# Patient Record
Sex: FEMALE | Race: WHITE | NOT HISPANIC OR LATINO | Employment: OTHER | URBAN - METROPOLITAN AREA
[De-identification: names, ages, dates, MRNs, and addresses within clinical notes are randomized per-mention and may not be internally consistent; named-entity substitution may affect disease eponyms.]

---

## 2017-02-08 RX ORDER — LEVOTHYROXINE SODIUM 112 MCG
TABLET ORAL
Qty: 90 TAB | Refills: 3 | Status: SHIPPED | OUTPATIENT
Start: 2017-02-08 | End: 2017-07-26

## 2017-02-17 ENCOUNTER — TELEPHONE (OUTPATIENT)
Dept: INTERNAL MEDICINE | Facility: MEDICAL CENTER | Age: 77
End: 2017-02-17

## 2017-03-14 ENCOUNTER — TELEPHONE (OUTPATIENT)
Dept: INTERNAL MEDICINE | Facility: MEDICAL CENTER | Age: 77
End: 2017-03-14

## 2017-03-14 NOTE — TELEPHONE ENCOUNTER
1. Caller Name: Pt                      Call Back Number: [phone] (home)     2. Message: Pt called stating she would like to get lab work done before her appt this Friday. If you would like to order labs she can get them done before the upcoming appt.    3. Patient approves office to leave a detailed voicemail/MyChart message: yes

## 2017-03-17 ENCOUNTER — OFFICE VISIT (OUTPATIENT)
Dept: INTERNAL MEDICINE | Facility: MEDICAL CENTER | Age: 77
End: 2017-03-17
Payer: MEDICARE

## 2017-03-17 VITALS
SYSTOLIC BLOOD PRESSURE: 120 MMHG | OXYGEN SATURATION: 95 % | TEMPERATURE: 97.6 F | HEIGHT: 60 IN | BODY MASS INDEX: 37.66 KG/M2 | HEART RATE: 84 BPM | DIASTOLIC BLOOD PRESSURE: 78 MMHG | WEIGHT: 191.8 LBS

## 2017-03-17 DIAGNOSIS — G89.29 CHRONIC LEFT-SIDED LOW BACK PAIN WITHOUT SCIATICA: ICD-10-CM

## 2017-03-17 DIAGNOSIS — E55.9 VITAMIN D DEFICIENCY: ICD-10-CM

## 2017-03-17 DIAGNOSIS — M54.50 CHRONIC LEFT-SIDED LOW BACK PAIN WITHOUT SCIATICA: ICD-10-CM

## 2017-03-17 DIAGNOSIS — L43.9 LICHEN PLANUS: ICD-10-CM

## 2017-03-17 DIAGNOSIS — I10 ESSENTIAL HYPERTENSION: ICD-10-CM

## 2017-03-17 PROBLEM — Z85.3 HISTORY OF BREAST CANCER: Status: ACTIVE | Noted: 2017-03-17

## 2017-03-17 PROCEDURE — 1036F TOBACCO NON-USER: CPT | Performed by: INTERNAL MEDICINE

## 2017-03-17 PROCEDURE — 99214 OFFICE O/P EST MOD 30 MIN: CPT | Performed by: INTERNAL MEDICINE

## 2017-03-17 PROCEDURE — 3017F COLORECTAL CA SCREEN DOC REV: CPT | Mod: 8P | Performed by: INTERNAL MEDICINE

## 2017-03-17 PROCEDURE — G8432 DEP SCR NOT DOC, RNG: HCPCS | Performed by: INTERNAL MEDICINE

## 2017-03-17 PROCEDURE — 4040F PNEUMOC VAC/ADMIN/RCVD: CPT | Mod: 8P | Performed by: INTERNAL MEDICINE

## 2017-03-17 PROCEDURE — 1101F PT FALLS ASSESS-DOCD LE1/YR: CPT | Performed by: INTERNAL MEDICINE

## 2017-03-17 PROCEDURE — G8484 FLU IMMUNIZE NO ADMIN: HCPCS | Performed by: INTERNAL MEDICINE

## 2017-03-17 PROCEDURE — G8419 CALC BMI OUT NRM PARAM NOF/U: HCPCS | Performed by: INTERNAL MEDICINE

## 2017-03-17 NOTE — MR AVS SNAPSHOT
Flavia Garrett   3/17/2017 1:20 PM   Office Visit   MRN: [id number]    Department:  [facility] Med - Internal Med   Dept Phone:  [phone]    Description:  Female : 1940   Provider:  Woo Samuels M.D.           Reason for Visit     Follow-Up follow up visit, would like to know if she needs anything to leave on trip to [facility]    Back Pain back problems    Medication Management stopped taking lisinopril, should she still take hctz      Allergies as of 3/17/2017     Allergen Noted Reactions    Lisinopril 2017       Cramps and upset stomach      You were diagnosed with     Essential hypertension   [2580198]       Vitamin D deficiency   [4797237]       Chronic left-sided low back pain without sciatica   [8423929]         Vital Signs     Blood Pressure Pulse Temperature Height Weight Body Mass Index    120/78 mmHg 84 36.4 °C (97.6 °F) 1.524 m (5') 87 kg (191 lb 12.8 oz) 37.46 kg/m2    Oxygen Saturation Smoking Status                95% Never Smoker           Basic Information     Date Of Birth Sex Race Ethnicity Preferred Language    1940 Female White Non- English      Your appointments     2017  1:40 PM   Established Patient with Woo Samuels M.D.   [facility] Med - Internal Medicine (--)    [address] [place] [address] [id number] [phone]           You will be receiving a confirmation call a few days before your appointment from our automated call confirmation system.              Problem List              ICD-10-CM Priority Class Noted - Resolved    Lack of energy R53.83   2016 - Present    Status post right knee replacement Z96.651   2016 - Present    Dysuria R30.0   2016 - Present    Toe pain, right M79.674   2016 - Present    Cellulitis L03.90   2016 - Present    Abdominal bloating R14.0   2016 - Present    Celiac disease K90.0   2016 - Present    Osteopenia M85.80   2016 - Present    Intertrigo L30.4   7/26/2016 - Present    Osteoarthritis of right knee M17.9   7/26/2016 - Present    Abnormal results of liver function studies R94.5   7/26/2016 - Present    Hearing loss H91.90   7/26/2016 - Present    Tinnitus H93.19   7/26/2016 - Present    Dyslipidemia E78.5   7/26/2016 - Present    H/O total hysterectomy Z90.710   7/26/2016 - Present    Hypothyroidism E03.9   7/26/2016 - Present    Vitamin D deficiency E55.9   7/26/2016 - Present    Osteoporosis M81.0   7/26/2016 - Present    Hx of breast cancer Z85.3   7/26/2016 - Present    Status post total right knee replacement Z96.651   8/17/2016 - Present    Gout of foot M10.9   9/30/2016 - Present    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.0710   9/30/2016 - Present    Essential hypertension I10   9/30/2016 - Present    Chronic left-sided low back pain without sciatica M54.5, G89.29   3/17/2017 - Present      Health Maintenance        Date Due Completion Dates    IMM DTaP/Tdap/Td Vaccine (1 - Tdap) 1/27/1961 ---    PAP SMEAR 1/27/1963 ---    MAMMOGRAM 1/27/1982 ---    IMM ZOSTER VACCINE 1/27/2002 ---    BONE DENSITY 1/27/2007 ---    IMM PNEUMOCOCCAL 65+ (ADULT) LOW/MEDIUM RISK SERIES (1 of 2 - PCV13) 1/27/2007 ---    IMM INFLUENZA (1) 9/1/2016 ---    COLONOSCOPY 3/13/2027 3/13/2017 (N/S)    Override on 3/13/2017: (N/S) (PER [facility] NO COLONOSCOPY)            Current Immunizations     INFLUENZA VACCINE H1N1 11/30/2009      Below and/or attached are the medications your provider expects you to take. Review all of your home medications and newly ordered medications with your provider and/or pharmacist. Follow medication instructions as directed by your provider and/or pharmacist. Please keep your medication list with you and share with your provider. Update the information when medications are discontinued, doses are changed, or new medications (including over-the-counter products) are added; and carry medication information at all times in the event of  emergency situations     Allergies:  LISINOPRIL - (reactions not documented)               Medications  Valid as of: March 17, 2017 -  2:29 PM    Generic Name Brand Name Tablet Size Instructions for use    Ergocalciferol (Cap) DRISDOL 06984 UNITS Take  by mouth every 7 days.        HydroCHLOROthiazide (Tab) HYDRODIURIL 12.5 MG TAKE 1 TABLET DAILY        Levothyroxine Sodium (Tab) SYNTHROID 112 MCG TAKE 1 TABLET DAILY AND    PLUS 1 AND 1/2 WEEKLY ON   SUNDAY        Probiotic Product   Take  by mouth.        .                 Medicines prescribed today were sent to:     [id number] [place], [place] - [address]    [address] [place] [address]    Phone: [phone] Fax: [phone]    Open 24 Hours?: No    [facility] [place], [place] - [address] AT PORTAL TO REGISTERED [facility] SITES    [address] [facility] [address] [address]    Phone: [phone] Fax: [phone]    Open 24 Hours?: No      Medication refill instructions:       If your prescription bottle indicates you have medication refills left, it is not necessary to call your provider’s office. Please contact your pharmacy and they will refill your medication.    If your prescription bottle indicates you do not have any refills left, you may request refills at any time through one of the following ways: The online [employer] system (except Urgent Care), by calling your provider’s office, or by asking your pharmacy to contact your provider’s office with a refill request. Medication refills are processed only during regular business hours and may not be available until the next business day. Your provider may request additional information or to have a follow-up visit with you prior to refilling your medication.   *Please Note: Medication refills are assigned a new Rx number when refilled electronically. Your pharmacy may indicate that no refills were authorized even though a new prescription for the same  medication is available at the pharmacy. Please request the medicine by name with the pharmacy before contacting your provider for a refill.        Referral     A referral request has been sent to our patient care coordination department. Please allow 3-5 business days for us to process this request and contact you either by phone or mail. If you do not hear from us by the 5th business day, please call us at [phone].           MyChart Status: Patient Declined

## 2017-03-17 NOTE — PROGRESS NOTES
Established Patient    Ms. Martinez a 77 y.o. female who presents today with:  CC: Follow-Up; Back Pain; and Medication Management        Assessment and Plan    1. Lichen planus  Recently was diagnosed with lichen planus and was compliant with topical steroid therapy for 3 months. Still complains of symptoms of dyspareunia and vaginal dryness. 2 different opinions from her gynecologist. With her oncologist not recommending vaginal estrogen because of her breast cancer history. Informed patient that although there was no significant data showing a link between vaginal estrogen and reoccurrence of estrogen positive breast cancer I did inform her that best practices for her to have an informed decision discussion with her oncologist or gynecologist about the pros and cons of using vaginal estrogen and a history of breast cancer. She will follow-up with her oncologist.    2. Essential hypertension  Discontinue lisinopril and continue hydrochlorothiazide.  - REFERRAL TO GERIATRICS    3. Vitamin D deficiency  Continue with vitamin D supplementation  - REFERRAL TO GERIATRICS    4. Chronic left-sided low back pain without sciatica  Suggest that she start physical therapy and if no improvement after 10-12 sessions then she can be referred to pain management for injection therapy. She agrees with this plan  - REFERRAL TO PHYSICAL THERAPY Reason for Therapy: Eval/Treat/Report      Current Outpatient Prescriptions   Medication Sig Dispense Refill   • SYNTHROID 112 MCG Tab TAKE 1 TABLET DAILY AND    PLUS 1 AND 1/2 WEEKLY ON   SUNDAY 90 Tab 3   • hydrochlorothiazide (HYDRODIURIL) 12.5 MG tablet TAKE 1 TABLET DAILY 90 Tab 3   • vitamin D, Ergocalciferol, (DRISDOL) 59393 UNITS Cap capsule Take  by mouth every 7 days.     • Probiotic Product (PROBIOTIC DAILY PO) Take  by mouth.       No current facility-administered medications for this visit.         followup Return in about 3 months (around  6/17/2017).      _______________________________________________________    HPI:   1. Lichen planus  She recently was diagnosed with lichen planus. This was diagnosed by her gynecologist. Via a biopsy. She was given topical high potency steroids. She was told to continue steroid therapy for one year. She also at the time had complaints of pain with intercourse and vaginal dryness. She has a history of breast cancer. Suspects that it was estrogen positive. Her gynecologist suggested topical vaginal estrogen. Her gynecologist moved out of state. Her new gynecologist suggested that she stop the steroid. He also disagreed with use of vaginal estrogen due to her prior history of breast cancer. Her oncologist Dr. Carver also suggested against it. Patient currently is using vaginal lubricants without much effect. She describes itching around the vagina without discharge and painful intercourse. She wants to use vaginal estrogen. She wants to know my opinion on the use of vaginal estrogen.    2. Essential hypertension  She developed a cough with lisinopril and also muscle cramps. She discontinued the medication. She resumed the hydrochlorothiazide 12.5 mg that she had at home. Her blood pressures at home have been 130/72. She no longer has symptoms.    3. Vitamin D deficiency  She has a history of vitamin D deficiency and is currently compliant with her vitamin D. She denies any falls or fractures or osteoporosis.    4. Chronic left-sided low back pain without sciatica  She has chronic left-sided low back pain after her knee replacement. The pain is 2 or 3 out of 10. She denies any symptoms of myopathy, neuropathy, incontinence, progressive weakness, fever and weight loss. Her orthopedic surgeon Dr. House performed x-rays in his office and told her that she had lumbar arthritis and suggested that she see pain management for an injection. She has not had physical therapy as of yet. She would prefer not to have injections at  this point.       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     reports that she has never smoked. She has never used smokeless tobacco. She reports that she drinks alcohol. She reports that she does not use illicit drugs.      ROS: As per HPI. Additional pertinent symptoms as noted below:        Physical Exam  /78 mmHg  Pulse 84  Temp(Src) 36.4 °C (97.6 °F)  Ht 1.524 m (5')  Wt 87 kg (191 lb 12.8 oz)  BMI 37.46 kg/m2  SpO2 95%  Constitutional:  oriented to person, place, and time. No distress.   Eyes: Pupils are equal, round, and reactive to light. No scleral icterus.   Neck: Neck supple. No thyromegaly present.   Cardiovascular: Normal rate, regular rhythm and normal heart sounds.  Exam reveals no gallop and no friction rub.    No murmur heard.  Pulmonary/Chest: Breath sounds normal. Chest wall is not dull to percussion.   Musculoskeletal:   no edema.       Current Outpatient Prescriptions on File Prior to Visit   Medication Sig Dispense Refill   • SYNTHROID 112 MCG Tab TAKE 1 TABLET DAILY AND    PLUS 1 AND 1/2 WEEKLY ON   SUNDAY 90 Tab 3   • hydrochlorothiazide (HYDRODIURIL) 12.5 MG tablet TAKE 1 TABLET DAILY 90 Tab 3   • vitamin D, Ergocalciferol, (DRISDOL) 06529 UNITS Cap capsule Take  by mouth every 7 days.     • Probiotic Product (PROBIOTIC DAILY PO) Take  by mouth.       No current facility-administered medications on file prior to visit.           Signed by: Woo Samuels M.D.

## 2017-03-28 ENCOUNTER — TELEPHONE (OUTPATIENT)
Dept: INTERNAL MEDICINE | Facility: MEDICAL CENTER | Age: 77
End: 2017-03-28

## 2017-03-28 NOTE — TELEPHONE ENCOUNTER
Called patient and explained, she states that she will wait through the week until Friday to see if they have gotten better.

## 2017-03-28 NOTE — TELEPHONE ENCOUNTER
1. Caller Name: Pt                      Call Back Number: [phone] (home)     2. Message: Pt called stating she got some canker sores on the 24th of this month and hasn't been able to get rid of them. She has tried OTC ointments but it just feels like a burn when she tries to put it on. Would like to know what she can do.    3. Patient approves office to leave a detailed voicemail/MyChart message: yes

## 2017-03-28 NOTE — TELEPHONE ENCOUNTER
Spoke to patient and she states she does not want to come in and would like to know if there is anything she can get OTC because she feels embarrassed to come into office. She says today they got better but she was using herpesin and aquafir

## 2017-03-28 NOTE — TELEPHONE ENCOUNTER
i can't recommend anything without seeing her.   She needs to come in. Please arrange if she agreeable.

## 2017-03-29 ENCOUNTER — OFFICE VISIT (OUTPATIENT)
Dept: INTERNAL MEDICINE | Facility: MEDICAL CENTER | Age: 77
End: 2017-03-29
Payer: MEDICARE

## 2017-03-29 VITALS
DIASTOLIC BLOOD PRESSURE: 80 MMHG | OXYGEN SATURATION: 96 % | HEIGHT: 60 IN | TEMPERATURE: 97.4 F | BODY MASS INDEX: 37.11 KG/M2 | SYSTOLIC BLOOD PRESSURE: 116 MMHG | WEIGHT: 189 LBS | HEART RATE: 94 BPM

## 2017-03-29 DIAGNOSIS — R23.8 SKIN IRRITATION: ICD-10-CM

## 2017-03-29 PROCEDURE — G8484 FLU IMMUNIZE NO ADMIN: HCPCS | Mod: GC | Performed by: INTERNAL MEDICINE

## 2017-03-29 PROCEDURE — G8419 CALC BMI OUT NRM PARAM NOF/U: HCPCS | Mod: GC | Performed by: INTERNAL MEDICINE

## 2017-03-29 PROCEDURE — 1101F PT FALLS ASSESS-DOCD LE1/YR: CPT | Mod: GC | Performed by: INTERNAL MEDICINE

## 2017-03-29 PROCEDURE — 4040F PNEUMOC VAC/ADMIN/RCVD: CPT | Mod: 8P,GC | Performed by: INTERNAL MEDICINE

## 2017-03-29 PROCEDURE — 99213 OFFICE O/P EST LOW 20 MIN: CPT | Mod: GE | Performed by: INTERNAL MEDICINE

## 2017-03-29 PROCEDURE — 3017F COLORECTAL CA SCREEN DOC REV: CPT | Mod: 8P,GC | Performed by: INTERNAL MEDICINE

## 2017-03-29 PROCEDURE — G8432 DEP SCR NOT DOC, RNG: HCPCS | Mod: GC | Performed by: INTERNAL MEDICINE

## 2017-03-29 PROCEDURE — 1036F TOBACCO NON-USER: CPT | Mod: GC | Performed by: INTERNAL MEDICINE

## 2017-03-29 NOTE — MR AVS SNAPSHOT
Flavia Garrett   3/29/2017 9:30 AM   Office Visit   MRN: [id number]    Department:  [facility] Med - Internal Med   Dept Phone:  [phone]    Description:  Female : 1940   Provider:  Vanessa Parks M.D.           Reason for Visit     Sore sores on lips      Allergies as of 3/29/2017     Allergen Noted Reactions    Lisinopril 2017       Cramps and upset stomach      You were diagnosed with     Allergy, initial encounter   [6141134]         Vital Signs     Blood Pressure Pulse Temperature Height Weight Body Mass Index    116/80 mmHg 94 36.3 °C (97.4 °F) 1.524 m (5') 85.73 kg (189 lb) 36.91 kg/m2    Oxygen Saturation Smoking Status                96% Never Smoker           Basic Information     Date Of Birth Sex Race Ethnicity Preferred Language    1940 Female White Non- English      Your appointments     2017  1:40 PM   Established Patient with Woo Samuels M.D.   [facility] Med - Internal Medicine (--)    [address] [place] [id number]   [phone]           You will be receiving a confirmation call a few days before your appointment from our automated call confirmation system.              Problem List              ICD-10-CM Priority Class Noted - Resolved    Lack of energy R53.83   2016 - Present    Status post right knee replacement Z96.651   2016 - Present    Dysuria R30.0   2016 - Present    Toe pain, right M79.674   2016 - Present    Cellulitis L03.90   2016 - Present    Abdominal bloating R14.0   2016 - Present    Celiac disease K90.0   2016 - Present    Osteopenia M85.80   2016 - Present    Intertrigo L30.4   2016 - Present    Osteoarthritis of right knee M17.9   2016 - Present    Abnormal results of liver function studies R94.5   2016 - Present    Hearing loss H91.90   2016 - Present    Tinnitus H93.19   2016 - Present    Dyslipidemia E78.5   2016 -  Present    H/O total hysterectomy Z90.710   7/26/2016 - Present    Hypothyroidism E03.9   7/26/2016 - Present    Vitamin D deficiency E55.9   7/26/2016 - Present    Osteoporosis M81.0   7/26/2016 - Present    Hx of breast cancer Z85.3   7/26/2016 - Present    Status post total right knee replacement Z96.651   8/17/2016 - Present    Gout of foot M10.9   9/30/2016 - Present    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.0710   9/30/2016 - Present    Essential hypertension I10   9/30/2016 - Present    Chronic left-sided low back pain without sciatica M54.5, G89.29   3/17/2017 - Present    Lichen planus L43.9   3/17/2017 - Present    History of breast cancer Z85.3   3/17/2017 - Present      Health Maintenance        Date Due Completion Dates    IMM DTaP/Tdap/Td Vaccine (1 - Tdap) 1/27/1961 ---    PAP SMEAR 1/27/1963 ---    MAMMOGRAM 1/27/1982 ---    IMM ZOSTER VACCINE 1/27/2002 ---    BONE DENSITY 1/27/2007 ---    IMM PNEUMOCOCCAL 65+ (ADULT) LOW/MEDIUM RISK SERIES (1 of 2 - PCV13) 1/27/2007 ---    IMM INFLUENZA (1) 9/1/2016 ---    COLONOSCOPY 3/13/2027 3/13/2017 (N/S)    Override on 3/13/2017: (N/S) (PER [facility] NO COLONOSCOPY)            Current Immunizations     INFLUENZA VACCINE H1N1 11/30/2009      Below and/or attached are the medications your provider expects you to take. Review all of your home medications and newly ordered medications with your provider and/or pharmacist. Follow medication instructions as directed by your provider and/or pharmacist. Please keep your medication list with you and share with your provider. Update the information when medications are discontinued, doses are changed, or new medications (including over-the-counter products) are added; and carry medication information at all times in the event of emergency situations     Allergies:  LISINOPRIL - (reactions not documented)               Medications  Valid as of: March 29, 2017 - 10:41 AM    Generic Name Brand Name Tablet Size Instructions  for use    Ergocalciferol (Cap) DRISDOL 13081 UNITS Take  by mouth every 7 days.        HydroCHLOROthiazide (Tab) HYDRODIURIL 12.5 MG TAKE 1 TABLET DAILY        Levothyroxine Sodium (Tab) SYNTHROID 112 MCG TAKE 1 TABLET DAILY AND    PLUS 1 AND 1/2 WEEKLY ON   SUNDAY        Probiotic Product   Take  by mouth.        .                 Medicines prescribed today were sent to:     [id number] - [place], [place] - [address]    [address] [place] [address]    Phone: [phone] Fax: [phone]    Open 24 Hours?: No    [facility] - [place], [place] - [address] AT PORTAL TO REGISTERED [facility]    [address] [facility] [address]    Phone: [phone] Fax: [phone]    Open 24 Hours?: No      Medication refill instructions:       If your prescription bottle indicates you have medication refills left, it is not necessary to call your provider’s office. Please contact your pharmacy and they will refill your medication.    If your prescription bottle indicates you do not have any refills left, you may request refills at any time through one of the following ways: The online [employer] system (except Urgent Care), by calling your provider’s office, or by asking your pharmacy to contact your provider’s office with a refill request. Medication refills are processed only during regular business hours and may not be available until the next business day. Your provider may request additional information or to have a follow-up visit with you prior to refilling your medication.   *Please Note: Medication refills are assigned a new Rx number when refilled electronically. Your pharmacy may indicate that no refills were authorized even though a new prescription for the same medication is available at the pharmacy. Please request the medicine by name with the pharmacy before contacting your provider for a refill.           MyChart Status: Patient Declined

## 2017-03-29 NOTE — PROGRESS NOTES
Established Patient    Flavia presents today with the following:    CC: Upper lip swelling and blisters    HPI: Patient is a 77-year-old female with past medical history of hypothyroidism, vitamin D deficiency, childhood eczema who presents today for acute visit. Patient reported swelling and blisters affecting her upper lip that started about 5 days ago. The only thing that she remembers that might have contributed to the symptoms is drinking too much acidic juices as she add levi, limes and oranges to her drinking water. After the onset of the symptoms she stopped drinking those kinds of things and symptoms improved significantly. She also tried over the counter Aquaphor which seems to be helping with her symptoms. She denies using any new makeup preparation. She denied any lesions in any other part of her body and also denied any shortness of breath or any mouth lesions.    Patient Active Problem List    Diagnosis Date Noted   • Chronic left-sided low back pain without sciatica 03/17/2017   • Lichen planus 03/17/2017   • History of breast cancer 03/17/2017   • Gout of foot 09/30/2016   • Idiopathic chronic gout of right ankle 09/30/2016   • Essential hypertension 09/30/2016   • Status post total right knee replacement 08/17/2016   • Dysuria 07/26/2016   • Toe pain, right 07/26/2016   • Cellulitis 07/26/2016   • Abdominal bloating 07/26/2016   • Celiac disease 07/26/2016   • Osteopenia 07/26/2016   • Intertrigo 07/26/2016   • Osteoarthritis of right knee 07/26/2016   • Abnormal results of liver function studies 07/26/2016   • Hearing loss 07/26/2016   • Tinnitus 07/26/2016   • Dyslipidemia 07/26/2016   • H/O total hysterectomy 07/26/2016   • Hypothyroidism 07/26/2016   • Vitamin D deficiency 07/26/2016   • Osteoporosis 07/26/2016   • Hx of breast cancer 07/26/2016   • Lack of energy 05/05/2016   • Status post right knee replacement 05/05/2016       Current Outpatient Prescriptions   Medication Sig Dispense  Refill   • SYNTHROID 112 MCG Tab TAKE 1 TABLET DAILY AND    PLUS 1 AND 1/2 WEEKLY ON   SUNDAY 90 Tab 3   • hydrochlorothiazide (HYDRODIURIL) 12.5 MG tablet TAKE 1 TABLET DAILY 90 Tab 3   • vitamin D, Ergocalciferol, (DRISDOL) 76879 UNITS Cap capsule Take  by mouth every 7 days.     • Probiotic Product (PROBIOTIC DAILY PO) Take  by mouth.       No current facility-administered medications for this visit.           Review of Systems:     Constitutional: Denies fevers, Denies weight changes  Eyes: Denies changes in vision, no eye pain  Ears/Nose/Throat/Mouth: Denies nasal congestion or sore throat   Cardiovascular: Denies chest pain or palpitations   Respiratory: Denies shortness of breath , Denies cough  Gastrointestinal/Hepatic: Denies abdominal pain, nausea, vomiting, diarrhea or constipation.  Genitourinary: Denies bladder dysfunction, dysuria or frequency  Musculoskeletal/Rheum: Denies  joint pain and swelling   Skin: Denies rash.  Neurological: Denies headache, confusion, memory loss or focal weakness/parasthesias  Psychiatric: denies mood disorder               /80 mmHg  Pulse 94  Temp(Src) 36.3 °C (97.4 °F)  Ht 1.524 m (5')  Wt 85.73 kg (189 lb)  BMI 36.91 kg/m2  SpO2 96%    Physical Exam   Constitutional:  Comfortable. No acute distress.   Eyes: Pupils are equal, round, and reactive to light. No scleral icterus.  Neck: Neck supple. No thyromegaly present.   Mouth: Upper lip swollen with some blisters in the healing process. No mouth lesions  Cardiovascular: Normal rate, regular rhythm and normal heart sounds.  Exam reveals no gallop and no friction rub.  No murmur heard.  Pulmonary/Chest: Lungs clear to ascultation bilaterally. Breath sounds normal. No rhonchi, wheezes or crackles.   Musculoskeletal:   No deformity noted. no edema.   Lymphadenopathy: no cervical adenopathy  Neurological: alert and oriented to person, place, and time. Cranial nerves 2-12 grossly intact. No obvious motor or sensory  deficits.  Extremities: No edema. No clubbing. No cyanosis. Distal pulses 2+ bilaterally.  Skin: No Rash. No cyanosis. Nails show no clubbing.      Assessment and Plan    1. Skin irritation:  - Patient most likely has a local reaction due to irritation of the skin due to excessive acidic juices.  - Patient advised to keep the area moist, avoid any kind of skin irritants, and to use Aquaphor  - Patient was also advised to avoid it make ups for the next 2 months or until the lesions healed completely.  - Patient understands that she should seek medical attention case of worsening of her symptoms or development of new symptoms like shortness of breath or new lesions in any part of her body.    Signed by: Vanessa Parks M.D.

## 2017-03-31 ENCOUNTER — TELEPHONE (OUTPATIENT)
Dept: INTERNAL MEDICINE | Facility: MEDICAL CENTER | Age: 77
End: 2017-03-31

## 2017-03-31 NOTE — TELEPHONE ENCOUNTER
Patient called stating she needed a renewal on Cytomel, but i see in her chart that, that medication was discontinued back in august of last year.

## 2017-04-05 RX ORDER — LIOTHYRONINE SODIUM 5 UG/1
5 TABLET ORAL DAILY
Qty: 30 TAB | Refills: 6 | Status: SHIPPED | OUTPATIENT
Start: 2017-04-05 | End: 2017-07-26

## 2017-04-05 NOTE — TELEPHONE ENCOUNTER
Patient states she has been taking medication for a couple of years. She last got it refilled back on 01/03/17 with no refills remaining. Also, has pain on left foot and thinks it might be gout and started taking indomethacin Dr. Miriam emanuel dr.

## 2017-04-06 ENCOUNTER — OFFICE VISIT (OUTPATIENT)
Dept: INTERNAL MEDICINE | Facility: MEDICAL CENTER | Age: 77
End: 2017-04-06
Payer: MEDICARE

## 2017-04-06 VITALS
HEIGHT: 60 IN | BODY MASS INDEX: 37.11 KG/M2 | DIASTOLIC BLOOD PRESSURE: 80 MMHG | TEMPERATURE: 95.6 F | WEIGHT: 189 LBS | RESPIRATION RATE: 20 BRPM | OXYGEN SATURATION: 95 % | HEART RATE: 92 BPM | SYSTOLIC BLOOD PRESSURE: 122 MMHG

## 2017-04-06 DIAGNOSIS — M79.89 SWELLING OF TOE OF LEFT FOOT: ICD-10-CM

## 2017-04-06 PROCEDURE — 1101F PT FALLS ASSESS-DOCD LE1/YR: CPT | Performed by: INTERNAL MEDICINE

## 2017-04-06 PROCEDURE — 1036F TOBACCO NON-USER: CPT | Performed by: INTERNAL MEDICINE

## 2017-04-06 PROCEDURE — 99213 OFFICE O/P EST LOW 20 MIN: CPT | Performed by: INTERNAL MEDICINE

## 2017-04-06 PROCEDURE — 4040F PNEUMOC VAC/ADMIN/RCVD: CPT | Mod: 8P | Performed by: INTERNAL MEDICINE

## 2017-04-06 PROCEDURE — G8432 DEP SCR NOT DOC, RNG: HCPCS | Performed by: INTERNAL MEDICINE

## 2017-04-06 PROCEDURE — G8419 CALC BMI OUT NRM PARAM NOF/U: HCPCS | Performed by: INTERNAL MEDICINE

## 2017-04-06 PROCEDURE — 3017F COLORECTAL CA SCREEN DOC REV: CPT | Mod: 8P | Performed by: INTERNAL MEDICINE

## 2017-04-06 RX ORDER — COLCHICINE 0.6 MG/1
TABLET ORAL
Qty: 30 TAB | Refills: 0 | Status: SHIPPED | OUTPATIENT
Start: 2017-04-06 | End: 2019-05-31

## 2017-04-06 ASSESSMENT — PAIN SCALES - GENERAL: PAINLEVEL: 5=MODERATE PAIN

## 2017-04-06 NOTE — MR AVS SNAPSHOT
Flavia Garrett   2017 12:30 PM   Office Visit   MRN: [id number]    Department:  [facility] Med - Internal Med   Dept Phone:  [phone]    Description:  Female : 1940   Provider:  Woo Samuels M.D.           Reason for Visit     Foot Swelling 4 days      Allergies as of 2017     Allergen Noted Reactions    Lisinopril 2017       Cramps and upset stomach      You were diagnosed with     Swelling of toe of left foot   [5450230]         Vital Signs     Blood Pressure Pulse Temperature Respirations Height Weight    122/80 mmHg 92 35.3 °C (95.6 °F) 20 1.524 m (5') 85.73 kg (189 lb)    Body Mass Index Oxygen Saturation Breastfeeding? Smoking Status          36.91 kg/m2 95% No Never Smoker         Basic Information     Date Of Birth Sex Race Ethnicity Preferred Language    1940 Female White Non- English      Your appointments     2017  1:40 PM   Established Patient with Woo Samuels M.D.   [facility] Med - Internal Medicine (--)    [address] [place] [id number] [phone]           You will be receiving a confirmation call a few days before your appointment from our automated call confirmation system.              Problem List              ICD-10-CM Priority Class Noted - Resolved    Lack of energy R53.83   2016 - Present    Status post right knee replacement Z96.651   2016 - Present    Dysuria R30.0   2016 - Present    Toe pain, right M79.674   2016 - Present    Cellulitis L03.90   2016 - Present    Abdominal bloating R14.0   2016 - Present    Celiac disease K90.0   2016 - Present    Osteopenia M85.80   2016 - Present    Intertrigo L30.4   2016 - Present    Osteoarthritis of right knee M17.9   2016 - Present    Abnormal results of liver function studies R94.5   2016 - Present    Hearing loss H91.90   2016 - Present    Tinnitus H93.19   2016 - Present    Dyslipidemia E78.5   7/26/2016 - Present    H/O total hysterectomy Z90.710   7/26/2016 - Present    Hypothyroidism E03.9   7/26/2016 - Present    Vitamin D deficiency E55.9   7/26/2016 - Present    Osteoporosis M81.0   7/26/2016 - Present    Hx of breast cancer Z85.3   7/26/2016 - Present    Status post total right knee replacement Z96.651   8/17/2016 - Present    Gout of foot M10.9   9/30/2016 - Present    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.0710   9/30/2016 - Present    Essential hypertension I10   9/30/2016 - Present    Chronic left-sided low back pain without sciatica M54.5, G89.29   3/17/2017 - Present    Lichen planus L43.9   3/17/2017 - Present    History of breast cancer Z85.3   3/17/2017 - Present      Health Maintenance        Date Due Completion Dates    IMM DTaP/Tdap/Td Vaccine (1 - Tdap) 1/27/1961 ---    PAP SMEAR 1/27/1963 ---    MAMMOGRAM 1/27/1982 ---    IMM ZOSTER VACCINE 1/27/2002 ---    BONE DENSITY 1/27/2007 ---    IMM PNEUMOCOCCAL 65+ (ADULT) LOW/MEDIUM RISK SERIES (1 of 2 - PCV13) 1/27/2007 ---    COLONOSCOPY 3/13/2027 3/13/2017 (N/S)    Override on 3/13/2017: (N/S) (PER [facility] NO COLONOSCOPY)            Current Immunizations     INFLUENZA VACCINE H1N1 11/30/2009      Below and/or attached are the medications your provider expects you to take. Review all of your home medications and newly ordered medications with your provider and/or pharmacist. Follow medication instructions as directed by your provider and/or pharmacist. Please keep your medication list with you and share with your provider. Update the information when medications are discontinued, doses are changed, or new medications (including over-the-counter products) are added; and carry medication information at all times in the event of emergency situations     Allergies:  LISINOPRIL - (reactions not documented)               Medications  Valid as of: April 06, 2017 -  1:05 PM    Generic Name Brand Name Tablet Size Instructions  for use    Colchicine (Tab) COLCRYS 0.6 MG Two pills x 1 then one tab po bid        Ergocalciferol (Cap) DRISDOL 34686 UNITS Take  by mouth every 7 days.        HydroCHLOROthiazide (Tab) HYDRODIURIL 12.5 MG TAKE 1 TABLET DAILY        Levothyroxine Sodium (Tab) SYNTHROID 112 MCG TAKE 1 TABLET DAILY AND    PLUS 1 AND 1/2 WEEKLY ON   SUNDAY        Liothyronine Sodium (Tab) CYTOMEL 5 MCG Take 1 Tab by mouth every day.        Probiotic Product   Take  by mouth.        .                 Medicines prescribed today were sent to:     [id number] [place], [place] [facility] [address]    [address] [place] [address]    Phone: [phone] Fax: [phone]    Open 24 Hours?: No    [facility] [place], [place] - [address] AT PORTAL TO REGISTERED [facility]    [address] [address]    Phone: [phone] Fax: [phone]    Open 24 Hours?: No      Medication refill instructions:       If your prescription bottle indicates you have medication refills left, it is not necessary to call your provider’s office. Please contact your pharmacy and they will refill your medication.    If your prescription bottle indicates you do not have any refills left, you may request refills at any time through one of the following ways: The online [employer] system (except Urgent Care), by calling your provider’s office, or by asking your pharmacy to contact your provider’s office with a refill request. Medication refills are processed only during regular business hours and may not be available until the next business day. Your provider may request additional information or to have a follow-up visit with you prior to refilling your medication.   *Please Note: Medication refills are assigned a new Rx number when refilled electronically. Your pharmacy may indicate that no refills were authorized even though a new prescription for the same medication is available at the pharmacy. Please  request the medicine by name with the pharmacy before contacting your provider for a refill.        Your To Do List     Future Labs/Procedures Complete By Expires    CBC WITH DIFFERENTIAL  As directed 4/6/2018    URIC ACID  As directed 4/7/2018    WESTERGREN SED RATE  As directed 4/6/2018      Instructions    Try colchicine as needed for gout.           MyChart Status: Patient Declined

## 2017-04-07 DIAGNOSIS — M79.89 SWELLING OF TOE OF LEFT FOOT: ICD-10-CM

## 2017-04-11 NOTE — PROGRESS NOTES
Established Patient    Ms. Martinez a 77 y.o. female who presents today with:  CC: Foot Swelling        Assessment and Plan    1. Swelling of toe of left foot  Suspect that this is most likely gouty arthritis exacerbation. She currently is not on prophylaxis. We'll check a uric acid, CBC and ESR. At her next visit we will discuss use of prophylaxis. I suggest that she try colchicine for the next couple of days although the benefit will be low after having her gout attack for several days.  - URIC ACID; Future  - CBC WITH DIFFERENTIAL; Future  - WESTERGREN SED RATE; Future      Current Outpatient Prescriptions   Medication Sig Dispense Refill   • colchicine (COLCRYS) 0.6 MG Tab Two pills x 1 then one tab po bid 30 Tab 0   • liothyronine (CYTOMEL) 5 MCG Tab Take 1 Tab by mouth every day. 30 Tab 6   • SYNTHROID 112 MCG Tab TAKE 1 TABLET DAILY AND    PLUS 1 AND 1/2 WEEKLY ON   SUNDAY 90 Tab 3   • hydrochlorothiazide (HYDRODIURIL) 12.5 MG tablet TAKE 1 TABLET DAILY 90 Tab 3   • vitamin D, Ergocalciferol, (DRISDOL) 85632 UNITS Cap capsule Take  by mouth every 7 days.     • Probiotic Product (PROBIOTIC DAILY PO) Take  by mouth.       No current facility-administered medications for this visit.         followup Return in about 3 months (around 7/6/2017).      _______________________________________________________    HPI:   1. Swelling of toe of left foot  She has a history gout. Recently within the last few days she had acute toe pain of the second metatarsal on the right foot. There was redness and pain with passive motion. The next day she gradually developed swelling in the dorsal surface of the foot that extended beyond the ankle to the lower distal leg. She denied any shortness of breath, cords or masses. The toe pain was exquisitely painful to touch. Her podiatrist gave her indomethacin which she took 50 mg twice a day for one day with no effect and then she discontinued.  Currently her swelling has improved in her  toe pain is significantly better.       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     reports that she has never smoked. She has never used smokeless tobacco. She reports that she drinks alcohol. She reports that she does not use illicit drugs.      ROS: As per HPI. Additional pertinent symptoms as noted below:  She does not have fever or rash.      Physical Exam  /80 mmHg  Pulse 92  Temp(Src) 35.3 °C (95.6 °F)  Resp 20  Ht 1.524 m (5')  Wt 85.73 kg (189 lb)  BMI 36.91 kg/m2  SpO2 95%  Breastfeeding? No  muscle skeletal: Mild 1-2+ pitting edema of the dorsal surface of the foot extending to the ankle. No Tenderness, redness, pain. Slight pain to palpation along the MTP second toe. No redness range of motion.        Current Outpatient Prescriptions on File Prior to Visit   Medication Sig Dispense Refill   • liothyronine (CYTOMEL) 5 MCG Tab Take 1 Tab by mouth every day. 30 Tab 6   • SYNTHROID 112 MCG Tab TAKE 1 TABLET DAILY AND    PLUS 1 AND 1/2 WEEKLY ON   SUNDAY 90 Tab 3   • hydrochlorothiazide (HYDRODIURIL) 12.5 MG tablet TAKE 1 TABLET DAILY 90 Tab 3   • vitamin D, Ergocalciferol, (DRISDOL) 71939 UNITS Cap capsule Take  by mouth every 7 days.     • Probiotic Product (PROBIOTIC DAILY PO) Take  by mouth.       No current facility-administered medications on file prior to visit.           Signed by: Woo Samuels M.D.

## 2017-06-23 ENCOUNTER — OFFICE VISIT (OUTPATIENT)
Dept: INTERNAL MEDICINE | Facility: MEDICAL CENTER | Age: 77
End: 2017-06-23
Payer: MEDICARE

## 2017-06-23 VITALS
BODY MASS INDEX: 37.99 KG/M2 | SYSTOLIC BLOOD PRESSURE: 140 MMHG | TEMPERATURE: 98.2 F | HEART RATE: 83 BPM | OXYGEN SATURATION: 94 % | HEIGHT: 60 IN | DIASTOLIC BLOOD PRESSURE: 90 MMHG | WEIGHT: 193.5 LBS

## 2017-06-23 DIAGNOSIS — E78.5 DYSLIPIDEMIA: ICD-10-CM

## 2017-06-23 DIAGNOSIS — E55.9 VITAMIN D DEFICIENCY: ICD-10-CM

## 2017-06-23 DIAGNOSIS — M10.9 GOUT OF FOOT, UNSPECIFIED CAUSE, UNSPECIFIED CHRONICITY, UNSPECIFIED LATERALITY: ICD-10-CM

## 2017-06-23 DIAGNOSIS — I10 ESSENTIAL HYPERTENSION: ICD-10-CM

## 2017-06-23 DIAGNOSIS — E03.9 ACQUIRED HYPOTHYROIDISM: ICD-10-CM

## 2017-06-23 PROCEDURE — 99214 OFFICE O/P EST MOD 30 MIN: CPT | Performed by: INTERNAL MEDICINE

## 2017-06-23 RX ORDER — LOSARTAN POTASSIUM 50 MG/1
50 TABLET ORAL DAILY
Qty: 30 TAB | Refills: 2 | Status: SHIPPED | OUTPATIENT
Start: 2017-06-23 | End: 2017-07-26 | Stop reason: SDUPTHER

## 2017-06-23 RX ORDER — ERGOCALCIFEROL 1.25 MG/1
50000 CAPSULE ORAL
Qty: 12 CAP | Refills: 3 | Status: SHIPPED | OUTPATIENT
Start: 2017-06-23 | End: 2018-05-14 | Stop reason: SDUPTHER

## 2017-06-23 NOTE — MR AVS SNAPSHOT
Flavia Garrett   2017 1:40 PM   Office Visit   MRN: [id number]    Department:  [facility] Med - Internal Med   Dept Phone:  [phone]    Description:  Female : 1940   Provider:  Woo Samuels M.D.           Reason for Visit     Results [facility]    Medication Refill Vit D 50,000iu-[facility] Mail order    Blood Pressure Problem B/P check      Allergies as of 2017     Allergen Noted Reactions    Lisinopril 2017       Cramps and upset stomach      You were diagnosed with     Essential hypertension   [2972019]       Vitamin D deficiency   [6661303]       Gout of foot, unspecified cause, unspecified chronicity, unspecified laterality   [3838215]       Acquired hypothyroidism   [3436192]       Dyslipidemia   [608172]         Vital Signs     Blood Pressure Pulse Temperature Height Weight Body Mass Index    140/90 mmHg 83 36.8 °C (98.2 °F) 1.524 m (5') 87.771 kg (193 lb 8 oz) 37.79 kg/m2    Oxygen Saturation Smoking Status                94% Never Smoker           Basic Information     Date Of Birth Sex Race Ethnicity Preferred Language    1940 Female White Non- English      Your appointments     2017  1:40 PM   Established Patient with Woo Samuels M.D.   [facility] Med - Internal Medicine (--)    [address] [place] [id number] [phone]           You will be receiving a confirmation call a few days before your appointment from our automated call confirmation system.              Problem List              ICD-10-CM Priority Class Noted - Resolved    Lack of energy R53.83   2016 - Present    Status post right knee replacement Z96.651   2016 - Present    Dysuria R30.0   2016 - Present    Toe pain, right M79.674   2016 - Present    Cellulitis L03.90   2016 - Present    Abdominal bloating R14.0   2016 - Present    Celiac disease K90.0   2016 - Present    Osteopenia M85.80    7/26/2016 - Present    Intertrigo L30.4   7/26/2016 - Present    Osteoarthritis of right knee M17.11   7/26/2016 - Present    Abnormal results of liver function studies R94.5   7/26/2016 - Present    Hearing loss H91.90   7/26/2016 - Present    Tinnitus H93.19   7/26/2016 - Present    Dyslipidemia E78.5   7/26/2016 - Present    H/O total hysterectomy Z90.710   7/26/2016 - Present    Hypothyroidism E03.9   7/26/2016 - Present    Vitamin D deficiency E55.9   7/26/2016 - Present    Osteoporosis M81.0   7/26/2016 - Present    Hx of breast cancer Z85.3   7/26/2016 - Present    Status post total right knee replacement Z96.651   8/17/2016 - Present    Gout of foot M10.9   9/30/2016 - Present    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.0710   9/30/2016 - Present    Essential hypertension I10   9/30/2016 - Present    Chronic left-sided low back pain without sciatica M54.5, G89.29   3/17/2017 - Present    Lichen planus L43.9   3/17/2017 - Present    History of breast cancer Z85.3   3/17/2017 - Present    Acquired hypothyroidism E03.9   6/23/2017 - Present      Health Maintenance        Date Due Completion Dates    IMM DTaP/Tdap/Td Vaccine (1 - Tdap) 1/27/1961 ---    PAP SMEAR 1/27/1963 ---    MAMMOGRAM 1/27/1982 ---    IMM ZOSTER VACCINE 1/27/2002 ---    BONE DENSITY 1/27/2007 ---    IMM PNEUMOCOCCAL 65+ (ADULT) LOW/MEDIUM RISK SERIES (1 of 2 - PCV13) 1/27/2007 ---    COLONOSCOPY 3/13/2027 3/13/2017 (N/S)    Override on 3/13/2017: (N/S) (PER [facility] NO COLONOSCOPY)            Current Immunizations     INFLUENZA VACCINE H1N1 11/30/2009      Below and/or attached are the medications your provider expects you to take. Review all of your home medications and newly ordered medications with your provider and/or pharmacist. Follow medication instructions as directed by your provider and/or pharmacist. Please keep your medication list with you and share with your provider. Update the information when medications are discontinued, doses  are changed, or new medications (including over-the-counter products) are added; and carry medication information at all times in the event of emergency situations     Allergies:  LISINOPRIL - (reactions not documented)               Medications  Valid as of: June 23, 2017 -  2:08 PM    Generic Name Brand Name Tablet Size Instructions for use    Colchicine (Tab) COLCRYS 0.6 MG Two pills x 1 then one tab po bid        Ergocalciferol (Cap) DRISDOL 59249 UNITS Take 1 Cap by mouth every 7 days.        Levothyroxine Sodium (Tab) SYNTHROID 112 MCG TAKE 1 TABLET DAILY AND    PLUS 1 AND 1/2 WEEKLY ON   SUNDAY        Liothyronine Sodium (Tab) CYTOMEL 5 MCG Take 1 Tab by mouth every day.        Losartan Potassium (Tab) COZAAR 50 MG Take 1 Tab by mouth every day.        Probiotic Product   Take  by mouth.        .                 Medicines prescribed today [facility] to:     [id number] [place] [address]    [address] [facility] [address]    Phone: [phone] Fax: [phone]    Open 24 Hours?: No    [facility] - [place], [place] - [address] AT PORTAL TO REGISTERED [facility]    [address] [address]    Phone: [phone] Fax: [phone]    Open 24 Hours?: No      Medication refill instructions:       If your prescription bottle indicates you have medication refills left, it is not necessary to call your provider’s office. Please contact your pharmacy and they will refill your medication.    If your prescription bottle indicates you do not have any refills left, you may request refills at any time through one of the following ways: The online [employer] system (except Urgent Care), by calling your provider’s office, or by asking your pharmacy to contact your provider’s office with a refill request. Medication refills are processed only during regular business hours and may not be available until the next business day. Your provider may request  additional information or to have a follow-up visit with you prior to refilling your medication.   *Please Note: Medication refills are assigned a new Rx number when refilled electronically. Your pharmacy may indicate that no refills were authorized even though a new prescription for the same medication is available at the pharmacy. Please request the medicine by name with the pharmacy before contacting your provider for a refill.        Your To Do List     Future Labs/Procedures Complete By Expires    CBC WITH DIFFERENTIAL  As directed 6/23/2018    TSH WITH REFLEX TO FT4  As directed 6/23/2018    VITAMIN D,25 HYDROXY  As directed 6/24/2018    WESTERGREN SED RATE  As directed 6/23/2018      Instructions    Check vitamin D levels in 12 weeks  Check thyroid labs when able  Stop HCTZ and begin lorsartan for bp  Keep checking BP twice per day and record and bring with you          MyChart Status: Patient Declined

## 2017-06-23 NOTE — PATIENT INSTRUCTIONS
Check vitamin D levels in 12 weeks  Check thyroid labs when able  Stop HCTZ and begin lorsartan for bp  Keep checking BP twice per day and record and bring with you

## 2017-06-23 NOTE — PROGRESS NOTES
Established Patient    Ms. Martinez a 77 y.o. female who presents today with:  CC: Results; Medication Refill; and Blood Pressure Problem        Assessment and Plan    1. Essential hypertension  She wants to go to [facility] and lose weight.  I agree with this plan. Goal is lose 15lbs.  Discontinuing hydrochlorothiazide secondary to gout. We'll begin losartan due to its reduced risk of gouty exacerbations. Patient will record her blood pressures throughout the week and bring to me for evaluation  2. Vitamin D deficiency  Needs a refill Vitamin D. Patient will remain compliant. We'll check vitamin D level in 12 weeks.     3. Gout of foot, unspecified cause, unspecified chronicity, unspecified laterality  Currently no further episodes. Not a candidate for prophylaxis. Discontinuing hydrochlorothiazide. Uric acid 6.2.      Current Outpatient Prescriptions   Medication Sig Dispense Refill   • colchicine (COLCRYS) 0.6 MG Tab Two pills x 1 then one tab po bid 30 Tab 0   • liothyronine (CYTOMEL) 5 MCG Tab Take 1 Tab by mouth every day. 30 Tab 6   • SYNTHROID 112 MCG Tab TAKE 1 TABLET DAILY AND    PLUS 1 AND 1/2 WEEKLY ON   SUNDAY 90 Tab 3   • hydrochlorothiazide (HYDRODIURIL) 12.5 MG tablet TAKE 1 TABLET DAILY 90 Tab 3   • vitamin D, Ergocalciferol, (DRISDOL) 12859 UNITS Cap capsule Take  by mouth every 7 days.     • Probiotic Product (PROBIOTIC DAILY PO) Take  by mouth.       No current facility-administered medications for this visit.         followup No Follow-up on file.      _______________________________________________________    HPI:   1. Essential hypertension  bp at home averages 140/70s. No values over  140. Compliant with hctz. No lightheadedness.    2. Vitamin D deficiency  Has a history of vitamin D deficiency, however not very compliant with her 50,000 units per week. Occasionally forgets. No bone pain, fractures. S2    3. Gout of foot, unspecified cause, unspecified chronicity, unspecified laterality  In  April she had acute first MTP pain most likely secondary to gout. Uric acid at that time was 6.2, ESR 51 CBC within normal limits. Patient prescribed anti-inflammatories and colchicine for one week.. Since then she has not had another flare. 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     reports that she has never smoked. She has never used smokeless tobacco. She reports that she drinks alcohol. She reports that she does not use illicit drugs.      ROS: As per HPI. Additional pertinent symptoms as noted below:  Musculoskeletal/Extremities ROS: No pain, redness or swelling on the joints      Physical Exam  /90 mmHg  Pulse 83  Temp(Src) 36.8 °C (98.2 °F)  Ht 1.524 m (5')  Wt 87.771 kg (193 lb 8 oz)  BMI 37.79 kg/m2  SpO2 94%  muscle skeletal: No joint edema, erythema, pain        Current Outpatient Prescriptions on File Prior to Visit   Medication Sig Dispense Refill   • colchicine (COLCRYS) 0.6 MG Tab Two pills x 1 then one tab po bid 30 Tab 0   • liothyronine (CYTOMEL) 5 MCG Tab Take 1 Tab by mouth every day. 30 Tab 6   • SYNTHROID 112 MCG Tab TAKE 1 TABLET DAILY AND    PLUS 1 AND 1/2 WEEKLY ON   SUNDAY 90 Tab 3   • hydrochlorothiazide (HYDRODIURIL) 12.5 MG tablet TAKE 1 TABLET DAILY 90 Tab 3   • vitamin D, Ergocalciferol, (DRISDOL) 38685 UNITS Cap capsule Take  by mouth every 7 days.     • Probiotic Product (PROBIOTIC DAILY PO) Take  by mouth.       No current facility-administered medications on file prior to visit.           Signed by: Woo Samuels M.D.

## 2017-06-27 DIAGNOSIS — M10.9 GOUT OF FOOT, UNSPECIFIED CAUSE, UNSPECIFIED CHRONICITY, UNSPECIFIED LATERALITY: ICD-10-CM

## 2017-06-27 DIAGNOSIS — E03.9 ACQUIRED HYPOTHYROIDISM: ICD-10-CM

## 2017-07-26 ENCOUNTER — OFFICE VISIT (OUTPATIENT)
Dept: INTERNAL MEDICINE | Facility: MEDICAL CENTER | Age: 77
End: 2017-07-26
Payer: MEDICARE

## 2017-07-26 VITALS
OXYGEN SATURATION: 95 % | HEART RATE: 91 BPM | SYSTOLIC BLOOD PRESSURE: 100 MMHG | WEIGHT: 195.6 LBS | TEMPERATURE: 97.8 F | BODY MASS INDEX: 38.4 KG/M2 | DIASTOLIC BLOOD PRESSURE: 70 MMHG | HEIGHT: 60 IN

## 2017-07-26 DIAGNOSIS — R14.0 BLOATING: ICD-10-CM

## 2017-07-26 DIAGNOSIS — E03.9 HYPOTHYROIDISM, UNSPECIFIED TYPE: ICD-10-CM

## 2017-07-26 DIAGNOSIS — I10 ESSENTIAL HYPERTENSION: ICD-10-CM

## 2017-07-26 DIAGNOSIS — M10.9 GOUT OF FOOT, UNSPECIFIED CAUSE, UNSPECIFIED CHRONICITY, UNSPECIFIED LATERALITY: ICD-10-CM

## 2017-07-26 PROCEDURE — 99214 OFFICE O/P EST MOD 30 MIN: CPT | Performed by: INTERNAL MEDICINE

## 2017-07-26 RX ORDER — LOSARTAN POTASSIUM 50 MG/1
50 TABLET ORAL DAILY
Qty: 90 TAB | Refills: 3 | Status: SHIPPED | OUTPATIENT
Start: 2017-07-26 | End: 2018-07-10 | Stop reason: SDUPTHER

## 2017-07-26 NOTE — MR AVS SNAPSHOT
Flavia Garrett   2017 3:00 PM   Office Visit   MRN: [id number]    Department:  [facility] - Internal Med   Dept Phone:  [phone]    Description:  Female : 1940   Provider:  Woo Samuels M.D.           Reason for Visit     Follow-Up follow up on labs    Medication Refill losartan send to [facility] mail order    Advice Only combacha    Medication Management hctz and liothyronine      Allergies as of 2017     Allergen Noted Reactions    Lisinopril 2017       Cramps and upset stomach      You were diagnosed with     Gout of foot, unspecified cause, unspecified chronicity, unspecified laterality   [[id number]]       Hypothyroidism, unspecified type   [[id number]]         Vital Signs     Blood Pressure Pulse Temperature Height Weight Body Mass Index    100/70 mmHg 91 36.6 °C (97.8 °F) 1.524 m (5') 88.724 kg (195 lb 9.6 oz) 38.20 kg/m2    Oxygen Saturation Smoking Status                95% Never Smoker           Basic Information     Date Of Birth Sex Race Ethnicity Preferred Language    1940 Female White Non- English      Your appointments     Oct 31, 2017  1:00 PM   Established Patient with Woo Samuels M.D.   [facility] - Internal Medicine (--)    [address] [place] [id number] [phone]           You will be receiving a confirmation call a few days before your appointment from our automated call confirmation system.              Problem List              ICD-10-CM Priority Class Noted - Resolved    Lack of energy R53.83   2016 - Present    Status post right knee replacement Z96.651   2016 - Present    Dysuria R30.0   2016 - Present    Toe pain, right M79.674   2016 - Present    Cellulitis L03.90   2016 - Present    Abdominal bloating R14.0   2016 - Present    Celiac disease K90.0   2016 - Present    Osteopenia M85.80   2016 - Present    Intertrigo L30.4   2016 - Present    Osteoarthritis of right knee M17.11   7/26/2016 - Present    Abnormal results of liver function studies R94.5   7/26/2016 - Present    Hearing loss H91.90   7/26/2016 - Present    Tinnitus H93.19   7/26/2016 - Present    Dyslipidemia E78.5   7/26/2016 - Present    H/O total hysterectomy Z90.710   7/26/2016 - Present    Hypothyroidism E03.9   7/26/2016 - Present    Vitamin D deficiency E55.9   7/26/2016 - Present    Osteoporosis M81.0   7/26/2016 - Present    Hx of breast cancer Z85.3   7/26/2016 - Present    Status post total right knee replacement Z96.651   8/17/2016 - Present    Gout of foot M10.9   9/30/2016 - Present    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.0710   9/30/2016 - Present    Essential hypertension I10   9/30/2016 - Present    Chronic left-sided low back pain without sciatica M54.5, G89.29   3/17/2017 - Present    Lichen planus L43.9   3/17/2017 - Present    History of breast cancer Z85.3   3/17/2017 - Present    Acquired hypothyroidism E03.9   6/23/2017 - Present      Health Maintenance        Date Due Completion Dates    IMM DTaP/Tdap/Td Vaccine (1 - Tdap) 1/27/1961 ---    PAP SMEAR 1/27/1963 ---    MAMMOGRAM 1/27/1982 ---    IMM ZOSTER VACCINE 1/27/2002 ---    BONE DENSITY 1/27/2007 ---    IMM PNEUMOCOCCAL 65+ (ADULT) LOW/MEDIUM RISK SERIES (1 of 2 - PCV13) 1/27/2007 ---    IMM INFLUENZA (1) 9/1/2017 ---    COLONOSCOPY 3/13/2027 3/13/2017 (N/S)    Override on 3/13/2017: (N/S) (PER [facility] NO COLONOSCOPY)            Current Immunizations     INFLUENZA VACCINE H1N1 11/30/2009      Below and/or attached are the medications your provider expects you to take. Review all of your home medications and newly ordered medications with your provider and/or pharmacist. Follow medication instructions as directed by your provider and/or pharmacist. Please keep your medication list with you and share with your provider. Update the information when medications are discontinued, doses are changed, or new  medications (including over-the-counter products) are added; and carry medication information at all times in the event of emergency situations     Allergies:  LISINOPRIL - (reactions not documented)               Medications  Valid as of: July 26, 2017 -  3:29 PM    Generic Name Brand Name Tablet Size Instructions for use    Colchicine (Tab) COLCRYS 0.6 MG Two pills x 1 then one tab po bid        Ergocalciferol (Cap) DRISDOL 44653 UNITS Take 1 Cap by mouth every 7 days.        Losartan Potassium (Tab) COZAAR 50 MG Take 1 Tab by mouth every day.        Probiotic Product   Take  by mouth.        .                 Medicines prescribed today were sent to:     [facility] [place], [place] - [address] AT PORTAL TO REGISTERED [facility] SITES    [address] [facility] [address]    Phone: [phone] Fax: [phone]    Open 24 Hours?: No    [facility] [address] [place] [facility] [place] - [address] [facility]    [address] [place] [address]    Phone: [phone] Fax: [phone]    Open 24 Hours?: No      Medication refill instructions:       If your prescription bottle indicates you have medication refills left, it is not necessary to call your provider’s office. Please contact your pharmacy and they will refill your medication.    If your prescription bottle indicates you do not have any refills left, you may request refills at any time through one of the following ways: The online [employer] system (except Urgent Care), by calling your provider’s office, or by asking your pharmacy to contact your provider’s office with a refill request. Medication refills are processed only during regular business hours and may not be available until the next business day. Your provider may request additional information or to have a follow-up visit with you prior to refilling your medication.   *Please Note: Medication refills are assigned a new Rx number when refilled electronically. Your pharmacy may  indicate that no refills were authorized even though a new prescription for the same medication is available at the pharmacy. Please request the medicine by name with the pharmacy before contacting your provider for a refill.        Your To Do List     Future Labs/Procedures Complete By Expires    TSH WITH REFLEX TO FT4  As directed 7/26/2018      Instructions    Stop  Hydrochlorothiazide.  Continue  With losartan for blood pressure  Repeat Uric acid prior to next visit  Increase synthroid as directed          MyChart Status: Patient Declined

## 2017-07-26 NOTE — PATIENT INSTRUCTIONS
Stop  Hydrochlorothiazide.  Continue  With losartan for blood pressure  Repeat Uric acid prior to next visit  Increase synthroid as directed

## 2017-07-27 NOTE — PROGRESS NOTES
Established Patient    Ms. Martinez a 77 y.o. female who presents today with:  CC: Follow-Up; Medication Refill; Advice Only; and Medication Management        Assessment and Plan    1. Gout of foot, unspecified cause, unspecified chronicity, unspecified laterality  Stable. Her uric acid increased at 9.3 however she did not discontinue hydrochlorothiazide nor does she have any acute gouty exacerbations. For now we'll not begin allopurinol secondary to lack of exacerbations. I suspect that uric acid should improve after she discontinues Hydrocort thiazide  - URIC ACID, SERUM    2. Essential hypertension  Stable. Discontinue hydrochlorothiazide and continue losartan 50 mg. If need be we'll increase losartan to 100 mg patient will continue to record blood pressures at home    3. Hypothyroidism, unspecified type  She does have symptoms of bloating and occasional constipation. We discussed the use of T3 in the management of hypothyroidism. We have agreed to discontinue Cytomel and increase her levothyroxine to 125 µg secondary to the elevated TSH of 6.4. Will recheck TSH in the future  - TSH WITH REFLEX TO FT4; Future    4. Bloating  Most likely secondary to probiotics plus fermented tea. Suggest she stop both of them. I gave her a list of food products that are associated with intestinal gas. May also improve with increase in levothyroxine   Current Outpatient Prescriptions   Medication Sig Dispense Refill   • losartan (COZAAR) 50 MG Tab Take 1 Tab by mouth every day. 90 Tab 3   • vitamin D, Ergocalciferol, (DRISDOL) 61285 UNITS Cap capsule Take 1 Cap by mouth every 7 days. 12 Cap 3   • colchicine (COLCRYS) 0.6 MG Tab Two pills x 1 then one tab po bid 30 Tab 0   • Probiotic Product (PROBIOTIC DAILY PO) Take  by mouth.       No current facility-administered medications for this visit.         followup Return in about 3 months (around 10/26/2017).    This note was created using voice recognition software (Dragon). The  accuracy of the dictation is limited by the abilities of the software. I have reviewed the note prior to signing, however some errors in grammar and context are still possible. If you have any questions related to this note please do not hesitate to contact our office.   _______________________________________________________    HPI:   1. Gout of foot, unspecified cause, unspecified chronicity, unspecified laterality  She has a history of gout. She has not had an exacerbation in several months. Her most recent uric acid level increased from 6.3 to 9.2. At the last visit I discontinued her hydrochlorothiazide secondary to his association with gouty arthritis. Unfortunately, she misunderstood and continued hydrochlorothiazide. She drinks minimal alcohol. I also started losartan in lieu of hydrochlorothiazide because of his association with reduced gouty attacks. She is compliant with that     2. Essential hypertension  At the last visit she was to discontinue hydrochlorothiazide and begin losartan. She currently is taking both medications. Her home systolic blood pressures are in the one teens to mid 120s. She has no symptoms of lightheadedness or dizziness.    3. Hypothyroidism, unspecified type  In reviewing her most recent labs her TSH is 6.49. She takes Synthroid 112 µg and Cytomel 5 µg. She currently has symptoms of weight gain mostly around the abdomen. She denies any leg swelling, severe constipation (does have mild constipation), fatigue, changes in hair or excessively dry skin or cold intolerance     4. Bloating  Over the last several months she has bloating and flatulence. She denies any constipation, abdominal pain, ascites, melena or nausea or vomiting. 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     reports that she has never smoked. She has never used smokeless tobacco. She reports that she drinks alcohol. She reports that she does not use illicit drugs.      ROS: Pertinent positives as stated in HPI, all others reviewed as negative:        Physical Exam  /70 mmHg  Pulse 91  Temp(Src) 36.6 °C (97.8 °F)  Ht 1.524 m (5')  Wt 88.724 kg (195 lb 9.6 oz)  BMI 38.20 kg/m2  SpO2 95%  Abdomen: Soft nontender no guarding and rebounding        Current Outpatient Prescriptions on File Prior to Visit   Medication Sig Dispense Refill   • vitamin D, Ergocalciferol, (DRISDOL) 65717 UNITS Cap capsule Take 1 Cap by mouth every 7 days. 12 Cap 3   • colchicine (COLCRYS) 0.6 MG Tab Two pills x 1 then one tab po bid 30 Tab 0   • Probiotic Product (PROBIOTIC DAILY PO) Take  by mouth.       No current facility-administered medications on file prior to visit.           Signed by: Woo Samuels M.D.

## 2017-08-08 ENCOUNTER — TELEPHONE (OUTPATIENT)
Dept: INTERNAL MEDICINE | Facility: MEDICAL CENTER | Age: 77
End: 2017-08-08

## 2017-08-08 NOTE — TELEPHONE ENCOUNTER
1. Caller Name: Pt                      Call Back Number: [phone] (home)     2. Message: Patient called and left message stating she was suppose to get her synthroid 125mg but hasn't received anything.    3. Patient approves office to leave a detailed voicemail/MyChart message: N\A

## 2017-08-09 RX ORDER — LEVOTHYROXINE SODIUM 0.12 MG/1
125 TABLET ORAL
Qty: 30 TAB | Refills: 3 | Status: SHIPPED | OUTPATIENT
Start: 2017-08-09 | End: 2017-10-31

## 2017-08-09 NOTE — TELEPHONE ENCOUNTER
Prescription sent to [facility] and not [facility] mail order.  Want to wait until next TSH draw before giving a 90 day supply

## 2017-10-30 DIAGNOSIS — E55.9 VITAMIN D DEFICIENCY: ICD-10-CM

## 2017-10-30 DIAGNOSIS — E03.9 HYPOTHYROIDISM, UNSPECIFIED TYPE: ICD-10-CM

## 2017-10-30 NOTE — TELEPHONE ENCOUNTER
Last seen: 07/26/17 by Dr. Samuels  Next appt: 10/31/17 with Dr. Samuels    Was the patient seen in the last year in this department? Yes   Does patient have an active prescription for medications requested? No   Received Request Via: Pharmacy

## 2017-10-31 ENCOUNTER — OFFICE VISIT (OUTPATIENT)
Dept: INTERNAL MEDICINE | Facility: MEDICAL CENTER | Age: 77
End: 2017-10-31
Payer: MEDICARE

## 2017-10-31 VITALS
HEART RATE: 85 BPM | WEIGHT: 193 LBS | DIASTOLIC BLOOD PRESSURE: 78 MMHG | HEIGHT: 60 IN | RESPIRATION RATE: 20 BRPM | SYSTOLIC BLOOD PRESSURE: 128 MMHG | TEMPERATURE: 97.9 F | BODY MASS INDEX: 37.89 KG/M2 | OXYGEN SATURATION: 93 %

## 2017-10-31 DIAGNOSIS — I10 ESSENTIAL HYPERTENSION: ICD-10-CM

## 2017-10-31 DIAGNOSIS — M10.9 GOUT OF FOOT, UNSPECIFIED CAUSE, UNSPECIFIED CHRONICITY, UNSPECIFIED LATERALITY: ICD-10-CM

## 2017-10-31 DIAGNOSIS — E55.9 VITAMIN D DEFICIENCY: ICD-10-CM

## 2017-10-31 DIAGNOSIS — E03.9 ACQUIRED HYPOTHYROIDISM: ICD-10-CM

## 2017-10-31 PROCEDURE — 99214 OFFICE O/P EST MOD 30 MIN: CPT | Performed by: INTERNAL MEDICINE

## 2017-10-31 RX ORDER — LEVOTHYROXINE SODIUM 0.15 MG/1
150 TABLET ORAL
Qty: 30 TAB | Refills: 1 | Status: SHIPPED | OUTPATIENT
Start: 2017-10-31 | End: 2017-11-02 | Stop reason: SDUPTHER

## 2017-10-31 ASSESSMENT — PAIN SCALES - GENERAL: PAINLEVEL: NO PAIN

## 2017-10-31 NOTE — PROGRESS NOTES
Established Patient    Ms. Martinez a 77 y.o. female who presents today with:  CC: Results (labs) and Medication Refill (levothyroxine)        Assessment and Plan    1. Acquired hypothyroidism  Subclinical hypothyroidism currently. Admits to cold intolerance, fatigue and occasional constipation. TSH 8.2 with normal T4. Will increase levothyroxine to 150ucg daily. Repeat TSH in 4 weeks. Discussed potential side effects of hyperthyroidism    2. Essential hypertension  Controlled on current regimen. No changes to medications. On losartan for HTN and gout prevention    3. Gout of foot, unspecified cause, unspecified chronicity, unspecified laterality  No flares of gout in several months    4. Vitamin D deficiency  Patient will be compliant with dosing        Current Outpatient Prescriptions   Medication Sig Dispense Refill   • levothyroxine (SYNTHROID) 125 MCG Tab Take 1 Tab by mouth Every morning on an empty stomach. 30 Tab 3   • losartan (COZAAR) 50 MG Tab Take 1 Tab by mouth every day. 90 Tab 3   • vitamin D, Ergocalciferol, (DRISDOL) 08579 UNITS Cap capsule Take 1 Cap by mouth every 7 days. 12 Cap 3   • colchicine (COLCRYS) 0.6 MG Tab Two pills x 1 then one tab po bid 30 Tab 0   • Probiotic Product (PROBIOTIC DAILY PO) Take  by mouth.       No current facility-administered medications for this visit.          followup No Follow-up on file.    This note was created using voice recognition software (Dragon). The accuracy of the dictation is limited by the abilities of the software. I have reviewed the note prior to signing, however some errors in grammar and context are still possible. If you have any questions related to this note please do not hesitate to contact our office.   _______________________________________________________    HPI:   1. Acquired hypothyroidism  She is here for follow-up of hypothyroidism. TSH is 8.24 from October 24, 2017 T4 1 0.2. Patient is complaining of constipation, cold intolerance and  occasional fatigue.    2. Essential hypertension  Chest history of hypertension and is compliant with medications including losartan.    3. Gout of foot, unspecified cause, unspecified chronicity, unspecified laterality  She has a history of gouty arthritis. She has not had an exacerbation since her hydrochlorothiazide was changed to losartan. Her uric acid decrease from 9.5-6.5    4. Vitamin D deficiency  She has a history of vitamin D deficiency and has not been as compliant with her 50,000 units per week. 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     reports that she has never smoked. She has never used smokeless tobacco. She reports that she drinks alcohol. She reports that she does not use drugs.      ROS: Pertinent positives as stated in HPI, all others reviewed as negative:        Physical Exam  /78   Pulse 85   Temp 36.6 °C (97.9 °F)   Resp 20   Ht 1.524 m (5')   Wt 87.5 kg (193 lb)   SpO2 93%   Breastfeeding? No   BMI 37.69 kg/m²   Constitutional:  oriented to person, place, and time. No distress.     Current Outpatient Prescriptions on File Prior to Visit   Medication Sig Dispense Refill   • levothyroxine (SYNTHROID) 125 MCG Tab Take 1 Tab by mouth Every morning on an empty stomach. 30 Tab 3   • losartan (COZAAR) 50 MG Tab Take 1 Tab by mouth every day. 90 Tab 3   • vitamin D, Ergocalciferol, (DRISDOL) 32795 UNITS Cap capsule Take 1 Cap by mouth every 7 days. 12 Cap 3   • colchicine (COLCRYS) 0.6 MG Tab Two pills x 1 then one tab po bid 30 Tab 0   • Probiotic Product (PROBIOTIC DAILY PO) Take  by mouth.       No current facility-administered medications on file prior to visit.            Signed by: Woo Samuels  M.D.

## 2017-11-02 RX ORDER — LEVOTHYROXINE SODIUM 0.12 MG/1
125 TABLET ORAL
Qty: 90 TAB | Refills: 0 | OUTPATIENT
Start: 2017-11-02

## 2017-11-02 RX ORDER — LEVOTHYROXINE SODIUM 0.15 MG/1
150 TABLET ORAL
Qty: 90 TAB | Refills: 2 | Status: SHIPPED | OUTPATIENT
Start: 2017-11-02 | End: 2018-08-09 | Stop reason: SDUPTHER

## 2017-11-02 NOTE — TELEPHONE ENCOUNTER
1. Caller Name: Jeffery (Pharmacy)                      Call Back Number: [phone]    2. Message: Jeffery left a message stating patient is requesting a 90 days supply on her Levothyroxine.    3. Patient approves office to leave a detailed voicemail/MyChart message: N\A

## 2017-11-29 DIAGNOSIS — E03.9 ACQUIRED HYPOTHYROIDISM: ICD-10-CM

## 2018-01-04 ENCOUNTER — OFFICE VISIT (OUTPATIENT)
Dept: INTERNAL MEDICINE | Facility: MEDICAL CENTER | Age: 78
End: 2018-01-04
Payer: MEDICARE

## 2018-01-04 VITALS
HEIGHT: 60 IN | HEART RATE: 75 BPM | WEIGHT: 193.2 LBS | OXYGEN SATURATION: 96 % | TEMPERATURE: 97.9 F | SYSTOLIC BLOOD PRESSURE: 120 MMHG | DIASTOLIC BLOOD PRESSURE: 60 MMHG | BODY MASS INDEX: 37.93 KG/M2

## 2018-01-04 DIAGNOSIS — N30.90 CYSTITIS: ICD-10-CM

## 2018-01-04 DIAGNOSIS — R69 SICK: ICD-10-CM

## 2018-01-04 LAB
APPEARANCE UR: CLEAR
BILIRUB UR STRIP-MCNC: NORMAL MG/DL
COLOR UR AUTO: NORMAL
FLUAV+FLUBV AG SPEC QL IA: NEGATIVE
GLUCOSE UR STRIP.AUTO-MCNC: NORMAL MG/DL
INT CON NEG: NEGATIVE
INT CON POS: POSITIVE
KETONES UR STRIP.AUTO-MCNC: NORMAL MG/DL
LEUKOCYTE ESTERASE UR QL STRIP.AUTO: NORMAL
NITRITE UR QL STRIP.AUTO: NORMAL
PH UR STRIP.AUTO: 6 [PH] (ref 5–8)
PROT UR QL STRIP: NORMAL MG/DL
RBC UR QL AUTO: NORMAL
SP GR UR STRIP.AUTO: 1.01
UROBILINOGEN UR STRIP-MCNC: NORMAL MG/DL

## 2018-01-04 PROCEDURE — 81002 URINALYSIS NONAUTO W/O SCOPE: CPT | Performed by: INTERNAL MEDICINE

## 2018-01-04 PROCEDURE — 99214 OFFICE O/P EST MOD 30 MIN: CPT | Performed by: INTERNAL MEDICINE

## 2018-01-04 PROCEDURE — 87804 INFLUENZA ASSAY W/OPTIC: CPT | Performed by: INTERNAL MEDICINE

## 2018-01-04 RX ORDER — SULFAMETHOXAZOLE AND TRIMETHOPRIM 800; 160 MG/1; MG/1
1 TABLET ORAL 2 TIMES DAILY
Qty: 14 TAB | Refills: 0 | Status: SHIPPED | OUTPATIENT
Start: 2018-01-04 | End: 2018-08-15

## 2018-01-04 ASSESSMENT — PATIENT HEALTH QUESTIONNAIRE - PHQ9: CLINICAL INTERPRETATION OF PHQ2 SCORE: 0

## 2018-01-04 NOTE — PROGRESS NOTES
Established Patient    Ms. Martinez a 77 y.o. female who presents today with:  CC: Flu Like Symptoms (x3 weeks) and Tired (x3 weeks)        Assessment and Plan    1. Sick  Rapid flu test negative approximately 3 weeks after onset of symptoms. Suspect she had the flu and is now recovering. Can continue use of antitussives as needed  2. Cystitis  UA with moderate LE and she has symptoms >1 week. Will prescribe Bactrim for patient.   Current Outpatient Prescriptions   Medication Sig Dispense Refill   • levothyroxine (SYNTHROID) 150 MCG Tab Take 1 Tab by mouth Every morning on an empty stomach. 90 Tab 2   • losartan (COZAAR) 50 MG Tab Take 1 Tab by mouth every day. 90 Tab 3   • vitamin D, Ergocalciferol, (DRISDOL) 34817 UNITS Cap capsule Take 1 Cap by mouth every 7 days. 12 Cap 3   • Probiotic Product (PROBIOTIC DAILY PO) Take  by mouth.     • colchicine (COLCRYS) 0.6 MG Tab Two pills x 1 then one tab po bid 30 Tab 0     No current facility-administered medications for this visit.          followup No Follow-up on file.    This note was created using voice recognition software (Dragon). The accuracy of the dictation is limited by the abilities of the software. I have reviewed the note prior to signing, however some errors in grammar and context are still possible. If you have any questions related to this note please do not hesitate to contact our office.   _______________________________________________________    HPI:   1. Sick  She has symptoms of overall sickness. Dec 22 she had rapidly developing fatigue. Chills, cough severe, dry. Malaise and drained. No documented fever. Moderate myalgias. No SOB, GURINDER, sore throat. Positive diarrhea. No nausea. No flu vaccine this year. Theraflu and Airborne helped somewhat.  Currently, she went to dance lessons but the next day she was fatigued. Still has a cough. Is using robitussin DM. Cough is mild. It is dry. No diarrhea or fever. Overall, she is getting better. She has some  maxillary tenderness bilaterally. She is also taking Mucinex.   Urine is thick and yellow dark.No dysuria, hematuria, flank tenderness but urgency and voiding small amounts.        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     reports that she has never smoked. She has never used smokeless tobacco. She reports that she drinks alcohol. She reports that she does not use drugs.      ROS: Pertinent positives as stated in HPI, all others reviewed as negative:        Physical Exam  /60   Pulse 75   Temp 36.6 °C (97.9 °F)   Ht 1.524 m (5')   Wt 87.6 kg (193 lb 3.2 oz)   SpO2 96%   BMI 37.73 kg/m²   Constitutional:  oriented to person, place, and time. No distress.   Eyes: Pupils are equal, round, and reactive to light. No scleral icterus.   Neck: Neck supple. No thyromegaly present.   Cardiovascular: Normal rate, regular rhythm and normal heart sounds.  Exam reveals no gallop and no friction rub.    No murmur heard.  Pulmonary/Chest: Breath sounds normal. Chest wall is not dull to percussion.     Lymphadenopathy: no cervical adenopathy        Current Outpatient Prescriptions on File Prior to Visit   Medication Sig Dispense Refill   • levothyroxine (SYNTHROID) 150 MCG Tab Take 1 Tab by mouth Every morning on an empty stomach. 90 Tab 2   • losartan (COZAAR) 50 MG Tab Take 1 Tab by mouth every day. 90 Tab 3   • vitamin D, Ergocalciferol, (DRISDOL) 38175 UNITS Cap capsule Take 1 Cap by mouth every 7 days. 12 Cap 3   • Probiotic Product (PROBIOTIC DAILY PO) Take  by mouth.     • colchicine (COLCRYS) 0.6 MG Tab Two pills x 1 then one tab po bid 30 Tab 0     No current facility-administered medications on file prior to visit.            Signed by: Woo Samuels M.D.

## 2018-01-05 ENCOUNTER — TELEPHONE (OUTPATIENT)
Dept: INTERNAL MEDICINE | Facility: MEDICAL CENTER | Age: 78
End: 2018-01-05

## 2018-01-05 RX ORDER — SULFAMETHOXAZOLE AND TRIMETHOPRIM 800; 160 MG/1; MG/1
1 TABLET ORAL 2 TIMES DAILY
Qty: 14 TAB | Refills: 0 | Status: CANCELLED | OUTPATIENT
Start: 2018-01-05

## 2018-01-05 NOTE — TELEPHONE ENCOUNTER
1. Caller Name: Pt                      Call Back Number: [phone] (home)     2. Message: Patient called and left a message stating abx were not sent to right pharmacy    3. Patient approves office to leave a detailed voicemail/MyChart message: N\A

## 2018-02-05 ENCOUNTER — TELEPHONE (OUTPATIENT)
Dept: INTERNAL MEDICINE | Facility: MEDICAL CENTER | Age: 78
End: 2018-02-05

## 2018-02-05 DIAGNOSIS — N90.4 LICHEN SCLEROSUS OF FEMALE GENITALIA: ICD-10-CM

## 2018-02-05 NOTE — TELEPHONE ENCOUNTER
1. Caller Name: Flavia Garrett                      Call Back Number: [phone] (home)     2. Message: Referral needed for Gyn. Dx's with Lichen's Sclerosis by Gyn that retired this year, needs a new opinion on possible laser surgery with Dr. Solares.     3. Patient approves office to leave a detailed voicemail/MyChart message: yes

## 2018-03-28 ENCOUNTER — OFFICE VISIT (OUTPATIENT)
Dept: INTERNAL MEDICINE | Facility: MEDICAL CENTER | Age: 78
End: 2018-03-28
Payer: MEDICARE

## 2018-03-28 VITALS
HEART RATE: 76 BPM | HEIGHT: 60 IN | OXYGEN SATURATION: 96 % | SYSTOLIC BLOOD PRESSURE: 120 MMHG | WEIGHT: 195 LBS | BODY MASS INDEX: 38.28 KG/M2 | TEMPERATURE: 97.1 F | DIASTOLIC BLOOD PRESSURE: 80 MMHG

## 2018-03-28 DIAGNOSIS — B00.1 COLD SORE: ICD-10-CM

## 2018-03-28 DIAGNOSIS — I10 ESSENTIAL HYPERTENSION: ICD-10-CM

## 2018-03-28 DIAGNOSIS — E03.9 HYPOTHYROIDISM, UNSPECIFIED TYPE: ICD-10-CM

## 2018-03-28 DIAGNOSIS — S01.01XA LACERATION OF SCALP, INITIAL ENCOUNTER: ICD-10-CM

## 2018-03-28 PROCEDURE — 99214 OFFICE O/P EST MOD 30 MIN: CPT | Performed by: INTERNAL MEDICINE

## 2018-03-28 RX ORDER — VALACYCLOVIR HYDROCHLORIDE 1 G/1
TABLET, FILM COATED ORAL
Qty: 20 TAB | Refills: 0 | Status: SHIPPED | OUTPATIENT
Start: 2018-03-28 | End: 2018-03-28 | Stop reason: SDUPTHER

## 2018-03-28 RX ORDER — VALACYCLOVIR HYDROCHLORIDE 1 G/1
TABLET, FILM COATED ORAL
Qty: 20 TAB | Refills: 0 | Status: SHIPPED | OUTPATIENT
Start: 2018-03-28 | End: 2019-05-31

## 2018-03-28 RX ORDER — CLOBETASOL PROPIONATE 0.5 MG/G
CREAM TOPICAL
COMMUNITY
Start: 2018-03-12 | End: 2020-12-17

## 2018-03-28 NOTE — PROGRESS NOTES
Established Patient    Ms. Martinez a 78 y.o. female who presents today with:  CC: Follow-Up (hypothyroidism); Cold Sores (any meds when feels like cold sores are coming on); and Fall (hit head and back)        Assessment and Plan    1. Cold sore- suggest using valtrex for cold sores prn when she feels dysesthesia.  2. Hypertension- at goal currently. Recommend continuing attempts of weight loss.  3. Hypothyroidism- controlled on levothyroxine. TSH has corrected to normal. Continue current dose 4. Laceration-secondary to slip and fall. Healed  Current Outpatient Prescriptions   Medication Sig Dispense Refill   • levothyroxine (SYNTHROID) 150 MCG Tab Take 1 Tab by mouth Every morning on an empty stomach. 90 Tab 2   • losartan (COZAAR) 50 MG Tab Take 1 Tab by mouth every day. 90 Tab 3   • vitamin D, Ergocalciferol, (DRISDOL) 60706 UNITS Cap capsule Take 1 Cap by mouth every 7 days. 12 Cap 3   • colchicine (COLCRYS) 0.6 MG Tab Two pills x 1 then one tab po bid 30 Tab 0   • Probiotic Product (PROBIOTIC DAILY PO) Take  by mouth.     • clobetasol (TEMOVATE) 0.05 % Cream      • sulfamethoxazole-trimethoprim (BACTRIM DS) 800-160 MG tablet Take 1 Tab by mouth 2 times a day. 14 Tab 0     No current facility-administered medications for this visit.          followup No Follow-up on file.    This note was created using voice recognition software (Dragon). The accuracy of the dictation is limited by the abilities of the software. I have reviewed the note prior to signing, however some errors in grammar and context are still possible. If you have any questions related to this note please do not hesitate to contact our office.   _______________________________________________________    HPI:   Cold sores on the right upper lip with ulceration and swelling and redness. Currently almost back to normal. Recently had 2 flares x 6 days. She tried OTC medication without effect.  Tingling sensation prior to the rash    March 2nd she fell  and hit the back of her head on black ice. The todd in her hair punctured her skin. She hurt her back as well. She just got back to the gym a week ago and now she is almost back to normal. The laceration bled significantly. No problems after healed. No pain or discharge. No concentration problems, headaches or LOC.   Her blood pressures at home are 135/70-75. However in the clinc have been 100-128  Previously she had a gradually increasing TSH. Most recent TSH is 1.78.     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     reports that she has never smoked. She has never used smokeless tobacco. She reports that she drinks alcohol. She reports that she does not use drugs.      ROS: Pertinent positives as stated in HPI, all others reviewed as negative:  No gouty exacerbations, no joint pain redness or pain.  Denies any cold intolerance, weight gain, significant leg swelling, excessively dry skin or constipation.    Physical Exam  /80   Pulse 76   Temp 36.2 °C (97.1 °F)   Ht 1.524 m (5')   Wt 88.5 kg (195 lb)   SpO2 96%   BMI 38.08 kg/m²   Constitutional:  oriented to person, place, and time. No distress.   Eyes: Pupils are equal, round, and reactive to light. No scleral icterus.   Neck: Neck supple. No thyromegaly present.   Cardiovascular: Normal rate, regular rhythm and normal heart sounds.  Exam reveals no gallop and no friction rub.    No murmur heard.  Pulmonary/Chest: Breath sounds normal. Chest wall is not dull to percussion.   Musculoskeletal:   no edema.   Lymphadenopathy: no cervical adenopathy  Neurological: alert and oriented to person, place, and time.   Skin: No cyanosis. Nails show no clubbing. Posterior scalp small 1 cm healed laceration. No drainage, clean dry  and intact. Small less than 1 cm healing upper lip [place]illion ulceration.        Current Outpatient Prescriptions on File Prior to Visit   Medication Sig Dispense Refill   • levothyroxine (SYNTHROID) 150 MCG Tab Take 1 Tab by mouth Every morning on an empty stomach. 90 Tab 2   • losartan (COZAAR) 50 MG Tab Take 1 Tab by mouth every day. 90 Tab 3   • vitamin D, Ergocalciferol, (DRISDOL) 83275 UNITS Cap capsule Take 1 Cap by mouth every 7 days. 12 Cap 3   • colchicine (COLCRYS) 0.6 MG Tab Two pills x 1 then one tab po bid 30 Tab 0   • Probiotic Product (PROBIOTIC DAILY PO) Take  by mouth.     • sulfamethoxazole-trimethoprim (BACTRIM DS) 800-160 MG tablet Take 1 Tab by mouth 2 times a day. 14 Tab 0     No current facility-administered medications on file prior to visit.            Signed by: Woo Samuels M.D.

## 2018-03-29 PROBLEM — B00.1 COLD SORE: Status: ACTIVE | Noted: 2018-03-29

## 2018-05-14 DIAGNOSIS — E55.9 VITAMIN D DEFICIENCY: ICD-10-CM

## 2018-05-14 RX ORDER — ERGOCALCIFEROL 1.25 MG/1
CAPSULE ORAL
Qty: 12 CAP | Refills: 1 | Status: SHIPPED | OUTPATIENT
Start: 2018-05-14 | End: 2019-06-01

## 2018-05-14 NOTE — TELEPHONE ENCOUNTER
Last seen: 03/28/18 by Dr. Samuels  Next appt: 06/27/18 with Dr. Samuels    Was the patient seen in the last year in this department? Yes   Does patient have an active prescription for medications requested? No   Received Request Via: Pharmacy

## 2018-05-16 ENCOUNTER — OFFICE VISIT (OUTPATIENT)
Dept: INTERNAL MEDICINE | Facility: MEDICAL CENTER | Age: 78
End: 2018-05-16
Payer: MEDICARE

## 2018-05-16 VITALS
DIASTOLIC BLOOD PRESSURE: 80 MMHG | OXYGEN SATURATION: 96 % | HEIGHT: 60 IN | SYSTOLIC BLOOD PRESSURE: 120 MMHG | BODY MASS INDEX: 37.93 KG/M2 | HEART RATE: 94 BPM | WEIGHT: 193.2 LBS | TEMPERATURE: 97.9 F

## 2018-05-16 DIAGNOSIS — J01.00 ACUTE NON-RECURRENT MAXILLARY SINUSITIS: ICD-10-CM

## 2018-05-16 PROCEDURE — 99213 OFFICE O/P EST LOW 20 MIN: CPT | Performed by: INTERNAL MEDICINE

## 2018-05-16 RX ORDER — AZITHROMYCIN 250 MG/1
TABLET, FILM COATED ORAL
Qty: 6 TAB | Refills: 0 | Status: SHIPPED | OUTPATIENT
Start: 2018-05-16 | End: 2018-08-15

## 2018-05-16 NOTE — PROGRESS NOTES
"Established Patient    Ms. Martinez a 78 y.o. female who presents today with:  CC: Sinus Problem (thinks maybe sinus infection x1 week); Other (feels maybe lung infection); and Cold Symptoms (maybe x 1 week)        Assessment and Plan   1. Sinusitis -most likely bacterial sinusitis. Patient with \"double sign\"-symptoms improved and then worsened. I suspect that she will benefit from oral antibiotics. She can use nasal irrigation if she wishes. Continue treatment for allergic rhinitis throughout the season.  Current Outpatient Prescriptions   Medication Sig Dispense Refill   • vitamin D, Ergocalciferol, (DRISDOL) 03439 units Cap capsule TAKE 1 CAPSULE EVERY 7 DAYS 12 Cap 1   • clobetasol (TEMOVATE) 0.05 % Cream      • levothyroxine (SYNTHROID) 150 MCG Tab Take 1 Tab by mouth Every morning on an empty stomach. 90 Tab 2   • losartan (COZAAR) 50 MG Tab Take 1 Tab by mouth every day. 90 Tab 3   • Probiotic Product (PROBIOTIC DAILY PO) Take  by mouth.     • valacyclovir (VALTREX) 1 GM Tab 2 pills q12hr po x 1 day prn cold sore 20 Tab 0   • sulfamethoxazole-trimethoprim (BACTRIM DS) 800-160 MG tablet Take 1 Tab by mouth 2 times a day. 14 Tab 0   • colchicine (COLCRYS) 0.6 MG Tab Two pills x 1 then one tab po bid 30 Tab 0     No current facility-administered medications for this visit.          followup No Follow-up on file.    This note was created using voice recognition software (Dragon). The accuracy of the dictation is limited by the abilities of the software. I have reviewed the note prior to signing, however some errors in grammar and context are still possible. If you have any questions related to this note please do not hesitate to contact our office.   _______________________________________________________    HPI:   She is here for an acute visit. Over one week ago she had chills, sinus congestion and pain maxillary, nasal congestion. Greenish discharge from nostrils. She got better and then got worse again over " the last 3 days.. Upper teeth pain bilaterally. No fever. Positive chills. Positive cough without sore throat. No SOB. Cough is purulent-green. Positive fatigue. No flu shot this year. Sore muscles day one but no longer an issue. She has allergic rhinitis and normally takes claritin.      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     reports that she has never smoked. She has never used smokeless tobacco. She reports that she drinks alcohol. She reports that she does not use drugs.      ROS: Pertinent positives as stated in HPI, all others reviewed as negative:        Physical Exam  /80   Pulse 94   Temp 36.6 °C (97.9 °F)   Ht 1.524 m (5')   Wt 87.6 kg (193 lb 3.2 oz)   SpO2 96%   BMI 37.73 kg/m²   Constitutional:  oriented to person, place, and time. No distress.   Eyes: Pupils are equal, round, and reactive to light. No scleral icterus.   Cardiovascular: Normal rate, regular rhythm and normal heart sounds.  Exam reveals no gallop and no friction rub.    No murmur heard.  Pulmonary/Chest: Breath sounds normal. Chest wall is not dull to percussion.   Lymphadenopathy: no cervical adenopathy    Other: Head: Mild tenderness to palpation bilateral mastoid sinuses. Nose without discharge ulcerations or masses. Oral mucosa pink without exudate or tonsillar enlargement.        Current Outpatient Prescriptions on File Prior to Visit   Medication Sig Dispense Refill   • vitamin D, Ergocalciferol, (DRISDOL) 42951 units Cap capsule TAKE 1 CAPSULE EVERY 7 DAYS 12 Cap 1   • clobetasol (TEMOVATE) 0.05 % Cream      • levothyroxine (SYNTHROID) 150 MCG Tab Take 1 Tab by mouth Every morning on an empty stomach. 90 Tab 2   • losartan (COZAAR) 50 MG Tab Take 1 Tab by mouth every day. 90 Tab 3    • Probiotic Product (PROBIOTIC DAILY PO) Take  by mouth.     • valacyclovir (VALTREX) 1 GM Tab 2 pills q12hr po x 1 day prn cold sore 20 Tab 0   • sulfamethoxazole-trimethoprim (BACTRIM DS) 800-160 MG tablet Take 1 Tab by mouth 2 times a day. 14 Tab 0   • colchicine (COLCRYS) 0.6 MG Tab Two pills x 1 then one tab po bid 30 Tab 0     No current facility-administered medications on file prior to visit.            Signed by: Woo Samuels M.D.

## 2018-06-27 ENCOUNTER — OFFICE VISIT (OUTPATIENT)
Dept: INTERNAL MEDICINE | Facility: MEDICAL CENTER | Age: 78
End: 2018-06-27
Payer: MEDICARE

## 2018-06-27 VITALS
TEMPERATURE: 97 F | OXYGEN SATURATION: 95 % | SYSTOLIC BLOOD PRESSURE: 132 MMHG | WEIGHT: 194 LBS | BODY MASS INDEX: 38.09 KG/M2 | HEIGHT: 60 IN | DIASTOLIC BLOOD PRESSURE: 86 MMHG | RESPIRATION RATE: 16 BRPM | HEART RATE: 100 BPM

## 2018-06-27 DIAGNOSIS — H54.7 VISION LOSS: ICD-10-CM

## 2018-06-27 DIAGNOSIS — I10 ESSENTIAL HYPERTENSION: ICD-10-CM

## 2018-06-27 DIAGNOSIS — R14.0 BLOATING: ICD-10-CM

## 2018-06-27 PROCEDURE — 99214 OFFICE O/P EST MOD 30 MIN: CPT | Performed by: INTERNAL MEDICINE

## 2018-06-27 NOTE — PROGRESS NOTES
Established Patient    Ms. Martinez a 78 y.o. female who presents today with:  CC: Hypertension (3 month follow up )        Assessment and Plan  1. Vision loss- vision loss x 2 months. Progressive. OS first then OD. Described as a curtain. Eval by opthalmology x 2 without diagnosis. Possible optic neuritis, SDH from fall 4 months ago unlikely. Patient has MRI scheduled plus has been referred to neuro opthalmology  2. HTN- controlled on losartan. Continue course  3. Bloating- probable functional and related to diet. Suggest she try Whole30    Current Outpatient Prescriptions   Medication Sig Dispense Refill   • vitamin D, Ergocalciferol, (DRISDOL) 44860 units Cap capsule TAKE 1 CAPSULE EVERY 7 DAYS 12 Cap 1   • levothyroxine (SYNTHROID) 150 MCG Tab Take 1 Tab by mouth Every morning on an empty stomach. 90 Tab 2   • losartan (COZAAR) 50 MG Tab Take 1 Tab by mouth every day. 90 Tab 3   • azithromycin (ZITHROMAX) 250 MG Tab 2 pills day one then one po daily 6 Tab 0   • clobetasol (TEMOVATE) 0.05 % Cream      • valacyclovir (VALTREX) 1 GM Tab 2 pills q12hr po x 1 day prn cold sore 20 Tab 0   • sulfamethoxazole-trimethoprim (BACTRIM DS) 800-160 MG tablet Take 1 Tab by mouth 2 times a day. 14 Tab 0   • colchicine (COLCRYS) 0.6 MG Tab Two pills x 1 then one tab po bid 30 Tab 0   • Probiotic Product (PROBIOTIC DAILY PO) Take  by mouth.       No current facility-administered medications for this visit.          followup No Follow-up on file.    This note was created using voice recognition software (Dragon). The accuracy of the dictation is limited by the abilities of the software. I have reviewed the note prior to signing, however some errors in grammar and context are still possible. If you have any questions related to this note please do not hesitate to contact our office.   _______________________________________________________    HPI:   She has a new problem-clouds over her eyes. Symptoms started in march. she had a  film over her eye. Left eye only. Described as a curtain but transparent. It only occludes certain parts of her vision. She can feel something odd when she blinks. No pain, headaches, double vision, red eye. No weakness or drooping. The cloud is always present. For 6-8 weeks it only affected the left eye, now the right eye is affected.  It differs in severity day to day. It is intermittent. She was evaluated by her ophthalmologist who did provide a diagnosis and she was told to return in one year. She was   Seen by a 2nd ophthalmologist, Dr. Lacey who performed her cataract surgery. Per patient, he conducted  tests in office and ultimately told her it could be neurologic. Dr. Lacey ordered a MRI brain and orbits with GEO. A CMP was ordered. She denies eye pain or difficulty with focusing.   She fell and bruised the back of the skull in Feb and had a small laceration.  She brought her blood pressure machine with her today. 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     reports that she has never smoked. She has never used smokeless tobacco. She reports that she drinks alcohol. She reports that she does not use drugs.      ROS: Pertinent positives as stated in HPI, all others reviewed as negative:  Complaints of abdominal bloating and abdominal weight gain. Positive flatulence. No ascites or leg swelling and no constipation, diarrhea, weight loss or melena or abdominal pain      Physical Exam  /86   Pulse 100   Temp 36.1 °C (97 °F)   Resp 16   Ht 1.524 m (5')   Wt 88 kg (194 lb)   SpO2 95%   Breastfeeding? No   BMI 37.89 kg/m²   Constitutional:  oriented to person, place, and time. No distress.   Eyes: Pupils are equal, round,  and reactive to light. No scleral icterus. No redness, No clouding of cornea  Neck: Neck supple. No thyromegaly present.   Cardiovascular: Normal rate, regular rhythm and normal heart sounds.  Exam reveals no gallop and no friction rub.    No murmur heard.  Pulmonary/Chest: Breath sounds normal. Chest wall is not dull to percussion.   Musculoskeletal:   no edema.   Lymphadenopathy: no cervical adenopathy  Neurological: alert and oriented to person, place, and time. CN 2-12 intact  abd: soft non tender and no ascites or masses        Current Outpatient Prescriptions on File Prior to Visit   Medication Sig Dispense Refill   • vitamin D, Ergocalciferol, (DRISDOL) 04306 units Cap capsule TAKE 1 CAPSULE EVERY 7 DAYS 12 Cap 1   • levothyroxine (SYNTHROID) 150 MCG Tab Take 1 Tab by mouth Every morning on an empty stomach. 90 Tab 2   • losartan (COZAAR) 50 MG Tab Take 1 Tab by mouth every day. 90 Tab 3   • azithromycin (ZITHROMAX) 250 MG Tab 2 pills day one then one po daily 6 Tab 0   • clobetasol (TEMOVATE) 0.05 % Cream      • valacyclovir (VALTREX) 1 GM Tab 2 pills q12hr po x 1 day prn cold sore 20 Tab 0   • sulfamethoxazole-trimethoprim (BACTRIM DS) 800-160 MG tablet Take 1 Tab by mouth 2 times a day. 14 Tab 0   • colchicine (COLCRYS) 0.6 MG Tab Two pills x 1 then one tab po bid 30 Tab 0   • Probiotic Product (PROBIOTIC DAILY PO) Take  by mouth.       No current facility-administered medications on file prior to visit.            Signed by: Woo Samuels M.D.

## 2018-07-10 ENCOUNTER — TELEPHONE (OUTPATIENT)
Dept: INTERNAL MEDICINE | Facility: MEDICAL CENTER | Age: 78
End: 2018-07-10

## 2018-07-10 NOTE — TELEPHONE ENCOUNTER
1. Caller Name: Malika ([facility])                      Call Back Number: [phone]    2. Message: Called stating she is getting an MRI tomorrow and is asking if we had any recent records of BUN and creatinine levels and if we did. Could we send them over.     3. Patient approves office to leave a detailed voicemail/MyChart message: N\A      I called malika after checking in chart but I didn't see any. She let patient know and patient had gotten recent ones done at [facility] and will take them to them after she gets them. Patient is all set for her procedure tomorrow

## 2018-07-11 ENCOUNTER — HOSPITAL ENCOUNTER (OUTPATIENT)
Dept: RADIOLOGY | Facility: MEDICAL CENTER | Age: 78
End: 2018-07-11
Attending: OPHTHALMOLOGY
Payer: MEDICARE

## 2018-07-11 DIAGNOSIS — H53.40 VISUAL FIELD DEFECT: ICD-10-CM

## 2018-07-11 PROCEDURE — 70543 MRI ORBT/FAC/NCK W/O &W/DYE: CPT

## 2018-07-11 PROCEDURE — 70553 MRI BRAIN STEM W/O & W/DYE: CPT

## 2018-07-16 ENCOUNTER — TELEPHONE (OUTPATIENT)
Dept: INTERNAL MEDICINE | Facility: MEDICAL CENTER | Age: 78
End: 2018-07-16

## 2018-07-16 NOTE — TELEPHONE ENCOUNTER
1. Caller Name: Pt                      Call Back Number: [phone] (home)     2. Message: Patient called and left a message on Friday asking if we received mri reports from 07/11 if not she would call to ask for them to send us the reports    3. Patient approves office to leave a detailed voicemail/MyChart message: N\A      I called patient and let her know we did get the reports. She asked what the results were and I told her normally she would need to get the results from the ordering physician. She states Dr. Samuels told her that he would be able to give her the results. I told her I would mention it to him. She also states she feels like she is retaining fluids and is wondering if she could get like hydrochlorothiazide. I told her I would mention it as well and would call her back on this

## 2018-07-17 NOTE — TELEPHONE ENCOUNTER
MRI of the orbits/eyes  was normal.  She'll have to make an appoinment to get the diuretic and discuss other options

## 2018-07-18 NOTE — TELEPHONE ENCOUNTER
Called patient and was unable to speak with her. Left her a message on results being normal. Also about the swelling she would need to be seen to discuss other options and to call back to schedule an appt and if she has further questions she can call us back

## 2018-08-09 RX ORDER — LEVOTHYROXINE SODIUM 150 MCG
TABLET ORAL
Qty: 90 TAB | Refills: 3 | Status: SHIPPED | OUTPATIENT
Start: 2018-08-09 | End: 2019-06-01

## 2018-08-09 NOTE — TELEPHONE ENCOUNTER
Was the patient seen in the last year in this department? Yes   Last seen: 06/27/18 by Dr. Samuels  Next appt: 08/15/18 with Dr. Samuels      Does patient have an active prescription for medications requested? No     Received Request Via: Pharmacy

## 2018-08-15 ENCOUNTER — OFFICE VISIT (OUTPATIENT)
Dept: INTERNAL MEDICINE | Facility: MEDICAL CENTER | Age: 78
End: 2018-08-15
Payer: MEDICARE

## 2018-08-15 VITALS
HEIGHT: 60 IN | SYSTOLIC BLOOD PRESSURE: 120 MMHG | BODY MASS INDEX: 37.89 KG/M2 | DIASTOLIC BLOOD PRESSURE: 60 MMHG | TEMPERATURE: 98.4 F | OXYGEN SATURATION: 98 % | WEIGHT: 193 LBS | HEART RATE: 60 BPM

## 2018-08-15 DIAGNOSIS — H54.7 VISION LOSS: ICD-10-CM

## 2018-08-15 DIAGNOSIS — M62.81 MUSCLE WEAKNESS: ICD-10-CM

## 2018-08-15 DIAGNOSIS — I87.2 VENOUS INSUFFICIENCY: ICD-10-CM

## 2018-08-15 DIAGNOSIS — R14.0 ABDOMINAL BLOATING: ICD-10-CM

## 2018-08-15 PROCEDURE — 99214 OFFICE O/P EST MOD 30 MIN: CPT | Performed by: INTERNAL MEDICINE

## 2018-08-15 NOTE — PROGRESS NOTES
Established Patient    Ms. Martinez a 78 y.o. female who presents today with:  CC: Follow-Up (vision loss); Other (last week had a stomach bug); and Bloated (stomach bloating )        Assessment and Plan    1. Abdominal bloating  Unchanged. Question of SIBO or  IBS. Will refer to gastroenterology    2. Vision loss  Etiology unknown. She has an appointment with [facility] in October.    3. Muscle weakness  Suspect she had mild rhabdomyolysis from her symptom description.  Currently recovered. Since this was several days ago and she is recovering I will not order CPK and UA. Unlikely to be positive.    Venous insuffiencey  Mild. Recommend she Try horsechestnut seed extract.      Current Outpatient Prescriptions   Medication Sig Dispense Refill   • SYNTHROID 150 MCG Tab TAKE 1 TABLET EVERY MORNINGON AN EMPTY STOMACH 90 Tab 3   • losartan (COZAAR) 50 MG Tab TAKE 1 TABLET DAILY 90 Tab 1   • vitamin D, Ergocalciferol, (DRISDOL) 43412 units Cap capsule TAKE 1 CAPSULE EVERY 7 DAYS 12 Cap 1   • clobetasol (TEMOVATE) 0.05 % Cream      • colchicine (COLCRYS) 0.6 MG Tab Two pills x 1 then one tab po bid 30 Tab 0   • Probiotic Product (PROBIOTIC DAILY PO) Take  by mouth.     • azithromycin (ZITHROMAX) 250 MG Tab 2 pills day one then one po daily 6 Tab 0   • valacyclovir (VALTREX) 1 GM Tab 2 pills q12hr po x 1 day prn cold sore 20 Tab 0   • sulfamethoxazole-trimethoprim (BACTRIM DS) 800-160 MG tablet Take 1 Tab by mouth 2 times a day. 14 Tab 0     No current facility-administered medications for this visit.          followup No Follow-up on file.    This note was created using voice recognition software (Dragon). The accuracy of the dictation is limited by the abilities of the software. I have reviewed the note prior to signing, however some errors in grammar and context are still possible. If you have any questions related to this note please do not hesitate to contact our office.    _______________________________________________________    HPI:   Last week, Monday she did piliates and circuit training. Piliates was new and very intense.. Came home and she was exhausted and as the day went on she had worse fatigue. She slept the entire day Tuesday and Wednesday. She had diarrhea and vomited x once. No change in her urine ( no brown urine). She had very sore muscles all over. Currently she is still kind of weak. Couldn't work out this week. Feeling better overall.    1. Abdominal bloating  Previously she complained of abdominal bloating. I suggested she try Whole30 but she did not try.Too  She is roughage and watermelon without effect. No lactose. No diarrhea or constipation. No GERD or heartburn or abdominal pain. No medications OTC associated with that. Symptoms for 6 months. No N/V or abdominal pain    2. Vision loss  She has an appointment with Dr. Emery. Her opthalmologist is going to see her October 26th. She has a film over the left eye and small amount on the right. In the morning it is gone but when she starts reading the newspaper it appears and will persist until the next morning. Does not obstruct vision completely. Only in the area of focus. She can still drive and read. Acuity is unaffected. 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     reports that she has never smoked. She has never used smokeless tobacco. She reports that she drinks alcohol. She reports that she does not use drugs.      ROS: Pertinent positives as stated in HPI, all others reviewed as negative:    Occasional leg swelling towards the end of the day. Compression socks are too hot. No leg pain or fatigue or  ulcerations.    Physical Exam  /60   Pulse 60   Temp 36.9 °C (98.4 °F)   Ht 1.524 m (5')   Wt 87.5 kg (193 lb)   SpO2 98%   BMI 37.69 kg/m²   Constitutional:  oriented to person, place, and time. No distress.   No lower extremity edema or ulcerations.       Current Outpatient Prescriptions on File Prior to Visit   Medication Sig Dispense Refill   • SYNTHROID 150 MCG Tab TAKE 1 TABLET EVERY MORNINGON AN EMPTY STOMACH 90 Tab 3   • losartan (COZAAR) 50 MG Tab TAKE 1 TABLET DAILY 90 Tab 1   • vitamin D, Ergocalciferol, (DRISDOL) 84345 units Cap capsule TAKE 1 CAPSULE EVERY 7 DAYS 12 Cap 1   • clobetasol (TEMOVATE) 0.05 % Cream      • colchicine (COLCRYS) 0.6 MG Tab Two pills x 1 then one tab po bid 30 Tab 0   • Probiotic Product (PROBIOTIC DAILY PO) Take  by mouth.     • azithromycin (ZITHROMAX) 250 MG Tab 2 pills day one then one po daily 6 Tab 0   • valacyclovir (VALTREX) 1 GM Tab 2 pills q12hr po x 1 day prn cold sore 20 Tab 0   • sulfamethoxazole-trimethoprim (BACTRIM DS) 800-160 MG tablet Take 1 Tab by mouth 2 times a day. 14 Tab 0     No current facility-administered medications on file prior to visit.            Signed by: Woo Samuels M.D.

## 2018-11-14 ENCOUNTER — OFFICE VISIT (OUTPATIENT)
Dept: INTERNAL MEDICINE | Facility: MEDICAL CENTER | Age: 78
End: 2018-11-14
Payer: MEDICARE

## 2018-11-14 VITALS
OXYGEN SATURATION: 94 % | WEIGHT: 195.2 LBS | HEIGHT: 60 IN | TEMPERATURE: 98.1 F | DIASTOLIC BLOOD PRESSURE: 80 MMHG | SYSTOLIC BLOOD PRESSURE: 120 MMHG | HEART RATE: 84 BPM | BODY MASS INDEX: 38.32 KG/M2

## 2018-11-14 DIAGNOSIS — I10 ESSENTIAL HYPERTENSION: ICD-10-CM

## 2018-11-14 DIAGNOSIS — G89.29 CHRONIC LEFT-SIDED LOW BACK PAIN WITHOUT SCIATICA: ICD-10-CM

## 2018-11-14 DIAGNOSIS — M54.50 CHRONIC LEFT-SIDED LOW BACK PAIN WITHOUT SCIATICA: ICD-10-CM

## 2018-11-14 DIAGNOSIS — Z23 NEED FOR VACCINATION: ICD-10-CM

## 2018-11-14 DIAGNOSIS — R14.0 ABDOMINAL BLOATING: ICD-10-CM

## 2018-11-14 PROCEDURE — G0009 ADMIN PNEUMOCOCCAL VACCINE: HCPCS | Performed by: INTERNAL MEDICINE

## 2018-11-14 PROCEDURE — 99214 OFFICE O/P EST MOD 30 MIN: CPT | Mod: 25 | Performed by: INTERNAL MEDICINE

## 2018-11-14 PROCEDURE — 90670 PCV13 VACCINE IM: CPT | Performed by: INTERNAL MEDICINE

## 2018-11-14 NOTE — PROGRESS NOTES
Established Patient    Ms. Martinez a 78 y.o. female who presents today with:  CC: Follow-Up (abdominal bloating) and Immunizations (flu vaccine)        Assessment and Plan    1.  Need for vaccination-we discussed the pros and cons of influenza vaccinations.  At this point she is not interested in a vaccine.  I did tell her that there are medications to treat symptomatic flu and if she develops symptoms she can call the office.  2.  Abdominal bloating most likely functional in nature.  Avoidance diet has worked for her well.  She has no red flags.  3.  Essential hypertension-controlled and at goal on losartan.  Continue  4.  Chronic left-sided low back pain without sciatica-improved.  Suggest patient continue a home exercise plan is stretching    Current Outpatient Prescriptions   Medication Sig Dispense Refill   • SYNTHROID 150 MCG Tab TAKE 1 TABLET EVERY MORNINGON AN EMPTY STOMACH 90 Tab 3   • losartan (COZAAR) 50 MG Tab TAKE 1 TABLET DAILY 90 Tab 1   • vitamin D, Ergocalciferol, (DRISDOL) 19642 units Cap capsule TAKE 1 CAPSULE EVERY 7 DAYS 12 Cap 1   • clobetasol (TEMOVATE) 0.05 % Cream      • colchicine (COLCRYS) 0.6 MG Tab Two pills x 1 then one tab po bid 30 Tab 0   • vitamin D, Ergocalciferol, (DRISDOL) 03482 units Cap capsule TAKE 1 CAPSULE EVERY 7 DAYS 12 Cap 1   • valacyclovir (VALTREX) 1 GM Tab 2 pills q12hr po x 1 day prn cold sore 20 Tab 0   • Probiotic Product (PROBIOTIC DAILY PO) Take  by mouth.       No current facility-administered medications for this visit.          followup No Follow-up on file.    This note was created using voice recognition software (Dragon). The accuracy of the dictation is limited by the abilities of the software. I have reviewed the note prior to signing, however some errors in grammar and context are still possible. If you have any questions related to this note please do not hesitate to contact our office.   _______________________________________________________    HPI:  "  List: eye update and [facility], bloating, blood pressure,   She has a history of a \"curtain\" descending over her field of vision.  This is been evaluated by her ophthalmologist and Dr. Emery.I do not have either note to review.  Patient was told that this may be a complication of her cataract surgery that she has several months ago and she may require another laser therapy.  She previous complained of low back k pain with sciatica however stretching routine was effective and she has no sciatica or back pain currently.  She also has history of gas and bloating.  She has identified certain foods that make the bloating worse and if she avoids these foods her bloating is manageable.  She still denies any nausea vomiting abdominal pain constipation diarrhea or melena.  Her blood pressures at home have been well controlled.  She is compliant with losartan.  We discussed the indications for flu vaccines.  At this time she is not interested in vaccine.       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     reports that she has never smoked. She has never used smokeless tobacco. She reports that she drinks alcohol. She reports that she does not use drugs.      ROS: Pertinent positives as stated in HPI, all others reviewed as negative:        Physical Exam  /80 (BP Location: Right arm, Patient Position: Sitting, BP Cuff Size: Adult)   Pulse 84   Temp 36.7 °C (98.1 °F) (Temporal)   Ht 1.524 m (5')   Wt 88.5 kg (195 lb 3.2 oz)   SpO2 94%   BMI 38.12 kg/m²   Constitutional:  oriented to person, place, and time. No distress.   Eyes: Pupils are equal, round, and reactive to light. No scleral icterus.   Neck: Neck supple. No thyromegaly present.   Cardiovascular: Normal rate, regular rhythm " and normal heart sounds.  Exam reveals no gallop and no friction rub.    No murmur heard.  Pulmonary/Chest: Breath sounds normal. Chest wall is not dull to percussion.   Musculoskeletal:   no edema.   Lymphadenopathy: no cervical adenopathy  Neurological: alert and oriented to person, place, and time.   Skin: No cyanosis. Nails show no clubbing.  Other: Abdomen: No abdominal bloating.        Current Outpatient Prescriptions on File Prior to Visit   Medication Sig Dispense Refill   • SYNTHROID 150 MCG Tab TAKE 1 TABLET EVERY MORNINGON AN EMPTY STOMACH 90 Tab 3   • losartan (COZAAR) 50 MG Tab TAKE 1 TABLET DAILY 90 Tab 1   • vitamin D, Ergocalciferol, (DRISDOL) 08335 units Cap capsule TAKE 1 CAPSULE EVERY 7 DAYS 12 Cap 1   • clobetasol (TEMOVATE) 0.05 % Cream      • colchicine (COLCRYS) 0.6 MG Tab Two pills x 1 then one tab po bid 30 Tab 0   • vitamin D, Ergocalciferol, (DRISDOL) 13345 units Cap capsule TAKE 1 CAPSULE EVERY 7 DAYS 12 Cap 1   • valacyclovir (VALTREX) 1 GM Tab 2 pills q12hr po x 1 day prn cold sore 20 Tab 0   • Probiotic Product (PROBIOTIC DAILY PO) Take  by mouth.       No current facility-administered medications on file prior to visit.            Signed by: Woo Samuels M.D.

## 2018-11-14 NOTE — LETTER
[facility]  Woo Samuels M.D.  [address]  [place] [address]  Fax: [phone]   Authorization for Release/Disclosure of   Protected Health Information   Name: FLAVIA MUSE : 1940 SSN: [id number]   Address: [address] [place] [address] [place] [address] Phone:    [phone] (home)    I authorize the entity listed below to release/disclose the PHI below to:   [facility]/Woo Samuels M.D. and Woo Samuels M.D.   Provider or Entity Name:  Dr. Li   Address   [facility], [facility], [facility]   Phone: [phone]      Fax: [phone]     Reason for request: continuity of care   Information to be released:    [  ] LAST COLONOSCOPY,  including any PATH REPORT and follow-up  [  ] LAST FIT/COLOGUARD RESULT [  ] LAST DEXA  [  ] LAST MAMMOGRAM  [  ] LAST PAP  [  ] LAST LABS [  ] RETINA EXAM REPORT  [  ] IMMUNIZATION RECORDS  [ x ] Release all info      [  ] Check here and initial the line next to each item to release ALL health information INCLUDING  _____ Care and treatment for drug and / or alcohol abuse  _____ HIV testing, infection status, or AIDS  _____ Genetic Testing    DATES OF SERVICE OR TIME PERIOD TO BE DISCLOSED: _____________  I understand and acknowledge that:  * This Authorization may be revoked at any time by you in writing, except if your health information has already been used or disclosed.  * Your health information that will be used or disclosed as a result of you signing this authorization could be re-disclosed by the recipient. If this occurs, your re-disclosed health information may no longer be protected by State or Federal laws.  * You may refuse to sign this Authorization. Your refusal will not affect your ability to obtain treatment.  * This Authorization becomes effective upon signing and will  on (date) __________.      If no date is indicated, this Authorization will  one (1) year from the signature date.    Name: Flavia Tinoco  Gerry    Signature:   Date:     11/14/2018       PLEASE FAX REQUESTED RECORDS BACK TO: [phone]

## 2018-11-14 NOTE — NON-PROVIDER
"Flavia Garrett is a 78 y.o. female here for a non-provider visit for:   PREVNAR (PCV13) 1 of 3    Reason for immunization: Overdue/Provider Recommended  Immunization records indicate need for vaccine: Yes, confirmed with Epic  Minimum interval has been met for this vaccine: Yes  ABN completed: Not Indicated    Order and dose verified by: JL  VIS Dated  11/05/15 was given to patient: Yes  All IAC Questionnaire questions were answered \"No.\"    Patient tolerated injection and no adverse effects were observed or reported: Yes    Pt scheduled for next dose in series: Not Indicated      "

## 2018-12-10 ENCOUNTER — TELEPHONE (OUTPATIENT)
Dept: INTERNAL MEDICINE | Facility: MEDICAL CENTER | Age: 78
End: 2018-12-10

## 2018-12-10 NOTE — TELEPHONE ENCOUNTER
1. Caller Name: Pt                      Call Back Number: [phone] (home)     2. Message: Patient called and left a message stating what could she do for her sinus infection. She has been doing the nasal lavage but isn't getting much help off of that. She also has an appt on 12/17 for this issue, but can she do anything else in the meantime?    3. Patient approves office to leave a detailed voicemail/MyChart message: N\A

## 2018-12-14 NOTE — TELEPHONE ENCOUNTER
Should be seen for this. May or may not need antibiotics. Please check if she okay with coming in today for acute visit.

## 2018-12-17 ENCOUNTER — APPOINTMENT (OUTPATIENT)
Dept: INTERNAL MEDICINE | Facility: MEDICAL CENTER | Age: 78
End: 2018-12-17
Payer: MEDICARE

## 2019-03-12 ENCOUNTER — OFFICE VISIT (OUTPATIENT)
Dept: INTERNAL MEDICINE | Facility: MEDICAL CENTER | Age: 79
End: 2019-03-12
Payer: MEDICARE

## 2019-03-12 VITALS
TEMPERATURE: 98.7 F | WEIGHT: 193.6 LBS | OXYGEN SATURATION: 95 % | SYSTOLIC BLOOD PRESSURE: 120 MMHG | HEIGHT: 60 IN | HEART RATE: 87 BPM | DIASTOLIC BLOOD PRESSURE: 70 MMHG | BODY MASS INDEX: 38.01 KG/M2

## 2019-03-12 DIAGNOSIS — R14.0 ABDOMINAL BLOATING: ICD-10-CM

## 2019-03-12 DIAGNOSIS — M54.50 CHRONIC LEFT-SIDED LOW BACK PAIN WITHOUT SCIATICA: ICD-10-CM

## 2019-03-12 DIAGNOSIS — E78.5 DYSLIPIDEMIA: ICD-10-CM

## 2019-03-12 DIAGNOSIS — E55.9 VITAMIN D DEFICIENCY: ICD-10-CM

## 2019-03-12 DIAGNOSIS — K90.0 CELIAC DISEASE: ICD-10-CM

## 2019-03-12 DIAGNOSIS — E66.9 CLASS 2 OBESITY WITHOUT SERIOUS COMORBIDITY WITH BODY MASS INDEX (BMI) OF 38.0 TO 38.9 IN ADULT, UNSPECIFIED OBESITY TYPE: ICD-10-CM

## 2019-03-12 DIAGNOSIS — G89.29 CHRONIC LEFT-SIDED LOW BACK PAIN WITHOUT SCIATICA: ICD-10-CM

## 2019-03-12 DIAGNOSIS — E03.9 HYPOTHYROIDISM, UNSPECIFIED TYPE: ICD-10-CM

## 2019-03-12 DIAGNOSIS — M1A.0710 IDIOPATHIC CHRONIC GOUT OF RIGHT ANKLE WITHOUT TOPHUS: ICD-10-CM

## 2019-03-12 DIAGNOSIS — I10 ESSENTIAL HYPERTENSION: ICD-10-CM

## 2019-03-12 DIAGNOSIS — Z78.0 POSTMENOPAUSAL: ICD-10-CM

## 2019-03-12 PROCEDURE — 99214 OFFICE O/P EST MOD 30 MIN: CPT | Performed by: INTERNAL MEDICINE

## 2019-03-12 ASSESSMENT — PATIENT HEALTH QUESTIONNAIRE - PHQ9: CLINICAL INTERPRETATION OF PHQ2 SCORE: 0

## 2019-03-12 NOTE — PROGRESS NOTES
Established Patient    Ms. Martinez a 79 y.o. female who presents today with:  CC: Follow-Up (thyroid, blood pressure)        Assessment and Plan    Obesity-improved.  Continue current efforts at weight loss.  We discussed intermittent fasting along with keto diets.  1. Hypothyroidism, unspecified type  Stable.  Continue with levothyroxine.  Repeat TSH    2. Idiopathic chronic gout of right ankle without tophus  Stable.  Repeat uric acid.  Patient has not had any flares in several months.    3. Essential hypertension  Manually her blood pressures are lower in our office than with her device at home.  Suggest that she change the batteries.  Instructed patient proper use of cuff.    4. Abdominal bloating  Improved with dietary changes.  Continue to follow-up with GI as needed    5. Celiac disease  She has a history of celiac disease but her recent small bowel biopsies were were not consistent with celiac disease.  And currently she is asymptomatic    6. Chronic left-sided low back pain without sciatica  Significantly improved with use of manual therapy.  Continue    7. Dyslipidemia  Stable.  Repeat lipid panel    8. Vitamin D deficiency  Stable.  Continue with vitamin D supplementation      Current Outpatient Prescriptions   Medication Sig Dispense Refill   • losartan (COZAAR) 50 MG Tab TAKE 1 TABLET DAILY 90 Tab 2   • SYNTHROID 150 MCG Tab TAKE 1 TABLET EVERY MORNINGON AN EMPTY STOMACH 90 Tab 3   • vitamin D, Ergocalciferol, (DRISDOL) 40090 units Cap capsule TAKE 1 CAPSULE EVERY 7 DAYS 12 Cap 1   • clobetasol (TEMOVATE) 0.05 % Cream      • valacyclovir (VALTREX) 1 GM Tab 2 pills q12hr po x 1 day prn cold sore 20 Tab 0   • colchicine (COLCRYS) 0.6 MG Tab Two pills x 1 then one tab po bid 30 Tab 0   • vitamin D, Ergocalciferol, (DRISDOL) 95210 units Cap capsule TAKE 1 CAPSULE EVERY 7 DAYS 12 Cap 1   • losartan (COZAAR) 50 MG Tab TAKE 1 TABLET DAILY 90 Tab 1   • Probiotic Product (PROBIOTIC DAILY PO) Take  by mouth.        No current facility-administered medications for this visit.          followup No Follow-up on file.    This note was created using voice recognition software (Dragon). The accuracy of the dictation is limited by the abilities of the software. I have reviewed the note prior to signing, however some errors in grammar and context are still possible. If you have any questions related to this note please do not hesitate to contact our office.   _______________________________________________________    HPI:   Obesity- her BMI is 38. She is frustrated due to inability to lose weight. Currently on a Keto diet and has lost some weight with this. She dances regularly.     1. Hypothyroidism, unspecified type  History of hypothyroidism and is compliant with synthroid 150 micrograms daily. Currently no constipation, dry skin, cold intolerance or fatigue.     2. Idiopathic chronic gout of right ankle without tophus  History of gout attacks in the past, podagra. No recent uric acid levels and no  recent gout attacks    3. Essential hypertension  She brought in her blod pressure cuff and her numbers are 150s on her machine  and today in tohe office her blood pressure is 120/70    4. Abdominal bloating  Occasional problems with bloating and referred to EGD and colonoscopy. I reviewed reports. Negative for H. Pylori and celiac sprue. Couldn't tolerate the fodmap diet. Eliminated bread and sweets. Lactose doesn't worsen it. Now on the keto diet and her stools are regular.Also less gas.  Using almond flour and rice cauliflower    5. Celiac disease  Remote history of celiac disease. No symptoms. Recent EGD was negative for celiac sprue.     6. Chronic left-sided low back pain without sciatica  Back pain and sciatica are not active. She has a therapist who does manipulation and this is working well for her.     7. Dyslipidemia  Stable. Currently no statin prophylaxis for prevention.     8. Vitamin D deficiency  History of vitamin D  def. But compliant with supplementation.      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     reports that she has never smoked. She has never used smokeless tobacco. She reports that she drinks alcohol. She reports that she does not use drugs.      ROS: Pertinent positives as stated in HPI, all others reviewed as negative:        Physical Exam  /70 (BP Location: Left arm, Patient Position: Sitting, BP Cuff Size: Large adult)   Pulse 87   Temp 37.1 °C (98.7 °F) (Temporal)   Ht 1.524 m (5')   Wt 87.8 kg (193 lb 9.6 oz)   SpO2 95%   BMI 37.81 kg/m²   Constitutional:  oriented to person, place, and time. No distress.   Eyes: Pupils are equal, round, and reactive to light. No scleral icterus.   Neck: Neck supple. No thyromegaly present.   Cardiovascular: Normal rate, regular rhythm and normal heart sounds.  Exam reveals no gallop and no friction rub.    No murmur heard.  Pulmonary/Chest: Breath sounds normal. Chest wall is not dull to percussion.   Musculoskeletal:   no edema.   Lymphadenopathy: no cervical adenopathy  Neurological: alert and oriented to person, place, and time.   Skin: No cyanosis. Nails show no clubbing.          Current Outpatient Prescriptions on File Prior to Visit   Medication Sig Dispense Refill   • losartan (COZAAR) 50 MG Tab TAKE 1 TABLET DAILY 90 Tab 2   • SYNTHROID 150 MCG Tab TAKE 1 TABLET EVERY MORNINGON AN EMPTY STOMACH 90 Tab 3   • vitamin D, Ergocalciferol, (DRISDOL) 87629 units Cap capsule TAKE 1 CAPSULE EVERY 7 DAYS 12 Cap 1   • clobetasol (TEMOVATE) 0.05 % Cream      • valacyclovir (VALTREX) 1 GM Tab 2 pills q12hr po x 1 day prn cold sore 20 Tab 0   • colchicine (COLCRYS) 0.6 MG Tab Two pills x 1 then one tab po bid 30 Tab 0   • vitamin D,  Ergocalciferol, (DRISDOL) 50493 units Cap capsule TAKE 1 CAPSULE EVERY 7 DAYS 12 Cap 1   • losartan (COZAAR) 50 MG Tab TAKE 1 TABLET DAILY 90 Tab 1   • Probiotic Product (PROBIOTIC DAILY PO) Take  by mouth.       No current facility-administered medications on file prior to visit.            Signed by: Woo Samuels M.D.

## 2019-03-13 PROBLEM — E66.9 CLASS 2 OBESITY IN ADULT: Status: ACTIVE | Noted: 2019-03-13

## 2019-03-13 PROBLEM — E66.812 CLASS 2 OBESITY IN ADULT: Status: ACTIVE | Noted: 2019-03-13

## 2019-04-03 ENCOUNTER — OFFICE VISIT (OUTPATIENT)
Dept: INTERNAL MEDICINE | Facility: MEDICAL CENTER | Age: 79
End: 2019-04-03
Payer: MEDICARE

## 2019-04-03 VITALS
SYSTOLIC BLOOD PRESSURE: 132 MMHG | WEIGHT: 192 LBS | DIASTOLIC BLOOD PRESSURE: 75 MMHG | OXYGEN SATURATION: 95 % | HEIGHT: 60 IN | TEMPERATURE: 98.2 F | BODY MASS INDEX: 37.69 KG/M2 | HEART RATE: 79 BPM

## 2019-04-03 DIAGNOSIS — R05.9 COUGH: ICD-10-CM

## 2019-04-03 DIAGNOSIS — R42 VERTIGO: ICD-10-CM

## 2019-04-03 DIAGNOSIS — R09.81 NASAL CONGESTION: ICD-10-CM

## 2019-04-03 DIAGNOSIS — R68.89 FLU-LIKE SYMPTOMS: ICD-10-CM

## 2019-04-03 PROCEDURE — 99213 OFFICE O/P EST LOW 20 MIN: CPT | Mod: GE | Performed by: INTERNAL MEDICINE

## 2019-04-03 RX ORDER — FLUTICASONE PROPIONATE 50 MCG
1 SPRAY, SUSPENSION (ML) NASAL DAILY
Qty: 16 G | Refills: 3 | Status: SHIPPED | OUTPATIENT
Start: 2019-04-03 | End: 2019-06-01

## 2019-04-03 NOTE — PROGRESS NOTES
"Established Patient    Flavia presents today with the following:    CC:   Presents for an acute visit to discuss her \"flulike symptoms\" for the past week    HPI:     Patient is a 79-year-old female who presents to the clinic to address her flulike symptoms for the past week.  Patient states over a week ago she was in [place] for 1 week duration in [facility].  She states her  had \"flulike symptoms which resolved and then later towards the end of the trip returned but now have improved.  She states she similarly developed cold flulike symptoms for the past week which is associated with a cough which is dry and productive at times with greenish sputum, denies any sore throat or fevers but states while she was in [place] she had some subjective chills, states body aches and fatigue in addition to sinus and nasal congestion.  In addition she states that when these symptoms came about she has also had a little bit of vertigo/head spinning symptoms while she is sleeping at night.  She states she has tried Mucinex, extra strength Tylenol and  lozenges which have helped a little with her symptoms.  She states she did not get the flu immunization this year.    Patient denies any other complaints or concerns at this time.      Patient Active Problem List    Diagnosis Date Noted   • Flu-like symptoms 04/03/2019   • Nasal congestion 04/03/2019   • Cough 04/03/2019   • Vertigo 04/03/2019   • Class 2 obesity in adult 03/13/2019   • Vision loss 08/15/2018   • Venous insufficiency 08/15/2018   • Cold sore 03/29/2018   • Lichen sclerosus of female genitalia 02/05/2018   • Acquired hypothyroidism 06/23/2017   • Chronic left-sided low back pain without sciatica 03/17/2017   • Lichen planus 03/17/2017   • History of breast cancer 03/17/2017   • Gout of foot 09/30/2016   • Idiopathic chronic gout of right ankle 09/30/2016   • Essential hypertension 09/30/2016   • Status post total right knee replacement 08/17/2016   • Dysuria " 07/26/2016   • Toe pain, right 07/26/2016   • Cellulitis 07/26/2016   • Abdominal bloating 07/26/2016   • Celiac disease 07/26/2016   • Intertrigo 07/26/2016   • Osteoarthritis of right knee 07/26/2016   • Abnormal results of liver function studies 07/26/2016   • Hearing loss 07/26/2016   • Tinnitus 07/26/2016   • Dyslipidemia 07/26/2016   • H/O total hysterectomy 07/26/2016   • Hypothyroidism 07/26/2016   • Vitamin D deficiency 07/26/2016   • Osteoporosis 07/26/2016   • Hx of breast cancer 07/26/2016   • Lack of energy 05/05/2016   • Status post right knee replacement 05/05/2016       Current Outpatient Prescriptions   Medication Sig Dispense Refill   • fluticasone (FLONASE) 50 MCG/ACT nasal spray Spray 1 Spray in nose every day. 16 g 3   • losartan (COZAAR) 50 MG Tab TAKE 1 TABLET DAILY 90 Tab 2   • SYNTHROID 150 MCG Tab TAKE 1 TABLET EVERY MORNINGON AN EMPTY STOMACH 90 Tab 3   • vitamin D, Ergocalciferol, (DRISDOL) 53313 units Cap capsule TAKE 1 CAPSULE EVERY 7 DAYS 12 Cap 1   • clobetasol (TEMOVATE) 0.05 % Cream      • valacyclovir (VALTREX) 1 GM Tab 2 pills q12hr po x 1 day prn cold sore (Patient not taking: Reported on 4/3/2019) 20 Tab 0   • colchicine (COLCRYS) 0.6 MG Tab Two pills x 1 then one tab po bid (Patient not taking: Reported on 4/3/2019) 30 Tab 0   • Probiotic Product (PROBIOTIC DAILY PO) Take  by mouth.       No current facility-administered medications for this visit.        Social History     Social History   • Marital status:      Spouse name: N/A   • Number of children: N/A   • Years of education: N/A     Occupational History   • Not on file.     Social History Main Topics   • Smoking status: Never Smoker   • Smokeless tobacco: Never Used   • Alcohol use 0.0 oz/week      Comment: min   • Drug use: No   • Sexual activity: Not on file     Other Topics Concern   • Not on file     Social History Narrative   • No narrative on file       Family History   Problem Relation Age of Onset   •  "Diabetes Unknown         GF   • Arthritis Unknown         GM   • Arthritis Mother    • Other Son         CELIAC DISEASE-SEVERE       ROS: As per HPI.  All other symptoms reviewed and were negative.  Additional pertinent symptoms as noted below.    All others negative    /75 (BP Location: Left arm, Patient Position: Sitting)   Pulse 79   Temp 36.8 °C (98.2 °F) (Temporal)   Ht 1.524 m (5')   Wt 87.1 kg (192 lb)   SpO2 95%   BMI 37.50 kg/m²     Physical Exam  General:  Alert and oriented, No apparent distress.    Eyes: Pupils equal and reactive. No scleral icterus.    Throat: Clear no erythema or exudates noted.    Neck: Supple. No lymphadenopathy noted. Thyroid not enlarged.    Lungs: Clear to auscultation and percussion bilaterally.  Normal effort of breathing.    Cardiovascular: Regular rate and rhythm. No murmurs, rubs or gallops.    Abdomen:  Benign. No rebound or guarding noted.    Extremities: No clubbing, cyanosis, edema.  Distal peripheral pulses 2+ bilaterally in all extremities.    Skin: Clear. No rash or suspicious skin lesions noted.          Assessment and Plan    1. Flu-like symptoms  2. Nasal congestion  3. Cough  4. Vertigo  - Flulike symptoms for the past week, was in [place] for 1 week duration in [facility], states her  had \"flulike symptoms which resolved and then later towards the end of the trip returned but now have improved.    -  Cough which is dry and productive at times with greenish sputum, denies any sore throat or fevers but states while she was in [place] she had some subjective chills, states body aches and fatigue in addition to sinus and nasal congestion and a little bit of vertigo/head spinning symptoms while she is sleeping at night.    - Did not get the flu immunization this year  - Physical examination unremarkable, vital signs stable  - Patient symptoms are likely viral in nature, as there is no sore throat or relevant physical examination findings, will avoid " "antibiotics at this time, and have patient utilize symptomatic measures to help with symptoms for 1 week  - Provided information regarding nasal lavages  - Chest x-ray to rule out underlying infectious process  - Flonase for allergic component to nasal/sinus congestion  - Educated and counseled to keep hydrated, do nasal lavages, use Claritin antihistamine for allergy component to symptoms, and over-the-counter symptomatic relief for \"flulike symptoms with Robitussin  - Advised that if signs or symptoms worsen to contact PCP or medical office immediately  - Follow-up chest x-ray results in improvement of symptoms in approximately 1-2-weeks            Signed by: Azalea Caraballo M.D.    "

## 2019-04-03 NOTE — PATIENT INSTRUCTIONS
"- Please get Chest X ray done as soon as possible  - Please use Flonase as prescribed  - Try to do nasal lavages, as per information provided, or purchase OTC kit  - Use Claritin antihistamine for allergy component of symptoms  - Hydrate adequately  - Use over the counter symptomatic relief for cold/flu symptoms- can try something like Robitussin   - Continue to utilize symptomatic measures and monitor for 1 week  - If signs or symptoms worsen, please contact PCP or medical office immediately     Viral Syndrome  You or your child has Viral Syndrome. It is the most common infection causing \"colds\" and infections in the nose, throat, sinuses, and breathing tubes. Sometimes the infection causes nausea, vomiting, or diarrhea. The germ that causes the infection is a virus. No antibiotic or other medicine will kill it. There are medicines that you can take to make you or your child more comfortable.   HOME CARE INSTRUCTIONS   · Rest in bed until you start to feel better.   · If you have diarrhea or vomiting, eat small amounts of crackers and toast. Soup is helpful.   · Do not give aspirin or medicine that contains aspirin to children.   · Only take over-the-counter or prescription medicines for pain, discomfort, or fever as directed by your caregiver.   SEEK IMMEDIATE MEDICAL CARE IF:   · You or your child has not improved within one week.   · You or your child has pain that is not at least partially relieved by over-the-counter medicine.   · Thick, colored mucus or blood is coughed up.   · Discharge from the nose becomes thick yellow or green.   · Diarrhea or vomiting gets worse.   · There is any major change in your or your child's condition.   · You or your child develops a skin rash, stiff neck, severe headache, or are unable to hold down food or fluid.   · You or your child has an oral temperature above 102° F (38.9° C), not controlled by medicine.   · Your baby is older than 3 months with a rectal temperature of " 102° F (38.9° C) or higher.   · Your baby is 3 months old or younger with a rectal temperature of 100.4° F (38° C) or higher.   Document Released: 12/03/2007 Document Revised: 03/11/2013 Document Reviewed: 12/03/2008  [employer]® Patient Information ©2013 [employer].

## 2019-04-09 DIAGNOSIS — M81.0 AGE-RELATED OSTEOPOROSIS WITHOUT CURRENT PATHOLOGICAL FRACTURE: ICD-10-CM

## 2019-04-09 DIAGNOSIS — E55.9 VITAMIN D DEFICIENCY: ICD-10-CM

## 2019-04-11 DIAGNOSIS — E55.9 VITAMIN D DEFICIENCY: ICD-10-CM

## 2019-04-12 RX ORDER — ERGOCALCIFEROL 1.25 MG/1
CAPSULE ORAL
Qty: 12 CAP | Refills: 3 | Status: SHIPPED | OUTPATIENT
Start: 2019-04-12 | End: 2019-06-01

## 2019-04-12 NOTE — TELEPHONE ENCOUNTER
Last seen: 04/03/19 by Dr. Caraballo  Next appt: 04/25/19 with Dr. Samuels    Was the patient seen in the last year in this department? Yes   Does patient have an active prescription for medications requested? No   Received Request Via: Pharmacy

## 2019-04-25 ENCOUNTER — APPOINTMENT (OUTPATIENT)
Dept: INTERNAL MEDICINE | Facility: MEDICAL CENTER | Age: 79
End: 2019-04-25
Payer: MEDICARE

## 2019-04-30 ENCOUNTER — TELEPHONE (OUTPATIENT)
Dept: INTERNAL MEDICINE | Facility: MEDICAL CENTER | Age: 79
End: 2019-04-30

## 2019-04-30 DIAGNOSIS — K90.0 CELIAC DISEASE: ICD-10-CM

## 2019-04-30 DIAGNOSIS — Z78.0 POST-MENOPAUSAL: ICD-10-CM

## 2019-04-30 DIAGNOSIS — M81.0 AGE-RELATED OSTEOPOROSIS WITHOUT CURRENT PATHOLOGICAL FRACTURE: ICD-10-CM

## 2019-04-30 NOTE — TELEPHONE ENCOUNTER
1. Caller Name: PT                      Call Back Number: [phone] (home)     2. Message: Patient called stating she was trying to schedule her DEXA scan but was not able to as ICD 10 codes are not appropriate. Once this is corrected she would like for the order to be faxed to [facility]    3. Patient approves office to leave a detailed voicemail/MyChart message: N\A

## 2019-05-01 PROBLEM — Z78.0 POST-MENOPAUSAL: Status: ACTIVE | Noted: 2019-05-01

## 2019-05-29 NOTE — TELEPHONE ENCOUNTER
1. Caller Name: Pt                      Call Back Number: [phone] (home)     2. Message: Patient states she is having another UTI and would like a refill on the Bactrim to her local pharmacy.    3. Patient approves office to leave a detailed voicemail/MyChart message: N\A

## 2019-05-30 RX ORDER — SULFAMETHOXAZOLE AND TRIMETHOPRIM 800; 160 MG/1; MG/1
1 TABLET ORAL 2 TIMES DAILY
Qty: 14 TAB | Refills: 0 | OUTPATIENT
Start: 2019-05-30

## 2019-05-31 RX ORDER — SULFAMETHOXAZOLE AND TRIMETHOPRIM 800; 160 MG/1; MG/1
1 TABLET ORAL 2 TIMES DAILY
Qty: 14 TAB | Refills: 0 | Status: SHIPPED | OUTPATIENT
Start: 2019-05-31 | End: 2019-06-01

## 2019-06-01 ENCOUNTER — HOSPITAL ENCOUNTER (OUTPATIENT)
Facility: MEDICAL CENTER | Age: 79
End: 2019-06-01
Attending: PHYSICIAN ASSISTANT
Payer: MEDICARE

## 2019-06-01 ENCOUNTER — OFFICE VISIT (OUTPATIENT)
Dept: URGENT CARE | Facility: MEDICAL CENTER | Age: 79
End: 2019-06-01
Payer: MEDICARE

## 2019-06-01 VITALS
TEMPERATURE: 102.5 F | HEIGHT: 60 IN | DIASTOLIC BLOOD PRESSURE: 82 MMHG | OXYGEN SATURATION: 95 % | SYSTOLIC BLOOD PRESSURE: 140 MMHG | BODY MASS INDEX: 37.5 KG/M2 | HEART RATE: 121 BPM

## 2019-06-01 DIAGNOSIS — R30.0 DYSURIA: ICD-10-CM

## 2019-06-01 DIAGNOSIS — R50.9 FEVER, UNSPECIFIED FEVER CAUSE: ICD-10-CM

## 2019-06-01 DIAGNOSIS — R30.0 DYSURIA: Primary | ICD-10-CM

## 2019-06-01 LAB
APPEARANCE UR: NORMAL
BILIRUB UR STRIP-MCNC: NEGATIVE MG/DL
COLOR UR AUTO: YELLOW
GLUCOSE UR STRIP.AUTO-MCNC: NEGATIVE MG/DL
KETONES UR STRIP.AUTO-MCNC: NEGATIVE MG/DL
LEUKOCYTE ESTERASE UR QL STRIP.AUTO: NORMAL
NITRITE UR QL STRIP.AUTO: NEGATIVE
PH UR STRIP.AUTO: 5 [PH] (ref 5–8)
PROT UR QL STRIP: 30 MG/DL
RBC UR QL AUTO: NORMAL
SP GR UR STRIP.AUTO: 1.02
UROBILINOGEN UR STRIP-MCNC: 0.2 MG/DL

## 2019-06-01 PROCEDURE — 87086 URINE CULTURE/COLONY COUNT: CPT

## 2019-06-01 PROCEDURE — 81002 URINALYSIS NONAUTO W/O SCOPE: CPT | Performed by: PHYSICIAN ASSISTANT

## 2019-06-01 PROCEDURE — 87077 CULTURE AEROBIC IDENTIFY: CPT

## 2019-06-01 PROCEDURE — 87186 SC STD MICRODIL/AGAR DIL: CPT

## 2019-06-01 PROCEDURE — 99214 OFFICE O/P EST MOD 30 MIN: CPT | Performed by: PHYSICIAN ASSISTANT

## 2019-06-01 RX ORDER — ACETAMINOPHEN 500 MG
500 TABLET ORAL ONCE
Status: COMPLETED | OUTPATIENT
Start: 2019-06-01 | End: 2019-06-01

## 2019-06-01 RX ORDER — SULFAMETHOXAZOLE AND TRIMETHOPRIM 800; 160 MG/1; MG/1
1 TABLET ORAL EVERY 12 HOURS
Qty: 20 TAB | Refills: 0 | Status: SHIPPED | OUTPATIENT
Start: 2019-06-01 | End: 2019-06-11

## 2019-06-01 RX ADMIN — Medication 500 MG: at 10:39

## 2019-06-01 NOTE — PROGRESS NOTES
Subjective:      Pt is a 79 y.o. female who presents with UTI (X1 week)            HPI  This is a new problem. PT comes into the UC with a chief complaint of dysuria, burning on urination, urgency, frequency, and bladder pressure x 7 days and with fever x 1 day. PT denies chills, CP, SOB, NVD, paresthesias, headaches, dizziness, change in vision, hives, or joint pain. PT states the pain is a 4/10 with burning upon urination, aching in nature and worse at night. Pt states they have not taken any RX meds for this issue. Pt denies flank or back pain as well. The pt's medication list, problem list, and allergies have been evaluated and reviewed during today's visit.      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  Family Status   Relation Status   • Unknown (Not Specified)   • Unknown (Not Specified)   • Mo (Not Specified)   • Son (Not Specified)       Soc HX:  Social History     Social History   • Marital status:      Spouse name: N/A   • Number of children: N/A   • Years of education: N/A     Occupational History   • Not on file.     Social History Main Topics   • Smoking status: Never Smoker   • Smokeless tobacco: Never Used   • Alcohol use 0.0 oz/week      Comment: min   • Drug use: No   • Sexual activity: Not on file     Other Topics Concern   • Not on file     Social History Narrative   • No narrative on file         Medications:    Current  Outpatient Prescriptions:   •  sulfamethoxazole-trimethoprim (BACTRIM DS) 800-160 MG tablet, Take 1 Tab by mouth every 12 hours for 10 days., Disp: 20 Tab, Rfl: 0  •  clobetasol (TEMOVATE) 0.05 % Cream, , Disp: , Rfl:   •  Probiotic Product (PROBIOTIC DAILY PO), Take  by mouth., Disp: , Rfl:     Current Facility-Administered Medications:   •  acetaminophen (TYLENOL) tablet 500 mg, 500 mg, Oral, Once, Jack Mejía P.A.-C.      Allergies:  Lisinopril    ROS    Review of Systems   Constitutional: POS for fever   HENT: Negative for congestion and sore throat.    Eyes: Negative for blurred vision, double vision and photophobia.   Respiratory: Negative for cough and shortness of breath.    Cardiovascular: Negative for chest pain and palpitations.   Gastrointestinal: Negative for nausea, vomiting, abdominal pain, diarrhea and constipation.   Genitourinary: Positive for dysuria, urgency and frequency.   Musculoskeletal: Negative for joint pain and myalgias.   Skin: Negative for rash.   Neurological: Negative for dizziness, tingling and headaches.   Endo/Heme/Allergies: Does not bruise/bleed easily.   Psychiatric/Behavioral: Negative for depression. The patient is not nervous/anxious.         Objective:     /82   Pulse (!) 121   Temp (!) 39.2 °C (102.5 °F)   Ht 1.524 m (5')   SpO2 95%   BMI 37.50 kg/m²      Physical Exam      Physical Exam   Constitutional: She is oriented to person, place, and time. She appears well-developed and well-nourished. No distress.   HENT:   Head: Normocephalic and atraumatic.   Right Ear: External ear normal.   Left Ear: External ear normal.   Nose: Nose normal.   Mouth/Throat: Oropharynx is clear and moist. No oropharyngeal exudate.   Eyes: Conjunctivae normal and EOM are normal. Pupils are equal, round, and reactive to light.   Neck: Normal range of motion. Neck supple. No thyromegaly present.   Cardiovascular: Normal rate, regular rhythm, normal heart sounds and intact distal  pulses.  Exam reveals no gallop and no friction rub.    No murmur heard.  Pulmonary/Chest: Effort normal and breath sounds normal. No respiratory distress. She has no wheezes. She has no rales. She exhibits no tenderness.   Abdominal: Soft. Bowel sounds are normal. She exhibits no distension and no mass. There is no tenderness. There is no rebound and no guarding.   Genitourinary:        Pt deferred   Musculoskeletal: Normal range of motion. She exhibits no edema and no tenderness.   Lymphadenopathy:     She has no cervical adenopathy.   Neurological: She is alert and oriented to person, place, and time. She has normal reflexes. No cranial nerve deficit.   Skin: Skin is warm and dry. No rash noted. No erythema.   Psychiatric: She has a normal mood and affect. Her behavior is normal. Judgment and thought content normal.          Assessment/Plan:     1. Dysuria    - POCT Urinalysis-->LEUKS AND BLOOD  - Urine Culture; Future  - sulfamethoxazole-trimethoprim (BACTRIM DS) 800-160 MG tablet; Take 1 Tab by mouth every 12 hours for 10 days.  Dispense: 20 Tab; Refill: 0    2. Fever, unspecified fever cause  - acetaminophen (TYLENOL) tablet 500 mg; Take 1 Tab by mouth Once.    Rest, fluids encouraged.  AVS with medical info given.  Pt was in full understanding and agreement with the plan.  Differential diagnosis, natural history, supportive care, and indications for immediate follow-up discussed. All questions answered. Patient agrees with the plan of care.  Follow-up as needed if symptoms worsen or fail to improve.

## 2019-06-03 LAB
BACTERIA UR CULT: ABNORMAL
BACTERIA UR CULT: ABNORMAL
SIGNIFICANT IND 70042: ABNORMAL
SITE SITE: ABNORMAL
SOURCE SOURCE: ABNORMAL

## 2019-06-04 ENCOUNTER — TELEPHONE (OUTPATIENT)
Dept: URGENT CARE | Facility: CLINIC | Age: 79
End: 2019-06-04

## 2019-06-04 NOTE — TELEPHONE ENCOUNTER
Called and left message with pt about urine culture results which came back positive for E.coli.   I told her she could continue the abx therapy with a positive urine culture which was sensitive to the abx she was placed on.  Encouraged Pt to call back with questions.  Jack Mejía PA-C

## 2019-06-10 DIAGNOSIS — E03.9 HYPOTHYROIDISM, UNSPECIFIED TYPE: ICD-10-CM

## 2019-06-10 DIAGNOSIS — E55.9 VITAMIN D DEFICIENCY: ICD-10-CM

## 2019-06-12 ENCOUNTER — HOSPITAL ENCOUNTER (OUTPATIENT)
Facility: MEDICAL CENTER | Age: 79
End: 2019-06-12
Attending: INTERNAL MEDICINE
Payer: MEDICARE

## 2019-06-12 ENCOUNTER — OFFICE VISIT (OUTPATIENT)
Dept: INTERNAL MEDICINE | Facility: MEDICAL CENTER | Age: 79
End: 2019-06-12
Payer: MEDICARE

## 2019-06-12 VITALS
HEIGHT: 60 IN | WEIGHT: 189.4 LBS | BODY MASS INDEX: 37.18 KG/M2 | TEMPERATURE: 97.9 F | OXYGEN SATURATION: 96 % | HEART RATE: 106 BPM | SYSTOLIC BLOOD PRESSURE: 110 MMHG | DIASTOLIC BLOOD PRESSURE: 80 MMHG

## 2019-06-12 DIAGNOSIS — E03.8 OTHER SPECIFIED HYPOTHYROIDISM: ICD-10-CM

## 2019-06-12 DIAGNOSIS — M81.0 OSTEOPOROSIS WITHOUT CURRENT PATHOLOGICAL FRACTURE, UNSPECIFIED OSTEOPOROSIS TYPE: ICD-10-CM

## 2019-06-12 DIAGNOSIS — N30.90 CYSTITIS: ICD-10-CM

## 2019-06-12 PROCEDURE — 99000 SPECIMEN HANDLING OFFICE-LAB: CPT | Performed by: INTERNAL MEDICINE

## 2019-06-12 PROCEDURE — 99214 OFFICE O/P EST MOD 30 MIN: CPT | Performed by: INTERNAL MEDICINE

## 2019-06-12 PROCEDURE — 87077 CULTURE AEROBIC IDENTIFY: CPT

## 2019-06-12 PROCEDURE — 87186 SC STD MICRODIL/AGAR DIL: CPT

## 2019-06-12 PROCEDURE — 87086 URINE CULTURE/COLONY COUNT: CPT

## 2019-06-12 RX ORDER — LEVOTHYROXINE SODIUM 175 UG/1
175 TABLET ORAL
Qty: 30 TAB | Refills: 2 | Status: SHIPPED | OUTPATIENT
Start: 2019-06-12 | End: 2019-06-12 | Stop reason: SDUPTHER

## 2019-06-12 RX ORDER — LEVOTHYROXINE SODIUM 175 UG/1
175 TABLET ORAL
Qty: 30 TAB | Refills: 2 | Status: SHIPPED | OUTPATIENT
Start: 2019-06-12 | End: 2019-10-02 | Stop reason: SDUPTHER

## 2019-06-12 NOTE — PATIENT INSTRUCTIONS
Use medications for vaginal dryness.   Consider bisphosphonate after you talk to your dentist. Call office if you want to start medication.   Discontinue the levothyroxine 150 and begin 175 daily and repeat TSH in 4 weeks.

## 2019-06-12 NOTE — PROGRESS NOTES
"Established Patient    Ms. Martinez a 79 y.o. female who presents today with:  CC: Follow-Up (UTI, E-coli recent dx from ); Results (labs); and Medication Management (losartan (due to re-call), levothyroxine)        Assessment and Plan    1.  Cystitis-she was recently treated for E. coli cystitis.  10 days of Bactrim.  Currently has no symptoms.  She is very concerned about E. coli remaining in her bladder.  She would like to have a urine culture to check for clearance which is reasonable.  I did instruct her that her vaginal dryness and noncompliance with vaginal estrogen is probably the reason that she continues to have urinary tract infections.  She agrees to use the medication as directed by her gynecologist    2.  Osteoporosis she has osteoporosis on her bone density.  We discussed the pathophysiology of osteoporosis and current treatment.  She is somewhat hesitant to take any medication for a 5-year period of time.  She thinks her fall risk is very low.  Plus she has \"dental\" problems and is worried about the small risk of osteonecrosis and dental issues.  She will discuss this with her dentist.  She will also think about using bisphosphonate therapy to reduce her fracture risk.    3.  Hypothyroidism-she currently is compliant with levothyroxine but her TSH level is currently 9.6.  I am going to increase her levothyroxine to 175 and repeat TSH in 4 weeks.  She does have some mild fatigue and this may be related to her hypothyroidism not adequately being treated    Current Outpatient Prescriptions   Medication Sig Dispense Refill   • clobetasol (TEMOVATE) 0.05 % Cream      • Probiotic Product (PROBIOTIC DAILY PO) Take  by mouth.       No current facility-administered medications for this visit.          followup No Follow-up on file.    This note was created using voice recognition software (Dragon). The accuracy of the dictation is limited by the abilities of the software. I have reviewed the note prior to " signing, however some errors in grammar and context are still possible. If you have any questions related to this note please do not hesitate to contact our office.   _______________________________________________________    HPI:   She is here today with a chief complaint of recurrent urinary tract infections.  She has had she thinks four in the last year.   About a week she had viscous urine but yellow. Also had frequency. Also had chills.  She was seen in the [facility]. UCX positive for E coli, pan sensitive. Prescribed Bactrim. She just finished BActrim BID 10 days on Monday.  Currently feels fatigued. Urine is regular consistency, still yellow. No fevers. Back to normal frequency.  No flank tenderness no fevers.  She has vaginal dryness and is supposed to use estrace but doesn't use it on a regular basis..     She has hemorrhoids that are symptomatic.  She feels fullness around the anus.  And it alters her stooling.  She has a gastroenterologist.  She is going to discuss this with her gastroenterologist.    She has a history of hypothyroidism.  Her most recent TSH is 9.6 with a normal T4.  She is compliant with Synthroid 150 mcg daily at the same time.    She also recently had a bone density this year.  Her left hip shows osteoporosis with a hip fracture risk of 5.6%.  She has never been treated for osteoporosis in the past these had osteopenia.  She is compliant with vitamin D supplementation and calcium.  She has not had any history of fractures.  She does not smoke or drink alcohol.  No family history of osteoporosis.  She dances on a regular basis.  She has not had any falls in quite some time.   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     reports that she has never smoked. She has  never used smokeless tobacco. She reports that she drinks alcohol. She reports that she does not use drugs.      ROS: Pertinent positives as stated in HPI, all others reviewed as negative:        Physical Exam  /80 (BP Location: Right arm, Patient Position: Sitting, BP Cuff Size: Large adult)   Pulse (!) 106   Temp 36.6 °C (97.9 °F) (Temporal)   Ht 1.524 m (5')   Wt 85.9 kg (189 lb 6.4 oz)   SpO2 96%   BMI 36.99 kg/m²   Constitutional:  oriented to person, place, and time. No distress.           Current Outpatient Prescriptions on File Prior to Visit   Medication Sig Dispense Refill   • clobetasol (TEMOVATE) 0.05 % Cream      • Probiotic Product (PROBIOTIC DAILY PO) Take  by mouth.       No current facility-administered medications on file prior to visit.            Signed by: Woo Samuels M.D.

## 2019-06-15 DIAGNOSIS — N39.0 RECURRENT UTI: ICD-10-CM

## 2019-06-15 RX ORDER — NITROFURANTOIN MACROCRYSTALS 50 MG/1
50 CAPSULE ORAL 4 TIMES DAILY
Qty: 28 CAP | Refills: 0 | Status: SHIPPED | OUTPATIENT
Start: 2019-06-15 | End: 2019-10-02

## 2019-06-15 NOTE — PROGRESS NOTES
Patient with Enterococcus UTI after treatment for cx positive Ecoli several weeks ago. Will treat with macrodantin but sending patient to urology for evaluaTION.    Called patient. Left voice mail for her to get abx from pharmacy and referral to urology.

## 2019-06-20 ENCOUNTER — TELEPHONE (OUTPATIENT)
Dept: INTERNAL MEDICINE | Facility: MEDICAL CENTER | Age: 79
End: 2019-06-20

## 2019-06-20 NOTE — TELEPHONE ENCOUNTER
VOICEMAIL  1. Caller Name:Flavia Garrett                     Call Back Number: [phone] (home)       2. Message: Pt left voicemail stating she has gout happened over night. She is currently taking nitrofurantoin for her UTI. She has indomethacin and would like to know if its okay to take since she is taking the UTI medication?     3. Patient approves office to leave a detailed voicemail/MyChart message: N\A

## 2019-06-28 ENCOUNTER — OFFICE VISIT (OUTPATIENT)
Dept: URGENT CARE | Facility: MEDICAL CENTER | Age: 79
End: 2019-06-28
Payer: MEDICARE

## 2019-06-28 ENCOUNTER — HOSPITAL ENCOUNTER (OUTPATIENT)
Dept: RADIOLOGY | Facility: MEDICAL CENTER | Age: 79
End: 2019-06-28
Attending: NURSE PRACTITIONER
Payer: MEDICARE

## 2019-06-28 VITALS
DIASTOLIC BLOOD PRESSURE: 82 MMHG | BODY MASS INDEX: 38.09 KG/M2 | WEIGHT: 194 LBS | SYSTOLIC BLOOD PRESSURE: 120 MMHG | TEMPERATURE: 98.1 F | HEART RATE: 86 BPM | OXYGEN SATURATION: 96 % | RESPIRATION RATE: 16 BRPM | HEIGHT: 60 IN

## 2019-06-28 DIAGNOSIS — M79.605 LEFT LEG PAIN: ICD-10-CM
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
## 2019-06-28 DIAGNOSIS — R07.89 CHEST WALL PAIN: ICD-10-CM

## 2019-06-28 DIAGNOSIS — S39.012A STRAIN OF LUMBAR REGION, INITIAL ENCOUNTER: ICD-10-CM

## 2019-06-28 PROCEDURE — 73590 X-RAY EXAM OF LOWER LEG: CPT | Mod: LT

## 2019-06-28 PROCEDURE — 72100 X-RAY EXAM L-S SPINE 2/3 VWS: CPT

## 2019-06-28 PROCEDURE — 99214 OFFICE O/P EST MOD 30 MIN: CPT | Performed by: NURSE PRACTITIONER

## 2019-06-28 PROCEDURE — 71046 X-RAY EXAM CHEST 2 VIEWS: CPT

## 2019-06-28 RX ORDER — CYCLOBENZAPRINE HCL 5 MG
TABLET ORAL
Qty: 30 TAB | Refills: 0 | Status: SHIPPED | OUTPATIENT
Start: 2019-06-28 | End: 2019-10-02

## 2019-06-28 NOTE — PROGRESS NOTES
Subjective:      Flavia Garrett is a 79 y.o. 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     Social History Main Topics   • Smoking status: Never Smoker   • Smokeless tobacco: Never Used   • Alcohol use 0.0 oz/week      Comment: min   • Drug use: No   • Sexual activity: Not on file     Other Topics Concern   • Not on file     Social History Narrative   • No narrative on file     Family History   Problem Relation Age of Onset   • Diabetes Unknown         GF   • Arthritis Unknown         GM   • Arthritis Mother    • Other Son         CELIAC DISEASE-SEVERE       Allergies: Lisinopril    Patient is a 79-year-old female who presents today with complaint of low back pain, right chest wall pain, and right leg pain after a fall that occurred 24 hours ago.  Patient [place] and her heel caught on an outlet on the floor.  She fell towards her right side, twisting as she fell.  Patient is not entirely certain of her position when she landed but states that she thinks she twisted her lower back and fell on the right side of her chest.  Denies any head injury or loss of consciousness.  States that she did go dancing after she fell as she did not immediately have pain.  States she took a shower last night after they went dancing and at that point began to have body aches and increasing pain.        Fall   The accident occurred 12 to 24 hours  ago. The fall occurred while walking. Distance fallen: GLF. Pain location: legs, right shoulder ,right arm. The pain is moderate. The symptoms are aggravated by ambulation, flexion and movement. She has tried nothing for the symptoms. The treatment provided no relief.       Review of Systems   Musculoskeletal:        Bilateral leg pain, right shoulder pain, right arm pain   All other systems reviewed and are negative.         Objective:     /82   Pulse 86   Temp 36.7 °C (98.1 °F) (Temporal)   Resp 16   Ht 1.524 m (5')   Wt 88 kg (194 lb)   SpO2 96%   BMI 37.89 kg/m²      Physical Exam   Constitutional: She appears well-developed and well-nourished.   Cardiovascular: Normal rate, regular rhythm and normal heart sounds.    Pulmonary/Chest: Effort normal and breath sounds normal. She exhibits tenderness.       Point tenderness to the left side of the chest.  Lungs are clear.   Musculoskeletal:        Right knee: Tenderness found.        Back:         Legs:  Contusion and swelling noted to the anterior left leg.  There is point tenderness.  No point tenderness over the knee.    There is point tenderness over the lumbar spine, no obvious deformity.   Skin: Skin is warm and dry.   Psychiatric: She has a normal mood and affect. Her behavior is normal. Judgment and thought content normal.   Vitals reviewed.         TECHNIQUE/EXAM DESCRIPTION AND NUMBER OF VIEWS:  Two views of the chest.    COMPARISON:  None.    FINDINGS:  The lungs are clear.    Lungs are probably hyperinflated suggesting chronic obstructive pulmonary disease.    The cardiac silhouette: enlarged.    Right breast or axillary surgical clip are present.    Pleura: There are no pleural effusion or pneumothoraces.    Osseous structures: No significant bony abnormality is present.   Impression       No acute cardiopulmonary abnormality identified.     6/28/2019 1:30 PM    HISTORY/REASON FOR EXAM:  Pain/Deformity Following Trauma.  Left leg pain,  fall, injury    TECHNIQUE/EXAM DESCRIPTION AND NUMBER OF VIEWS:  2 views of the LEFT tibia and fibula.    COMPARISON: None of the left leg    FINDINGS:  There is no fracture.    Alignment is normal.    Mild knee joint degenerative changes are present.   Impression       1.  Negative for left tibial or fibular fracture    2.  Mild left knee joint osteoarthritis       TECHNIQUE/ EXAM DESCRIPTION AND NUMBER OF VIEWS:  3 views of the lumbar spine.    COMPARISON: CT scan lumbar spine 12/14/2009    FINDINGS:  The lumbar vertebral bodies are normal in height.    Degenerative subluxation at L4-5 measures 10 mm.    There is mild dextroconvex scoliosis.    There is endplate spurring and facet arthropathy.    The visualized portions of the abdomen are within normal limits.    There is abdominal aortic atherosclerotic plaque.   Impression       1.  Negative for lumbar spine fracture    2.  Degenerative subluxation at L4-5    3.  Dextroconvex scoliosis    4.  Multilevel facet arthropathy          Assessment/Plan:     1. Fall, initial encounter  2. Lumbar strain  3. Leg contusion  4. Chest wall contusion    Flexeril 5 mg PO TID as needed  Ice PRN  Rest  Follow up for persistent or worsening of symptoms.

## 2019-07-03 ENCOUNTER — HOSPITAL ENCOUNTER (OUTPATIENT)
Facility: MEDICAL CENTER | Age: 79
End: 2019-07-03
Attending: FAMILY MEDICINE
Payer: MEDICARE

## 2019-07-03 ENCOUNTER — OFFICE VISIT (OUTPATIENT)
Dept: URGENT CARE | Facility: MEDICAL CENTER | Age: 79
End: 2019-07-03
Payer: MEDICARE

## 2019-07-03 VITALS
BODY MASS INDEX: 38.09 KG/M2 | WEIGHT: 194 LBS | DIASTOLIC BLOOD PRESSURE: 84 MMHG | RESPIRATION RATE: 16 BRPM | HEART RATE: 100 BPM | SYSTOLIC BLOOD PRESSURE: 142 MMHG | HEIGHT: 60 IN | TEMPERATURE: 97.2 F | OXYGEN SATURATION: 95 %

## 2019-07-03 DIAGNOSIS — N39.0 RECURRENT UTI: ICD-10-CM

## 2019-07-03 LAB
APPEARANCE UR: NORMAL
BILIRUB UR STRIP-MCNC: NORMAL MG/DL
COLOR UR AUTO: YELLOW
GLUCOSE UR STRIP.AUTO-MCNC: NORMAL MG/DL
KETONES UR STRIP.AUTO-MCNC: NORMAL MG/DL
LEUKOCYTE ESTERASE UR QL STRIP.AUTO: NORMAL
NITRITE UR QL STRIP.AUTO: NORMAL
PH UR STRIP.AUTO: 6 [PH] (ref 5–8)
PROT UR QL STRIP: NORMAL MG/DL
RBC UR QL AUTO: NORMAL
SP GR UR STRIP.AUTO: 1.01
UROBILINOGEN UR STRIP-MCNC: 0.2 MG/DL

## 2019-07-03 PROCEDURE — 87086 URINE CULTURE/COLONY COUNT: CPT

## 2019-07-03 PROCEDURE — 99214 OFFICE O/P EST MOD 30 MIN: CPT | Performed by: FAMILY MEDICINE

## 2019-07-03 PROCEDURE — 87077 CULTURE AEROBIC IDENTIFY: CPT

## 2019-07-03 PROCEDURE — 81002 URINALYSIS NONAUTO W/O SCOPE: CPT | Performed by: FAMILY MEDICINE

## 2019-07-03 PROCEDURE — 87186 SC STD MICRODIL/AGAR DIL: CPT

## 2019-07-03 RX ORDER — AMOXICILLIN 875 MG/1
875 TABLET, COATED ORAL 2 TIMES DAILY
Qty: 14 TAB | Refills: 0 | Status: SHIPPED | OUTPATIENT
Start: 2019-07-03 | End: 2019-07-10

## 2019-07-05 RX ORDER — AMOXICILLIN 875 MG/1
875 TABLET, COATED ORAL 2 TIMES DAILY
Qty: 14 TAB | Refills: 0 | Status: SHIPPED | OUTPATIENT
Start: 2019-07-05 | End: 2019-07-12

## 2019-07-10 ASSESSMENT — ENCOUNTER SYMPTOMS
WEIGHT LOSS: 0
SENSORY CHANGE: 0
FOCAL WEAKNESS: 0
EYE DISCHARGE: 0
EYE REDNESS: 0

## 2019-07-10 NOTE — PROGRESS NOTES
Subjective:      Flavia Garrett is a 79 y.o. female who presents with UTI (since march, has appt with urologist, [facility]. )            Patient presents today with recurrent symptoms of dysuria, urinary frequency and urgency.  No hematuria.  No fever or flank pain.  No past medical history of pyelonephritis.  She is scheduled to see urology.  She has had culture positive urinary tract infections 2 or 3 times over the last 4 months.  Symptoms are moderate.  No OTC medications or home remedies tried.  No other aggravating or alleviating factors.        Review of Systems   Constitutional: Negative for malaise/fatigue and weight loss.   Eyes: Negative for discharge and redness.   Skin: Negative for itching and rash.   Neurological: Negative for sensory change and focal weakness.     .  Medications, Allergies, and current problem list reviewed today in Epic       Objective:     /84   Pulse 100   Temp 36.2 °C (97.2 °F)   Resp 16   Ht 1.524 m (5')   Wt 88 kg (194 lb)   SpO2 95%   BMI 37.89 kg/m²      Physical Exam   Constitutional: She is oriented to person, place, and time. She appears well-developed and well-nourished. No distress.   HENT:   Head: Normocephalic and atraumatic.   Eyes: Conjunctivae are normal.   Cardiovascular: Normal rate, regular rhythm and normal heart sounds.    Pulmonary/Chest: Effort normal and breath sounds normal.   Genitourinary:   Genitourinary Comments: No CVA or suprapubic tenderness   Neurological: She is alert and oriented to person, place, and time.   Skin: Skin is warm and dry. No rash noted.               Assessment/Plan:   UA reviewed  Urine cultures from 6/1 and 6/12 reviewed    1. Recurrent UTI    - amoxicillin (AMOXIL) 875 MG tablet; Take 1 Tab by mouth 2 times a day for 7 days.  Dispense: 14 Tab; Refill: 0  - POCT Urinalysis  - URINE CULTURE(NEW); Future  - amoxicillin (AMOXIL) 875 MG tablet; Take 1 Tab by  mouth 2 times a day for 7 days.  Dispense: 14 Tab; Refill: 0    Differential diagnosis, natural history, supportive care, and indications for immediate follow-up discussed at length.     Push fluids.  I will follow-up urine culture.

## 2019-10-02 ENCOUNTER — OFFICE VISIT (OUTPATIENT)
Dept: MEDICAL GROUP | Facility: IMAGING CENTER | Age: 79
End: 2019-10-02
Payer: MEDICARE

## 2019-10-02 VITALS
HEART RATE: 104 BPM | HEIGHT: 60 IN | OXYGEN SATURATION: 94 % | DIASTOLIC BLOOD PRESSURE: 88 MMHG | SYSTOLIC BLOOD PRESSURE: 148 MMHG | BODY MASS INDEX: 37.5 KG/M2 | TEMPERATURE: 97.2 F | RESPIRATION RATE: 16 BRPM | WEIGHT: 191 LBS

## 2019-10-02 DIAGNOSIS — E55.9 VITAMIN D DEFICIENCY: ICD-10-CM

## 2019-10-02 DIAGNOSIS — Z91.81 HISTORY OF FALL: ICD-10-CM

## 2019-10-02 DIAGNOSIS — B99.9 RECURRENT INFECTIONS: ICD-10-CM

## 2019-10-02 DIAGNOSIS — E03.9 ACQUIRED HYPOTHYROIDISM: ICD-10-CM

## 2019-10-02 DIAGNOSIS — E78.5 DYSLIPIDEMIA: ICD-10-CM

## 2019-10-02 DIAGNOSIS — Z90.710 H/O TOTAL HYSTERECTOMY: ICD-10-CM

## 2019-10-02 DIAGNOSIS — Z85.3 HISTORY OF BREAST CANCER: ICD-10-CM

## 2019-10-02 DIAGNOSIS — E55.9 VITAMIN D INSUFFICIENCY: ICD-10-CM

## 2019-10-02 DIAGNOSIS — I10 ESSENTIAL HYPERTENSION: ICD-10-CM

## 2019-10-02 DIAGNOSIS — N90.4 LICHEN SCLEROSUS OF FEMALE GENITALIA: ICD-10-CM

## 2019-10-02 DIAGNOSIS — M54.50 CHRONIC LEFT-SIDED LOW BACK PAIN WITHOUT SCIATICA: ICD-10-CM

## 2019-10-02 DIAGNOSIS — G89.29 CHRONIC LEFT-SIDED LOW BACK PAIN WITHOUT SCIATICA: ICD-10-CM

## 2019-10-02 DIAGNOSIS — H35.369 DRUSEN: ICD-10-CM

## 2019-10-02 DIAGNOSIS — R63.5 WEIGHT GAIN: ICD-10-CM

## 2019-10-02 DIAGNOSIS — M81.0 OSTEOPOROSIS WITHOUT CURRENT PATHOLOGICAL FRACTURE, UNSPECIFIED OSTEOPOROSIS TYPE: ICD-10-CM

## 2019-10-02 PROCEDURE — 99214 OFFICE O/P EST MOD 30 MIN: CPT | Performed by: FAMILY MEDICINE

## 2019-10-02 RX ORDER — LEVOTHYROXINE SODIUM 175 UG/1
175 TABLET ORAL
Qty: 90 TAB | Refills: 1 | Status: SHIPPED | OUTPATIENT
Start: 2019-10-02 | End: 2019-12-20

## 2019-10-02 RX ORDER — LOSARTAN POTASSIUM 50 MG/1
TABLET ORAL
COMMUNITY
Start: 2019-09-24 | End: 2020-08-07 | Stop reason: SDUPTHER

## 2019-10-02 RX ORDER — ERGOCALCIFEROL 1.25 MG/1
CAPSULE ORAL
COMMUNITY
End: 2020-12-17

## 2019-10-02 SDOH — HEALTH STABILITY: MENTAL HEALTH
STRESS IS WHEN SOMEONE FEELS TENSE, NERVOUS, ANXIOUS, OR CAN'T SLEEP AT NIGHT BECAUSE THEIR MIND IS TROUBLED. HOW STRESSED ARE YOU?: NOT AT ALL

## 2019-10-02 SDOH — HEALTH STABILITY: PHYSICAL HEALTH: ON AVERAGE, HOW MANY DAYS PER WEEK DO YOU ENGAGE IN MODERATE TO STRENUOUS EXERCISE (LIKE A BRISK WALK)?: 3 DAYS

## 2019-10-02 SDOH — HEALTH STABILITY: PHYSICAL HEALTH: ON AVERAGE, HOW MANY MINUTES DO YOU ENGAGE IN EXERCISE AT THIS LEVEL?: 90 MIN

## 2019-10-02 ASSESSMENT — PAIN SCALES - GENERAL: PAINLEVEL: NO PAIN

## 2019-10-02 NOTE — PROGRESS NOTES
Chief Complaint   Patient presents with   • Fall     in june   • Other     improve immune system and health       HPI:  79 y.o. female new patient here to review medical issues and establish care.   Previously with Dr. Samuels.     Hypothyroidism- levothyroxine 175 mcg daily.   Has had some acupuncture therapy which has helped.     Dr. Ruffin- once a year for history of breast cancer- ductal cancer, stage one. Had surgery 2011. 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   Feels due to her mother being ill who then passed away in 2012.    Lumpectomy, then had further removal. 2 surgeries.   Had radiation for several weeks. No chemotherapy.   History of hysterectomy.     Dr. Bonilla- brook.    Fell on ice and affected her vision. Saw Dr. Lacey then was referred.   Had damaged some small nerves.   4 months prior had cataract surgery.   Little cloud above her left eye area.  Follow up on Monday.     Hypertension- losartan 50 mg daily. Tolerates well.   Not checking BP at home.   Had 1.5 hour gym class today prior to coming here.   She is upset that her companions BP is expected to be lower.     Dyslipidemia- not on medication therapy.     Vitamin D insufficiency. On supplement therapy.     Had a tough year with fall and infections.   Right side diaphragm affected and had pain in the area.   Seeing physical therapy for her lower extremities.   Fit and strong class with [facility].   Hx of UTI-taking cranberry pills. Probiotics due to antibiotic therapy.    7/2019- E coli.   States she has a hard time reaching from front to back.   Uses water to cleanse.     Not big sweet eater.   Vegetables.   No meat, eating fish/chicken. limits bread, but hard to stay away.   Likes cheese, limits.   Trying to remind self to drink water. Alkaline/electolyte, puts apple cider vinegar- for UTI.   Uses restroom frequenecy.   Had fall, was distracted.   A lot of corn on the cob. Beans.     Osteoporosis- dexa a few months ago. 5/2019.   Was  offered fosamax, does not want pills.   Was told she will need to have her hip replaced on left in future.    Since fall cannot lie on her left side leg as she did before.   Has seen Dr. Owen- orthopedics, she declined injections.   Seeing PTJustin Dodd. Has been helpful.   Had xrays for her back which is better. Not much low back pain now.     Has gained 10 lbs, with Al, her significant other.   States she is a foodie.   Substitutes with riced cauliflower, etc.    Eats twice a day. Eats breakfast-eggs, ham.   219 lbs in past.      History of eczema as child, improved age 38 yo.     Lichen sclerosis- uses clobetasol, improved.   Had follow ups for gynecology.   Recommended estrace cream vaginally.   Uses some vaseline and glyderm.   Will follow up in future with gynecology.     Vitamin D low- takes 97186 IU weekly.       Health Maintenance  Last pap: complete hysterectomy- thought possible cervical cancer- no cancer find, to prevent it. Around 65 yo or so. Dr. Saeed's PA.   History of abnormal pap: in past  Immunizations: Td/Tdap - reviewed recommendations, thinks done 2011;  Influenza vaccine - reviewed recommendations; Zostavax- had a shingles vaccine, uncertain about Shingrix;  PCV 13  - 11/2018; PPSV 23 - declined due to prior reaction to other pneumonia.   Mammogram: 6/2014  Colonoscopy: 1/2019-due in 10 years  Dexa Scan: 5/2019    Lifestyle:  Diet: as above, states her sons are vegans.   Supplements:  Oregano oils, colloidal silver.   Exercise: routine.   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      Review of Systems   Constitutional: Negative for fever, chills and malaise/fatigue.   HENT: Negative for congestion, sore throat, or swallowing issues.   Eyes: Negative for pain or vision changes.   Respiratory: Negative for cough and shortness of breath.    Cardiovascular: Negative for leg swelling. No chest pain.    Gastrointestinal: Negative for nausea, vomiting, abdominal pain and diarrhea.   Genitourinary: Negative for dysuria and hematuria.   Skin: Negative for rash.   Neurological: Negative for dizziness, focal weakness and headaches.   Endo/Heme/Allergies: Does not bruise/bleed easily.   Psychiatric/Behavioral: Negative for depression.  The patient is not nervous/anxious.        Current Outpatient Medications:   •  losartan (COZAAR) 50 MG Tab, LOSARTAN POTASSIUM 50 MG TABS, Disp: , Rfl:   •  vitamin D, Ergocalciferol, (DRISDOL) 50139 units Cap capsule, Take  by mouth every 7 days., Disp: , Rfl:   •  Multiple Vitamins-Minerals (PRESERVISION AREDS 2 PO), Take  by mouth., Disp: , Rfl:   •  CRANBERRY PO, Take  by mouth., Disp: , Rfl:   •  levothyroxine (SYNTHROID) 175 MCG Tab, Take 1 Tab by mouth Every morning on an empty stomach., Disp: 90 Tab, Rfl: 1  •  clobetasol (TEMOVATE) 0.05 % Cream, , Disp: , Rfl:   •  Probiotic Product (PROBIOTIC DAILY PO), Take  by mouth., Disp: , Rfl:     Allergies   Allergen Reactions   • Lisinopril      Cramps and upset stomach       Patient Active Problem List   Diagnosis   • Lack of energy   • Status post right knee replacement   • Dysuria   • Toe pain, right   • Cellulitis   • Abdominal bloating   • Celiac disease   • Intertrigo   • Osteoarthritis of right knee   • Abnormal results of liver function studies   • Hearing loss   • Tinnitus   • Dyslipidemia   • H/O total hysterectomy   • Hypothyroidism   • Vitamin D deficiency   • Osteoporosis   • Hx of breast cancer   • Status post total right knee replacement   • Gout of foot   • Idiopathic chronic gout of right ankle   • Essential hypertension   • Chronic left-sided low back pain without sciatica   • Lichen planus   • History of breast cancer   • Acquired hypothyroidism   • Lichen sclerosus of female genitalia   • Cold sore   • Vision loss   • Venous insufficiency   • Class 2 obesity in adult   • Flu-like symptoms   • Nasal congestion   •  "Cough   • Vertigo   • Post-menopausal   • Cystitis   • Drusen       Past Medical History:   Diagnosis Date   • Eczema    • Hx of breast cancer 7/26/2016   • Hypertension    • Hypothyroidism 7/26/2016   • Lichen sclerosus of female genitalia    • Osteopenia 7/26/2016   • Overweight    • Thyroid activity decreased    • 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    • Vitamin D deficiency 7/26/2016       Past Surgical History:   Procedure Laterality Date   • OTHER      LIPOSUCTION THIGHS-LEG LIFT   • VAGINAL HYSTERECTOMY TOTAL      Hysterectomy,Total Vaginal       Family History   Problem Relation Age of Onset   • Diabetes Other         GF   • Arthritis Other         GM   • Arthritis Mother    • Other Son         CELIAC DISEASE-SEVERE       Social History     Socioeconomic History   • Marital status:      Spouse name: Not on file   • Number of children: Not on file   • Years of education: Not on file   • Highest education level: Not on file   Occupational History   • Not on file   Social Needs   • Financial resource strain: Not on file   • Food insecurity:     Worry: Not on file     Inability: Not on file   • Transportation needs:     Medical: Not on file     Non-medical: Not on file   Tobacco Use   • Smoking status: Never Smoker   • Smokeless tobacco: Never Used   Substance and Sexual Activity   • Alcohol use: Not Currently     Alcohol/week: 0.0 oz     Comment: min   • Drug use: No   • Sexual activity: Yes     Partners: Male   Lifestyle   • Physical activity:     Days per week: 3 days     Minutes per session: 90 min   • Stress: Not at all       PHYSICAL EXAM:  /88   Pulse (!) 104   Temp 36.2 °C (97.2 °F)   Resp 16   Ht 1.511 m (4' 11.5\")   Wt 86.6 kg (191 lb)   SpO2 94%   BMI 37.93 kg/m²   Constitutional: She appears well-developed and well-nourished. She appears not diaphoretic. No distress.   HENT: Right Ear: External ear normal. Left Ear: External ear normal. " Tympanic membranes clear and intact.   Nose: Nose normal.   Mouth/Throat: Oropharynx is clear and moist. No oropharyngeal exudate.     Eyes: Conjunctivae and extraocular motions are normal. Pupils are equal, round, and reactive to light. No scleral icterus.   Neck: Normal range of motion. Neck supple. No thyromegaly present.   Cardiovascular: Normal rate, regular rhythm, normal heart sounds and intact distal pulses.  Exam reveals no gallop and no friction rub.  No murmur heard. No carotid bruits.   Pulmonary/Chest: Effort normal and breath sounds normal. No respiratory distress. She has no wheezes. She has no rales.   Abdominal: Soft. Bowel sounds are normal. She exhibits no distension and no mass. No tenderness. She has no rebound and no guarding.   Lymphadenopathy:  She has no cervical adenopathy.   Neurological: She is alert. She has normal reflexes. No cranial nerve deficit. She exhibits normal muscle tone.   Skin: Skin is warm and dry. No rash noted. She is not diaphoretic. No erythema.   Psychiatric: She has a normal mood and affect. Her behavior is normal.   Musculoskeletal: She exhibits no edema. Full strength throughout. 2+ DTR throughout.         ASSESSMENT/PLAN:    This is a 79 y.o. female new patient.     1. Acquired hypothyroidism - stable, continue current medications.   TSH WITH REFLEX TO FT4   2. History of breast cancer - stable, follow up routinely with oncology.     3. H/O total hysterectomy     4. Drusen - stable, follow up routinely with Dr. Bonilla.     5. Essential hypertension - BP elevated.  Recently exercises.   -Recommend keep BP log. Heart healthy diet, routine exercise. Continue current medication. Monitor.     6. Dyslipidemia - not medication therapy.   -Modify diet, recommend routine exercise. Monitor.  Lipid Profile   7. Vitamin D insufficiency - stable. Continue current therapy. Monitor labs.  VITAMIN D,25 HYDROXY   8. History of fall   -Reviewed fall precautions, routine  strengthening and balance exercise.s     9. Recurrent infections  -Will assess HgbA1c. Recommend healthy diet, exercise and stress management. Monitor.   HEMOGLOBIN A1C   10. Weight gain   -Discussed recommended changes for current eating habits. Patient's body mass index is 37.93 kg/m². Exercise and nutrition counseling were performed at this visit.    11. Lichen sclerosus of female genitalia - stable. Continue current therapy. Follow up with gynecology.     12. Vitamin D deficiency- stable, continue current therapy. Monitor.     13. Chronic left-sided low back pain without sciatica- improved. Stable.      14. Osteoporosis without current pathological fracture, unspecified osteoporosis type - has declined medication therapy.   -Recommend routine weight bearing exercise, adequate calcium and vitamin D intake. Discussed adequate nutrition vs supplement intake. Repeat dexa in 2 years. Discussed recommendations for medication therapy.     Follow up after lab work.   Recommend routine AWV.     This medical record contains text that has been entered with the assistance of computer voice recognition and dictation software.  Therefore, it may contain unintended errors in text, spelling, punctuation, or grammar.

## 2019-10-08 PROBLEM — H35.369 DRUSEN: Status: ACTIVE | Noted: 2019-10-08

## 2019-12-20 ENCOUNTER — OFFICE VISIT (OUTPATIENT)
Dept: MEDICAL GROUP | Facility: IMAGING CENTER | Age: 79
End: 2019-12-20
Payer: MEDICARE

## 2019-12-20 VITALS
TEMPERATURE: 97.9 F | SYSTOLIC BLOOD PRESSURE: 126 MMHG | OXYGEN SATURATION: 97 % | WEIGHT: 193.4 LBS | HEART RATE: 78 BPM | BODY MASS INDEX: 37.97 KG/M2 | HEIGHT: 60 IN | RESPIRATION RATE: 17 BRPM | DIASTOLIC BLOOD PRESSURE: 78 MMHG

## 2019-12-20 DIAGNOSIS — E55.9 VITAMIN D INSUFFICIENCY: ICD-10-CM

## 2019-12-20 DIAGNOSIS — E78.5 DYSLIPIDEMIA: ICD-10-CM

## 2019-12-20 DIAGNOSIS — E03.9 ACQUIRED HYPOTHYROIDISM: ICD-10-CM

## 2019-12-20 DIAGNOSIS — R05.9 COUGH: ICD-10-CM

## 2019-12-20 DIAGNOSIS — R09.82 POSTNASAL DRIP: ICD-10-CM

## 2019-12-20 DIAGNOSIS — M53.3 SACROILIAC JOINT PAIN: ICD-10-CM

## 2019-12-20 PROCEDURE — 99214 OFFICE O/P EST MOD 30 MIN: CPT | Performed by: FAMILY MEDICINE

## 2019-12-20 RX ORDER — LEVOTHYROXINE SODIUM 0.15 MG/1
150 TABLET ORAL
Qty: 90 TAB | Refills: 1 | Status: SHIPPED | OUTPATIENT
Start: 2019-12-20 | End: 2020-08-07

## 2019-12-20 RX ORDER — LEVOTHYROXINE SODIUM 0.15 MG/1
150 TABLET ORAL
Qty: 90 TAB | Refills: 1 | Status: SHIPPED | OUTPATIENT
Start: 2019-12-20 | End: 2019-12-20 | Stop reason: SDUPTHER

## 2019-12-20 NOTE — LETTER
Counts include 234 beds at the [facility]  Carmen Pacheco M.D.  [address] [place] [address]  Fax: [phone]   Authorization for Release/Disclosure of   Protected Health Information   Name: FLAVIA GARRETT : 1940 SSN: [id number]   Address: [address] [place] [address] [address] [place] [address] Phone:    [phone] (home)    I authorize the entity listed below to release/disclose the PHI below to:   Counts include 234 beds at the [facility]/Carmen Pacheco M.D. and Carmen Pacheco M.D.   Provider or Entity Name:     Address   City, State, Zip   Phone:      Fax:     Reason for request: continuity of care   Information to be released:    [  ] LAST COLONOSCOPY,  including any PATH REPORT and follow-up  [  ] LAST FIT/COLOGUARD RESULT [  ] LAST DEXA  [  ] LAST MAMMOGRAM  [  ] LAST PAP  [  ] LAST LABS [  ] RETINA EXAM REPORT  [  ] IMMUNIZATION RECORDS  [  ] Release all info      [  ] Check here and initial the line next to each item to release ALL health information INCLUDING  _____ Care and treatment for drug and / or alcohol abuse  _____ HIV testing, infection status, or AIDS  _____ Genetic Testing    DATES OF SERVICE OR TIME PERIOD TO BE DISCLOSED: _____________  I understand and acknowledge that:  * This Authorization may be revoked at any time by you in writing, except if your health information has already been used or disclosed.  * Your health information that will be used or disclosed as a result of you signing this authorization could be re-disclosed by the recipient. If this occurs, your re-disclosed health information may no longer be protected by State or Federal laws.  * You may refuse to sign this Authorization. Your refusal will not affect your ability to obtain treatment.  * This Authorization becomes effective upon signing and will  on (date) __________.      If no date is indicated, this Authorization will  one (1) year from the signature date.    Name: Flavia Garrett    Signature:   Date:     2019       PLEASE FAX REQUESTED RECORDS  BACK TO: [phone]

## 2019-12-20 NOTE — PROGRESS NOTES
SUBJECTIVE:        Chief Complaint   Patient presents with   • Back Pain     Brought MRI of back - she has been experiencing pain and would like to know the next steps.   • Results     Thyroid results.    • Other     Chest congestion - cough. Patient wonders if she should take mucinex or claritin.        HPI:     Flavia Garrett is a 79 y.o. female here for low back pain, had MRI completed in past.   Had fall in June. MRI was done 2014. No other recent imaging done.     Hypothyroidism-she is on levothyroxine 150 mcg daily.  States she had her labs done today.  Notes that her previous TSH was 0.55.  States that she feels fine.  Feels good energy wise.    Dyslipidemia-likes bread with butter.  She is not currently on medication therapy.    Notes having postnasal drip which is causing some coughing chest congestion.  Asking about trying Mucinex.  She will also try Claritin.  Has Flonase at home to use.  No fevers or chills.  No shortness of breath or chest pains.  Declined other medication therapy at this time.    States that she was previously exercising and then her classes ended thus has not been regularly active with exercise activities.  Has had a lot going on the last 3 weeks as well.  She is leaving for [place] on Monday.    Vitamin D insufficiency -she does take some vitamin supplements but sometimes forgets.  Notes taking Cranactin for a day which has been helping with her symptoms.  Needs to complete overnight oximetry test.    Notes history of total hysterectomy.    ROS:  Denies any recent fevers or chills. No nausea or vomiting. No diarrhea. No chest pains or shortness of breath. No lower extremity edema.    Current Outpatient Medications on File Prior to Visit   Medication Sig Dispense Refill   • losartan (COZAAR) 50 MG Tab LOSARTAN POTASSIUM 50 MG TABS     • Multiple Vitamins-Minerals (PRESERVISION AREDS 2 PO) Take  by mouth.     • vitamin D, Ergocalciferol, (DRISDOL) 03000 units Cap capsule  Take  by mouth every 7 days.     • CRANBERRY PO Take  by mouth.     • clobetasol (TEMOVATE) 0.05 % Cream      • Probiotic Product (PROBIOTIC DAILY PO) Take  by mouth.       No current facility-administered medications on file prior to visit.        Allergies   Allergen Reactions   • Lisinopril      Cramps and upset stomach       Patient Active Problem List    Diagnosis Date Noted   • Drusen 10/08/2019   • Cystitis 06/12/2019   • Post-menopausal 05/01/2019   • Flu-like symptoms 04/03/2019   • Nasal congestion 04/03/2019   • Cough 04/03/2019   • Vertigo 04/03/2019   • Class 2 obesity in adult 03/13/2019   • Vision loss 08/15/2018   • Venous insufficiency 08/15/2018   • Cold sore 03/29/2018   • Lichen sclerosus of female genitalia 02/05/2018   • Acquired hypothyroidism 06/23/2017   • Chronic left-sided low back pain without sciatica 03/17/2017   • Lichen planus 03/17/2017   • History of breast cancer 03/17/2017   • Gout of foot 09/30/2016   • Idiopathic chronic gout of right ankle 09/30/2016   • Essential hypertension 09/30/2016   • Status post total right knee replacement 08/17/2016   • Dysuria 07/26/2016   • Toe pain, right 07/26/2016   • Cellulitis 07/26/2016   • Abdominal bloating 07/26/2016   • Celiac disease 07/26/2016   • Intertrigo 07/26/2016   • Osteoarthritis of right knee 07/26/2016   • Abnormal results of liver function studies 07/26/2016   • Hearing loss 07/26/2016   • Tinnitus 07/26/2016   • Dyslipidemia 07/26/2016   • H/O total hysterectomy 07/26/2016   • Hypothyroidism 07/26/2016   • Vitamin D deficiency 07/26/2016   • Osteoporosis 07/26/2016   • Hx of breast cancer 07/26/2016   • Lack of energy 05/05/2016   • Status post right knee replacement 05/05/2016       Past Medical History:   Diagnosis Date   • Eczema    • Hx of breast cancer 7/26/2016   • Hypertension    • Hypothyroidism 7/26/2016   • Lichen sclerosus of female genitalia    • Osteopenia 7/26/2016   • Overweight    • Thyroid activity decreased   "  • 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    • Vitamin D deficiency 7/26/2016         OBJECTIVE:   /78 (BP Location: Left arm, Patient Position: Sitting, BP Cuff Size: Adult)   Pulse 78   Temp 36.6 °C (97.9 °F) (Temporal)   Resp 17   Ht 1.511 m (4' 11.5\")   Wt 87.7 kg (193 lb 6.4 oz)   SpO2 97%   BMI 38.41 kg/m²   General: Well-developed well-nourished female, no acute distress  HEENT: oropharynx clear, eyes clear, TMs clear and intact, nose with mild edema  Neck: supple, no lymphadenopathy- cervical or supraclavicular, no thyromegaly  Cardiovascular: regular rate and rhythm, no murmurs, gallops, rubs  Lungs: clear to auscultation bilaterally, no wheezes, crackles, or rhonchi  Abdomen: +bowel sounds, soft, nontender, nondistended, no rebound, no guarding, no hepatosplenomegaly  Extremities: no cyanosis, clubbing, edema.  5/5 strength in lower extremities.  2+ DTR bilateral lower extremities.  Back: No spinal tenderness to palpation.  Right SI joint region with tenderness to palpation.  Skin: Warm and dry  Psych: appropriate mood and affect      ASSESSMENT/PLAN:    79 y.o.female with hypertension, dyslipidemia and hypothyroidism.    1. Sacroiliac joint pain-recent symptoms appear mostly in the SI joint.  -Recommend obtaining imaging with x-ray.  Recommend modification of activities.  Consider physical therapy.  Consider acupuncture therapy. DX-SACROILIAC JOINTS 3+   2. Acquired hypothyroidism-previous TSH out of range.   -We will monitor lab work at this time.  Patient will continue current therapy. TSH WITH REFLEX TO FT4   3. Dyslipidemia-not on medication therapy.  -Await lab results.  Recommend heart healthy diet and routine exercise.    4. Postnasal drip-recommend use of Flonase and Claritin therapy.  Consider use of nasal sinus wash.  Monitor.    5. Cough-appears due to postnasal drip.  Treatment as above.  Monitor.  Follow-up as needed if no further improvement in " symptoms.    6. Vitamin D insufficiency  -Recommend taking supplement routinely as she has been irregular with intake.  Will monitor lab results. VITAMIN D,25 HYDROXY         Return in about 6 weeks (around 1/31/2020).    This medical record contains text that has been entered with the assistance of computer voice recognition and dictation software.  Therefore, it may contain unintended errors in text, spelling, punctuation, or grammar.

## 2019-12-30 ENCOUNTER — TELEPHONE (OUTPATIENT)
Dept: MEDICAL GROUP | Facility: IMAGING CENTER | Age: 79
End: 2019-12-30

## 2019-12-31 NOTE — TELEPHONE ENCOUNTER
Pt called and LM wondering the status of the paperwork for the [employer]. Dr Pacheco had it and filled it out. I put it up front at the  office to be picked up. Called pt and LM that it is ready for pickup / kb

## 2020-01-03 ENCOUNTER — OFFICE VISIT (OUTPATIENT)
Dept: URGENT CARE | Facility: MEDICAL CENTER | Age: 80
End: 2020-01-03
Payer: MEDICARE

## 2020-01-03 VITALS
WEIGHT: 193 LBS | HEART RATE: 91 BPM | TEMPERATURE: 98 F | SYSTOLIC BLOOD PRESSURE: 138 MMHG | OXYGEN SATURATION: 98 % | RESPIRATION RATE: 16 BRPM | DIASTOLIC BLOOD PRESSURE: 72 MMHG | BODY MASS INDEX: 38.33 KG/M2

## 2020-01-03 DIAGNOSIS — B35.3 TINEA PEDIS, RIGHT: ICD-10-CM

## 2020-01-03 DIAGNOSIS — M53.3 SACROILIAC JOINT PAIN: ICD-10-CM

## 2020-01-03 DIAGNOSIS — L03.031 CELLULITIS OF SMALL TOE OF RIGHT FOOT: ICD-10-CM

## 2020-01-03 PROCEDURE — 99202 OFFICE O/P NEW SF 15 MIN: CPT | Performed by: EMERGENCY MEDICINE

## 2020-01-03 RX ORDER — SULFAMETHOXAZOLE AND TRIMETHOPRIM 800; 160 MG/1; MG/1
1 TABLET ORAL 2 TIMES DAILY
Qty: 14 TAB | Refills: 0 | Status: SHIPPED | OUTPATIENT
Start: 2020-01-03 | End: 2020-01-10

## 2020-01-03 ASSESSMENT — ENCOUNTER SYMPTOMS
NUMBNESS: 0
FOCAL WEAKNESS: 0
SENSORY CHANGE: 0
JOINT SWELLING: 0
FEVER: 0

## 2020-01-03 NOTE — PROGRESS NOTES
Subjective:      Flavia Garrett is a 79 y.o. female who presents with Toe Pain (inflammed toe on R foot, sore)            Foot Problem   This is a recurrent problem. The current episode started in the past 7 days. The problem occurs daily. The problem has been gradually worsening. Pertinent negatives include no fever, joint swelling or numbness. The symptoms are aggravated by walking. Treatments tried: Neosporin. The treatment provided no relief.   No trauma.    Review of Systems   Constitutional: Negative for fever.   Musculoskeletal: Negative for joint swelling.        No pain proximal or distal to the site.   Skin: Negative for itching.   Neurological: Negative for sensory change, focal weakness and numbness.     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  MEDS:   Current Outpatient Medications:   •  sulfamethoxazole-trimethoprim (BACTRIM DS) 800-160 MG tablet, Take 1 Tab by mouth 2 times a day for 7 days., Disp: 14 Tab, Rfl: 0  •  levothyroxine (SYNTHROID) 150 MCG Tab, Take 1 Tab by mouth Every morning on an empty stomach., Disp: 90 Tab, Rfl: 1  •  losartan (COZAAR) 50 MG Tab, LOSARTAN POTASSIUM 50 MG TABS, Disp: , Rfl:   •  vitamin D, Ergocalciferol, (DRISDOL) 87802 units Cap capsule, Take  by mouth every 7 days., Disp: , Rfl:   •  Multiple Vitamins-Minerals (PRESERVISION AREDS 2 PO), Take  by mouth., Disp: , Rfl:   •  CRANBERRY PO, Take  by mouth., Disp: , Rfl:   •  clobetasol (TEMOVATE) 0.05 % Cream, , Disp: , Rfl:   •  Probiotic Product (PROBIOTIC DAILY PO), Take  by mouth., Disp: , Rfl:   ALLERGIES:   Allergies   Allergen Reactions   • Lisinopril      Cramps and upset stomach     SURGHX:   Past Surgical History:   Procedure  Laterality Date   • OTHER      LIPOSUCTION THIGHS-LEG LIFT   • VAGINAL HYSTERECTOMY TOTAL      Hysterectomy,Total Vaginal     SOCHX:  reports that she has never smoked. She has never used smokeless tobacco. She reports previous alcohol use. She reports that she does not use drugs.  FH: family history includes Arthritis in her mother and another family member; Diabetes in an other family member; Other in her son.     Objective:     /72   Pulse 91   Temp 36.7 °C (98 °F) (Temporal)   Resp 16   Wt 87.5 kg (193 lb)   SpO2 98%   BMI 38.33 kg/m²      Physical Exam  Constitutional:       General: She is not in acute distress.     Appearance: Normal appearance.   Cardiovascular:      Comments: Distal capillary refill intact.  Musculoskeletal:      Right lower leg: No edema.      Right foot: Normal range of motion. No bony tenderness or crepitus.   Lymphadenopathy:      Comments: No lymphangitis proximally.   Skin:     General: Skin is warm and moist.          Neurological:      Mental Status: She is alert.   Psychiatric:         Behavior: Behavior is cooperative.                 Assessment/Plan:       1. Cellulitis of small toe of right foot  Stop Neosporin, warm soaks PRN  - sulfamethoxazole-trimethoprim (BACTRIM DS) 800-160 MG tablet; Take 1 Tab by mouth 2 times a day for 7 days.  Dispense: 14 Tab; Refill: 0  Recheck in 2 to 3 days if not resolving  2. Tinea pedis, right  OTC antifungal until resolved

## 2020-01-28 DIAGNOSIS — E55.9 VITAMIN D INSUFFICIENCY: ICD-10-CM

## 2020-01-28 DIAGNOSIS — E03.9 ACQUIRED HYPOTHYROIDISM: ICD-10-CM

## 2020-01-31 ENCOUNTER — OFFICE VISIT (OUTPATIENT)
Dept: MEDICAL GROUP | Facility: IMAGING CENTER | Age: 80
End: 2020-01-31
Payer: MEDICARE

## 2020-01-31 ENCOUNTER — TELEPHONE (OUTPATIENT)
Dept: MEDICAL GROUP | Facility: IMAGING CENTER | Age: 80
End: 2020-01-31

## 2020-01-31 ENCOUNTER — HOSPITAL ENCOUNTER (OUTPATIENT)
Facility: MEDICAL CENTER | Age: 80
End: 2020-01-31
Attending: FAMILY MEDICINE
Payer: MEDICARE

## 2020-01-31 VITALS
HEART RATE: 86 BPM | TEMPERATURE: 98.3 F | HEIGHT: 60 IN | RESPIRATION RATE: 16 BRPM | BODY MASS INDEX: 37.69 KG/M2 | DIASTOLIC BLOOD PRESSURE: 68 MMHG | SYSTOLIC BLOOD PRESSURE: 116 MMHG | WEIGHT: 192 LBS | OXYGEN SATURATION: 94 %

## 2020-01-31 DIAGNOSIS — G89.29 CHRONIC LEFT-SIDED LOW BACK PAIN WITHOUT SCIATICA: ICD-10-CM

## 2020-01-31 DIAGNOSIS — G89.29 CHRONIC LEFT SI JOINT PAIN: ICD-10-CM

## 2020-01-31 DIAGNOSIS — R30.0 DYSURIA: ICD-10-CM

## 2020-01-31 DIAGNOSIS — E03.9 HYPOTHYROIDISM, UNSPECIFIED TYPE: ICD-10-CM

## 2020-01-31 DIAGNOSIS — M53.3 CHRONIC LEFT SI JOINT PAIN: ICD-10-CM

## 2020-01-31 DIAGNOSIS — M54.50 CHRONIC LEFT-SIDED LOW BACK PAIN WITHOUT SCIATICA: ICD-10-CM

## 2020-01-31 DIAGNOSIS — E55.9 VITAMIN D DEFICIENCY: ICD-10-CM

## 2020-01-31 DIAGNOSIS — E78.5 DYSLIPIDEMIA: ICD-10-CM

## 2020-01-31 DIAGNOSIS — N39.0 RECURRENT UTI: ICD-10-CM

## 2020-01-31 DIAGNOSIS — Z87.440 HISTORY OF UTI: ICD-10-CM

## 2020-01-31 LAB
APPEARANCE UR: NORMAL
BILIRUB UR STRIP-MCNC: NEGATIVE MG/DL
COLOR UR AUTO: NORMAL
GLUCOSE UR STRIP.AUTO-MCNC: NEGATIVE MG/DL
KETONES UR STRIP.AUTO-MCNC: NEGATIVE MG/DL
LEUKOCYTE ESTERASE UR QL STRIP.AUTO: NORMAL
NITRITE UR QL STRIP.AUTO: NEGATIVE
PH UR STRIP.AUTO: 6 [PH] (ref 5–8)
PROT UR QL STRIP: 100 MG/DL
RBC UR QL AUTO: NORMAL
SP GR UR STRIP.AUTO: 1.02
UROBILINOGEN UR STRIP-MCNC: 0.2 MG/DL

## 2020-01-31 PROCEDURE — 81002 URINALYSIS NONAUTO W/O SCOPE: CPT | Performed by: FAMILY MEDICINE

## 2020-01-31 PROCEDURE — 99214 OFFICE O/P EST MOD 30 MIN: CPT | Performed by: FAMILY MEDICINE

## 2020-01-31 PROCEDURE — 87086 URINE CULTURE/COLONY COUNT: CPT

## 2020-01-31 RX ORDER — CEFDINIR 300 MG/1
300 CAPSULE ORAL 2 TIMES DAILY
Qty: 10 CAP | Refills: 0 | Status: SHIPPED | OUTPATIENT
Start: 2020-01-31 | End: 2020-06-11

## 2020-01-31 NOTE — PROGRESS NOTES
SUBJECTIVE:        Chief Complaint   Patient presents with   • Results     brought in imaging   • Back Pain     doing better       HPI:     Flavia Garrett is a 80 y.o. female with history of breast cancer, history of UTI, hypertension, dyslipidemia, hypothyroidism and vitamin D deficiency here for follow-up of back pain symptoms on left lower side and lab results.    She has her imaging results from 1/2/2020 which shows some bone spurs and degenerative changes of the SI joint more so on the left, also some degenerative changes of hip joint right greater than left.  Symptoms are mostly in her left SI joint region.  She has been doing acupuncture therapy which is helped.  Has not noticed significant pain symptoms.  Occasional dull aches but no significant symptoms at night.  She has also been doing some sinan chi.  States she likes to go dancing.  Was advised not to wear high heels which she loves.    Hypothyroidism has been on levothyroxine 150 mcg daily.  Has been stable on this medication dose.  Recent thyroid labs appear improved.  Notes feeling tired, but not sleeping well due to urinary symptoms and previously due to back pain.     Vitamin D-previously at level 15 and recently improved to 21.  She has been taking the 50,000 IU of vitamin D currently.  Patient's prior lab results were from 12/13/2019.    Dyslipidemia- working on dietary changes.   Cholesterol total 199, HDL 41, triglycerides 315, , non-HDL cholesterol 158.  Hemoglobin A1c 5.1.  TSH 0.55.    Patient has had some issues urinating frequently at nighttime.  Has had some discomfort with urination.  Feels that she has had some intermittent symptoms since about a year ago. Last treated for UTI 7/2019 per records. Has seen urology in the past. No fevers or chills or flank pain. She has been taking 8 cranberry tablets a day.  She has been on multiple antibiotic treatments in the past. Saw urology in September.  She also has lichen sclerosis  and uses cortical steroid cream in the area as needed.  He was also previously prescribed vaginal cream which she has not been using, was recommended to use twice a week.  Patient states that she does drink adequate fluids.      ROS:  Denies any recent fevers or chills. No nausea or vomiting. No diarrhea. No chest pains or shortness of breath. No lower extremity edema.    Current Outpatient Medications on File Prior to Visit   Medication Sig Dispense Refill   • levothyroxine (SYNTHROID) 150 MCG Tab Take 1 Tab by mouth Every morning on an empty stomach. 90 Tab 1   • losartan (COZAAR) 50 MG Tab LOSARTAN POTASSIUM 50 MG TABS     • vitamin D, Ergocalciferol, (DRISDOL) 60041 units Cap capsule Take  by mouth every 7 days.     • Multiple Vitamins-Minerals (PRESERVISION AREDS 2 PO) Take  by mouth.     • CRANBERRY PO Take  by mouth.     • Probiotic Product (PROBIOTIC DAILY PO) Take  by mouth.     • clobetasol (TEMOVATE) 0.05 % Cream        No current facility-administered medications on file prior to visit.        Allergies   Allergen Reactions   • Lisinopril      Cramps and upset stomach       Patient Active Problem List    Diagnosis Date Noted   • Recurrent UTI 01/31/2020   • History of UTI 01/31/2020   • Drusen 10/08/2019   • Cystitis 06/12/2019   • Post-menopausal 05/01/2019   • Flu-like symptoms 04/03/2019   • Nasal congestion 04/03/2019   • Cough 04/03/2019   • Vertigo 04/03/2019   • Class 2 obesity in adult 03/13/2019   • Vision loss 08/15/2018   • Venous insufficiency 08/15/2018   • Cold sore 03/29/2018   • Lichen sclerosus of female genitalia 02/05/2018   • Acquired hypothyroidism 06/23/2017   • Chronic left-sided low back pain without sciatica 03/17/2017   • Lichen planus 03/17/2017   • History of breast cancer 03/17/2017   • Gout of foot 09/30/2016   • Idiopathic chronic gout of right ankle 09/30/2016   • Essential hypertension 09/30/2016   • Status post total right knee replacement 08/17/2016   • Dysuria  "07/26/2016   • Toe pain, right 07/26/2016   • Cellulitis 07/26/2016   • Abdominal bloating 07/26/2016   • Celiac disease 07/26/2016   • Intertrigo 07/26/2016   • Osteoarthritis of right knee 07/26/2016   • Abnormal results of liver function studies 07/26/2016   • Hearing loss 07/26/2016   • Tinnitus 07/26/2016   • Dyslipidemia 07/26/2016   • H/O total hysterectomy 07/26/2016   • Hypothyroidism 07/26/2016   • Vitamin D deficiency 07/26/2016   • Osteoporosis 07/26/2016   • Hx of breast cancer 07/26/2016   • Lack of energy 05/05/2016   • Status post right knee replacement 05/05/2016       Past Medical History:   Diagnosis Date   • Eczema    • Hx of breast cancer 7/26/2016   • Hypertension    • Hypothyroidism 7/26/2016   • Lichen sclerosus of female genitalia    • Osteopenia 7/26/2016   • Overweight    • Thyroid activity decreased    • 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    • Vitamin D deficiency 7/26/2016       OBJECTIVE:   /68 Comment: patient home cuff  Pulse 86   Temp 36.8 °C (98.3 °F) (Temporal)   Resp 16   Ht 1.511 m (4' 11.5\")   Wt 87.1 kg (192 lb)   SpO2 94%   BMI 38.13 kg/m²   General: Well-developed well-nourished female, no acute distress  Neck: supple, no lymphadenopathy- cervical or supraclavicular, no thyromegaly  Cardiovascular: regular rate and rhythm, no murmurs, gallops, rubs  Lungs: clear to auscultation bilaterally, no wheezes, crackles, or rhonchi  Abdomen: +bowel sounds, soft, nontender, nondistended, no rebound, no guarding, no hepatosplenomegaly, no CVA tenderness  Extremities: no cyanosis, clubbing, edema.  Left SI joint region with mild tenderness to palpation.  Skin: Warm and dry  Psych: appropriate mood and affect    Urine POC:+ small blood, 100 mg/dL protein, large leuk, negative nit.   1/27/20 labs: Vitamin D- 21; TSH 2.67      ASSESSMENT/PLAN:    80 y.o.female with history of breast cancer, history of UTI, hypertension, dyslipidemia, " hypothyroidism and vitamin D deficiency.     1. Chronic left-sided low back pain without sciatica- currently improved with sinan chi and acupuncture therapy.   -Discussed routine stretching/strengthening exercise. Patient agreeable with physical therapy referral. Discussed modification of activities and appropriate footwear. Discussed anti-inflammatory diet and possible supplements to consider.  REFERRAL TO PHYSICAL THERAPY Reason for Therapy: Eval/Treat/Report   2. Chronic left SI joint pain - as above.  REFERRAL TO PHYSICAL THERAPY Reason for Therapy: Eval/Treat/Report   3. Hypothyroidism, unspecified type - improved TSH. Stable. Monitor.     4. Dyslipidemia - patient is working on diet and lifestyle changes. 12/2019 labs.   -Counseled on diet and lifestyle changes. Will monitor labs in 3-4 months.     5. Recurrent UTI-findings on urine POC and current symptoms appearconsistent with UTI at this time.  Will start on medication therapy. Omnicef 300 mg   6. Dysuria  POCT Urinalysis    URINE CULTURE(NEW)   7. History of UTI       8. Vitamin D deficiency    -Recommend continue vitamin D 50,000 IU weekly and add vitamin D 3, 2000 IU daily.  Take with food.    Will monitor lab work in future. VITAMIN D,25 HYDROXY       Return in about 1 week (around 2/7/2020).    This medical record contains text that has been entered with the assistance of computer voice recognition and dictation software.  Therefore, it may contain unintended errors in text, spelling, punctuation, or grammar.

## 2020-02-03 LAB
BACTERIA UR CULT: NORMAL
SIGNIFICANT IND 70042: NORMAL
SITE SITE: NORMAL
SOURCE SOURCE: NORMAL

## 2020-02-03 NOTE — TELEPHONE ENCOUNTER
Please let pt know that her urine culture came back negative. She can discontinue antibiotic therapy at this time.   Have her restart her vaginal estrogen cream as discussed at her visit and monitor for any improvements or worsening symptoms.

## 2020-02-03 NOTE — TELEPHONE ENCOUNTER
Notified pt of Dr. Pacheco's instructions. Pt confirms understanding and will follow up later this week as instructed.

## 2020-02-06 ENCOUNTER — HOSPITAL ENCOUNTER (OUTPATIENT)
Facility: MEDICAL CENTER | Age: 80
End: 2020-02-06
Attending: FAMILY MEDICINE
Payer: MEDICARE

## 2020-02-06 ENCOUNTER — OFFICE VISIT (OUTPATIENT)
Dept: MEDICAL GROUP | Facility: IMAGING CENTER | Age: 80
End: 2020-02-06
Payer: MEDICARE

## 2020-02-06 VITALS
BODY MASS INDEX: 37.97 KG/M2 | OXYGEN SATURATION: 100 % | DIASTOLIC BLOOD PRESSURE: 86 MMHG | HEIGHT: 60 IN | WEIGHT: 193.4 LBS | SYSTOLIC BLOOD PRESSURE: 128 MMHG | HEART RATE: 77 BPM | TEMPERATURE: 97.9 F | RESPIRATION RATE: 16 BRPM

## 2020-02-06 DIAGNOSIS — E03.9 ACQUIRED HYPOTHYROIDISM: ICD-10-CM

## 2020-02-06 DIAGNOSIS — R31.9 HEMATURIA, UNSPECIFIED TYPE: ICD-10-CM

## 2020-02-06 DIAGNOSIS — Z87.440 HISTORY OF UTI: ICD-10-CM

## 2020-02-06 DIAGNOSIS — I10 ESSENTIAL HYPERTENSION: ICD-10-CM

## 2020-02-06 LAB
APPEARANCE UR: ABNORMAL
APPEARANCE UR: NORMAL
BACTERIA #/AREA URNS HPF: NEGATIVE /HPF
BILIRUB UR QL STRIP.AUTO: NEGATIVE
BILIRUB UR STRIP-MCNC: NEGATIVE MG/DL
COLOR UR AUTO: YELLOW
COLOR UR: YELLOW
EPI CELLS #/AREA URNS HPF: ABNORMAL /HPF
GLUCOSE UR STRIP.AUTO-MCNC: NEGATIVE MG/DL
GLUCOSE UR STRIP.AUTO-MCNC: NEGATIVE MG/DL
HYALINE CASTS #/AREA URNS LPF: ABNORMAL /LPF
KETONES UR STRIP.AUTO-MCNC: NEGATIVE MG/DL
KETONES UR STRIP.AUTO-MCNC: NEGATIVE MG/DL
LEUKOCYTE ESTERASE UR QL STRIP.AUTO: ABNORMAL
LEUKOCYTE ESTERASE UR QL STRIP.AUTO: NORMAL
MICRO URNS: ABNORMAL
NITRITE UR QL STRIP.AUTO: NEGATIVE
NITRITE UR QL STRIP.AUTO: NEGATIVE
PH UR STRIP.AUTO: 5.5 [PH] (ref 5–8)
PH UR STRIP.AUTO: 6 [PH] (ref 5–8)
PROT UR QL STRIP: NEGATIVE MG/DL
PROT UR QL STRIP: NEGATIVE MG/DL
RBC # URNS HPF: ABNORMAL /HPF
RBC UR QL AUTO: ABNORMAL
RBC UR QL AUTO: NORMAL
SP GR UR STRIP.AUTO: 1.01
SP GR UR STRIP.AUTO: 1.01
UROBILINOGEN UR STRIP-MCNC: 0.2 MG/DL
UROBILINOGEN UR STRIP.AUTO-MCNC: 0.2 MG/DL
WBC #/AREA URNS HPF: ABNORMAL /HPF

## 2020-02-06 PROCEDURE — 81001 URINALYSIS AUTO W/SCOPE: CPT

## 2020-02-06 PROCEDURE — 87086 URINE CULTURE/COLONY COUNT: CPT

## 2020-02-06 PROCEDURE — 99214 OFFICE O/P EST MOD 30 MIN: CPT | Performed by: FAMILY MEDICINE

## 2020-02-06 PROCEDURE — 81002 URINALYSIS NONAUTO W/O SCOPE: CPT | Performed by: FAMILY MEDICINE

## 2020-02-06 ASSESSMENT — PATIENT HEALTH QUESTIONNAIRE - PHQ9: CLINICAL INTERPRETATION OF PHQ2 SCORE: 0

## 2020-02-06 NOTE — PROGRESS NOTES
SUBJECTIVE:        Chief Complaint   Patient presents with   • Dysuria     most recent antibiotic seemed to help but was told to discontinue        HPI:     Flavia Garrett is a 80 y.o. female with with history of breast cancer, history of UTI, hypertension, dyslipidemia, and hypothyroidism here for follow up of urinary symptoms.   Had abnormal urine dip, was started on omnicef therapy until urine culture results received. Urine culture was negative. Patient was advised to stop antibiotic therapy. However, she feels her symptoms are better with antibiotic treatment, completed about 2.5 days of therapy.   She has restarted her vaginal cream.   Denies dysuria, flank pain or gross hematuria. Prior urinary symptoms appear resolved.  Denies history of smoking.    States she is drinking illa tea- boiling lila for flavor.     Hypothyroidism- asking about adequate iodine intake. Eats some seaweed salad and some fish. Does not eat shellfish. She is otherwise stable levothyroxine 150 mcg daily .    Hypertension- BP stable on losartan 50 mg daily.       ROS:  Denies any recent fevers or chills. No nausea or vomiting. No diarrhea. No chest pains or shortness of breath. No lower extremity edema.    Current Outpatient Medications on File Prior to Visit   Medication Sig Dispense Refill   • levothyroxine (SYNTHROID) 150 MCG Tab Take 1 Tab by mouth Every morning on an empty stomach. 90 Tab 1   • losartan (COZAAR) 50 MG Tab LOSARTAN POTASSIUM 50 MG TABS     • vitamin D, Ergocalciferol, (DRISDOL) 92514 units Cap capsule Take  by mouth every 7 days.     • Multiple Vitamins-Minerals (PRESERVISION AREDS 2 PO) Take  by mouth.     • clobetasol (TEMOVATE) 0.05 % Cream      • Probiotic Product (PROBIOTIC DAILY PO) Take  by mouth.     • cefdinir (OMNICEF) 300 MG Cap Take 1 Cap by mouth 2 times a day. (Patient not taking: Reported on 2/6/2020) 10 Cap 0   • CRANBERRY PO Take  by mouth.       No current facility-administered  medications on file prior to visit.        Allergies   Allergen Reactions   • Lisinopril      Cramps and upset stomach       Patient Active Problem List    Diagnosis Date Noted   • Recurrent UTI 01/31/2020   • History of UTI 01/31/2020   • Drusen 10/08/2019   • Cystitis 06/12/2019   • Post-menopausal 05/01/2019   • Flu-like symptoms 04/03/2019   • Nasal congestion 04/03/2019   • Cough 04/03/2019   • Vertigo 04/03/2019   • Class 2 obesity in adult 03/13/2019   • Vision loss 08/15/2018   • Venous insufficiency 08/15/2018   • Cold sore 03/29/2018   • Lichen sclerosus of female genitalia 02/05/2018   • Acquired hypothyroidism 06/23/2017   • Chronic left-sided low back pain without sciatica 03/17/2017   • Lichen planus 03/17/2017   • History of breast cancer 03/17/2017   • Gout of foot 09/30/2016   • Idiopathic chronic gout of right ankle 09/30/2016   • Essential hypertension 09/30/2016   • Status post total right knee replacement 08/17/2016   • Dysuria 07/26/2016   • Toe pain, right 07/26/2016   • Cellulitis 07/26/2016   • Abdominal bloating 07/26/2016   • Celiac disease 07/26/2016   • Intertrigo 07/26/2016   • Osteoarthritis of right knee 07/26/2016   • Abnormal results of liver function studies 07/26/2016   • Hearing loss 07/26/2016   • Tinnitus 07/26/2016   • Dyslipidemia 07/26/2016   • H/O total hysterectomy 07/26/2016   • Hypothyroidism 07/26/2016   • Vitamin D deficiency 07/26/2016   • Osteoporosis 07/26/2016   • Hx of breast cancer 07/26/2016   • Lack of energy 05/05/2016   • Status post right knee replacement 05/05/2016       Past Medical History:   Diagnosis Date   • Eczema    • Hx of breast cancer 7/26/2016   • Hypertension    • Hypothyroidism 7/26/2016   • Lichen sclerosus of female genitalia    • Osteopenia 7/26/2016   • Overweight    • Thyroid activity decreased    • 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    • Vitamin D deficiency 7/26/2016       OBJECTIVE:   /86 (BP  "Location: Left arm, Patient Position: Sitting, BP Cuff Size: Adult)   Pulse 77   Temp 36.6 °C (97.9 °F) (Temporal)   Resp 16   Ht 1.511 m (4' 11.5\")   Wt 87.7 kg (193 lb 6.4 oz)   SpO2 100%   BMI 38.41 kg/m²   General: Well-developed well-nourished female, no acute distress  Neck: supple, no lymphadenopathy- cervical or supraclavicular, no thyromegaly  Cardiovascular: regular rate and rhythm, no murmurs, gallops, rubs  Lungs: clear to auscultation bilaterally, no wheezes, crackles, or rhonchi  Abdomen: +bowel sounds, soft, nontender, nondistended, no rebound, no guarding, no hepatosplenomegaly, no cvat  Extremities: no cyanosis, clubbing, edema  Skin: Warm and dry  Psych: appropriate mood and affect    Urine POC today : mod blood, small leuk, otherwise negative.        Ref. Range 1/31/2020 14:48   POC Color Latest Ref Range: Negative  dark yellow   POC Appearance Latest Ref Range: Negative  cloudy   POC Specific Gravity Latest Ref Range: <1.005 - >1.030  1.025   POC Urine PH Latest Ref Range: 5.0 - 8.0  6.0   POC Glucose Latest Ref Range: Negative mg/dL negative   POC Ketones Latest Ref Range: Negative mg/dL negative   POC Protein Latest Ref Range: Negative mg/dL 100   POC Nitrites Latest Ref Range: Negative  negative   POC Leukocyte Esterase Latest Ref Range: Negative  large   POC Blood Latest Ref Range: Negative  small   POC Bilirubin Latest Ref Range: Negative mg/dL negative   POC Urobiligen Latest Ref Range: Negative (0.2) mg/dL 0.2   Significant Indicator Unknown NEG   Site Unknown -   Source Unknown UR     1/31/20  Component 6d ago   Significant Indicator NEG    Source UR    Site -    Culture Result Mixed skin abel ,000 cfu/mL    Resulting Agency M           ASSESSMENT/PLAN:    80 y.o.female with history of breast cancer, history of UTI, hypertension, dyslipidemia, and hypothyroidism.     1. Hematuria, unspecified type- prior dysuria improved. + blood noted in POC urine, will send to labs. " Unclear etiology, prior symptoms resolved. Consider due to IC or other lesion.   -Patient will plan to schedule appointment with her urologist, Dr. Cramer. Otherwise consider repeat urine in 2 weeks, CT urogram and cystoscopy. Recommend hold supplements prior to next urine test. Keep well hydrated.  URINALYSIS,CULTURE IF INDICATED   2. History of UTI     3. Acquired hypothyroidism -stable, continue current medication. Discussed appropriate diet.     4. Essential hypertension - stable, continue current medication.     She plans to continue her cranberry pills and vaginal cream at this time.   Return in about 2 weeks (around 2/20/2020).    This medical record contains text that has been entered with the assistance of computer voice recognition and dictation software.  Therefore, it may contain unintended errors in text, spelling, punctuation, or grammar.

## 2020-02-07 ENCOUNTER — TELEPHONE (OUTPATIENT)
Dept: MEDICAL GROUP | Facility: IMAGING CENTER | Age: 80
End: 2020-02-07

## 2020-02-08 LAB
BACTERIA UR CULT: NORMAL
SIGNIFICANT IND 70042: NORMAL
SITE SITE: NORMAL
SOURCE SOURCE: NORMAL

## 2020-02-08 NOTE — TELEPHONE ENCOUNTER
Please let pt know that her urine culture was negative, but she did still have some blood in her urine. Thus, I would recommend further evaluation with CT or she can make an appointment to see her urologist at this time. Please see which she prefers. Please return message for order to be signed.

## 2020-02-13 NOTE — TELEPHONE ENCOUNTER
Spoke with patient. She was able to get in with urology this past Monday (2/10/20). Pt reports they did a urinalysis at the urologist and found WBC and will be running more tests. She also reports that they scheduled a cystoscopy for her in may. Pt instructed to follow up with Dr. Pacheco after the cystoscopy or sooner with any needs. Pt confirms understanding and has no further questions at this time. Pt will call back later to schedule follow up with Dr. Pacheco.

## 2020-06-11 ENCOUNTER — OFFICE VISIT (OUTPATIENT)
Dept: MEDICAL GROUP | Facility: IMAGING CENTER | Age: 80
End: 2020-06-11
Payer: MEDICARE

## 2020-06-11 VITALS
OXYGEN SATURATION: 96 % | SYSTOLIC BLOOD PRESSURE: 128 MMHG | HEIGHT: 60 IN | DIASTOLIC BLOOD PRESSURE: 76 MMHG | RESPIRATION RATE: 12 BRPM | TEMPERATURE: 98.1 F | BODY MASS INDEX: 38.09 KG/M2 | WEIGHT: 194 LBS | HEART RATE: 70 BPM

## 2020-06-11 DIAGNOSIS — E03.9 ACQUIRED HYPOTHYROIDISM: ICD-10-CM

## 2020-06-11 DIAGNOSIS — R53.83 FATIGUE, UNSPECIFIED TYPE: ICD-10-CM

## 2020-06-11 DIAGNOSIS — R31.9 HEMATURIA, UNSPECIFIED TYPE: ICD-10-CM

## 2020-06-11 DIAGNOSIS — E78.5 DYSLIPIDEMIA: ICD-10-CM

## 2020-06-11 DIAGNOSIS — E55.9 VITAMIN D DEFICIENCY: ICD-10-CM

## 2020-06-11 DIAGNOSIS — I10 ESSENTIAL HYPERTENSION: ICD-10-CM

## 2020-06-11 DIAGNOSIS — M79.662 PAIN IN LEFT LOWER LEG: ICD-10-CM

## 2020-06-11 PROCEDURE — 99214 OFFICE O/P EST MOD 30 MIN: CPT | Performed by: FAMILY MEDICINE

## 2020-06-11 RX ORDER — LORATADINE 10 MG/1
10 TABLET ORAL DAILY
COMMUNITY

## 2020-06-11 RX ORDER — GUAIFENESIN 600 MG/1
600 TABLET, EXTENDED RELEASE ORAL EVERY 12 HOURS
COMMUNITY
End: 2024-03-15

## 2020-06-11 RX ORDER — ACETAMINOPHEN 500 MG
500-1000 TABLET ORAL EVERY 6 HOURS PRN
COMMUNITY

## 2020-06-11 NOTE — PROGRESS NOTES
SUBJECTIVE:      Chief Complaint   Patient presents with   • Hypothyroidism     feels some fatigue on new medication dose   • Leg Pain     aching in left lower leg, mostly at night, just occassionally       HPI:     Flavia Garrett is a 80 y.o. female with hypothyroidism, hypertension, hx of hematuria and vitamin D deficiency.     Hypertension - stable on losartan 50 mg daily, tolerating well.   If walking fast, with partner, to try to keep up, feeling a little winded, but no shortness of breath otherwise with regular walking.  No chest pain. No cough, wheezing. No fevers/chills. Does other activities without issues. She is otherwise active.  Has not gone to acupuncture and therapy as well. Has been eating healthy and managing well otherwise.   Not on probiotics, plans to get back on it.     Hypothryodism- on synthroid 150 mcg daily currently.   Feeling fatigued- Partner thinks she has a sleeping issue.   Has mouth guard. Has had some sleep testing in past, no clear diagnosis of sleep apnea of which she is aware.   States she would not wear a CPAP machine. Dr. Paez in past.     Vitamin D low- 50,000 IU weekly + 1000 IU daily.     Dyslipidemia- not on medication, eats healthy.     Left lower leg- throbs at night from prior fall. Overall has gotten better though.     Hematuria on past urine- schedule for bladder procedure, due to covid crisis canceled.   Urinating at night time some. No pain with urination.   No dysuira or blood.       ROS:  Denies any recent fevers or chills. No nausea or vomiting. No diarrhea. No chest pains or shortness of breath. No lower extremity edema.    Current Outpatient Medications on File Prior to Visit   Medication Sig Dispense Refill   • Calcium-Magnesium-Zinc (CALCIUM-MAGNESUIUM-ZINC PO) Take  by mouth.     • acetaminophen (TYLENOL) 500 MG Tab Take 500-1,000 mg by mouth every 6 hours as needed.     • diphenhydrAMINE-APAP, sleep, (TYLENOL PM EXTRA STRENGTH PO) Take  by mouth.      • Naproxen Sodium (ALEVE PO) Take  by mouth.     • OIL OF OREGANO PO Take  by mouth.     • MISC NATURAL PRODUCTS PO Take  by mouth.     • loratadine (CLARITIN) 10 MG Tab Take 10 mg by mouth every day.     • guaiFENesin ER (MUCINEX) 600 MG TABLET SR 12 HR Take 600 mg by mouth every 12 hours.     • levothyroxine (SYNTHROID) 150 MCG Tab Take 1 Tab by mouth Every morning on an empty stomach. 90 Tab 1   • losartan (COZAAR) 50 MG Tab LOSARTAN POTASSIUM 50 MG TABS     • vitamin D, Ergocalciferol, (DRISDOL) 45598 units Cap capsule Take  by mouth every 7 days.     • Multiple Vitamins-Minerals (PRESERVISION AREDS 2 PO) Take  by mouth.     • CRANBERRY PO Take  by mouth.     • Probiotic Product (PROBIOTIC DAILY PO) Take  by mouth.     • clobetasol (TEMOVATE) 0.05 % Cream        No current facility-administered medications on file prior to visit.        Allergies   Allergen Reactions   • Lisinopril      Cramps and upset stomach       Patient Active Problem List    Diagnosis Date Noted   • Recurrent UTI 01/31/2020   • History of UTI 01/31/2020   • Drusen 10/08/2019   • Cystitis 06/12/2019   • Post-menopausal 05/01/2019   • Flu-like symptoms 04/03/2019   • Nasal congestion 04/03/2019   • Cough 04/03/2019   • Vertigo 04/03/2019   • Class 2 obesity in adult 03/13/2019   • Vision loss 08/15/2018   • Venous insufficiency 08/15/2018   • Cold sore 03/29/2018   • Lichen sclerosus of female genitalia 02/05/2018   • Acquired hypothyroidism 06/23/2017   • Chronic left-sided low back pain without sciatica 03/17/2017   • Lichen planus 03/17/2017   • History of breast cancer 03/17/2017   • Gout of foot 09/30/2016   • Idiopathic chronic gout of right ankle 09/30/2016   • Essential hypertension 09/30/2016   • Status post total right knee replacement 08/17/2016   • Dysuria 07/26/2016   • Toe pain, right 07/26/2016   • Cellulitis 07/26/2016   • Abdominal bloating 07/26/2016   • Celiac disease 07/26/2016   • Intertrigo 07/26/2016   •  "Osteoarthritis of right knee 07/26/2016   • Abnormal results of liver function studies 07/26/2016   • Hearing loss 07/26/2016   • Tinnitus 07/26/2016   • Dyslipidemia 07/26/2016   • H/O total hysterectomy 07/26/2016   • Hypothyroidism 07/26/2016   • Vitamin D deficiency 07/26/2016   • Osteoporosis 07/26/2016   • Hx of breast cancer 07/26/2016   • Lack of energy 05/05/2016   • Status post right knee replacement 05/05/2016       Past Medical History:   Diagnosis Date   • Eczema    • Hx of breast cancer 7/26/2016   • Hypertension    • Hypothyroidism 7/26/2016   • Lichen sclerosus of female genitalia    • Osteopenia 7/26/2016   • Overweight    • Thyroid activity decreased    • 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    • Vitamin D deficiency 7/26/2016           OBJECTIVE:   /76   Pulse 70   Temp 36.7 °C (98.1 °F)   Resp 12   Ht 1.511 m (4' 11.5\")   Wt 88 kg (194 lb)   SpO2 96%   BMI 38.53 kg/m²   General: Well-developed well-nourished female, no acute distress  Neck: supple, no lymphadenopathy- cervical or supraclavicular, no thyromegaly  Cardiovascular: regular rate and rhythm, no murmurs, gallops, rubs  Lungs: clear to auscultation bilaterally, no wheezes, crackles, or rhonchi  Abdomen: +bowel sounds, soft, nontender, nondistended, no rebound, no guarding, no hepatosplenomegaly  Extremities: no cyanosis, clubbing, edema  Skin: Warm and dry  Psych: appropriate mood and affect      ASSESSMENT/PLAN:     80 y.o. female with hypothyroidism, hypertension, hx of hematuria and vitamin D deficiency.    1. Essential hypertension- stable, continue losartan 50 mg daily. Monitor BP. Heart healthy diet and routine exercise.      2. Acquired hypothyroidism - fatigue.   -Repeat lab work. Monitor.  TSH WITH REFLEX TO FT4   3. Vitamin D deficiency - on supplement therapy as above.   -Repeat labs.  VITAMIN D,25 HYDROXY   4. Fatigue, unspecified type- may be multifactorial.   -Assess labs and " overnight oximetry test.  Comp Metabolic Panel    CBC WITH DIFFERENTIAL   5. Dyslipidemia - diet controlled. Monitor. Heart healthy diet and routine exercise.  Lipid Profile   6. Pain in left lower leg- improved. Monitor. Follow up as needed.      7. Hematuria, unspecified type- no dysuria or gross hematuria noted.   -Encouraged to follow up with urology- patient will consider,  after repeat UA.   URINALYSIS,CULTURE IF INDICATED   Probiotic recommendations given.     Return in about 3 weeks (around 7/2/2020).    This medical record contains text that has been entered with the assistance of computer voice recognition and dictation software.  Therefore, it may contain unintended errors in text, spelling, punctuation, or grammar.

## 2020-06-25 DIAGNOSIS — E78.5 DYSLIPIDEMIA: ICD-10-CM

## 2020-06-25 DIAGNOSIS — R31.9 HEMATURIA, UNSPECIFIED TYPE: ICD-10-CM

## 2020-06-25 DIAGNOSIS — E55.9 VITAMIN D DEFICIENCY: ICD-10-CM

## 2020-06-25 DIAGNOSIS — E03.9 ACQUIRED HYPOTHYROIDISM: ICD-10-CM

## 2020-06-25 DIAGNOSIS — R53.83 FATIGUE, UNSPECIFIED TYPE: ICD-10-CM

## 2020-06-26 ENCOUNTER — OFFICE VISIT (OUTPATIENT)
Dept: MEDICAL GROUP | Facility: IMAGING CENTER | Age: 80
End: 2020-06-26
Payer: MEDICARE

## 2020-06-26 VITALS
BODY MASS INDEX: 38.09 KG/M2 | OXYGEN SATURATION: 95 % | SYSTOLIC BLOOD PRESSURE: 122 MMHG | HEART RATE: 82 BPM | RESPIRATION RATE: 12 BRPM | DIASTOLIC BLOOD PRESSURE: 80 MMHG | TEMPERATURE: 97.6 F | HEIGHT: 60 IN | WEIGHT: 194 LBS

## 2020-06-26 DIAGNOSIS — M54.50 CHRONIC LEFT-SIDED LOW BACK PAIN WITHOUT SCIATICA: ICD-10-CM

## 2020-06-26 DIAGNOSIS — M79.662 PAIN IN LEFT LOWER LEG: ICD-10-CM

## 2020-06-26 DIAGNOSIS — G89.29 CHRONIC LEFT-SIDED LOW BACK PAIN WITHOUT SCIATICA: ICD-10-CM

## 2020-06-26 DIAGNOSIS — E78.5 DYSLIPIDEMIA: ICD-10-CM

## 2020-06-26 DIAGNOSIS — E03.9 ACQUIRED HYPOTHYROIDISM: ICD-10-CM

## 2020-06-26 DIAGNOSIS — R31.9 HEMATURIA, UNSPECIFIED TYPE: ICD-10-CM

## 2020-06-26 PROCEDURE — 99214 OFFICE O/P EST MOD 30 MIN: CPT | Performed by: FAMILY MEDICINE

## 2020-06-26 RX ORDER — LEVOTHYROXINE SODIUM 175 MCG
175 TABLET ORAL
Qty: 90 TAB | Refills: 0 | Status: SHIPPED | OUTPATIENT
Start: 2020-06-26 | End: 2020-08-07

## 2020-06-26 NOTE — PROGRESS NOTES
SUBJECTIVE:        Chief Complaint   Patient presents with   • Hypothyroidism   • Lab Results       HPI:     Flavia Garrett is a 80 y.o. female with hypothyroidism, hypercholesterolemia and hypertension here for follow up of labs done at [facility] 6/23/20.     Hypothyroidism 150 mcg daily. Compliant on therapy.   Was previously on 175 mcg but TSH was on low side, thus switched. She is currently on the generic brand.   Had one night this week- was tired and slept and felt better.   Wondering about iodine intake, but uses some table salt.    Hematuria- had cystoscopy scheduled, but did not go due to covid crisis. She will reschedule with the urologist.     Back pain and hip joint is better with acupuncture and physical therapy.   Doing well with physical therapy. She has movement back in her hip.   Does I[employer] chi too.     States she will have visitors coming from [place]. Asking about COVID-19 precautions.     ROS:  Denies any recent fevers or chills. No nausea or vomiting. No diarrhea. No chest pains or shortness of breath. No lower extremity edema.    Current Outpatient Medications on File Prior to Visit   Medication Sig Dispense Refill   • Calcium-Magnesium-Zinc (CALCIUM-MAGNESUIUM-ZINC PO) Take  by mouth.     • acetaminophen (TYLENOL) 500 MG Tab Take 500-1,000 mg by mouth every 6 hours as needed.     • diphenhydrAMINE-APAP, sleep, (TYLENOL PM EXTRA STRENGTH PO) Take  by mouth.     • OIL OF OREGANO PO Take  by mouth.     • MISC NATURAL PRODUCTS PO Take  by mouth.     • loratadine (CLARITIN) 10 MG Tab Take 10 mg by mouth every day.     • guaiFENesin ER (MUCINEX) 600 MG TABLET SR 12 HR Take 600 mg by mouth every 12 hours.     • levothyroxine (SYNTHROID) 150 MCG Tab Take 1 Tab by mouth Every morning on an empty stomach. 90 Tab 1   • losartan (COZAAR) 50 MG Tab LOSARTAN POTASSIUM 50 MG TABS     • vitamin D, Ergocalciferol, (DRISDOL) 27033 units Cap capsule Take  by mouth every 7 days.     • Multiple  Vitamins-Minerals (PRESERVISION AREDS 2 PO) Take  by mouth.     • clobetasol (TEMOVATE) 0.05 % Cream      • Probiotic Product (PROBIOTIC DAILY PO) Take  by mouth.     • Naproxen Sodium (ALEVE PO) Take  by mouth.     • CRANBERRY PO Take  by mouth.       No current facility-administered medications on file prior to visit.        Allergies   Allergen Reactions   • Lisinopril      Cramps and upset stomach       Patient Active Problem List    Diagnosis Date Noted   • Recurrent UTI 01/31/2020   • History of UTI 01/31/2020   • Drusen 10/08/2019   • Cystitis 06/12/2019   • Post-menopausal 05/01/2019   • Flu-like symptoms 04/03/2019   • Nasal congestion 04/03/2019   • Cough 04/03/2019   • Vertigo 04/03/2019   • Class 2 obesity in adult 03/13/2019   • Vision loss 08/15/2018   • Venous insufficiency 08/15/2018   • Cold sore 03/29/2018   • Lichen sclerosus of female genitalia 02/05/2018   • Acquired hypothyroidism 06/23/2017   • Chronic left-sided low back pain without sciatica 03/17/2017   • Lichen planus 03/17/2017   • History of breast cancer 03/17/2017   • Gout of foot 09/30/2016   • Idiopathic chronic gout of right ankle 09/30/2016   • Essential hypertension 09/30/2016   • Status post total right knee replacement 08/17/2016   • Dysuria 07/26/2016   • Toe pain, right 07/26/2016   • Cellulitis 07/26/2016   • Abdominal bloating 07/26/2016   • Celiac disease 07/26/2016   • Intertrigo 07/26/2016   • Osteoarthritis of right knee 07/26/2016   • Abnormal results of liver function studies 07/26/2016   • Hearing loss 07/26/2016   • Tinnitus 07/26/2016   • Dyslipidemia 07/26/2016   • H/O total hysterectomy 07/26/2016   • Hypothyroidism 07/26/2016   • Vitamin D deficiency 07/26/2016   • Osteoporosis 07/26/2016   • Hx of breast cancer 07/26/2016   • Lack of energy 05/05/2016   • Status post right knee replacement 05/05/2016       Past Medical History:   Diagnosis Date   • Eczema    • Hx of breast cancer 7/26/2016   • Hypertension    •  "Hypothyroidism 7/26/2016   • Lichen sclerosus of female genitalia    • Osteopenia 7/26/2016   • Overweight    • Thyroid activity decreased    • 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    • Vitamin D deficiency 7/26/2016         OBJECTIVE:   /80 (BP Location: Left arm, Patient Position: Sitting, BP Cuff Size: Large adult)   Pulse 82   Temp 36.4 °C (97.6 °F) (Temporal)   Resp 12   Ht 1.511 m (4' 11.5\")   Wt 88 kg (194 lb)   SpO2 95%   BMI 38.53 kg/m²   General: Well-developed well-nourished female, no acute distress  Neck: supple, no lymphadenopathy- cervical or supraclavicular, no thyromegaly  Cardiovascular: regular rate and rhythm, no murmurs, gallops, rubs  Lungs: clear to auscultation bilaterally, no wheezes, crackles, or rhonchi  Extremities: no cyanosis, clubbing, edema. Good ROM of left hip which patient demonstrated.   Skin: Warm and dry  Psych: appropriate mood and affect    6/23/20- [facility]  TSH 7.44  Chol 214, HDL 45, ,   Urine+ occult blood  CMP normal  CBC- overall WNL    ASSESSMENT/PLAN:    80 y.o. female with hypothyroidism, hypercholesterolemia and hypertension     1. Acquired hypothyroidism - elevated TSH.   -Will switch to synthroid 175 mcg daily, recheck labs 6 weeks.  TSH WITH REFLEX TO FT4  Synthroid 175 mcg   2. Hematuria, unspecified type -+ UA. Procedure delayed due to covid crisis.   -Recommend scheduled with urology from prior referral.     3. Chronic left-sided low back pain without sciatica- improved. Recommend continue routine exercise.      4. Pain in left lower leg - improved. Recommend continue routine exercise.      5. Dyslipidemia- some improvement but increase in LDL, possibly due to thyroid levels being off.   -Will monitor. Heart healthy diet, routine exercise and continue fish oils.  Recheck in future.     Answered all questions and concerns about covid-19, reviewed precautions.     Return in about 6 weeks (around " 8/7/2020).    This medical record contains text that has been entered with the assistance of computer voice recognition and dictation software.  Therefore, it may contain unintended errors in text, spelling, punctuation, or grammar.

## 2020-07-01 NOTE — TELEPHONE ENCOUNTER
Received request via: Patient    Was the patient seen in the last year in this department? Yes    Does the patient have an active prescription (recently filled or refills available) for medication(s) requested? No     Pt called to confirm that her dose is 50mg daily. Request for 90 day supply to mail order

## 2020-07-02 RX ORDER — LOSARTAN POTASSIUM 50 MG/1
50 TABLET ORAL DAILY
Qty: 90 TAB | Refills: 3 | Status: SHIPPED | OUTPATIENT
Start: 2020-07-02 | End: 2021-05-14 | Stop reason: SDUPTHER

## 2020-07-15 ENCOUNTER — TELEPHONE (OUTPATIENT)
Dept: MEDICAL GROUP | Facility: IMAGING CENTER | Age: 80
End: 2020-07-15

## 2020-07-17 NOTE — TELEPHONE ENCOUNTER
Please advise patient that she does show some dips in her oxygen at nighttime.  Would recommend further evaluation with complete sleep study.  Also would recommend she start at 2 L/min of oxygen at nighttime until further complete sleep study is done.  Please see if patient is agreeable.

## 2020-07-21 NOTE — TELEPHONE ENCOUNTER
Notified pt of results. Pt inquired if she would be eligible for the at home sleep study. She has tried a sleep study in the past and found she could not sleep at the sleep lab. She is agreeable to nocturnal oxygen. Pt inquired if acupuncture could help with this issue. Pt notes she sometimes wears a snore guard. She could not recall if she used this during the overnight oximetry test.

## 2020-08-07 ENCOUNTER — OFFICE VISIT (OUTPATIENT)
Dept: MEDICAL GROUP | Facility: IMAGING CENTER | Age: 80
End: 2020-08-07
Payer: MEDICARE

## 2020-08-07 ENCOUNTER — TELEPHONE (OUTPATIENT)
Dept: MEDICAL GROUP | Facility: IMAGING CENTER | Age: 80
End: 2020-08-07

## 2020-08-07 VITALS
HEIGHT: 60 IN | RESPIRATION RATE: 14 BRPM | BODY MASS INDEX: 38.13 KG/M2 | WEIGHT: 194.2 LBS | SYSTOLIC BLOOD PRESSURE: 124 MMHG | DIASTOLIC BLOOD PRESSURE: 78 MMHG | TEMPERATURE: 97.6 F | OXYGEN SATURATION: 95 % | HEART RATE: 74 BPM

## 2020-08-07 DIAGNOSIS — Z91.81 HISTORY OF FALL: ICD-10-CM

## 2020-08-07 DIAGNOSIS — G47.34 NOCTURNAL OXYGEN DESATURATION: ICD-10-CM

## 2020-08-07 DIAGNOSIS — R31.9 HEMATURIA, UNSPECIFIED TYPE: ICD-10-CM

## 2020-08-07 DIAGNOSIS — E03.9 ACQUIRED HYPOTHYROIDISM: ICD-10-CM

## 2020-08-07 DIAGNOSIS — M79.662 PAIN IN LEFT LOWER LEG: ICD-10-CM

## 2020-08-07 DIAGNOSIS — I10 ESSENTIAL HYPERTENSION: ICD-10-CM

## 2020-08-07 DIAGNOSIS — E55.9 VITAMIN D DEFICIENCY: ICD-10-CM

## 2020-08-07 DIAGNOSIS — E78.5 DYSLIPIDEMIA: ICD-10-CM

## 2020-08-07 PROCEDURE — 99214 OFFICE O/P EST MOD 30 MIN: CPT | Performed by: FAMILY MEDICINE

## 2020-08-07 RX ORDER — LOSARTAN POTASSIUM 50 MG/1
50 TABLET ORAL DAILY
Qty: 90 TAB | Refills: 1 | Status: SHIPPED | OUTPATIENT
Start: 2020-08-07 | End: 2020-12-17

## 2020-08-07 RX ORDER — LEVOTHYROXINE SODIUM 0.2 MG/1
200 TABLET ORAL
Qty: 30 TAB | Refills: 2 | Status: SHIPPED | OUTPATIENT
Start: 2020-08-07 | End: 2020-09-01

## 2020-08-07 NOTE — TELEPHONE ENCOUNTER
Patient notified. No further questions at this time. Will plan to check blood work before next follow-up.

## 2020-08-07 NOTE — TELEPHONE ENCOUNTER
Please let patient know that her recent thyroid labs are available to us now.    Recent TSH was slightly improved to 6.21 from 7.22 but still needs further improvement.   She is currently on Synthroid 175 mcg -recommend that we increase her Synthroid to 200 mcg daily as tolerated. New Rx sent.   Patient to monitor for symptoms of palpitations, diarrhea, skin changes, hair changes and initial weight loss or any other unusual symptoms.  Recheck lab work in 6 weeks prior to follow-up.

## 2020-08-07 NOTE — PROGRESS NOTES
SUBJECTIVE:        Chief Complaint   Patient presents with   • Hypothyroidism       HPI:     Flavia Garrett is a 80 y.o. female with hypothyroidism, hypercholesterolemia and hypertension, hematuria and vitamin D deficiency here for follow-up of thyroid labs.    Hypothyroidism- had lab work done about 3 days ago.  States that she did receive her lab work.  She notes on of the levels was elevated and one was decreased. Lab work not available in office today.   Was switched to synthroid 175 mcg daily.     Hematuria- she did see urology and had a cystoscopy and will schedule for biopsy with Dr. Cramer in 3-4 weeks.  Feels 2019-urinary tract infection may have affected her.   States when she was having the cystoscopy she was sensitive in the genitourinary region.  She does have some estrogen cream at home to use.    Vitamin D 50,000 IU daily-states she has been on this dose for 6-7 years.  Last labs in June were within normal limits.  She takes her supplements in the morning.  Patient advised to take with food.  She also takes fish oil supplements.  States she will be more consistent with her supplements.  Plans to get a pillbox.  Takes Preservision- 2 times a day-for some developing macular degeneration.     Hypertension-has been stable on losartan 50 mg daily. 120-130s systolic at home.  Needs refill of losartan 50 mg daily.  Has tolerated medication well.  Dyslipidemia- will have lipids rechecked in future when thyroid in better range.    Overnight oximetry notes nocturnal oxygen desaturation. She is willing to start oxygen at night until further evaluation.     Left anterior lower leg pain at night for past year or so.  Fell a year ago, pain of the left lower leg region since then.   Right knee redness- fell tripped outside recently by her porch when she tripped.      ROS:  Denies any recent fevers or chills. No nausea or vomiting. No diarrhea. No chest pains or shortness of breath. No lower extremity  edema.    Current Outpatient Medications on File Prior to Visit   Medication Sig Dispense Refill   • losartan (COZAAR) 50 MG Tab Take 1 Tab by mouth every day. 90 Tab 3   • SYNTHROID 175 MCG Tab Take 1 Tab by mouth Every morning on an empty stomach. 90 Tab 0   • Calcium-Magnesium-Zinc (CALCIUM-MAGNESUIUM-ZINC PO) Take  by mouth.     • acetaminophen (TYLENOL) 500 MG Tab Take 500-1,000 mg by mouth every 6 hours as needed.     • diphenhydrAMINE-APAP, sleep, (TYLENOL PM EXTRA STRENGTH PO) Take  by mouth.     • OIL OF OREGANO PO Take  by mouth.     • MISC NATURAL PRODUCTS PO Take  by mouth.     • loratadine (CLARITIN) 10 MG Tab Take 10 mg by mouth every day.     • guaiFENesin ER (MUCINEX) 600 MG TABLET SR 12 HR Take 600 mg by mouth every 12 hours.     • vitamin D, Ergocalciferol, (DRISDOL) 16596 units Cap capsule Take  by mouth every 7 days.     • Multiple Vitamins-Minerals (PRESERVISION AREDS 2 PO) Take  by mouth.     • Probiotic Product (PROBIOTIC DAILY PO) Take  by mouth.     • Naproxen Sodium (ALEVE PO) Take  by mouth.     • levothyroxine (SYNTHROID) 150 MCG Tab Take 1 Tab by mouth Every morning on an empty stomach. (Patient not taking: Reported on 8/7/2020) 90 Tab 1   • CRANBERRY PO Take  by mouth.     • clobetasol (TEMOVATE) 0.05 % Cream        No current facility-administered medications on file prior to visit.        Allergies   Allergen Reactions   • Lisinopril      Cramps and upset stomach       Patient Active Problem List    Diagnosis Date Noted   • Pain in left lower leg 08/07/2020   • Hematuria 08/07/2020   • Nocturnal oxygen desaturation 08/07/2020   • Recurrent UTI 01/31/2020   • History of UTI 01/31/2020   • Drusen 10/08/2019   • Cystitis 06/12/2019   • Post-menopausal 05/01/2019   • Flu-like symptoms 04/03/2019   • Nasal congestion 04/03/2019   • Cough 04/03/2019   • Vertigo 04/03/2019   • Class 2 obesity in adult 03/13/2019   • Vision loss 08/15/2018   • Venous insufficiency 08/15/2018   • Cold sore  "03/29/2018   • Lichen sclerosus of female genitalia 02/05/2018   • Acquired hypothyroidism 06/23/2017   • Chronic left-sided low back pain without sciatica 03/17/2017   • Lichen planus 03/17/2017   • History of breast cancer 03/17/2017   • Gout of foot 09/30/2016   • Idiopathic chronic gout of right ankle 09/30/2016   • Essential hypertension 09/30/2016   • Status post total right knee replacement 08/17/2016   • Dysuria 07/26/2016   • Toe pain, right 07/26/2016   • Cellulitis 07/26/2016   • Abdominal bloating 07/26/2016   • Celiac disease 07/26/2016   • Intertrigo 07/26/2016   • Osteoarthritis of right knee 07/26/2016   • Abnormal results of liver function studies 07/26/2016   • Hearing loss 07/26/2016   • Tinnitus 07/26/2016   • Dyslipidemia 07/26/2016   • H/O total hysterectomy 07/26/2016   • Hypothyroidism 07/26/2016   • Vitamin D deficiency 07/26/2016   • Osteoporosis 07/26/2016   • Hx of breast cancer 07/26/2016   • Lack of energy 05/05/2016   • Status post right knee replacement 05/05/2016       Past Medical History:   Diagnosis Date   • Eczema    • Hx of breast cancer 7/26/2016   • Hypertension    • Hypothyroidism 7/26/2016   • Lichen sclerosus of female genitalia    • Osteopenia 7/26/2016   • Overweight    • Thyroid activity decreased    • 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    • Vitamin D deficiency 7/26/2016       OBJECTIVE:   /78 (BP Location: Left arm, Patient Position: Sitting, BP Cuff Size: Adult)   Pulse 74   Temp 36.4 °C (97.6 °F) (Temporal)   Resp 14   Ht 1.511 m (4' 11.5\")   Wt 88.1 kg (194 lb 3.2 oz)   SpO2 95%   BMI 38.57 kg/m²   General: Well-developed well-nourished female, no acute distress  Neck: supple, no lymphadenopathy- cervical or supraclavicular, no thyromegaly  Cardiovascular: regular rate and rhythm, no murmurs, gallops, rubs  Lungs: clear to auscultation bilaterally, no wheezes, crackles, or rhonchi  Abdomen: +bowel sounds, soft, " nontender, nondistended, no rebound, no guarding, no hepatosplenomegaly  Extremities: no cyanosis, clubbing, edema.  Left lower 1/3 anterior region with tenderness to palpation of tibia and overlying soft tissue, some indentation noted of soft tissue in the area and slight lumpiness of soft tissue of area noted.  No erythema or edema.  Left medial knee region with slight erythema from abrasion and slight ecchymosis which appears to be healing, otherwise adequate range of motion of knee and ankle regions, appears to have 5/5 strength throughout lower extremity on left side and no pain symptoms against resistance.  Skin: Warm and dry  Psych: appropriate mood and affect      ASSESSMENT/PLAN:    80 y.o.female with hypertension, hyperlipidemia, hypothyroidism, hematuria and vitamin D deficiency.      1. Acquired hypothyroidism  TSH    FREE THYROXINE    TRIIDOTHYRONINE   2. Hematuria, unspecified type     3. Vitamin D deficiency  VITAMIN D,25 HYDROXY   4. Essential hypertension     5. Dyslipidemia     6. Nocturnal oxygen desaturation     7. Pain in left lower leg  DX-TIBIA AND FIBULA LEFT   8. History of fall     -Hypothyroidism- recent lab results not available to us.  We will plan to obtain and adjust medication as needed.  Otherwise plan to recheck labs in 6 weeks.  -Hematuria-has had cystoscopy with urology and is scheduled for biopsy.  Follow-up with urology.  -Vitamin D deficiency-patient notes having been on vitamin D 50,000 IU weekly for many years.  Last labs stable.  Advised to take vitamin D with food.  Will recheck labs prior to refill.  -Hypertension- controlled.  Continue losartan 50 mg daily, refilled.  Heart healthy diet and routine exercise as tolerated.  -Dyslipidemia- continue to adjust thyroid medication until TSH at goal.  She is not on medication therapy.  We will plan to recheck lipids in future when TSH is further improved.  Otherwise recommend heart healthy diet, low cholesterol/high-fiber diet  and routine exercise.  -Nocturnal oxygen desaturation-on recent overnight oximetry.  Patient will plan to start on nocturnal oxygen at 2 L/min until further complete sleep evaluation.  We will try to complete home sleep evaluation at this time.  -Pain of left lower leg- since prior fall about a year ago.  Some tenderness of anterior tibia and overlying soft tissue areas.  Will obtain x-ray for further evaluation.  Recommend routine stretching and strengthening exercises.  -Patient with history of a couple falls in the past year.  Reviewed fall precautions.  Recommend continue her sinan chi exercises for balance, stretching and strengthening.  Recommend mindfulness with activities and especially with walking to avoid future falls or tripping on objects.  Discussed importance of prevention of falls.    Return in about 6 weeks (around 9/18/2020).    This medical record contains text that has been entered with the assistance of computer voice recognition and dictation software.  Therefore, it may contain unintended errors in text, spelling, punctuation, or grammar.

## 2020-08-20 ENCOUNTER — TELEPHONE (OUTPATIENT)
Dept: MEDICAL GROUP | Facility: IMAGING CENTER | Age: 80
End: 2020-08-20

## 2020-08-21 DIAGNOSIS — M79.662 PAIN IN LEFT LOWER LEG: ICD-10-CM

## 2020-08-21 NOTE — TELEPHONE ENCOUNTER
Pre [facility], patient does not qualify for overnight oxygen per medicare guidelines.   Called and lm for [facility] to see if they have received order for at home sleep study.

## 2020-08-21 NOTE — TELEPHONE ENCOUNTER
Patient is currently taking 200 mcg of levothyroxine. She is worried about staying on dose for another month with current side effects.

## 2020-08-21 NOTE — TELEPHONE ENCOUNTER
Notified patient. She states her leg is still achy.     Patient also states she has not heard regarding her oxygen. Let patient know I would give [facility] a call to make sure they received our order.     Patient would also like Dr. Pacheco to know that she has been feeling very hungry on her new dose of thyroid medication. She also has noticed her nails have been breaking very easily. She would like to know if this is normal and should stay on this current dose.

## 2020-08-26 NOTE — TELEPHONE ENCOUNTER
Notified patient overnight oxygen will not be currently covered by medicare. Patient is scheduled for her at home sleep study and will proceed with that. No further questions at this time.

## 2020-09-01 NOTE — TELEPHONE ENCOUNTER
Please see if she is able to check her thyroid labs a little early,  sometime this week, and we can adjust her medications as needed due to her concern of side effects.

## 2020-09-02 NOTE — TELEPHONE ENCOUNTER
Pt returned call. She states she went to [facility] today to have her labs drawn. No further needs at this time.

## 2020-09-14 ENCOUNTER — TELEPHONE (OUTPATIENT)
Dept: MEDICAL GROUP | Facility: IMAGING CENTER | Age: 80
End: 2020-09-14

## 2020-09-14 NOTE — TELEPHONE ENCOUNTER
Phone Number Called: [phone]    Call outcome: Spoke to patient regarding message below.    Message: Please let pt know that her repeat TSH was improving but still a little out of range. However, I would like for her to continue her current medication dose until we discuss further at her follow up visit. Please also advise pt we will review her overnight oximetry results at that time as well.

## 2020-09-14 NOTE — TELEPHONE ENCOUNTER
Please let pt know that her repeat TSH was improving but still a little out of range. However, I would like for her to continue her current medication dose until we discuss further at her follow up visit. Please also advise pt we will review her overnight oximetry results at that time as well.

## 2020-09-16 RX ORDER — HYDROCHLOROTHIAZIDE 12.5 MG/1
TABLET ORAL
COMMUNITY
Start: 2014-07-25 | End: 2021-04-09

## 2020-09-16 RX ORDER — LEVOTHYROXINE SODIUM 175 UG/1
TABLET ORAL
COMMUNITY
Start: 2019-06-12 | End: 2020-09-17

## 2020-09-16 RX ORDER — ERGOCALCIFEROL 1.25 MG/1
CAPSULE ORAL
COMMUNITY
Start: 2014-11-11 | End: 2021-05-14

## 2020-09-16 RX ORDER — LEVOTHYROXINE SODIUM 0.15 MG/1
TABLET ORAL
COMMUNITY
End: 2020-09-17

## 2020-09-16 RX ORDER — LEVOTHYROXINE SODIUM 112 UG/1
TABLET ORAL
COMMUNITY
Start: 2014-07-25 | End: 2020-09-17

## 2020-09-16 RX ORDER — CYCLOBENZAPRINE HCL 5 MG
TABLET ORAL 3 TIMES DAILY PRN
COMMUNITY
Start: 2019-06-28

## 2020-09-16 RX ORDER — NITROFURANTOIN MACROCRYSTALS 50 MG/1
CAPSULE ORAL
COMMUNITY
Start: 2019-06-15 | End: 2020-12-17

## 2020-09-16 RX ORDER — CLOTRIMAZOLE 1 %
CREAM (GRAM) TOPICAL
COMMUNITY
End: 2021-05-14

## 2020-09-16 RX ORDER — LOSARTAN POTASSIUM 50 MG/1
TABLET ORAL
COMMUNITY
End: 2020-12-17

## 2020-09-16 RX ORDER — AMOXICILLIN 500 MG/1
CAPSULE ORAL
COMMUNITY
End: 2021-04-09

## 2020-09-16 RX ORDER — CLOBETASOL PROPIONATE 0.5 MG/G
CREAM TOPICAL
COMMUNITY
Start: 2018-03-12 | End: 2021-09-22 | Stop reason: SDUPTHER

## 2020-09-17 ENCOUNTER — OFFICE VISIT (OUTPATIENT)
Dept: MEDICAL GROUP | Facility: IMAGING CENTER | Age: 80
End: 2020-09-17
Payer: MEDICARE

## 2020-09-17 VITALS
HEART RATE: 83 BPM | WEIGHT: 194.2 LBS | OXYGEN SATURATION: 96 % | SYSTOLIC BLOOD PRESSURE: 112 MMHG | BODY MASS INDEX: 38.13 KG/M2 | HEIGHT: 60 IN | DIASTOLIC BLOOD PRESSURE: 70 MMHG | TEMPERATURE: 97.3 F | RESPIRATION RATE: 14 BRPM

## 2020-09-17 DIAGNOSIS — E78.1 HYPERTRIGLYCERIDEMIA: ICD-10-CM

## 2020-09-17 DIAGNOSIS — E03.9 ACQUIRED HYPOTHYROIDISM: ICD-10-CM

## 2020-09-17 DIAGNOSIS — M79.662 PAIN IN LEFT LOWER LEG: ICD-10-CM

## 2020-09-17 DIAGNOSIS — G47.30 SLEEP APNEA IN ADULT: ICD-10-CM

## 2020-09-17 DIAGNOSIS — M79.89 INFLAMMATION OF SOFT TISSUE: ICD-10-CM

## 2020-09-17 DIAGNOSIS — Z87.448 HISTORY OF HEMATURIA: ICD-10-CM

## 2020-09-17 PROCEDURE — 99214 OFFICE O/P EST MOD 30 MIN: CPT | Performed by: FAMILY MEDICINE

## 2020-09-17 NOTE — PROGRESS NOTES
SUBJECTIVE:        Chief Complaint   Patient presents with   • Hypothyroidism   • Results     at home sleep study       HPI:     Flavia Garrett is a 80 y.o. female with hypothyroidism, hypercholesterolemia, hypertension, hematuria and and low vitamin D for follow-up of lab work for hypothyroidism.    Hypothyroidism-previously with elevated TSH, levothyroxine increased to 200 mcg daily.  Initially noticed that her nails appeared weak and had some hair loss with increased dose but feels now she has adjusted to the dose.  Denies abnormal weight loss, diarrhea or palpitations.  States that she has taken her blood pressure medication, prevagen and thyroid medication at the same time together for years.  Labs 9/2/2020-see media. TSH 1.23. labs per Dr. Naik.   Chol 208, HDL 38, . .     She has had some persistent bumps along her left lower extremity just below her knee and along her lower leg since having had a fall a year ago. States sometimes the area throbs at night. Had xray complete, which was unremarkable.   States she has been drinking more water and her urine seems better, hx of hematuria- 9/2020 lab without blood in urine.   She has had a stressful last few months.     Had at home sleep apnea test and was noted to have sleep apnea.       ROS:  Denies any recent fevers or chills. No nausea or vomiting. No diarrhea. No chest pains or shortness of breath. No lower extremity edema.    Current Outpatient Medications on File Prior to Visit   Medication Sig Dispense Refill   • ergocalciferol (DRISDOL) 45154 UNIT capsule ergocalciferol (vitamin D2) 1,250 mcg (50,000 unit) capsule     • cyclobenzaprine (FLEXERIL) 5 MG tablet CYCLOBENZAPRINE HCL 5 MG TABS     • clobetasol (TEMOVATE) 0.05 % Cream CLOBETASOL PROPIONATE 0.05 % CREA     • losartan (COZAAR) 50 MG Tab Take 1 Tab by mouth every day. 90 Tab 3   • acetaminophen (TYLENOL) 500 MG Tab Take 500-1,000 mg by mouth every 6 hours as needed.     •  diphenhydrAMINE-APAP, sleep, (TYLENOL PM EXTRA STRENGTH PO) Take  by mouth.     • Naproxen Sodium (ALEVE PO) Take  by mouth.     • OIL OF OREGANO PO Take  by mouth.     • MISC NATURAL PRODUCTS PO Take  by mouth.     • loratadine (CLARITIN) 10 MG Tab Take 10 mg by mouth every day.     • guaiFENesin ER (MUCINEX) 600 MG TABLET SR 12 HR Take 600 mg by mouth every 12 hours.     • vitamin D, Ergocalciferol, (DRISDOL) 06167 units Cap capsule Take  by mouth every 7 days.     • Multiple Vitamins-Minerals (PRESERVISION AREDS 2 PO) Take  by mouth.     • clobetasol (TEMOVATE) 0.05 % Cream      • Probiotic Product (PROBIOTIC DAILY PO) Take  by mouth.     • amoxicillin (AMOXIL) 500 MG Cap amoxicillin 500 mg capsule     • Probiotic Product (PROBIOTIC PO) Probiotic Product (PROBIOTIC DAILY PO)     • Probiotic Product (PROBIOTIC PO) Probiotic Product (PROBIOTIC DAILY PO)     • clotrimazole (LOTRIMIN) 1 % Cream clotrimazole 1 % topical cream     • hydroCHLOROthiazide (HYDRODIURIL) 12.5 MG tablet HYDROCHLOROTHIAZIDE 12.5 MG TABS     • nitrofurantoin (MACRODANTIN) 50 MG Cap NITROFURANTOIN MACROCRYSTAL 50 MG CAPS     • losartan (COZAAR) 50 MG Tab losartan 50 mg tablet     • losartan (COZAAR) 50 MG Tab Take 1 Tab by mouth every day. 90 Tab 1   • Calcium-Magnesium-Zinc (CALCIUM-MAGNESUIUM-ZINC PO) Take  by mouth.     • CRANBERRY PO Take  by mouth.       No current facility-administered medications on file prior to visit.        Allergies   Allergen Reactions   • Lisinopril      Cramps and upset stomach       Patient Active Problem List    Diagnosis Date Noted   • Sleep apnea in adult 09/28/2020   • Inflammation of soft tissue 09/28/2020   • Hypertriglyceridemia 09/28/2020   • Pain in left lower leg 08/07/2020   • Hematuria 08/07/2020   • Nocturnal oxygen desaturation 08/07/2020   • Recurrent UTI 01/31/2020   • History of UTI 01/31/2020   • Drusen 10/08/2019   • Cystitis 06/12/2019   • Post-menopausal 05/01/2019   • Flu-like symptoms  "04/03/2019   • Nasal congestion 04/03/2019   • Cough 04/03/2019   • Vertigo 04/03/2019   • Class 2 obesity in adult 03/13/2019   • Vision loss 08/15/2018   • Venous insufficiency 08/15/2018   • Cold sore 03/29/2018   • Lichen sclerosus of female genitalia 02/05/2018   • Acquired hypothyroidism 06/23/2017   • Chronic left-sided low back pain without sciatica 03/17/2017   • Lichen planus 03/17/2017   • History of breast cancer 03/17/2017   • Gout of foot 09/30/2016   • Idiopathic chronic gout of right ankle 09/30/2016   • Essential hypertension 09/30/2016   • Status post total right knee replacement 08/17/2016   • Dysuria 07/26/2016   • Toe pain, right 07/26/2016   • Cellulitis 07/26/2016   • Abdominal bloating 07/26/2016   • Celiac disease 07/26/2016   • Intertrigo 07/26/2016   • Osteoarthritis of right knee 07/26/2016   • Abnormal results of liver function studies 07/26/2016   • Hearing loss 07/26/2016   • Tinnitus 07/26/2016   • Dyslipidemia 07/26/2016   • H/O total hysterectomy 07/26/2016   • Hypothyroidism 07/26/2016   • Vitamin D deficiency 07/26/2016   • Osteoporosis 07/26/2016   • Hx of breast cancer 07/26/2016   • Lack of energy 05/05/2016   • Status post right knee replacement 05/05/2016       Past Medical History:   Diagnosis Date   • Eczema    • Hx of breast cancer 7/26/2016   • Hypertension    • Hypothyroidism 7/26/2016   • Lichen sclerosus of female genitalia    • Osteopenia 7/26/2016   • Overweight    • Thyroid activity decreased    • 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    • Vitamin D deficiency 7/26/2016         OBJECTIVE:   /70 (BP Location: Left arm, Patient Position: Sitting, BP Cuff Size: Large adult)   Pulse 83   Temp 36.3 °C (97.3 °F) (Temporal)   Resp 14   Ht 1.511 m (4' 11.5\")   Wt 88.1 kg (194 lb 3.2 oz)   SpO2 96%   BMI 38.57 kg/m²   General: Well-developed well-nourished female, no acute distress  HEENT: oropharynx clear, eyes clear, ears L " >R cerumen noted  Neck: supple, no lymphadenopathy- cervical or supraclavicular, no thyromegaly  Cardiovascular: regular rate and rhythm, no murmurs, gallops, rubs  Lungs: clear to auscultation bilaterally, no wheezes, crackles, or rhonchi  Abdomen: +bowel sounds, soft, nontender, nondistended, no rebound, no guarding, no hepatosplenomegaly  Extremities: no cyanosis, clubbing, edema. Left lower leg- medially just below knee region with slight bogginess, left lower leg region mid leg and slightly medial with prominence of soft tissue along tibia, without erythema/edema/ecchymosis.   Skin: Warm and dry  Psych: appropriate mood and affect    8/2020- tib/fib xray- unremarkable.       ASSESSMENT/PLAN:    80 y.o. female with hypothyroidism, hypercholesterolemia, hypertension, hematuria and and low vitamin D for follow-up of lab work for hypothyroidism.    1. Acquired hypothyroidism     2. Pain in left lower leg     3. Inflammation of soft tissue      left lower leg, prior hx of fall/injury   4. History of hematuria     5. Sleep apnea in adult     6. Hypertriglyceridemia     -Hypothyroidism- improved TSH on recent labs. She will continue on levothyroxine 200 mcg daily. Monitor labs in future.   -Pain in left lower leg/inflammation of soft tissue-prior injury of area, medial/anterior lower leg region. Appears due to prior injury and residual inflammatory tissue in area. Recommend anti-inflammatory diet, routine stretching/strengthening, consider acupuncture therapy. Monitor, follow up if no further improvements.   -Hx of hematuria- recent urine, see media, improved. Recommend follow up with urology.   -Sleep apnea- recommend use of CPAP. Reviewed other options of therapy.   -Hypertriglyceridemia- recommend anti- inflammatory diet and routine exercise. Monitor labs in future.     Return in about 6 weeks (around 10/29/2020).    This medical record contains text that has been entered with the assistance of computer voice  recognition and dictation software.  Therefore, it may contain unintended errors in text, spelling, punctuation, or grammar.

## 2020-09-22 RX ORDER — LEVOTHYROXINE SODIUM 0.2 MG/1
200 TABLET ORAL
Qty: 90 TAB | Refills: 0 | Status: SHIPPED | OUTPATIENT
Start: 2020-09-22 | End: 2020-11-23

## 2020-09-28 PROBLEM — G47.30 SLEEP APNEA IN ADULT: Status: ACTIVE | Noted: 2020-09-28

## 2020-09-28 PROBLEM — M79.89 INFLAMMATION OF SOFT TISSUE: Status: ACTIVE | Noted: 2020-09-28

## 2020-09-28 PROBLEM — E78.1 HYPERTRIGLYCERIDEMIA: Status: ACTIVE | Noted: 2020-09-28

## 2020-11-30 ENCOUNTER — TELEPHONE (OUTPATIENT)
Dept: MEDICAL GROUP | Facility: IMAGING CENTER | Age: 80
End: 2020-11-30

## 2020-11-30 DIAGNOSIS — Z11.52 ENCOUNTER FOR SCREENING LABORATORY TESTING FOR COVID-19 VIRUS: ICD-10-CM

## 2020-11-30 DIAGNOSIS — R19.7 DIARRHEA, UNSPECIFIED TYPE: ICD-10-CM

## 2020-11-30 NOTE — TELEPHONE ENCOUNTER
Call received from patient to report diarrhea that started early this am. No other symptoms. She just came back from a trip to [place]. She reports she was only with 2 family members at that time. Masks were worn if outside the home. No one else reports any illness. Pt does not think she has covid 19, but is aware that diarrhea could be a symptom. Pt believes it might be a little food poisoning. She is maintaining hydration. Advised bland diet, hydration and to make sure to replace electrolytes. Advised to isolate at this time. Will monitor symptoms and call us if diarrhea does not resolve or she develops any new symptoms. Reviewed er precautions. Pt agrees with plan and has no other questions at this time.

## 2020-12-04 ENCOUNTER — HOSPITAL ENCOUNTER (OUTPATIENT)
Dept: LAB | Facility: MEDICAL CENTER | Age: 80
End: 2020-12-04
Attending: FAMILY MEDICINE
Payer: MEDICARE

## 2020-12-04 DIAGNOSIS — R19.7 DIARRHEA, UNSPECIFIED TYPE: ICD-10-CM

## 2020-12-04 DIAGNOSIS — Z11.52 ENCOUNTER FOR SCREENING LABORATORY TESTING FOR COVID-19 VIRUS: ICD-10-CM

## 2020-12-04 PROCEDURE — U0003 INFECTIOUS AGENT DETECTION BY NUCLEIC ACID (DNA OR RNA); SEVERE ACUTE RESPIRATORY SYNDROME CORONAVIRUS 2 (SARS-COV-2) (CORONAVIRUS DISEASE [COVID-19]), AMPLIFIED PROBE TECHNIQUE, MAKING USE OF HIGH THROUGHPUT TECHNOLOGIES AS DESCRIBED BY CMS-2020-01-R: HCPCS

## 2020-12-04 NOTE — TELEPHONE ENCOUNTER
Pt called back and reports she is feeling well. Diarrhea resolved yesterday. No other symptoms. Her acupuncturist thought she had a bit of a fever. Temp measured at that office was 37C (98.6f). had pt check temp at home 98f. Pt inquiring if she should get tested for covid. Test ordered due to diarrhea symptom and travel.

## 2020-12-05 LAB
COVID ORDER STATUS COVID19: NORMAL
SARS-COV-2 RNA RESP QL NAA+PROBE: NOTDETECTED
SPECIMEN SOURCE: NORMAL

## 2020-12-07 ENCOUNTER — TELEPHONE (OUTPATIENT)
Dept: MEDICAL GROUP | Facility: IMAGING CENTER | Age: 80
End: 2020-12-07

## 2020-12-07 NOTE — TELEPHONE ENCOUNTER
Notified pt her covid test was negative. Pt states she is feeling well. Never had any other symptoms other than the diarrhea. No further questions at this time.

## 2020-12-17 ENCOUNTER — OFFICE VISIT (OUTPATIENT)
Dept: MEDICAL GROUP | Facility: IMAGING CENTER | Age: 80
End: 2020-12-17
Payer: MEDICARE

## 2020-12-17 VITALS
HEART RATE: 90 BPM | WEIGHT: 195 LBS | RESPIRATION RATE: 12 BRPM | DIASTOLIC BLOOD PRESSURE: 76 MMHG | OXYGEN SATURATION: 96 % | HEIGHT: 60 IN | SYSTOLIC BLOOD PRESSURE: 126 MMHG | TEMPERATURE: 98 F | BODY MASS INDEX: 38.28 KG/M2

## 2020-12-17 DIAGNOSIS — E78.1 HYPERTRIGLYCERIDEMIA: ICD-10-CM

## 2020-12-17 DIAGNOSIS — E55.9 VITAMIN D DEFICIENCY: ICD-10-CM

## 2020-12-17 DIAGNOSIS — E66.9 OBESITY (BMI 30-39.9): ICD-10-CM

## 2020-12-17 DIAGNOSIS — D17.20 LIPOMA OF EXTREMITY: ICD-10-CM

## 2020-12-17 DIAGNOSIS — I10 ESSENTIAL HYPERTENSION: ICD-10-CM

## 2020-12-17 DIAGNOSIS — M79.18 MUSCULOSKELETAL PAIN: ICD-10-CM

## 2020-12-17 DIAGNOSIS — M79.89 INFLAMMATION OF SOFT TISSUE: ICD-10-CM

## 2020-12-17 DIAGNOSIS — M79.662 PAIN IN LEFT LOWER LEG: ICD-10-CM

## 2020-12-17 DIAGNOSIS — E03.9 ACQUIRED HYPOTHYROIDISM: ICD-10-CM

## 2020-12-17 PROCEDURE — 99214 OFFICE O/P EST MOD 30 MIN: CPT | Performed by: FAMILY MEDICINE

## 2020-12-17 RX ORDER — LEVOTHYROXINE SODIUM 175 UG/1
175 TABLET ORAL
Qty: 90 TAB | Refills: 0 | Status: SHIPPED | OUTPATIENT
Start: 2020-12-17 | End: 2021-02-04

## 2020-12-17 ASSESSMENT — PAIN SCALES - GENERAL: PAINLEVEL: NO PAIN

## 2020-12-17 NOTE — PROGRESS NOTES
SUBJECTIVE:      Chief Complaint   Patient presents with   • Thyroid Problem       HPI:     Flavia Garrett is a 80 y.o. female with hypothyroidism, hypercholesterolemia, hypertension, hematuria and low vitamin D here for follow-up of lab work.    Hypothyroidism -has been on Synthroid 200 mcg daily.  States that she has been busy and has had a lot to do this month.  No specific side effects of medication she has noticed.  Has tolerated medication well.  Denies any specific palpitations, hair changes or weight loss.  No changes in bowel habits.  Had lab work done at [facility] which shows TSH 0.16 mIU/L (L), free T4 1.5 ng/dL, total T3 131 ng/dL.   Labs 9/2020-was normal TSH on 200 mcg daily of Synthroid.    Vitamin D levels continue to be low.  She is currently on 50,000 IU weekly and 1000 IU daily.  Recent labs show 29 ng/ml (L).  Notes that she does take her vitamin D with food.  States she does like butter.    Prior cholesterol levels with elevated triglycerides.  States she is working on her diet.    Hypertension -stable on losartan 50 mg daily.  Blood pressure has been stable.  Tolerates well.    Body mass index is 38.73 kg/m².  Plans to work on weight loss.   Feels if she over exerts herself might feel a little short of breath, but not with regular activities.  No chest pain or pressure. Has not been doing aerobic activity. Otherwise no new concerns. Has not been as active as she was previously with dancing etc, thus possibly due to deconditioning.     She did have acupuncture therapy for sciatic nerve and neck pain which are improving.    History of left lower leg pain after a prior injury due to inflammation of soft tissue. Had prior imaging with xray of area. Areas .   States she was told in past the excess tissue around ankles were fatty tissue, recently told it may be lymphedema.     She had multiple labs completed with Dr. Naik, has brought in results.     ROS:  Denies any recent fevers or  chills. No nausea or vomiting. No diarrhea. No chest pains. No lower extremity edema.    Current Outpatient Medications on File Prior to Visit   Medication Sig Dispense Refill   • ergocalciferol (DRISDOL) 17208 UNIT capsule ergocalciferol (vitamin D2) 1,250 mcg (50,000 unit) capsule     • clobetasol (TEMOVATE) 0.05 % Cream CLOBETASOL PROPIONATE 0.05 % CREA     • losartan (COZAAR) 50 MG Tab Take 1 Tab by mouth every day. 90 Tab 3   • acetaminophen (TYLENOL) 500 MG Tab Take 500-1,000 mg by mouth every 6 hours as needed.     • diphenhydrAMINE-APAP, sleep, (TYLENOL PM EXTRA STRENGTH PO) Take  by mouth.     • Naproxen Sodium (ALEVE PO) Take  by mouth.     • OIL OF OREGANO PO Take  by mouth.     • MISC NATURAL PRODUCTS PO Take  by mouth.     • loratadine (CLARITIN) 10 MG Tab Take 10 mg by mouth every day.     • Probiotic Product (PROBIOTIC DAILY PO) Take  by mouth.     • amoxicillin (AMOXIL) 500 MG Cap amoxicillin 500 mg capsule     • clotrimazole (LOTRIMIN) 1 % Cream clotrimazole 1 % topical cream     • cyclobenzaprine (FLEXERIL) 5 MG tablet CYCLOBENZAPRINE HCL 5 MG TABS     • hydroCHLOROthiazide (HYDRODIURIL) 12.5 MG tablet HYDROCHLOROTHIAZIDE 12.5 MG TABS     • Calcium-Magnesium-Zinc (CALCIUM-MAGNESUIUM-ZINC PO) Take  by mouth.     • guaiFENesin ER (MUCINEX) 600 MG TABLET SR 12 HR Take 600 mg by mouth every 12 hours.     • Multiple Vitamins-Minerals (PRESERVISION AREDS 2 PO) Take  by mouth.     • CRANBERRY PO Take  by mouth.       No current facility-administered medications on file prior to visit.        Allergies   Allergen Reactions   • Lisinopril      Cramps and upset stomach       Patient Active Problem List    Diagnosis Date Noted   • Sleep apnea in adult 09/28/2020   • Inflammation of soft tissue 09/28/2020   • Hypertriglyceridemia 09/28/2020   • Pain in left lower leg 08/07/2020   • Hematuria 08/07/2020   • Nocturnal oxygen desaturation 08/07/2020   • Recurrent UTI 01/31/2020   • History of UTI 01/31/2020   •  "Jennifer 10/08/2019   • Cystitis 06/12/2019   • Post-menopausal 05/01/2019   • Flu-like symptoms 04/03/2019   • Nasal congestion 04/03/2019   • Cough 04/03/2019   • Vertigo 04/03/2019   • Class 2 obesity in adult 03/13/2019   • Vision loss 08/15/2018   • Venous insufficiency 08/15/2018   • Cold sore 03/29/2018   • Lichen sclerosus of female genitalia 02/05/2018   • Acquired hypothyroidism 06/23/2017   • Chronic left-sided low back pain without sciatica 03/17/2017   • Lichen planus 03/17/2017   • History of breast cancer 03/17/2017   • Gout of foot 09/30/2016   • Idiopathic chronic gout of right ankle 09/30/2016   • Essential hypertension 09/30/2016   • Status post total right knee replacement 08/17/2016   • Dysuria 07/26/2016   • Toe pain, right 07/26/2016   • Cellulitis 07/26/2016   • Abdominal bloating 07/26/2016   • Celiac disease 07/26/2016   • Intertrigo 07/26/2016   • Osteoarthritis of right knee 07/26/2016   • Abnormal results of liver function studies 07/26/2016   • Hearing loss 07/26/2016   • Tinnitus 07/26/2016   • Dyslipidemia 07/26/2016   • H/O total hysterectomy 07/26/2016   • Hypothyroidism 07/26/2016   • Vitamin D deficiency 07/26/2016   • Osteoporosis 07/26/2016   • Hx of breast cancer 07/26/2016   • Lack of energy 05/05/2016   • Status post right knee replacement 05/05/2016       Past Medical History:   Diagnosis Date   • Eczema    • Hx of breast cancer 7/26/2016   • Hypertension    • Hypothyroidism 7/26/2016   • Lichen sclerosus of female genitalia    • Osteopenia 7/26/2016   • Overweight    • Thyroid activity decreased    • 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    • Vitamin D deficiency 7/26/2016         OBJECTIVE:   /76   Pulse 90   Temp 36.7 °C (98 °F)   Resp 12   Ht 1.511 m (4' 11.5\")   Wt 88.5 kg (195 lb)   SpO2 96%   BMI 38.73 kg/m²   General: Well-developed well-nourished female, no acute distress  Neck: supple, no lymphadenopathy- cervical or " supraclavicular, no thyromegaly  Cardiovascular: regular rate and rhythm, no murmurs, gallops, rubs  Lungs: clear to auscultation bilaterally, no wheezes, crackles, or rhonchi  Abdomen: +bowel sounds, soft, nontender, nondistended, no rebound, no guarding, no hepatosplenomegaly  Extremities: no cyanosis, clubbing, edema.  Bilateral lateral ankle areas with benign-appearing excess fatty tissue.  Left ateromedial tibial region with mild tenderness to palpation, no erythema/edema or ecchymosis.  Skin: Warm and dry  Psych: appropriate mood and affect    [facility] labs: See media.    TSH 0.16 mIU/L (L), free T4 1.5 ng/dL, total T3 131 ng/dL.   Vitamin D, 25-OH 29 ng/ml (L)      ASSESSMENT/PLAN:    80 y.o.female with hypothyroidism, hypercholesterolemia, hypertension, hematuria and low vitamin D.    1. Acquired hypothyroidism  TSH WITH REFLEX TO FT4   2. Vitamin D deficiency  VITAMIN D,25 HYDROXY   3. Hypertriglyceridemia     4. Essential hypertension     5. Obesity (BMI 30-39.9)     6. Musculoskeletal pain     7. Pain in left lower leg     8. Inflammation of soft tissue     9. Lipoma of extremity     -Hypothyroidism--slightly low TSH.  Currently without symptoms.    Will reduce to Synthroid 175 mcg daily.  Discussed appropriate time to take medication.  Recheck lab work in 6 weeks.  -Vitamin D deficiency-level still slightly low.  Continue with once weekly vitamin D 50,000 IU and increase to 2000 IU daily.  Discussed appropriate times to take supplement.  Recheck labs in 6 weeks.  -Hypertriglyceridemia-we will plan to recheck labs in future once thyroid labs are in normal range.  Recommend continue with heart healthy diet, low cholesterol, low sugar, and high fiber diet.  Recommend routine exercise.  -Hypertension-stable blood pressure.  Continue current medication.  Heart healthy diet and routine exercise as tolerated.  -Obesity-patient plans to further work on diet and exercise regimen.  Appears slight deconditioning due  to lack of aerobic exercise lately.  Advised gradual return to activities as tolerated.  Follow-up if any concerns or new symptoms occur.  -Musculoskeletal pain- neck/back areas. Improved with acupuncture therapy. Recommend continue acupuncture therapy as needed. Modify activities.   -Pain in left lower leg due to inflammation of soft tissue after prior injury.  Prior x-ray was unremarkable. Overall stable.   -Lipoma of extremity-appears to have fatty tissue surrounding lateral ankle region bilaterally. Follow up as needed if any changes or discomfort of area.     Follow up in 6-8 weeks after lab work complete.       This medical record contains text that has been entered with the assistance of computer voice recognition and dictation software.  Therefore, it may contain unintended errors in text, spelling, punctuation, or grammar.

## 2020-12-18 DIAGNOSIS — E55.9 VITAMIN D DEFICIENCY: ICD-10-CM

## 2020-12-18 DIAGNOSIS — E03.9 ACQUIRED HYPOTHYROIDISM: ICD-10-CM

## 2021-01-11 DIAGNOSIS — Z23 NEED FOR VACCINATION: ICD-10-CM

## 2021-02-04 ENCOUNTER — TELEMEDICINE (OUTPATIENT)
Dept: MEDICAL GROUP | Facility: IMAGING CENTER | Age: 81
End: 2021-02-04
Payer: MEDICARE

## 2021-02-04 VITALS
WEIGHT: 194 LBS | SYSTOLIC BLOOD PRESSURE: 145 MMHG | DIASTOLIC BLOOD PRESSURE: 83 MMHG | BODY MASS INDEX: 39.11 KG/M2 | HEIGHT: 59 IN

## 2021-02-04 DIAGNOSIS — R93.89 ABNORMAL CXR: ICD-10-CM

## 2021-02-04 DIAGNOSIS — R09.82 POSTNASAL DRIP: ICD-10-CM

## 2021-02-04 DIAGNOSIS — G47.33 MODERATE OBSTRUCTIVE SLEEP APNEA: ICD-10-CM

## 2021-02-04 DIAGNOSIS — E55.9 VITAMIN D DEFICIENCY: ICD-10-CM

## 2021-02-04 DIAGNOSIS — R06.02 SOB (SHORTNESS OF BREATH) ON EXERTION: ICD-10-CM

## 2021-02-04 DIAGNOSIS — E03.9 ACQUIRED HYPOTHYROIDISM: ICD-10-CM

## 2021-02-04 PROCEDURE — 99214 OFFICE O/P EST MOD 30 MIN: CPT | Mod: 95,CR | Performed by: FAMILY MEDICINE

## 2021-02-04 RX ORDER — LEVOTHYROXINE SODIUM 0.15 MG/1
150 TABLET ORAL
Qty: 30 TAB | Refills: 2 | Status: SHIPPED | OUTPATIENT
Start: 2021-02-04 | End: 2021-06-23 | Stop reason: CLARIF

## 2021-02-04 RX ORDER — CHLORAL HYDRATE 500 MG
CAPSULE ORAL
COMMUNITY
End: 2023-07-14

## 2021-02-04 ASSESSMENT — PAIN SCALES - GENERAL: PAINLEVEL: NO PAIN

## 2021-02-04 ASSESSMENT — PATIENT HEALTH QUESTIONNAIRE - PHQ9: CLINICAL INTERPRETATION OF PHQ2 SCORE: 0

## 2021-02-04 NOTE — PROGRESS NOTES
Telemedicine: Established Patient   This evaluation was conducted via Zoom using secure and encrypted videoconferencing technology. The patient was in a private location in the state of [place].    The patient's identity was confirmed and verbal consent was obtained for this virtual visit.    Subjective:     Chief Complaint   Patient presents with   • Lab Results   • Referral Needed     cardiology       Flavia Garrett is a 81 y.o. female with hypothyroidism, hypercholesterolemia, hypertension, hematuria and low vitamin D presenting for evaluation and management of: Hypothyroidism.  Labs done at [facility] 1/26/2021    Hypothyroidism- 175 mcg daily at 6 am since about December.  Feels tired.  She does find she is getting out of breath more easily.  Chest x-ray 2019 shows enlarged cardiac silhouette and hyperinflation.  30 years of secondhand smoke exposure from her .  Has been saw Dr. Piper Templeton in the past and that she would like to see him as well.  Has not completed a pulmonary function test.  Has been a while since completing echocardiogram.    No significant chest pain or extremity edema.  Overall she has been less active than she was prior thus possibly some deconditioning.    She also notes that she has postnasal drip mostly at nighttime.  Uses Claritin which helps.  Nasal sinus wash also helps.    States she saw Dr. Templeton, cardiologist in the past.  She would like to see Dr. BAKER in the future if needed.  Home sleep apnea test done 9/2020.  Moderate obstructive sleep apnea noted.  States she has not heard in obtaining a CPAP yet.    Vitamin D levels within low end of normal range.  Labs done at [facility].     ROS  Denies any recent fevers or chills. No nausea or vomiting. No diarrhea. No chest pains or shortness of breath. No lower extremity edema.        Allergies   Allergen Reactions   • Lisinopril      Cramps and upset stomach       Current medicines (including changes today)  Current Outpatient  Medications   Medication Sig Dispense Refill   • Omega-3 1000 MG Cap Take  by mouth.     • levothyroxine (SYNTHROID) 175 MCG Tab Take 1 Tab by mouth Every morning on an empty stomach. 90 Tab 0   • amoxicillin (AMOXIL) 500 MG Cap amoxicillin 500 mg capsule     • ergocalciferol (DRISDOL) 23535 UNIT capsule ergocalciferol (vitamin D2) 1,250 mcg (50,000 unit) capsule     • clotrimazole (LOTRIMIN) 1 % Cream clotrimazole 1 % topical cream     • cyclobenzaprine (FLEXERIL) 5 MG tablet CYCLOBENZAPRINE HCL 5 MG TABS     • clobetasol (TEMOVATE) 0.05 % Cream CLOBETASOL PROPIONATE 0.05 % CREA     • losartan (COZAAR) 50 MG Tab Take 1 Tab by mouth every day. 90 Tab 3   • acetaminophen (TYLENOL) 500 MG Tab Take 500-1,000 mg by mouth every 6 hours as needed.     • diphenhydrAMINE-APAP, sleep, (TYLENOL PM EXTRA STRENGTH PO) Take  by mouth.     • OIL OF OREGANO PO Take  by mouth.     • MISC NATURAL PRODUCTS PO Take  by mouth.     • loratadine (CLARITIN) 10 MG Tab Take 10 mg by mouth every day.     • guaiFENesin ER (MUCINEX) 600 MG TABLET SR 12 HR Take 600 mg by mouth every 12 hours.     • Multiple Vitamins-Minerals (PRESERVISION AREDS 2 PO) Take  by mouth.     • Probiotic Product (PROBIOTIC DAILY PO) Take  by mouth.     • hydroCHLOROthiazide (HYDRODIURIL) 12.5 MG tablet HYDROCHLOROTHIAZIDE 12.5 MG TABS     • Calcium-Magnesium-Zinc (CALCIUM-MAGNESUIUM-ZINC PO) Take  by mouth.     • Naproxen Sodium (ALEVE PO) Take  by mouth.     • CRANBERRY PO Take  by mouth.       No current facility-administered medications for this visit.        Patient Active Problem List    Diagnosis Date Noted   • Sleep apnea in adult 09/28/2020   • Inflammation of soft tissue 09/28/2020   • Hypertriglyceridemia 09/28/2020   • Pain in left lower leg 08/07/2020   • Hematuria 08/07/2020   • Nocturnal oxygen desaturation 08/07/2020   • Recurrent UTI 01/31/2020   • History of UTI 01/31/2020   • Drusen 10/08/2019   • Cystitis 06/12/2019   • Post-menopausal 05/01/2019    • Flu-like symptoms 04/03/2019   • Nasal congestion 04/03/2019   • Cough 04/03/2019   • Vertigo 04/03/2019   • Class 2 obesity in adult 03/13/2019   • Vision loss 08/15/2018   • Venous insufficiency 08/15/2018   • Cold sore 03/29/2018   • Lichen sclerosus of female genitalia 02/05/2018   • Acquired hypothyroidism 06/23/2017   • Chronic left-sided low back pain without sciatica 03/17/2017   • Lichen planus 03/17/2017   • History of breast cancer 03/17/2017   • Gout of foot 09/30/2016   • Idiopathic chronic gout of right ankle 09/30/2016   • Essential hypertension 09/30/2016   • Status post total right knee replacement 08/17/2016   • Dysuria 07/26/2016   • Toe pain, right 07/26/2016   • Cellulitis 07/26/2016   • Abdominal bloating 07/26/2016   • Celiac disease 07/26/2016   • Intertrigo 07/26/2016   • Osteoarthritis of right knee 07/26/2016   • Abnormal results of liver function studies 07/26/2016   • Hearing loss 07/26/2016   • Tinnitus 07/26/2016   • Dyslipidemia 07/26/2016   • H/O total hysterectomy 07/26/2016   • Hypothyroidism 07/26/2016   • Vitamin D deficiency 07/26/2016   • Osteoporosis 07/26/2016   • Hx of breast cancer 07/26/2016   • Lack of energy 05/05/2016   • Status post right knee replacement 05/05/2016       Family History   Problem Relation Age of Onset   • Diabetes Other         GF   • Arthritis Other         GM   • Arthritis Mother    • Other Son         CELIAC DISEASE-SEVERE       She  has a past medical history of Eczema, breast cancer (7/26/2016), Hypertension, Hypothyroidism (7/26/2016), Lichen sclerosus of female genitalia, Osteopenia (7/26/2016), Overweight, Thyroid activity decreased, 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  She  has a past surgical history that includes vaginal hysterectomy total and other.       Objective:   /83 (BP Location: Other (Comment)) Comment (BP Location): wrist pt reported  Ht 1.499 m  "(4' 11\")   Wt 88 kg (194 lb)   BMI 39.18 kg/m²     Physical Exam  Constitutional: Alert, no distress, well-groomed.  Skin: No rashes in visible areas.  Eye: Round. Conjunctiva clear, lids normal. No icterus.   ENMT: Lips pink without lesions, good dentition, moist mucous membranes. Phonation normal.  Neck: No masses, no thyromegaly. Moves freely without pain.  CV: Pulse as reported by patient  Respiratory: Unlabored respiratory effort, no cough or audible wheeze  Psych: Alert and oriented x3, normal affect and mood.     6/2019- cxr.   FINDINGS:  The lungs are clear.   Lungs are probably hyperinflated suggesting chronic obstructive pulmonary disease.     The cardiac silhouette: enlarged.     Right breast or axillary surgical clip are present.     Pleura: There are no pleural effusion or pneumothoraces.     Osseous structures: No significant bony abnormality is present.     IMPRESSION:     No acute cardiopulmonary abnormality identified.      TSH 0.22-see media [facility] labs.  Vitamin D 33.    Assessment and Plan:   The following treatment plan was discussed:     This is a 81 y.o. female with hypothyroidism, hypercholesterolemia, vitamin D deficiency, moderate sleep apnea and hypertension.      1. Acquired hypothyroidism  TSH WITH REFLEX TO FT4   2. SOB (shortness of breath) on exertion  DX-CHEST-2 VIEWS    EC-ECHOCARDIOGRAM COMPLETE W/O CONT    PULMONARY FUNCTION TESTS -Test requested: Complete Pulmonary Function Test    DX-CHEST-2 VIEWS    REFERRAL TO PULMONARY AND SLEEP MEDICINE    CBC WITH DIFFERENTIAL    proBrain Natriuretic Peptide, NT    Comp Metabolic Panel   3. Abnormal CXR  EC-ECHOCARDIOGRAM COMPLETE W/O CONT    PULMONARY FUNCTION TESTS -Test requested: Complete Pulmonary Function Test    DX-CHEST-2 VIEWS    REFERRAL TO PULMONARY AND SLEEP MEDICINE   4. Moderate obstructive sleep apnea     5. Postnasal drip     6. Vitamin D deficiency     -Hypothyroidism-recent TSH slightly decreased.  Patient notes some " fatigue symptoms.  Will lower levothyroxine to 150 mcg daily.  Recheck in 6 weeks.  She plans to work with acupuncture therapy as well.  -Shortness of breath on exertion-prior chest x-ray in 2019 shows enlarged cardiac silhouette and hyperinflated lungs.  May be multifactorial but will need further evaluation.  Assess further with lab work, PFT and echocardiogram.  Referral per request of Dr. Piper Templeton.  -Abnormal chest x-ray-as above.  -Moderate obstructive sleep apnea-had home sleep apnea test.  Will contact company for sleep apnea monitor.  Follow-up with pulmonary.  -Postnasal drip-counseled on avoidance of allergens or triggers.  She may continue on Claritin at this time.  -Vitamin D recent labs stable.  Continue current supplement therapy.    Follow-up: Return in about 6 weeks (around 3/18/2021).         This medical record contains text that has been entered with the assistance of computer voice recognition and dictation software.  Therefore, it may contain unintended errors in text, spelling, punctuation, or grammar.

## 2021-03-10 ENCOUNTER — OFFICE VISIT (OUTPATIENT)
Dept: SLEEP MEDICINE | Facility: MEDICAL CENTER | Age: 81
End: 2021-03-10
Payer: MEDICARE

## 2021-03-10 ENCOUNTER — NON-PROVIDER VISIT (OUTPATIENT)
Dept: SLEEP MEDICINE | Facility: MEDICAL CENTER | Age: 81
End: 2021-03-10
Payer: MEDICARE

## 2021-03-10 VITALS
RESPIRATION RATE: 16 BRPM | SYSTOLIC BLOOD PRESSURE: 126 MMHG | HEIGHT: 60 IN | OXYGEN SATURATION: 98 % | HEART RATE: 72 BPM | TEMPERATURE: 98 F | BODY MASS INDEX: 38.68 KG/M2 | WEIGHT: 197 LBS | DIASTOLIC BLOOD PRESSURE: 80 MMHG

## 2021-03-10 VITALS — HEIGHT: 60 IN | WEIGHT: 197 LBS | BODY MASS INDEX: 38.68 KG/M2

## 2021-03-10 DIAGNOSIS — R93.89 ABNORMAL CXR: ICD-10-CM

## 2021-03-10 DIAGNOSIS — Z85.3 HX OF BREAST CANCER: ICD-10-CM

## 2021-03-10 DIAGNOSIS — R09.81 NASAL CONGESTION: ICD-10-CM

## 2021-03-10 DIAGNOSIS — G47.34 NOCTURNAL OXYGEN DESATURATION: ICD-10-CM

## 2021-03-10 DIAGNOSIS — G47.30 SLEEP APNEA IN ADULT: ICD-10-CM

## 2021-03-10 DIAGNOSIS — R05.9 COUGH: ICD-10-CM

## 2021-03-10 DIAGNOSIS — R06.02 SOB (SHORTNESS OF BREATH) ON EXERTION: ICD-10-CM

## 2021-03-10 PROCEDURE — 94761 N-INVAS EAR/PLS OXIMETRY MLT: CPT | Performed by: INTERNAL MEDICINE

## 2021-03-10 PROCEDURE — 94060 EVALUATION OF WHEEZING: CPT | Performed by: INTERNAL MEDICINE

## 2021-03-10 PROCEDURE — 99204 OFFICE O/P NEW MOD 45 MIN: CPT | Mod: 25 | Performed by: INTERNAL MEDICINE

## 2021-03-10 PROCEDURE — 94729 DIFFUSING CAPACITY: CPT | Performed by: INTERNAL MEDICINE

## 2021-03-10 PROCEDURE — 94726 PLETHYSMOGRAPHY LUNG VOLUMES: CPT | Performed by: INTERNAL MEDICINE

## 2021-03-10 ASSESSMENT — PULMONARY FUNCTION TESTS
FEV1_PERCENT_PREDICTED: 116
FEV1_PERCENT_CHANGE: 2
FVC_PERCENT_PREDICTED: 106
FVC: 2.32
FEV1/FVC_PREDICTED: 78
FEV1: 1.95
FEV1/FVC_PERCENT_PREDICTED: 108
FVC: 2.3
FEV1/FVC: 84
FEV1_LLN: 1.40
FEV1/FVC_PERCENT_PREDICTED: 109
FEV1/FVC_PERCENT_PREDICTED: 77
FEV1/FVC: 83
FVC_LLN: 1.82
FEV1_PREDICTED: 1.68
FEV1/FVC: 83
FEV1/FVC: 84.05
FEV1/FVC_PERCENT_LLN: 65
FEV1/FVC_PERCENT_CHANGE: 1
FEV1: 1.91
FVC_PREDICTED: 2.18
FVC_PERCENT_PREDICTED: 105
FEV1/FVC_PERCENT_PREDICTED: 106
FEV1_PERCENT_CHANGE: 0
FEV1_PERCENT_PREDICTED: 113
FEV1/FVC_PERCENT_PREDICTED: 108

## 2021-03-10 ASSESSMENT — PAIN SCALES - GENERAL: PAINLEVEL: NO PAIN

## 2021-03-10 NOTE — PROCEDURES
Multi-Ox Readings  Multi Ox #1 Room air   O2 sat % at rest 97   O2 sat % on exertion 90   O2 sat average on exertion 94   Multi Ox #2     O2 sat % at rest     O2 sat % on exertion     O2 sat average on exertion       Oxygen Use     Oxygen Frequency     Duration of need     Is the patient mobile within the home?     CPAP Use?     BIPAP Use?     Servo Titration

## 2021-03-10 NOTE — PROGRESS NOTES
Flavia Garrett is a 81 y.o. female here for shortness of breath and dyspnea on exertion with results of lung function testing. Patient was referred by primary care.    History of Present Illness: As follows  Flavia comes in today for evaluation of shortness of breath and dyspnea on exertion.  She is the  for a patient of ours, Mr. Joseph and we were able to help him on the side with her visit today restored his CPAP and nighttime oxygen.    From her standpoint her shortness of breath and Doeexertion are subtle, I strongly suspect this is deconditioning at age 81 but she is quite alert and vigorous likes to work out and her oxygen saturations today are very good.  In addition we did lung function testing today and it looks remarkably good, the only minor abnormality is reduced expiratory reserve volume at 41% which reflects her elevated body mass index at 39, I think this accounts for her shortness of breath and dyspnea on exertion.  We will measure her walking oxygen level to make certain she does not desaturate.    She does have an x-ray pending next week of her chest, also laboratory studies to make sure she is not anemic ordered by Dr. Pacheco and those are pending as well and then also an EKG is pending.  We will do the multiple oximetry, await the x-ray the laboratory studies and help her  with his sleep equipment needs as well, see separate dictation and entries in his chart.  Constitutional ROS: No unexpected change in weight, No unexplained fevers  Eyes: No change in vision or blurring or double vision  Mouth/Throat ROS: No sore throat, No recent change in voice or hoarseness  Pulmonary ROS: See present history for pertinent positives  Cardiovascular ROS: No chest pain to suggest acute coronary syndrome  Gastrointestinal ROS: No abdominal pain to suggest peptic disease  Musculoskeletal/Extremities ROS: no acute artritis or unusual swelling  Hematologic/Lymphatic ROS: No easy  bleeding or unusual lymph node swelling  Neurologic ROS: No new or unusual weakness  Psychiatric ROS: No hallucinations  Allergic/Immunologic: No  urticaria or allergic rash      Current Outpatient Medications   Medication Sig Dispense Refill   • Omega-3 1000 MG Cap Take  by mouth.     • levothyroxine (SYNTHROID) 150 MCG Tab Take 1 Tab by mouth Every morning on an empty stomach. 30 Tab 2   • amoxicillin (AMOXIL) 500 MG Cap amoxicillin 500 mg capsule     • ergocalciferol (DRISDOL) 86897 UNIT capsule ergocalciferol (vitamin D2) 1,250 mcg (50,000 unit) capsule     • clotrimazole (LOTRIMIN) 1 % Cream clotrimazole 1 % topical cream     • cyclobenzaprine (FLEXERIL) 5 MG tablet CYCLOBENZAPRINE HCL 5 MG TABS     • hydroCHLOROthiazide (HYDRODIURIL) 12.5 MG tablet HYDROCHLOROTHIAZIDE 12.5 MG TABS     • clobetasol (TEMOVATE) 0.05 % Cream CLOBETASOL PROPIONATE 0.05 % CREA     • losartan (COZAAR) 50 MG Tab Take 1 Tab by mouth every day. 90 Tab 3   • Calcium-Magnesium-Zinc (CALCIUM-MAGNESUIUM-ZINC PO) Take  by mouth.     • acetaminophen (TYLENOL) 500 MG Tab Take 500-1,000 mg by mouth every 6 hours as needed.     • diphenhydrAMINE-APAP, sleep, (TYLENOL PM EXTRA STRENGTH PO) Take  by mouth.     • Naproxen Sodium (ALEVE PO) Take  by mouth.     • OIL OF OREGANO PO Take  by mouth.     • MISC NATURAL PRODUCTS PO Take  by mouth.     • loratadine (CLARITIN) 10 MG Tab Take 10 mg by mouth every day.     • guaiFENesin ER (MUCINEX) 600 MG TABLET SR 12 HR Take 600 mg by mouth every 12 hours.     • Multiple Vitamins-Minerals (PRESERVISION AREDS 2 PO) Take  by mouth.     • CRANBERRY PO Take  by mouth.     • Probiotic Product (PROBIOTIC DAILY PO) Take  by mouth.       No current facility-administered medications for this visit.       Social History     Tobacco Use   • Smoking status: Never Smoker   • Smokeless tobacco: Never Used   Substance Use Topics   • Alcohol use: Not Currently     Alcohol/week: 0.0 oz     Comment: min   • Drug use: No     "    Past Medical History:   Diagnosis Date   • Back pain    • Breast cancer (HCC)    • Chickenpox    • Constipation    • Cough    • Diarrhea    • Eczema    • Fatigue    • Frequent urination    • Hx of breast cancer 7/26/2016   • Hypertension    • Hypothyroidism 7/26/2016   • Influenza    • Lichen sclerosus of female genitalia    • Nasal drainage    • Obesity    • Osteopenia 7/26/2016   • Osteoporosis    • Overweight    • Rhinitis    • Ringing in ears    • Shortness of breath    • Sputum production    • Swelling of lower extremity    • Thyroid activity decreased    • Tonsillitis    • 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    • Vision loss    • Vitamin D deficiency 7/26/2016   • Whooping cough        Past Surgical History:   Procedure Laterality Date   • ARTHROSCOPY, KNEE     • HYSTERECTOMY LAPAROSCOPY     • OTHER      LIPOSUCTION THIGHS-LEG LIFT   • PB REMV 2ND CATARACT,CORN-SCLER SECTN     • VAGINAL HYSTERECTOMY TOTAL      Hysterectomy,Total Vaginal       Allergies: Lisinopril    Family History   Problem Relation Age of Onset   • Diabetes Other         GF   • Arthritis Other         GM   • Arthritis Mother    • Other Son         CELIAC DISEASE-SEVERE       Physical Examination    Vitals:    03/10/21 0915   Height: 1.511 m (4' 11.5\")   Weight: 89.4 kg (197 lb)   Weight % change since last entry.: 0 %   BP: 126/80   Pulse: 72   BMI (Calculated): 39.12   Resp: 16   Temp: 36.7 °C (98 °F)   TempSrc: Temporal       General Appearance: alert, no distress  Skin: Skin color, texture, turgor normal. No rashes or lesions.  Eyes: negative  Oropharynx: Lips, mucosa, and tongue normal. Teeth and gums normal. Oropharynx moist and without lesion  Lungs: positive findings: Remarkably quiet and clear  Heart: negative. RRR without murmur, gallop, or rubs.  No ectopy.  Abdomen: Abdomen soft, non-tender. . No masses,  No organomegaly  Extremities:  No deformities, edema, or skin discoloration  Joints: " No acute arthritis  Peripheral Pulses:perfused  Neurologic: intact grossly  No clubbing or cyanosis    Imaging: X-ray next week pending    PFTS: Remarkably normal lung function except for reduced expiratory reserve volume      Assessment and Plan  1. SOB (shortness of breath) on exertion  Excellent lung function, measure multiple oximetry and await chest x-ray and then reassess after cardiology appointment and labs available  - PULMONARY FUNCTION TESTS -Test requested: Complete Pulmonary Function Test    2. Abnormal CXR  Repeat next week ordered and pending  - PULMONARY FUNCTION TESTS -Test requested: Complete Pulmonary Function Test    3. Nocturnal oxygen desaturation  Noted    4. Sleep apnea in adult  Presumptive diagnosis    5. Cough  Minor noted reactive airways    6. Nasal congestion      7. Hx of breast cancer      Multiple oximetry, home sleep study  Followup Return in about 2 months (around 5/10/2021) for follow up visit with Dr. Piper Templeton.

## 2021-03-10 NOTE — PROCEDURES
Tech: Rhonda Manzo, RRT, CPFT  Tech notes: Good patient effort & cooperation.  The results of this test meet the ATS/ERS standards for acceptability & reproducibility.  Test was performed on the Uberseq Body Plethysmograph-Elite DX system.  Predicted values were GLI-2012 for spirometry, GLI- 2017 for DLCO, ITS for Lung Volumes.  The DLCO was uncorrected for Hgb.  A bronchodilator of Ventolin HFA -2puffs via spacer administered.  DLCO performed during dilation period.    Interpretation:    Lung function testing completed on March 10, 2021 showed normal lung capacity, total is 106% predicted.  Mild reduction of ERV at 41% reflects elevated body mass index.  Spirometry is completely normal, ratio is normal and no change after bronchodilators.  FEV1 1.9 L, 113% predicted.  Oxygen transfer was normal.  Flow volume loop looks normal as well with excellent patient effort

## 2021-03-10 NOTE — PATIENT INSTRUCTIONS
Flavia comes in today for evaluation of shortness of breath and dyspnea on exertion.  She is the  for a patient of ours, Mr. Joseph and we were able to help him on the side with her visit today restored his CPAP and nighttime oxygen.    From her standpoint her shortness of breath and Doeexertion are subtle, I strongly suspect this is deconditioning at age 81 but she is quite alert and vigorous likes to work out and her oxygen saturations today are very good.  In addition we did lung function testing today and it looks remarkably good, the only minor abnormality is reduced expiratory reserve volume at 41% which reflects her elevated body mass index at 39, I think this accounts for her shortness of breath and dyspnea on exertion.  We will measure her walking oxygen level to make certain she does not desaturate.    She does have an x-ray pending next week of her chest, also laboratory studies to make sure she is not anemic ordered by Dr. Pacheco and those are pending as well and then also an EKG is pending.  We will do the multiple oximetry, await the x-ray the laboratory studies and help her  with his sleep equipment needs as well, see separate dictation and entries in his chart.

## 2021-03-18 ENCOUNTER — HOSPITAL ENCOUNTER (OUTPATIENT)
Dept: RADIOLOGY | Facility: MEDICAL CENTER | Age: 81
End: 2021-03-18
Attending: FAMILY MEDICINE
Payer: MEDICARE

## 2021-03-18 ENCOUNTER — HOSPITAL ENCOUNTER (OUTPATIENT)
Dept: CARDIOLOGY | Facility: MEDICAL CENTER | Age: 81
End: 2021-03-18
Attending: FAMILY MEDICINE
Payer: MEDICARE

## 2021-03-18 DIAGNOSIS — R93.89 ABNORMAL CXR: ICD-10-CM

## 2021-03-18 DIAGNOSIS — R06.02 SOB (SHORTNESS OF BREATH) ON EXERTION: ICD-10-CM

## 2021-03-18 LAB
LV EJECT FRACT  99904: 65
LV EJECT FRACT MOD 2C 99903: 50.61
LV EJECT FRACT MOD 4C 99902: 58.45
LV EJECT FRACT MOD BP 99901: 53.87

## 2021-03-18 PROCEDURE — 71046 X-RAY EXAM CHEST 2 VIEWS: CPT

## 2021-03-18 PROCEDURE — 93306 TTE W/DOPPLER COMPLETE: CPT | Mod: 26 | Performed by: INTERNAL MEDICINE

## 2021-03-18 PROCEDURE — 93306 TTE W/DOPPLER COMPLETE: CPT

## 2021-03-19 ENCOUNTER — HOME STUDY (OUTPATIENT)
Dept: SLEEP MEDICINE | Facility: MEDICAL CENTER | Age: 81
End: 2021-03-19
Payer: MEDICARE

## 2021-03-19 PROCEDURE — 94762 N-INVAS EAR/PLS OXIMTRY CONT: CPT | Performed by: INTERNAL MEDICINE

## 2021-03-24 ENCOUNTER — TELEPHONE (OUTPATIENT)
Dept: MEDICAL GROUP | Facility: IMAGING CENTER | Age: 81
End: 2021-03-24

## 2021-03-24 NOTE — PROCEDURES
Interpretation;  This is an overnight oximetry study performed on March 21, 2021 for duration of 8 hours and 11 minutes on room air.  Basal SPO2 is 91.4%.  Total time spent below 88% was 8 minutes and occurred in 1 discrete episode around 5:30 AM around the time it appears the patient wakes up.  Otherwise patient maintains SPO2 right around 90% with no clustered desaturations to suggest sleep disordered breathing.

## 2021-03-24 NOTE — TELEPHONE ENCOUNTER
----- Message from Carmen Pacheco M.D. sent at 3/19/2021  8:06 AM PDT -----  Please let pt know cxr showed right side diaphragm slightly elevate, otherwise normal xray.

## 2021-04-09 ENCOUNTER — OFFICE VISIT (OUTPATIENT)
Dept: MEDICAL GROUP | Facility: IMAGING CENTER | Age: 81
End: 2021-04-09
Payer: MEDICARE

## 2021-04-09 ENCOUNTER — TELEPHONE (OUTPATIENT)
Dept: MEDICAL GROUP | Facility: IMAGING CENTER | Age: 81
End: 2021-04-09

## 2021-04-09 VITALS
BODY MASS INDEX: 38.87 KG/M2 | WEIGHT: 198 LBS | HEIGHT: 60 IN | RESPIRATION RATE: 12 BRPM | HEART RATE: 86 BPM | DIASTOLIC BLOOD PRESSURE: 78 MMHG | OXYGEN SATURATION: 96 % | TEMPERATURE: 98.2 F | SYSTOLIC BLOOD PRESSURE: 132 MMHG

## 2021-04-09 DIAGNOSIS — R73.01 ELEVATED FASTING GLUCOSE: ICD-10-CM

## 2021-04-09 DIAGNOSIS — E03.9 ACQUIRED HYPOTHYROIDISM: ICD-10-CM

## 2021-04-09 DIAGNOSIS — R09.89 THROAT CLEARING: ICD-10-CM

## 2021-04-09 DIAGNOSIS — R74.01 ELEVATED ALT MEASUREMENT: ICD-10-CM

## 2021-04-09 DIAGNOSIS — R31.9 HEMATURIA, UNSPECIFIED TYPE: ICD-10-CM

## 2021-04-09 DIAGNOSIS — E66.9 OBESITY (BMI 30-39.9): ICD-10-CM

## 2021-04-09 DIAGNOSIS — Z71.9 HEALTH EDUCATION/COUNSELING: ICD-10-CM

## 2021-04-09 DIAGNOSIS — R06.02 SOB (SHORTNESS OF BREATH) ON EXERTION: ICD-10-CM

## 2021-04-09 DIAGNOSIS — R12 HEART BURN: ICD-10-CM

## 2021-04-09 DIAGNOSIS — I10 ESSENTIAL HYPERTENSION: ICD-10-CM

## 2021-04-09 DIAGNOSIS — R14.0 ABDOMINAL BLOATING: ICD-10-CM

## 2021-04-09 DIAGNOSIS — M89.8X9 BONY PROMINENCE: ICD-10-CM

## 2021-04-09 LAB
APPEARANCE UR: CLEAR
BILIRUB UR STRIP-MCNC: NORMAL MG/DL
COLOR UR AUTO: YELLOW
GLUCOSE UR STRIP.AUTO-MCNC: NORMAL MG/DL
KETONES UR STRIP.AUTO-MCNC: NORMAL MG/DL
LEUKOCYTE ESTERASE UR QL STRIP.AUTO: NORMAL
NITRITE UR QL STRIP.AUTO: NORMAL
PH UR STRIP.AUTO: 7 [PH] (ref 5–8)
PROT UR QL STRIP: NORMAL MG/DL
RBC UR QL AUTO: NORMAL
SP GR UR STRIP.AUTO: 1.02
UROBILINOGEN UR STRIP-MCNC: 0.2 MG/DL

## 2021-04-09 PROCEDURE — 99214 OFFICE O/P EST MOD 30 MIN: CPT | Performed by: FAMILY MEDICINE

## 2021-04-09 PROCEDURE — 81002 URINALYSIS NONAUTO W/O SCOPE: CPT | Performed by: FAMILY MEDICINE

## 2021-04-09 RX ORDER — FAMOTIDINE 20 MG/1
20 TABLET, FILM COATED ORAL 2 TIMES DAILY
Qty: 60 TABLET | Refills: 0 | Status: SHIPPED | OUTPATIENT
Start: 2021-04-09 | End: 2021-05-05

## 2021-04-09 NOTE — PROGRESS NOTES
SUBJECTIVE:    Chief Complaint   Patient presents with   • Blood Pressure Problem     elevated above normal in monday   • Itching     on chest        HPI:     Flavia Garrett is a 81 y.o. female with hypothyroidism, hypercholesterolemia, hypertension, hematuria and low vitamin D here for follow-up of hypothyroidism and lab work.    Hypothyroidism-recent lab work done at [facility] shows TSH at 2.67.  Currently stable.  She has been on levothyroxine 150 mcg daily.    Hypertension- 130-140s/70-80s.  She is on losartan 50 mg daily.  Notes about it 10-20 point increase with her wrist cuff.  She plans to look into some weight loss plans.  Body mass index is 39.32 kg/m².    Shortness of breath on exertion-had prior evaluation and saw pulmonary; however, states that her pulmonologist has since left the practice.  Did not feel she had sleep breathing disorder.  Has had home sleep test evaluation which noted some sleep apnea.  Has a chest x-ray and pulmonary function tests.  Recent proBNP normal range.  Echocardiogram 3/2021.  Normal EF.    She does note some mucus in her throat and throat clearing but does not feel it is sinus related.  She does have throat clearing.  Denies significant reflux but does note occasional heartburn.  Unclear of having history of hiatal hernia.  History of EGD per patient.  Note she gets /bloating.  Patient has a history of hysterectomy.    Right medial clavicular region has noticed a bump of the area for past month.  No pain.    Has had prior hematuria-saw urology, Dr. Cramer previously.  States she has not followed up.  Notes had a difficult experience previously with having a scope as her urethra was retracted from the consultation to the procedure.  Notes she had fallen in between, clear if that could have been associated.  Denies any current dysuria or gross hematuria.    Patient states she does not eat much meat.  Slightly elevated glucose on labs at 101.  A LT minimally elevated at  34.  Remaining CBC and chemistry results in normal range.    She is requesting labs for iron, TIBC, ferritin, folate, vitamin B12 and methylmalonic acid as her son is requesting these.  She does eat a more vegetarian type diet.  Does not eat much meat.  Takes biotin for her nails.    She would also like to review POLST form.    ROS:  Denies any recent fevers or chills. No nausea or vomiting. No diarrhea. No chest pains or shortness of breath. No lower extremity edema.    Current Outpatient Medications on File Prior to Visit   Medication Sig Dispense Refill   • Omega-3 1000 MG Cap Take  by mouth.     • levothyroxine (SYNTHROID) 150 MCG Tab Take 1 Tab by mouth Every morning on an empty stomach. 30 Tab 2   • cyclobenzaprine (FLEXERIL) 5 MG tablet CYCLOBENZAPRINE HCL 5 MG TABS     • clobetasol (TEMOVATE) 0.05 % Cream CLOBETASOL PROPIONATE 0.05 % CREA     • losartan (COZAAR) 50 MG Tab Take 1 Tab by mouth every day. 90 Tab 3   • acetaminophen (TYLENOL) 500 MG Tab Take 500-1,000 mg by mouth every 6 hours as needed.     • diphenhydrAMINE-APAP, sleep, (TYLENOL PM EXTRA STRENGTH PO) Take  by mouth.     • Naproxen Sodium (ALEVE PO) Take  by mouth.     • OIL OF OREGANO PO Take  by mouth.     • MISC NATURAL PRODUCTS PO Take  by mouth.     • loratadine (CLARITIN) 10 MG Tab Take 10 mg by mouth every day.     • guaiFENesin ER (MUCINEX) 600 MG TABLET SR 12 HR Take 600 mg by mouth every 12 hours.     • Multiple Vitamins-Minerals (PRESERVISION AREDS 2 PO) Take  by mouth.     • Probiotic Product (PROBIOTIC DAILY PO) Take  by mouth.     • ergocalciferol (DRISDOL) 39231 UNIT capsule ergocalciferol (vitamin D2) 1,250 mcg (50,000 unit) capsule     • clotrimazole (LOTRIMIN) 1 % Cream clotrimazole 1 % topical cream       No current facility-administered medications on file prior to visit.       Allergies   Allergen Reactions   • Lisinopril      Cramps and upset stomach       Patient Active Problem List    Diagnosis Date Noted   • Sleep apnea  in adult 09/28/2020   • Inflammation of soft tissue 09/28/2020   • Hypertriglyceridemia 09/28/2020   • Pain in left lower leg 08/07/2020   • Hematuria 08/07/2020   • Nocturnal oxygen desaturation 08/07/2020   • Recurrent UTI 01/31/2020   • History of UTI 01/31/2020   • Drusen 10/08/2019   • Cystitis 06/12/2019   • Post-menopausal 05/01/2019   • Flu-like symptoms 04/03/2019   • Nasal congestion 04/03/2019   • Cough 04/03/2019   • Vertigo 04/03/2019   • Class 2 obesity in adult 03/13/2019   • Vision loss 08/15/2018   • Venous insufficiency 08/15/2018   • Cold sore 03/29/2018   • Lichen sclerosus of female genitalia 02/05/2018   • Acquired hypothyroidism 06/23/2017   • Chronic left-sided low back pain without sciatica 03/17/2017   • Lichen planus 03/17/2017   • History of breast cancer 03/17/2017   • Gout of foot 09/30/2016   • Idiopathic chronic gout of right ankle 09/30/2016   • Essential hypertension 09/30/2016   • Status post total right knee replacement 08/17/2016   • Dysuria 07/26/2016   • Toe pain, right 07/26/2016   • Cellulitis 07/26/2016   • Abdominal bloating 07/26/2016   • Celiac disease 07/26/2016   • Intertrigo 07/26/2016   • Osteoarthritis of right knee 07/26/2016   • Abnormal results of liver function studies 07/26/2016   • Hearing loss 07/26/2016   • Tinnitus 07/26/2016   • Dyslipidemia 07/26/2016   • H/O total hysterectomy 07/26/2016   • Hypothyroidism 07/26/2016   • Vitamin D deficiency 07/26/2016   • Osteoporosis 07/26/2016   • Hx of breast cancer 07/26/2016   • Lack of energy 05/05/2016   • Status post right knee replacement 05/05/2016       Past Medical History:   Diagnosis Date   • Back pain    • Breast cancer (HCC)    • Chickenpox    • Constipation    • Cough    • Diarrhea    • Eczema    • Fatigue    • Frequent urination    • Hx of breast cancer 7/26/2016   • Hypertension    • Hypothyroidism 7/26/2016   • Influenza    • Lichen sclerosus of female genitalia    • Nasal drainage    • Obesity    •  "Osteopenia 7/26/2016   • Osteoporosis    • Overweight    • Rhinitis    • Ringing in ears    • Shortness of breath    • Sputum production    • Swelling of lower extremity    • Thyroid activity decreased    • Tonsillitis    • 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    • Vision loss    • Vitamin D deficiency 7/26/2016   • Whooping cough          OBJECTIVE:   /78 (BP Location: Left arm, Patient Position: Sitting, BP Cuff Size: Large adult)   Pulse 86   Temp 36.8 °C (98.2 °F)   Resp 12   Ht 1.511 m (4' 11.5\")   Wt 89.8 kg (198 lb)   SpO2 96%   BMI 39.32 kg/m²   General: Well-developed well-nourished female, no acute distress  Neck: supple, no lymphadenopathy- cervical or supraclavicular, no thyromegaly  Chest wall: Right side medial clavicular region at sternoclavicular joint with bony prominence, nontender to palpation.  Cardiovascular: regular rate and rhythm, no murmurs, gallops, rubs  Lungs: clear to auscultation bilaterally, no wheezes, crackles, or rhonchi  Abdomen: +bowel sounds, soft, nontender, nondistended, no rebound, no guarding, no hepatosplenomegaly  Extremities: no cyanosis, clubbing, edema  Skin: Warm and dry  Psych: appropriate mood and affect    Urine dip: Report once complete.  TSH at 2.67, ALT 34.  CBC and remainder of CMP otherwise normal range.      ASSESSMENT/PLAN:    81 y.o. female with hypothyroidism, hypercholesterolemia, hypertension, hematuria and low vitamin D.      1. Acquired hypothyroidism     2. Essential hypertension     3. SOB (shortness of breath) on exertion  Cardiac Stress Test Treadmill Only   4. Throat clearing     5. Heart burn  famotidine (PEPCID) 20 MG Tab   6. Abdominal bloating     7. Bony prominence  DX-CLAVICLE RIGHT   8. Hematuria, unspecified type  POCT Urinalysis   9. Elevated fasting glucose     10. Elevated ALT measurement     11. Obesity (BMI 30-39.9)     12. Health education/counseling     -Hypothyroidism-stable.  Recent " TSH within normal range.  She will continue on levothyroxine 150 mcg daily.  -Hypertension-blood pressure appears stable today.  Continue on losartan 50 mg daily.  Heart healthy diet and routine exercise.  Recommend continue working on weight loss.  -Shortness of breath on exertion-current work-up has been otherwise unremarkable.  Discussed possible contributing factors due to weight and may consider further evaluation for hiatal hernia.  Patient to complete cardiac stress test.  Patient should otherwise follow-up with pulmonary.  -Throat clearing/heartburn-but may be associated.  Modify diet.  Will give trial of Pepcid.  -Abdominal bloating-trial of Pepcid as above.  Continue to modify diet.  -Bony prominence on right medial clavicle navicular region.  Obtain x-ray.  -Hematuria-previously saw urology but has not followed up.  Will check urine.  Encourage patient to follow-up with urology.  -Minimally elevated fasting glucose-recommend low sugar diet and routine exercise.  Work on weight loss.  -Minimally elevated ALT-monitor with repeat labs in future.  -Obesity-recommend weight loss with dietary changes and lifestyle changes.  Recommend routine exercise.  -Health education/counseling-counseled on above recommendations.  Also discussed POLST form which she will review after discussion today. We will plan to further discuss at next visit as well.  Advised patient will review her chart prior to ordering requested labs.    Follow-up in 1 month.      This medical record contains text that has been entered with the assistance of computer voice recognition and dictation software.  Therefore, it may contain unintended errors in text, spelling, punctuation, or grammar.

## 2021-04-09 NOTE — TELEPHONE ENCOUNTER
Contacted [employer] at [phone], spoke to Dasha, requested all recent lab results to be faxed to [phone].

## 2021-04-15 ENCOUNTER — TELEPHONE (OUTPATIENT)
Dept: MEDICAL GROUP | Facility: IMAGING CENTER | Age: 81
End: 2021-04-15

## 2021-04-15 NOTE — TELEPHONE ENCOUNTER
Feeling head rush within an hour of taking famotidine. Also feeling malaise. Had a little bit of nausea, no vomiting.  Started taking famotidine on Monday and hasn't let up.  Bloating has improved though. Pt felt worse yesterday, but also had some dental work that might have made her symptoms worse. Feeling better today. Pt reports she has prevacid at home that did not cause the head rush feeling. Please advise.

## 2021-04-16 NOTE — TELEPHONE ENCOUNTER
Spoke with patient. Pt reports she is feeling much better today. She would like to continue the famotidine for now since it is helping her stomach. Will notify us of any changes.

## 2021-05-05 ENCOUNTER — HOSPITAL ENCOUNTER (OUTPATIENT)
Dept: RADIOLOGY | Facility: MEDICAL CENTER | Age: 81
End: 2021-05-05
Attending: FAMILY MEDICINE
Payer: MEDICARE

## 2021-05-05 DIAGNOSIS — M89.8X9 BONY PROMINENCE: ICD-10-CM

## 2021-05-05 PROCEDURE — 73000 X-RAY EXAM OF COLLAR BONE: CPT | Mod: RT

## 2021-05-14 ENCOUNTER — OFFICE VISIT (OUTPATIENT)
Dept: MEDICAL GROUP | Facility: IMAGING CENTER | Age: 81
End: 2021-05-14
Payer: MEDICARE

## 2021-05-14 VITALS
WEIGHT: 191 LBS | TEMPERATURE: 97.1 F | DIASTOLIC BLOOD PRESSURE: 84 MMHG | BODY MASS INDEX: 37.5 KG/M2 | HEART RATE: 85 BPM | SYSTOLIC BLOOD PRESSURE: 128 MMHG | OXYGEN SATURATION: 95 % | RESPIRATION RATE: 12 BRPM | HEIGHT: 60 IN

## 2021-05-14 DIAGNOSIS — Z71.85 IMMUNIZATION COUNSELING: ICD-10-CM

## 2021-05-14 DIAGNOSIS — M89.8X9 BONY PROMINENCE: ICD-10-CM

## 2021-05-14 DIAGNOSIS — E78.1 HYPERTRIGLYCERIDEMIA: ICD-10-CM

## 2021-05-14 DIAGNOSIS — R74.01 ELEVATED ALT MEASUREMENT: ICD-10-CM

## 2021-05-14 DIAGNOSIS — G47.62 NOCTURNAL LEG CRAMPS: ICD-10-CM

## 2021-05-14 DIAGNOSIS — R09.89 THROAT CLEARING: ICD-10-CM

## 2021-05-14 DIAGNOSIS — Z87.448 HISTORY OF HEMATURIA: ICD-10-CM

## 2021-05-14 DIAGNOSIS — R14.0 ABDOMINAL BLOATING: ICD-10-CM

## 2021-05-14 DIAGNOSIS — Z86.19 HISTORY OF VIRAL INFECTION: ICD-10-CM

## 2021-05-14 DIAGNOSIS — R22.2 CHEST WALL MASS: ICD-10-CM

## 2021-05-14 DIAGNOSIS — M25.9 KNEE PROBLEM: ICD-10-CM

## 2021-05-14 DIAGNOSIS — I10 ESSENTIAL HYPERTENSION: ICD-10-CM

## 2021-05-14 DIAGNOSIS — R06.02 SOB (SHORTNESS OF BREATH) ON EXERTION: ICD-10-CM

## 2021-05-14 PROCEDURE — 99214 OFFICE O/P EST MOD 30 MIN: CPT | Mod: 25 | Performed by: FAMILY MEDICINE

## 2021-05-14 RX ORDER — LOSARTAN POTASSIUM 50 MG/1
50 TABLET ORAL DAILY
Qty: 90 TABLET | Refills: 3 | Status: SHIPPED | OUTPATIENT
Start: 2021-05-14 | End: 2022-05-10

## 2021-05-14 RX ORDER — LEVOTHYROXINE SODIUM 150 MCG
150 TABLET ORAL
Qty: 90 TABLET | Refills: 3 | Status: SHIPPED | OUTPATIENT
Start: 2021-05-14 | End: 2022-07-15

## 2021-05-14 NOTE — PROGRESS NOTES
SUBJECTIVE:    Chief Complaint   Patient presents with   • Knee Pain     left- had steroid injection        HPI:     Flavia Garrett is a 81 y.o. female with hypothyroidism, hypercholesterolemia, hypertension, hematuria and low vitamin D here for follow-up of medical issues and request for lab work.    Knee pain-states she had a recent corticosteroid injection of the area on the right side.  She did have some x-rays completed, not quite yet bone-on-bone at this time on x-ray.      Hypertension-blood pressure stable on losartan 50 mg daily.  Patient is also asking for labs for apolipoprotein A&B and TMAO per her son's request.    Hypertriglyceridemia-patient is on omega-3 supplements.    4/1/2021 labs-proBNP was normal.  Glucose 101 (H), ALT 34 (H), TSH 2.67.    Had a lump of the right medial sternoclavicular region and x-ray was completed.  Noted ultrasound may further clarify overlying soft tissue.    Continues to have some shortness of breath with activities.  Overall has been about the same.  Feels  fine at rest.  Had prior echocardiogram, EKG, chest x-ray and PFT.  Overall stable.  June 2nd- stress test is scheduled.  Has seen pulmonary.    History of hematuria-she is followed by urology.    Pepcid-has not been taking.  Wondering about taking a different medication she has.  Has had some bloating still in her abdominal region.  Continues to have some mucus in her throat.    Lower leg cramps at nighttime.  Does not have cramps with walking.  Has not been doing her usual exercises at night.  Has not been drinking adequate fluids.    During Thanksgiving had some symptoms of chills and feeling hot.  Took some supplements to help with her symptoms.  Would like COVID-19 testing for antibodies.  Hx of MI of friend after covid-19 vaccine.  She decided not to get her Covid vaccine.    POLST form - nurse friend will review this with her.  She is not currently ready to sign.      ROS:  No recent fevers or chills. No  nausea or vomiting. No diarrhea. No chest pains or shortness of breath. No lower extremity edema.    Current Outpatient Medications on File Prior to Visit   Medication Sig Dispense Refill   • Cholecalciferol (VITAMIN D3 PO) Take 5,000 Units by mouth.     • SYNTHROID 150 MCG Tab Take 1 tablet by mouth every morning on an empty stomach. 30 tablet 3   • Omega-3 1000 MG Cap Take  by mouth.     • levothyroxine (SYNTHROID) 150 MCG Tab Take 1 Tab by mouth Every morning on an empty stomach. 30 Tab 2   • cyclobenzaprine (FLEXERIL) 5 MG tablet CYCLOBENZAPRINE HCL 5 MG TABS     • clobetasol (TEMOVATE) 0.05 % Cream CLOBETASOL PROPIONATE 0.05 % CREA     • losartan (COZAAR) 50 MG Tab Take 1 Tab by mouth every day. 90 Tab 3   • acetaminophen (TYLENOL) 500 MG Tab Take 500-1,000 mg by mouth every 6 hours as needed.     • diphenhydrAMINE-APAP, sleep, (TYLENOL PM EXTRA STRENGTH PO) Take  by mouth.     • Naproxen Sodium (ALEVE PO) Take  by mouth.     • OIL OF OREGANO PO Take  by mouth.     • MISC NATURAL PRODUCTS PO Take  by mouth.     • loratadine (CLARITIN) 10 MG Tab Take 10 mg by mouth every day.     • guaiFENesin ER (MUCINEX) 600 MG TABLET SR 12 HR Take 600 mg by mouth every 12 hours.     • Multiple Vitamins-Minerals (PRESERVISION AREDS 2 PO) Take  by mouth.     • Probiotic Product (PROBIOTIC DAILY PO) Take  by mouth.     • famotidine (PEPCID) 20 MG Tab TAKE 1 TABLET BY MOUTH TWICE A DAY (Patient not taking: Reported on 5/14/2021) 180 tablet 1     No current facility-administered medications on file prior to visit.       Allergies   Allergen Reactions   • Lisinopril      Cramps and upset stomach       Patient Active Problem List    Diagnosis Date Noted   • Sleep apnea in adult 09/28/2020   • Inflammation of soft tissue 09/28/2020   • Hypertriglyceridemia 09/28/2020   • Pain in left lower leg 08/07/2020   • Hematuria 08/07/2020   • Nocturnal oxygen desaturation 08/07/2020   • Recurrent UTI 01/31/2020   • History of UTI 01/31/2020    • Drusen 10/08/2019   • Cystitis 06/12/2019   • Post-menopausal 05/01/2019   • Flu-like symptoms 04/03/2019   • Nasal congestion 04/03/2019   • Cough 04/03/2019   • Vertigo 04/03/2019   • Class 2 obesity in adult 03/13/2019   • Vision loss 08/15/2018   • Venous insufficiency 08/15/2018   • Cold sore 03/29/2018   • Lichen sclerosus of female genitalia 02/05/2018   • Acquired hypothyroidism 06/23/2017   • Chronic left-sided low back pain without sciatica 03/17/2017   • Lichen planus 03/17/2017   • History of breast cancer 03/17/2017   • Gout of foot 09/30/2016   • Idiopathic chronic gout of right ankle 09/30/2016   • Essential hypertension 09/30/2016   • Status post total right knee replacement 08/17/2016   • Dysuria 07/26/2016   • Toe pain, right 07/26/2016   • Cellulitis 07/26/2016   • Abdominal bloating 07/26/2016   • Celiac disease 07/26/2016   • Intertrigo 07/26/2016   • Osteoarthritis of right knee 07/26/2016   • Abnormal results of liver function studies 07/26/2016   • Hearing loss 07/26/2016   • Tinnitus 07/26/2016   • Dyslipidemia 07/26/2016   • H/O total hysterectomy 07/26/2016   • Hypothyroidism 07/26/2016   • Vitamin D deficiency 07/26/2016   • Osteoporosis 07/26/2016   • Hx of breast cancer 07/26/2016   • Lack of energy 05/05/2016   • Status post right knee replacement 05/05/2016       Past Medical History:   Diagnosis Date   • Back pain    • Breast cancer (HCC)    • Chickenpox    • Constipation    • Cough    • Diarrhea    • Eczema    • Fatigue    • Frequent urination    • Hx of breast cancer 7/26/2016   • Hypertension    • Hypothyroidism 7/26/2016   • Influenza    • Lichen sclerosus of female genitalia    • Nasal drainage    • Obesity    • Osteopenia 7/26/2016   • Osteoporosis    • Overweight    • Rhinitis    • Ringing in ears    • Shortness of breath    • Sputum production    • Swelling of lower extremity    • Thyroid activity decreased    • Tonsillitis    • Unspecified disorder of the pituitary gland  "and its hypothalamic control    • Vision loss    • Vitamin D deficiency 7/26/2016   • Whooping cough        OBJECTIVE:   /84   Pulse 85   Temp 36.2 °C (97.1 °F)   Resp 12   Ht 1.511 m (4' 11.5\")   Wt 86.6 kg (191 lb)   SpO2 95%   BMI 37.93 kg/m²   General: Well-developed well-nourished female, no acute distress  Neck: supple, no lymphadenopathy- cervical or supraclavicular, no thyromegaly  Cardiovascular: regular rate and rhythm, no murmurs, gallops, rubs  Chest wall: right side medial clavicle region with bony prominence of area.   Lungs: clear to auscultation bilaterally, no wheezes, crackles, or rhonchi  Abdomen: +bowel sounds, soft, nontender, nondistended, no rebound, no guarding, no hepatosplenomegaly  Extremities: no cyanosis, clubbing, edema  Skin: Warm and dry  Psych: appropriate mood and affect    PFT-3/2021-normal.    Narrative & Impression     5/5/2021 12:43 PM     HISTORY/REASON FOR EXAM:  Prominence of proximal clavicle at the sternoclavicular joint.  .     TECHNIQUE/EXAM DESCRIPTION AND NUMBER OF VIEWS:  2 views of the RIGHT clavicle.     COMPARISON: None     FINDINGS:  Moderate osteoarthritis of the AC joint with marginal osteophytes.     Cannot visualize the sternoclavicular joint due to overlying structure.     IMPRESSION:     Cannot visualize the sternoclavicular joint due to overlying structure. Ultrasound may be helpful to evaluate for soft tissue mass.      Echocardiography Laboratory     CONCLUSIONS  No prior study is available for comparison.   Left ventricular ejection fraction is visually estimated to be 65%.  Unable to estimate pulmonary artery pressure due to an inadequate   tricuspid regurgitant jet.     ARIELLE MUSE  Exam Date:         03/18/2021       Narrative & Impression  Transthoracic  Echo Report        Echocardiography Laboratory     CONCLUSIONS  No prior study is available for comparison.   Left ventricular ejection fraction is visually estimated to be " 65%.  Unable to estimate pulmonary artery pressure due to an inadequate   tricuspid regurgitant jet.     ARIELLE MUSE  Exam Date:         2021                      12:24  Exam Location:     Out Patient  Priority:          Routine     Ordering Physician:        JORDEN MCKENZIE  Referring Physician:       JONAH Sosa  Sonographer:               Ted Joshi RDCS,                              RVT     Age:    81     Gender:    F  MRN:    [id number]  :    1940  BSA:    1.83   Ht (in):    59     Wt (lb):    197  Exam Type:     Complete     Indications:     Shortness of breath  ICD Codes:       786.05     CPT Codes:       17199     BP:   126    /   80     HR:   78  Technical Quality:       Fair     MEASUREMENTS  (Male / Female) Normal Values  2D ECHO  LV Diastolic Diameter PLAX        5 cm                  4.2 - 5.9 / 3.9 - 5.3   cm  LV Systolic Diameter PLAX         3.8 cm                2.1 - 4.0 cm  IVS Diastolic Thickness           0.9 cm                  LVPW Diastolic Thickness          1.1 cm                  LVOT Diameter                     2.2 cm                  Estimated LV Ejection Fraction    65 %                    LV Ejection Fraction MOD BP       53.9 %                >= 55  %  LV Ejection Fraction MOD 4C       58.4 %                  LV Ejection Fraction MOD 2C       50.6 %                     DOPPLER  AV Peak Velocity                  1.6 m/s                 AV Peak Gradient                  10.4 mmHg               AV Mean Gradient                  5.3 mmHg                LVOT Peak Velocity                0.93 m/s                AV Area Cont Eq vti               2.4 cm2                 Mitral E Point Velocity           0.63 m/s                Mitral E to A Ratio               0.49                    MV Pressure Half Time             87.2 ms                 MV Area PHT                       2.5 cm2                 MV Deceleration Time              301 ms                  MV E' Velocity                     4.7 cm/s                   * Indicates values subject to auto-interpretation  LV EF:  65    %     FINDINGS  Left Ventricle  The left ventricle was normal in size and function.  Normal left   ventricular wall thickness. Left ventricular ejection fraction is   visually estimated to be 65%. Normal regional wall motion. Grade I   diastolic dysfunction.     Right Ventricle  The right ventricle was normal in size and function.     Right Atrium  The right atrium is normal in size.  Normal inferior vena cava size and   inspiratory collapse.     Left Atrium  Mildly dilated left atrium. Left atrial volume index is 35  mL/sq m.     Mitral Valve  Structurally normal mitral valve without significant stenosis or   regurgitation.     Aortic Valve  Structurally normal aortic valve without significant stenosis or   regurgitation.     Tricuspid Valve  Structurally normal tricuspid valve without significant stenosis or   regurgitation.  Right atrial pressure is estimated to be 3 mmHg. Unable   to estimate pulmonary artery pressure due to an inadequate tricuspid   regurgitant jet.     Pulmonic Valve  Structurally normal pulmonic valve without significant stenosis or   regurgitation.     Pericardium  Normal pericardium without effusion.     Aorta  The aortic root is normal.  Ascending aorta diameter is 3.5 cm.          81 y.o.female with hypothyroidism, hypercholesterolemia, hypertension, hematuria and low vitamin D.    1. Knee problem     2. Essential hypertension     3. Hypertriglyceridemia  APOLIPOPROTEIN A-1    APOLIPOPROTEIN B   4. Elevated ALT measurement     5. Bony prominence  US-CHEST   6. Chest wall mass  US-CHEST   7. SOB (shortness of breath) on exertion     8. Throat clearing     9. History of hematuria     10. Abdominal bloating     11. Nocturnal leg cramps     12. History of viral infection  COVID-19 IgG, Qual   13. Immunization counseling     -Knee problem-recent right side corticosteroid injection per  orthopedics.  Recommend routine stretching and strengthening exercises.  Follow-up with orthopedics as needed.  -Hypertension-stable.  Continue losartan 50 mg daily.  Recommend heart healthy diet and routine exercise as tolerated.  -Hypertriglyceridemia-continue to modify diet and continue omega-3 supplements.  Monitor in future.  -Elevated ALT-mild.  Monitor.  Repeat labs in future.  -Bony prominence/chest wall mass-right medial sternoclavicular region.  Unable to clarify on x-ray.  Obtain ultrasound for further clarification.  -Shortness of breath on exertion-has had prior evaluation and has seen pulmonary.  Currently awaiting stress test.  Monitor.  Modify activities.  -Throat clearing-unclear if related to allergies or possible reflux symptoms at this time.  Recommend treatment as below.  Monitor.  -History of hematuria-stable.  Follow-up with urology routinely.  -Abdominal bloating-patient has not been taking Pepcid.  Recommend use of over-the-counter PPI or H2 blocker.  Modify diet.  Monitor.  -Nocturnal leg cramps-recommend adequate hydration and electrolyte intake.  Recommend routine stretching exercises.  Monitor at this time.  Consider further evaluation with labs or imaging as needed.  -History of viral infection-obtain Covid antibody study.  -Immunization counseling-counseled on COVID-19 immunization.    Return in about 1 month (around 6/14/2021).    This medical record contains text that has been entered with the assistance of computer voice recognition and dictation software.  Therefore, it may contain unintended errors in text, spelling, punctuation, or grammar.

## 2021-06-02 ENCOUNTER — NON-PROVIDER VISIT (OUTPATIENT)
Dept: CARDIOLOGY | Facility: MEDICAL CENTER | Age: 81
End: 2021-06-02
Attending: FAMILY MEDICINE
Payer: MEDICARE

## 2021-06-02 VITALS
DIASTOLIC BLOOD PRESSURE: 72 MMHG | SYSTOLIC BLOOD PRESSURE: 136 MMHG | HEIGHT: 60 IN | OXYGEN SATURATION: 93 % | WEIGHT: 191 LBS | HEART RATE: 80 BPM | BODY MASS INDEX: 37.5 KG/M2

## 2021-06-02 DIAGNOSIS — R06.02 SOB (SHORTNESS OF BREATH) ON EXERTION: ICD-10-CM

## 2021-06-02 LAB — TREADMILL STRESS: NORMAL

## 2021-06-02 PROCEDURE — 93015 CV STRESS TEST SUPVJ I&R: CPT | Performed by: INTERNAL MEDICINE

## 2021-06-23 ENCOUNTER — OFFICE VISIT (OUTPATIENT)
Dept: MEDICAL GROUP | Facility: IMAGING CENTER | Age: 81
End: 2021-06-23
Payer: MEDICARE

## 2021-06-23 VITALS
TEMPERATURE: 97.5 F | DIASTOLIC BLOOD PRESSURE: 76 MMHG | WEIGHT: 195 LBS | SYSTOLIC BLOOD PRESSURE: 134 MMHG | RESPIRATION RATE: 12 BRPM | BODY MASS INDEX: 39.31 KG/M2 | HEART RATE: 85 BPM | HEIGHT: 59 IN | OXYGEN SATURATION: 95 %

## 2021-06-23 DIAGNOSIS — F43.9 STRESS: ICD-10-CM

## 2021-06-23 DIAGNOSIS — F41.9 ANXIETY: ICD-10-CM

## 2021-06-23 DIAGNOSIS — R06.02 SOB (SHORTNESS OF BREATH) ON EXERTION: ICD-10-CM

## 2021-06-23 DIAGNOSIS — R53.81 PHYSICAL DECONDITIONING: ICD-10-CM

## 2021-06-23 DIAGNOSIS — Z87.448 HISTORY OF HEMATURIA: ICD-10-CM

## 2021-06-23 DIAGNOSIS — Z85.3 HX OF BREAST CANCER: ICD-10-CM

## 2021-06-23 DIAGNOSIS — E66.9 OBESITY (BMI 30-39.9): ICD-10-CM

## 2021-06-23 PROBLEM — R31.29 MICROSCOPIC HEMATURIA: Status: ACTIVE | Noted: 2020-08-05

## 2021-06-23 PROBLEM — N32.9 LESION OF BLADDER: Status: ACTIVE | Noted: 2020-08-05

## 2021-06-23 PROCEDURE — 99214 OFFICE O/P EST MOD 30 MIN: CPT | Performed by: FAMILY MEDICINE

## 2021-06-23 ASSESSMENT — PAIN SCALES - GENERAL: PAINLEVEL: NO PAIN

## 2021-06-23 NOTE — PROGRESS NOTES
SUBJECTIVE:    Chief Complaint   Patient presents with   • Shortness of Breath     review stress test, worse with anxiety    • Other     questions about activity- ok to ride bike?       HPI:     Flavia Garrett is a 81 y.o. female with hypothyroidism, hypercholesterolemia, hypertension, hematuria and low vitamin D here for follow up of stress test and shortness of breath on exertion.     States she had limited activity during pandemics, thus deconditioning. Used to be more active.   No excess weight gain, but has difficulty losing weight.   States her significant other had recent stent placement.   Has had panic attack, has had before.  Has felt out of breath with doing some household chores. Walking to her car is fine, no issues.   Stress test was completed without findings for ischemia.   3/2021 had CXR and echocardiogram unrevealing of symptoms.   3/2021 PFT unremarkable.   Pulse oximetry was 94% during stress test.   Declines CT scan evaluation at this time.   [facility]- used to attend fit and strong class.   States she thinks she may have had covid earlier on in the pandemic which may have affected her.     Hx of hematuria- she has not followed up with urology. States she was tired thus gave up on it. Heat affects her.   Notes right lower leg vein is better.   Has family coming to visit.     Hx of right lumpectomy. Followed by Dr. Carver, ultrasound and mammogram.   2011- Dr. Huitron.      Declined vaccines.   Notes she will further discuss POLST form with her friend who is an RN as she is still uncertain what she would like to do.       ROS:  No recent fevers or chills. No nausea or vomiting. No diarrhea. No chest pains or shortness of breath. No lower extremity edema.    Current Outpatient Medications on File Prior to Visit   Medication Sig Dispense Refill   • Cholecalciferol (VITAMIN D3 PO) Take 5,000 Units by mouth.     • losartan (COZAAR) 50 MG Tab Take 1 tablet by mouth every day. 90 tablet  3   • SYNTHROID 150 MCG Tab Take 1 tablet by mouth every morning on an empty stomach. 90 tablet 3   • Omega-3 1000 MG Cap Take  by mouth.     • cyclobenzaprine (FLEXERIL) 5 MG tablet CYCLOBENZAPRINE HCL 5 MG TABS     • clobetasol (TEMOVATE) 0.05 % Cream CLOBETASOL PROPIONATE 0.05 % CREA     • acetaminophen (TYLENOL) 500 MG Tab Take 500-1,000 mg by mouth every 6 hours as needed.     • diphenhydrAMINE-APAP, sleep, (TYLENOL PM EXTRA STRENGTH PO) Take  by mouth.     • Naproxen Sodium (ALEVE PO) Take  by mouth.     • OIL OF OREGANO PO Take  by mouth.     • MISC NATURAL PRODUCTS PO Take  by mouth.     • loratadine (CLARITIN) 10 MG Tab Take 10 mg by mouth every day.     • guaiFENesin ER (MUCINEX) 600 MG TABLET SR 12 HR Take 600 mg by mouth every 12 hours.     • Multiple Vitamins-Minerals (PRESERVISION AREDS 2 PO) Take  by mouth.     • Probiotic Product (PROBIOTIC DAILY PO) Take  by mouth.       No current facility-administered medications on file prior to visit.       Allergies   Allergen Reactions   • Lisinopril      Cramps and upset stomach       Patient Active Problem List    Diagnosis Date Noted   • Sleep apnea in adult 09/28/2020   • Inflammation of soft tissue 09/28/2020   • Hypertriglyceridemia 09/28/2020   • Pain in left lower leg 08/07/2020   • Hematuria 08/07/2020   • Nocturnal oxygen desaturation 08/07/2020   • Microscopic hematuria 08/05/2020   • Lesion of bladder 08/05/2020   • Recurrent UTI 01/31/2020   • History of UTI 01/31/2020   • Drusen 10/08/2019   • Cystitis 06/12/2019   • Post-menopausal 05/01/2019   • Flu-like symptoms 04/03/2019   • Nasal congestion 04/03/2019   • Cough 04/03/2019   • Vertigo 04/03/2019   • Class 2 obesity in adult 03/13/2019   • Vision loss 08/15/2018   • Venous insufficiency 08/15/2018   • Cold sore 03/29/2018   • Lichen sclerosus of female genitalia 02/05/2018   • Acquired hypothyroidism 06/23/2017   • Sleep related leg cramps 04/11/2017   • Chronic left-sided low back pain  "without sciatica 03/17/2017   • Lichen planus 03/17/2017   • History of breast cancer 03/17/2017   • Gout of foot 09/30/2016   • Idiopathic chronic gout of right ankle 09/30/2016   • Essential hypertension 09/30/2016   • Status post total right knee replacement 08/17/2016   • Dysuria 07/26/2016   • Toe pain, right 07/26/2016   • Cellulitis 07/26/2016   • Abdominal bloating 07/26/2016   • Celiac disease 07/26/2016   • Intertrigo 07/26/2016   • Osteoarthritis of right knee 07/26/2016   • Abnormal results of liver function studies 07/26/2016   • Hearing loss 07/26/2016   • Tinnitus 07/26/2016   • Dyslipidemia 07/26/2016   • H/O total hysterectomy 07/26/2016   • Hypothyroidism 07/26/2016   • Vitamin D deficiency 07/26/2016   • Osteoporosis 07/26/2016   • Hx of breast cancer 07/26/2016   • Lack of energy 05/05/2016   • Status post right knee replacement 05/05/2016   • Other specified postprocedural states 11/11/2014   • Acquired absence of both cervix and uterus 11/11/2014       Past Medical History:   Diagnosis Date   • Back pain    • Breast cancer (HCC)    • Chickenpox    • Constipation    • Cough    • Diarrhea    • Eczema    • Fatigue    • Frequent urination    • Hx of breast cancer 7/26/2016   • Hypertension    • Hypothyroidism 7/26/2016   • Influenza    • Lichen sclerosus of female genitalia    • Nasal drainage    • Obesity    • Osteopenia 7/26/2016   • Osteoporosis    • Overweight    • Rhinitis    • Ringing in ears    • Shortness of breath    • Sputum production    • Swelling of lower extremity    • Thyroid activity decreased    • Tonsillitis    • 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    • Vision loss    • Vitamin D deficiency 7/26/2016   • Whooping cough          OBJECTIVE:   /76   Pulse 85   Temp 36.4 °C (97.5 °F)   Resp 12   Ht 1.499 m (4' 11\")   Wt 88.5 kg (195 lb)   SpO2 95%   BMI 39.39 kg/m²   General: Well-developed well-nourished female, no acute " distress  Neck: supple, no lymphadenopathy- cervical or supraclavicular, no thyromegaly  Cardiovascular: regular rate and rhythm, no murmurs, gallops, rubs  Lungs: clear to auscultation bilaterally, no wheezes, crackles, or rhonchi  Abdomen: +bowel sounds, soft, nontender, nondistended, no rebound, no guarding, no hepatosplenomegaly  Extremities: no cyanosis, clubbing, edema  Skin: Warm and dry  Psych: appropriate mood and affect    Narrative  Performed by: *A  Indications for test: Dyspnea on exertion     Resting data:      Heart rate: 80 bpm      BP: 136/72 mmHg   Baseline EKG: Sinus rhythm     Procedure:   Patient exercised on a Matt Protocol.     Patient exercised for 4 minutes, 0 seconds, and 6.5 METs.   100% of MPHR: 141   90% of MPHR: 127   85% of MPHR: 120   Peak Heart Rate Achieved: 157   112 % of MPHR.   Maximum /80 mmHg   Functional aerobic impairment: (20) Active     Test was terminated due to: Shortness of breath     Observations/Comments: Patient tolerated test fairly well. She denied chest pain or discomfort. She reported moderate to severe shortness of breath at peak exercise. She recovered to baseline during rest period. Vital signs stable, oxygen saturation 86-94% noted.     Test performed by:   Carmen BAKER R.N.   6/2/2021 3:06 PM       Provider conclusions and analysis:   Baseline rhythm sinus with right bundle branch block.   Poor exercise capacity.   With stress ST changes noted due to bundle branch block, but not suggestive of ischemia     Overall negative stress electrocardiogram for ischemia.  Poor exercise capacity.        ASSESSMENT/PLAN:    81 y.o. female with hypothyroidism, hypercholesterolemia, hypertension, hematuria and low vitamin D h    1. SOB (shortness of breath) on exertion     2. Physical deconditioning     3. Obesity (BMI 30-39.9)     4. History of hematuria     5. Hx of breast cancer     6. Anxiety     7. Stress     -SOB on exertion- work up thus far has been  unrevealing of symptoms. May be multifactorial associated with deconditioning, obesity, and anxiety/stress factors.   Patient declined further evaluation with Chest CT at this time. Vitals otherwise stable.   Discussed further working on gradual physical conditioning as tolerated and anxiety/stress management. Will continue to monitor at this time.   -Hx of hematuria- recommend and encourage patient to follow up with urology.   -Hx of breast cancer- stable. Followed by oncology.   -Anxiety/stress- recommend working on management of symptoms. Monitor.     Return in about 2 months (around 8/23/2021).   Follow up sooner as needed.     This medical record contains text that has been entered with the assistance of computer voice recognition and dictation software.  Therefore, it may contain unintended errors in text, spelling, punctuation, or grammar.

## 2021-07-08 ENCOUNTER — HOSPITAL ENCOUNTER (OUTPATIENT)
Dept: RADIOLOGY | Facility: MEDICAL CENTER | Age: 81
End: 2021-07-08
Attending: FAMILY MEDICINE
Payer: MEDICARE

## 2021-07-08 DIAGNOSIS — R22.2 CHEST WALL MASS: ICD-10-CM

## 2021-07-08 DIAGNOSIS — M89.8X9 BONY PROMINENCE: ICD-10-CM

## 2021-07-08 PROCEDURE — 76604 US EXAM CHEST: CPT

## 2021-08-25 ENCOUNTER — OFFICE VISIT (OUTPATIENT)
Dept: MEDICAL GROUP | Facility: IMAGING CENTER | Age: 81
End: 2021-08-25
Payer: MEDICARE

## 2021-08-25 VITALS
RESPIRATION RATE: 12 BRPM | OXYGEN SATURATION: 97 % | BODY MASS INDEX: 39.72 KG/M2 | TEMPERATURE: 97.6 F | HEIGHT: 59 IN | HEART RATE: 74 BPM | WEIGHT: 197 LBS | SYSTOLIC BLOOD PRESSURE: 136 MMHG | DIASTOLIC BLOOD PRESSURE: 84 MMHG

## 2021-08-25 DIAGNOSIS — E03.9 HYPOTHYROIDISM, UNSPECIFIED TYPE: ICD-10-CM

## 2021-08-25 DIAGNOSIS — M25.551 POSTERIOR PAIN OF HIP, RIGHT: ICD-10-CM

## 2021-08-25 DIAGNOSIS — R73.09 ELEVATED GLUCOSE: ICD-10-CM

## 2021-08-25 DIAGNOSIS — R74.01 ELEVATED ALT MEASUREMENT: ICD-10-CM

## 2021-08-25 DIAGNOSIS — R06.02 SOB (SHORTNESS OF BREATH) ON EXERTION: ICD-10-CM

## 2021-08-25 DIAGNOSIS — T14.8XXA MUSCLE STRAIN: ICD-10-CM

## 2021-08-25 DIAGNOSIS — R53.83 LACK OF ENERGY: ICD-10-CM

## 2021-08-25 PROCEDURE — 99214 OFFICE O/P EST MOD 30 MIN: CPT | Performed by: FAMILY MEDICINE

## 2021-08-25 ASSESSMENT — PAIN SCALES - GENERAL: PAINLEVEL: 5=MODERATE PAIN

## 2021-08-25 NOTE — PROGRESS NOTES
SUBJECTIVE:    Chief Complaint   Patient presents with   • Hypothyroidism     discuss supplements-added magnesium and turmeric   • Medication Management     discuss changing to NP thyroid    • Hip Pain     twisted yesterday, right side    • Sleep Problem       HPI:     Flavia Garrett is a 81 y.o. female with hypothyroidism, hypercholesterolemia, hypertension, history of hematuria and low vitamin D levels here for follow-up and medical issues and discussion of her thyroid medication.  Here with son, Kirby.     Hypothyroidism-wants to discuss magnesium turmeric supplements. Hx of hashimoto's patient.  Last labs 4/2021 TSH was in normal range.  Goes to [facility] for labs, see media.  She has been on levothyroxine 150 mcg daily.  Is consistent about taking her medication.  Otherwise tolerating well.  Wondering about other thyroid medication.  To feel tired.  Son does note that she does over commit to activities.  She does do a lot of her significant other who lives in his separate home and requires more assistance.  Notes her sleep has been affected.  She did have an overnight oximetry test which showed brief minimal dip of oxygen.  States she does have a sleep guard at home.  States she did not have any significant sleep apnea noted.    Prior symptoms of shortness of breath improved.  Has had prior evaluation.  Better now Pilates/mallory- started.  Not noticed any shortness of breath with doing Mallory or Pilates.  She may only notice with walking upstairs or possibly up hills.  She did have prior chest x-ray, pulmonary function testing and echocardiogram.    States she takes oil of oregano and colloidal silver if she feels she may be becoming ill.  She is still hesitant to have COVID-19 vaccination at this time until further information is out.    Twisted right hip yesterday-getting up from jolie. Able to walk. Better today.  Has pain on the posterior portion of her hip/buttock area.  Thinking about going to Pilates  today and [facility] tomorrow.  Does Pilates usually Wednesday and Fridays.      Elevated glucose on prior labs.  Eats gluten-free bread and has cut out sweets.  Elevated ALT on prior labs.  Very minimally elevated.        ROS:  No recent fevers or chills. No nausea or vomiting. No diarrhea. No chest pains or shortness of breath. No lower extremity edema.    Current Outpatient Medications on File Prior to Visit   Medication Sig Dispense Refill   • Cholecalciferol (VITAMIN D3 PO) Take 5,000 Units by mouth.     • losartan (COZAAR) 50 MG Tab Take 1 tablet by mouth every day. 90 tablet 3   • SYNTHROID 150 MCG Tab Take 1 tablet by mouth every morning on an empty stomach. 90 tablet 3   • Omega-3 1000 MG Cap Take  by mouth.     • cyclobenzaprine (FLEXERIL) 5 MG tablet CYCLOBENZAPRINE HCL 5 MG TABS     • acetaminophen (TYLENOL) 500 MG Tab Take 500-1,000 mg by mouth every 6 hours as needed.     • diphenhydrAMINE-APAP, sleep, (TYLENOL PM EXTRA STRENGTH PO) Take  by mouth.     • OIL OF OREGANO PO Take  by mouth.     • MISC NATURAL PRODUCTS PO Take  by mouth.     • loratadine (CLARITIN) 10 MG Tab Take 10 mg by mouth every day.     • guaiFENesin ER (MUCINEX) 600 MG TABLET SR 12 HR Take 600 mg by mouth every 12 hours.     • Multiple Vitamins-Minerals (PRESERVISION AREDS 2 PO) Take  by mouth.     • Probiotic Product (PROBIOTIC DAILY PO) Take  by mouth.     • clobetasol (TEMOVATE) 0.05 % Cream CLOBETASOL PROPIONATE 0.05 % CREA       No current facility-administered medications on file prior to visit.       Allergies   Allergen Reactions   • Lisinopril      Cramps and upset stomach       Patient Active Problem List    Diagnosis Date Noted   • Sleep apnea in adult 09/28/2020   • Inflammation of soft tissue 09/28/2020   • Hypertriglyceridemia 09/28/2020   • Pain in left lower leg 08/07/2020   • Hematuria 08/07/2020   • Nocturnal oxygen desaturation 08/07/2020   • Microscopic hematuria 08/05/2020   • Lesion of bladder 08/05/2020   •  Recurrent UTI 01/31/2020   • History of UTI 01/31/2020   • Drusen 10/08/2019   • Cystitis 06/12/2019   • Post-menopausal 05/01/2019   • Flu-like symptoms 04/03/2019   • Nasal congestion 04/03/2019   • Cough 04/03/2019   • Vertigo 04/03/2019   • Class 2 obesity in adult 03/13/2019   • Vision loss 08/15/2018   • Venous insufficiency 08/15/2018   • Cold sore 03/29/2018   • Lichen sclerosus of female genitalia 02/05/2018   • Acquired hypothyroidism 06/23/2017   • Sleep related leg cramps 04/11/2017   • Chronic left-sided low back pain without sciatica 03/17/2017   • Lichen planus 03/17/2017   • History of breast cancer 03/17/2017   • Gout of foot 09/30/2016   • Idiopathic chronic gout of right ankle 09/30/2016   • Essential hypertension 09/30/2016   • Status post total right knee replacement 08/17/2016   • Dysuria 07/26/2016   • Toe pain, right 07/26/2016   • Cellulitis 07/26/2016   • Abdominal bloating 07/26/2016   • Celiac disease 07/26/2016   • Intertrigo 07/26/2016   • Osteoarthritis of right knee 07/26/2016   • Abnormal results of liver function studies 07/26/2016   • Hearing loss 07/26/2016   • Tinnitus 07/26/2016   • Dyslipidemia 07/26/2016   • H/O total hysterectomy 07/26/2016   • Hypothyroidism 07/26/2016   • Vitamin D deficiency 07/26/2016   • Osteoporosis 07/26/2016   • Hx of breast cancer 07/26/2016   • Lack of energy 05/05/2016   • Status post right knee replacement 05/05/2016   • Other specified postprocedural states 11/11/2014   • Acquired absence of both cervix and uterus 11/11/2014       Past Medical History:   Diagnosis Date   • Back pain    • Breast cancer (HCC)    • Chickenpox    • Constipation    • Cough    • Diarrhea    • Eczema    • Fatigue    • Frequent urination    • Hx of breast cancer 7/26/2016   • Hypertension    • Hypothyroidism 7/26/2016   • Influenza    • Lichen sclerosus of female genitalia    • Nasal drainage    • Obesity    • Osteopenia 7/26/2016   • Osteoporosis    • Overweight    •  "Rhinitis    • Ringing in ears    • Shortness of breath    • Sputum production    • Swelling of lower extremity    • Thyroid activity decreased    • Tonsillitis    • 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    • Vision loss    • Vitamin D deficiency 7/26/2016   • Whooping cough          OBJECTIVE:   /84   Pulse 74   Temp 36.4 °C (97.6 °F)   Resp 12   Ht 1.499 m (4' 11\")   Wt 89.4 kg (197 lb)   SpO2 97%   BMI 39.79 kg/m²   General: Well-developed well-nourished female, no acute distress  Neck: supple, no lymphadenopathy- cervical or supraclavicular, no thyromegaly  Cardiovascular: regular rate and rhythm, no murmurs, gallops, rubs  Lungs: clear to auscultation bilaterally, no wheezes, crackles, or rhonchi  Abdomen: +bowel sounds, soft, nontender, nondistended, no rebound, no guarding, no hepatosplenomegaly  Extremities: no cyanosis, clubbing, edema.  Right side posterior hip/buttock area with mild tenderness to palpation.  Adequate range of motion of right hip, no pain with axial load or internal rotation.  Skin: Warm and dry  Psych: appropriate mood and affect        ASSESSMENT/PLAN:     81 y.o. female with hypothyroidism, hypercholesterolemia, hypertension, history of hematuria and low vitamin D levels.    1. Hypothyroidism, unspecified type  TSH WITH REFLEX TO FT4   2. Lack of energy     3. SOB (shortness of breath) on exertion     4. Posterior pain of hip, right     5. Muscle strain     6. Elevated glucose  Comp Metabolic Panel    HEMOGLOBIN A1C   7. Elevated ALT measurement  Comp Metabolic Panel   -Hypothyroidism-last labs stable.  We will plan to repeat lab work.  Recommend continue on Synthroid 150 mcg daily.  -Lack of energy appears multifactorial-does not appear due to thyroid.  Appears may be related to patient's activities.  Patient does have mouthguard to use at nighttime, noted to have minimal dip and oxygen at night.  Counseled on recommendations for " management of activities and energy levels.  Setting healthy boundaries.  Recommend healthy diet and routine exercise.  -Shortness of breath on exertion-appears due to prior deconditioning.  Prior evaluation was otherwise unremarkable.  May notes symptoms with walking uphill or stairs.  Continue mild to moderate exercise activities and continued conditioning.  We will continue to monitor.  -Posterior hip pain of left-appears due to muscle strain.  Modify activities at this time.  May gradually return to activities as tolerated in the next few days.  -Elevated glucose-continue to modify diet routine exercise recommended.  Monitor labs as above.  -Elevated ALT-mild.  Plan to recheck labs.    Follow-up in 1 month, sooner as needed.      This medical record contains text that has been entered with the assistance of computer voice recognition and dictation software.  Therefore, it may contain unintended errors in text, spelling, punctuation, or grammar.

## 2021-08-27 ENCOUNTER — TELEPHONE (OUTPATIENT)
Dept: MEDICAL GROUP | Facility: IMAGING CENTER | Age: 81
End: 2021-08-27

## 2021-08-27 NOTE — TELEPHONE ENCOUNTER
Pt called to see if she can go to [facility] for her lab work. Notified pt that was fine. She has the lab orders to bring with her to [facility].

## 2021-09-20 DIAGNOSIS — R74.01 ELEVATED ALT MEASUREMENT: ICD-10-CM

## 2021-09-20 DIAGNOSIS — E78.1 HYPERTRIGLYCERIDEMIA: ICD-10-CM

## 2021-09-20 DIAGNOSIS — E03.9 HYPOTHYROIDISM, UNSPECIFIED TYPE: ICD-10-CM

## 2021-09-20 DIAGNOSIS — R73.09 ELEVATED GLUCOSE: ICD-10-CM

## 2021-09-22 ENCOUNTER — OFFICE VISIT (OUTPATIENT)
Dept: MEDICAL GROUP | Facility: IMAGING CENTER | Age: 81
End: 2021-09-22
Payer: MEDICARE

## 2021-09-22 VITALS
OXYGEN SATURATION: 96 % | BODY MASS INDEX: 39.72 KG/M2 | DIASTOLIC BLOOD PRESSURE: 70 MMHG | TEMPERATURE: 97.9 F | WEIGHT: 197 LBS | HEART RATE: 80 BPM | RESPIRATION RATE: 12 BRPM | SYSTOLIC BLOOD PRESSURE: 118 MMHG | HEIGHT: 59 IN

## 2021-09-22 DIAGNOSIS — N90.4 LICHEN SCLEROSUS OF FEMALE GENITALIA: ICD-10-CM

## 2021-09-22 DIAGNOSIS — R89.9 ABNORMAL LABORATORY TEST RESULT: ICD-10-CM

## 2021-09-22 DIAGNOSIS — R53.83 LACK OF ENERGY: ICD-10-CM

## 2021-09-22 DIAGNOSIS — M25.471 ANKLE EDEMA, BILATERAL: ICD-10-CM

## 2021-09-22 DIAGNOSIS — R06.02 SOB (SHORTNESS OF BREATH) ON EXERTION: ICD-10-CM

## 2021-09-22 DIAGNOSIS — M25.472 ANKLE EDEMA, BILATERAL: ICD-10-CM

## 2021-09-22 DIAGNOSIS — R73.09 ELEVATED GLUCOSE: ICD-10-CM

## 2021-09-22 DIAGNOSIS — Z85.3 HISTORY OF BREAST CANCER: ICD-10-CM

## 2021-09-22 DIAGNOSIS — R20.2 TINGLING OF BOTH FEET: ICD-10-CM

## 2021-09-22 DIAGNOSIS — E03.9 HYPOTHYROIDISM, UNSPECIFIED TYPE: ICD-10-CM

## 2021-09-22 DIAGNOSIS — E66.9 OBESITY (BMI 30-39.9): ICD-10-CM

## 2021-09-22 DIAGNOSIS — R74.01 ELEVATED ALT MEASUREMENT: ICD-10-CM

## 2021-09-22 DIAGNOSIS — R53.81 PHYSICAL DECONDITIONING: ICD-10-CM

## 2021-09-22 PROCEDURE — 99214 OFFICE O/P EST MOD 30 MIN: CPT | Performed by: FAMILY MEDICINE

## 2021-09-22 RX ORDER — CLOBETASOL PROPIONATE 0.5 MG/G
CREAM TOPICAL
Qty: 30 G | Refills: 1 | Status: SHIPPED | OUTPATIENT
Start: 2021-09-22

## 2021-09-22 RX ORDER — LEVOTHYROXINE SODIUM 175 UG/1
TABLET ORAL
Qty: 36 TABLET | Refills: 1 | Status: SHIPPED | OUTPATIENT
Start: 2021-09-22 | End: 2021-09-30 | Stop reason: SDUPTHER

## 2021-09-22 ASSESSMENT — PAIN SCALES - GENERAL: PAINLEVEL: NO PAIN

## 2021-09-22 NOTE — PROGRESS NOTES
SUBJECTIVE:    Chief Complaint   Patient presents with   • Nerve Pain     bilateral, on and off at off, tingling, since using callous mask a year ago   • Lab Results   • Ankle Pain     when feet swell       HPI:     Flavia Garrett is a 81 y.o. female with hypothyroidism, hypercholesterolemia, hypertension, history of hematuria and low vitamin D levels here for follow-up of lab results and also bilateral nerve pain.  Notes some ankle swelling intermittently, current foot swelling.    Hypothyroidism-recent TSH 4.28. She has been on synthroid 150 mcg daily.  Body mass index is 39.79 kg/m².  Fights her weight- does not want to track food. Would work with nutritionist. May binge. Significant other likes to eat.   Yvette Laurent-has taught nutrition- she will look into plan.   Has been adding beet powder.   Energy has been stable.  Walking is better and prior shortness of breath is better.  She has been evaluated by pulmonary in past, but not sure if she will follow up. Notes symptoms sometimes with walking and climbing stairs.     Elevated ALT-repeat labs normalized.   Elevated glucose- hgba1c- 5.3%.     Did foot mask from [place], helped with callus, a year ago.   Noticed tingling afterward with use of mask.   Past year with symptoms. Sometimes light numbness. No pain. Tingling and sensitivity of area.     Ankle- lateral region with edema. No current foot edema noted. No calf swelling.   Tends to have sciatic nerve issues.     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 Would like refill. Has been stable with symptoms.    Uses clobetasol- only as needed. 3 times a month or so. No changes.   Used bleach for her laundry, thought might be associated.     Declined immunizations. First pneeumonia vaccine kept her in bed for a few days.   Hx of breast cancer. Mammogram- saw Dr. Carver, 10-11 year out. U/s in January and mammogram in July.     ROS:  No recent fevers or chills. No nausea or  vomiting. No diarrhea. No chest pains or shortness of breath. No lower extremity edema.    Current Outpatient Medications on File Prior to Visit   Medication Sig Dispense Refill   • Cholecalciferol (VITAMIN D3 PO) Take 5,000 Units by mouth.     • losartan (COZAAR) 50 MG Tab Take 1 tablet by mouth every day. 90 tablet 3   • SYNTHROID 150 MCG Tab Take 1 tablet by mouth every morning on an empty stomach. 90 tablet 3   • Omega-3 1000 MG Cap Take  by mouth.     • cyclobenzaprine (FLEXERIL) 5 MG tablet CYCLOBENZAPRINE HCL 5 MG TABS     • acetaminophen (TYLENOL) 500 MG Tab Take 500-1,000 mg by mouth every 6 hours as needed.     • diphenhydrAMINE-APAP, sleep, (TYLENOL PM EXTRA STRENGTH PO) Take  by mouth.     • OIL OF OREGANO PO Take  by mouth.     • MISC NATURAL PRODUCTS PO Take  by mouth.     • loratadine (CLARITIN) 10 MG Tab Take 10 mg by mouth every day.     • guaiFENesin ER (MUCINEX) 600 MG TABLET SR 12 HR Take 600 mg by mouth every 12 hours.     • Multiple Vitamins-Minerals (PRESERVISION AREDS 2 PO) Take  by mouth.     • Probiotic Product (PROBIOTIC DAILY PO) Take  by mouth.       No current facility-administered medications on file prior to visit.       Allergies   Allergen Reactions   • Lisinopril      Cramps and upset stomach       Patient Active Problem List    Diagnosis Date Noted   • Sleep apnea in adult 09/28/2020   • Inflammation of soft tissue 09/28/2020   • Hypertriglyceridemia 09/28/2020   • Pain in left lower leg 08/07/2020   • Hematuria 08/07/2020   • Nocturnal oxygen desaturation 08/07/2020   • Microscopic hematuria 08/05/2020   • Lesion of bladder 08/05/2020   • Recurrent UTI 01/31/2020   • History of UTI 01/31/2020   • Drusen 10/08/2019   • Cystitis 06/12/2019   • Post-menopausal 05/01/2019   • Flu-like symptoms 04/03/2019   • Nasal congestion 04/03/2019   • Cough 04/03/2019   • Vertigo 04/03/2019   • Class 2 obesity in adult 03/13/2019   • Vision loss 08/15/2018   • Venous insufficiency 08/15/2018   •  Cold sore 03/29/2018   • Lichen sclerosus of female genitalia 02/05/2018   • Acquired hypothyroidism 06/23/2017   • Sleep related leg cramps 04/11/2017   • Chronic left-sided low back pain without sciatica 03/17/2017   • Lichen planus 03/17/2017   • History of breast cancer 03/17/2017   • Gout of foot 09/30/2016   • Idiopathic chronic gout of right ankle 09/30/2016   • Essential hypertension 09/30/2016   • Status post total right knee replacement 08/17/2016   • Dysuria 07/26/2016   • Toe pain, right 07/26/2016   • Cellulitis 07/26/2016   • Abdominal bloating 07/26/2016   • Celiac disease 07/26/2016   • Intertrigo 07/26/2016   • Osteoarthritis of right knee 07/26/2016   • Abnormal results of liver function studies 07/26/2016   • Hearing loss 07/26/2016   • Tinnitus 07/26/2016   • Dyslipidemia 07/26/2016   • H/O total hysterectomy 07/26/2016   • Hypothyroidism 07/26/2016   • Vitamin D deficiency 07/26/2016   • Osteoporosis 07/26/2016   • Hx of breast cancer 07/26/2016   • Lack of energy 05/05/2016   • Status post right knee replacement 05/05/2016   • Other specified postprocedural states 11/11/2014   • Acquired absence of both cervix and uterus 11/11/2014       Past Medical History:   Diagnosis Date   • Back pain    • Breast cancer (HCC)    • Chickenpox    • Constipation    • Cough    • Diarrhea    • Eczema    • Fatigue    • Frequent urination    • Hx of breast cancer 7/26/2016   • Hypertension    • Hypothyroidism 7/26/2016   • Influenza    • Lichen sclerosus of female genitalia    • Nasal drainage    • Obesity    • Osteopenia 7/26/2016   • Osteoporosis    • Overweight    • Rhinitis    • Ringing in ears    • Shortness of breath    • Sputum production    • Swelling of lower extremity    • Thyroid activity decreased    • Tonsillitis    • 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    • Vision loss    • Vitamin D deficiency 7/26/2016   • Whooping cough          OBJECTIVE:   /70   Pulse  "80   Temp 36.6 °C (97.9 °F)   Resp 12   Ht 1.499 m (4' 11\")   Wt 89.4 kg (197 lb)   SpO2 96%   BMI 39.79 kg/m²   General: Well-developed well-nourished female, no acute distress  Neck: supple, no lymphadenopathy- cervical or supraclavicular, no thyromegaly  Cardiovascular: regular rate and rhythm, no murmurs, gallops, rubs  Lungs: clear to auscultation bilaterally, no wheezes, crackles, or rhonchi  Abdomen: +bowel sounds, soft, nontender, nondistended, no rebound, no guarding, no hepatosplenomegaly  Extremities: no cyanosis, clubbing. Bilateral lateral ankle edema. No foot edema noted.  Varicosities of lower extremities noted bilaterally.  Foot exam: 2+ pedal pulses, no concerning lesions/wounds noted, sensation intact with 10 out of 10 monofilament test. Forefoot region medially with callus bilaterally.   Skin: Warm and dry  Psych: appropriate mood and affect    Labs from [facility].  See media.  Glucose 102, hemoglobin A1c 5.3%, TSH 4.28, apolipoprotein B 124.    ASSESSMENT/PLAN:    81 y.o.female with  hypothyroidism, hypercholesterolemia, hypertension, history of hematuria and low vitamin D levels.    1. Hypothyroidism, unspecified type  TSH WITH REFLEX TO FT4   2. Obesity (BMI 30-39.9)     3. Lack of energy     4. SOB (shortness of breath) on exertion     5. Physical deconditioning     6. Elevated ALT measurement     7. Elevated glucose     8. Tingling of both feet  VITAMIN B12    FOLATE   9. Ankle edema, bilateral     10. Lichen sclerosus of female genitalia     11. History of breast cancer     12. Abnormal laboratory test result     -Hypothyroidism-TSH on slightly upper end of normal.    Will adjust to Synthroid 175 mcg Monday, Wednesday, and Friday.  Synthroid 150 mcg on Sunday, Tuesday, Thursday and Saturday.  Recheck labs in 6 to 8 weeks.  -Obesity- Patient's body mass index is 39.79 kg/m². Exercise and nutrition counseling were performed at this visit.  We will adjust thyroid medication as above.  Patient " does not desire to log her food intake.  Patient will look into working with nutritionist.  Recommend routine exercise.  -Lack of energy-has been otherwise stable.  Previously discussed limiting activities.  Will adjust thyroid medications.  Continue to work on diet and exercise as weight as well his weight management. Monitor.   -Shortness of breath on exertion-intermittent with walking or climbing stairs.  Patient with some physical deconditioning and elevated BMI which is likely contributing.  Has seen pulmonary in past for evaluation.  Otherwise stable.  Continue work on weight loss and lifestyle habits.  Monitor.  Recommend follow-up with pulmonary.  -ALT elevated-improved on recent lab work.  -Elevated glucose-fasting.  Hemoglobin A1c in normal range.  Recommend low sugar/processed food diet and routine exercise.  Monitor in future.  -Tingling in both feet-may be multifactorial.  May be associated with thyroid disorder or elevated glucose levels.  Assess lab work with vitamin B12 and folate.  Patient recommended to examine feet regularly and wear appropriate footwear.  -Ankle edema bilateral-mild laterally.  May have DJD underlying.  Vascularity of lower legs noted as well.  Monitor.  Consider further imaging as needed.  -Lichen sclerosis-stable.  Topical therapy as needed.  Patient to follow-up if any worsening symptoms or changes.  -History of breast cancer-followed by Dr. Carver.  Patient will continue with routine imaging as recommended.  -Abnormal laboratory test result-elevated apolipoprotein B.   Reviewed labs and risk factors.     Return in about 3 months (around 12/22/2021).   Follow up sooner as needed.     This medical record contains text that has been entered with the assistance of computer voice recognition and dictation software.  Therefore, it may contain unintended errors in text, spelling, punctuation, or grammar.

## 2021-09-27 ENCOUNTER — TELEPHONE (OUTPATIENT)
Dept: MEDICAL GROUP | Facility: IMAGING CENTER | Age: 81
End: 2021-09-27

## 2021-09-30 ENCOUNTER — TELEPHONE (OUTPATIENT)
Dept: MEDICAL GROUP | Facility: IMAGING CENTER | Age: 81
End: 2021-09-30

## 2021-09-30 RX ORDER — LEVOTHYROXINE SODIUM 175 MCG
TABLET ORAL
Qty: 36 TABLET | Refills: 1 | Status: SHIPPED | OUTPATIENT
Start: 2021-09-30 | End: 2021-12-01 | Stop reason: SDUPTHER

## 2021-09-30 NOTE — TELEPHONE ENCOUNTER
Message received from pt that she needs brand synthroid sent to pharmacy for new dose. Reviewed with Dr. Pacheco. Verbal order to change rx to brand. New rx sent. Left pt voicemail.

## 2021-10-07 ENCOUNTER — TELEPHONE (OUTPATIENT)
Dept: MEDICAL GROUP | Facility: IMAGING CENTER | Age: 81
End: 2021-10-07

## 2021-10-07 NOTE — TELEPHONE ENCOUNTER
msg received from pt requesting refill of clobetasol ointment for vaginal burning. Returned call and notified pt via VM that rx was prescribed on 9/22 with one refill. Advised to contact pharmacy for refill and schedule appointment if symptoms don't resolve.     Pt also stated in message that she does have an appointment scheduled with the gynecologist for 12/17, which was the soonest available.

## 2021-11-24 NOTE — TELEPHONE ENCOUNTER
Vaccine Information Statement(s) or the Emergency Use Authorization was given today. This has been reviewed, questions answered, and verbal consent given by Patient for injection(s) and administration of Influenza (Inactivated).    Patient tolerated without incident. See immunization grid for documentation.         Left voicemail for pt to return my call to discuss results.

## 2021-11-30 NOTE — TELEPHONE ENCOUNTER
1. Caller Name: pt                      Call Back Number: [phone] (home)     2. Message: Patient called asking if she could just take her hydrochlorothiazide instead of the lisinopril, because she's been having a terrible effect in her throat.     3. Patient approves office to leave a detailed voicemail/MyChart message: N\A     Two sutures present. Increase lubrication.

## 2021-12-01 ENCOUNTER — OFFICE VISIT (OUTPATIENT)
Dept: MEDICAL GROUP | Facility: IMAGING CENTER | Age: 81
End: 2021-12-01
Payer: MEDICARE

## 2021-12-01 VITALS
OXYGEN SATURATION: 97 % | RESPIRATION RATE: 12 BRPM | TEMPERATURE: 98.2 F | DIASTOLIC BLOOD PRESSURE: 76 MMHG | HEART RATE: 76 BPM | BODY MASS INDEX: 39.51 KG/M2 | WEIGHT: 196 LBS | HEIGHT: 59 IN | SYSTOLIC BLOOD PRESSURE: 122 MMHG

## 2021-12-01 DIAGNOSIS — E03.9 HYPOTHYROIDISM, UNSPECIFIED TYPE: ICD-10-CM

## 2021-12-01 DIAGNOSIS — Z71.9 HEALTH EDUCATION/COUNSELING: ICD-10-CM

## 2021-12-01 DIAGNOSIS — B00.1 HERPES LABIALIS: ICD-10-CM

## 2021-12-01 DIAGNOSIS — M25.9 KNEE PROBLEM: ICD-10-CM

## 2021-12-01 DIAGNOSIS — R52 SORENESS-TYPE PAIN: ICD-10-CM

## 2021-12-01 DIAGNOSIS — E66.9 OBESITY (BMI 30-39.9): ICD-10-CM

## 2021-12-01 DIAGNOSIS — I10 ESSENTIAL HYPERTENSION: ICD-10-CM

## 2021-12-01 DIAGNOSIS — Z85.3 HISTORY OF BREAST CANCER: ICD-10-CM

## 2021-12-01 DIAGNOSIS — R09.82 POST-NASAL DRAINAGE: ICD-10-CM

## 2021-12-01 DIAGNOSIS — Z78.9 POLST (PHYSICIAN ORDERS FOR LIFE-SUSTAINING TREATMENT): ICD-10-CM

## 2021-12-01 PROCEDURE — 99214 OFFICE O/P EST MOD 30 MIN: CPT | Performed by: FAMILY MEDICINE

## 2021-12-01 RX ORDER — VALACYCLOVIR HYDROCHLORIDE 1 G/1
2000 TABLET, FILM COATED ORAL EVERY 12 HOURS
Qty: 20 TABLET | Refills: 0 | Status: SHIPPED | OUTPATIENT
Start: 2021-12-01

## 2021-12-01 RX ORDER — LEVOTHYROXINE SODIUM 175 MCG
TABLET ORAL
Qty: 36 TABLET | Refills: 1 | Status: SHIPPED | OUTPATIENT
Start: 2021-12-01 | End: 2022-07-15

## 2021-12-01 ASSESSMENT — PAIN SCALES - GENERAL: PAINLEVEL: 3=SLIGHT PAIN

## 2021-12-01 NOTE — PROGRESS NOTES
SUBJECTIVE:    Chief Complaint   Patient presents with   • Hypothyroidism     review labs   • Arm Pain     left, injured months ago, still having pain     HPI:     Flavia Garrett is a 81 y.o. female with hypothyroidism, hypercholesterolemia, hypertension, history of hematuria and low vitamin D levels     Hypothyroidism-synthroid 175 mcg 3 times a week, 4 days a week 150 mcg daily. Energy- better.  Overall doing well on current dose.  Needs medication sent to mail order.    Hypertension- losartan 50 mg daily.  Blood pressure has been stable.  Tolerating medication well.    Cold sores-uses medication as needed.  Needs refill.  Does not use often.  Has occasional outbreaks.  She is on Valtrex 2 g every 12 hours for 1 day as needed.  Tolerated medication well.    Arm Pain- left arm injury. Hit a fixture in the store in summer, continues to be sore in the area.  Does not hurt to move her arm.     Post nasal drip- throat clearing. Wondering about which medication otc to use.  Has nasal spray. Has sinus wash helps. Has air filter. Has not currently used.     POLST- son is here up from [place]. Has 3 sons. Would like to redo her POLST.  Working on trust, etc.     Asking about monoclonal antibody. No symptoms of illness.     Body mass index is 39.59 kg/m².  Declined vaccinations    Hx of right lumpectomy, breast cancer. Followed by oncology, Dr. Carver. Now sees as needed.  Shar cook/s scheduled, mammogram 6 months later.     Dr. Owen next week for knee replacement consultation.     ROS:  No recent fevers or chills. No nausea or vomiting. No diarrhea. No chest pains or shortness of breath. No lower extremity edema.    Current Outpatient Medications on File Prior to Visit   Medication Sig Dispense Refill   • SYNTHROID 175 MCG Tab Take 175 mcg on M,W,F. Take 150 mcg on Sun, T, Th and Sat. 36 Tablet 1   • clobetasol (TEMOVATE) 0.05 % Cream CLOBETASOL PROPIONATE 0.05 % CREA 30 g 1   • Cholecalciferol (VITAMIN D3 PO) Take  5,000 Units by mouth.     • losartan (COZAAR) 50 MG Tab Take 1 tablet by mouth every day. 90 tablet 3   • SYNTHROID 150 MCG Tab Take 1 tablet by mouth every morning on an empty stomach. (Patient taking differently: Take 150 mcg by mouth every morning on an empty stomach. 4 days a week) 90 tablet 3   • Omega-3 1000 MG Cap Take  by mouth.     • cyclobenzaprine (FLEXERIL) 5 MG tablet CYCLOBENZAPRINE HCL 5 MG TABS     • acetaminophen (TYLENOL) 500 MG Tab Take 500-1,000 mg by mouth every 6 hours as needed.     • diphenhydrAMINE-APAP, sleep, (TYLENOL PM EXTRA STRENGTH PO) Take  by mouth.     • OIL OF OREGANO PO Take  by mouth.     • MISC NATURAL PRODUCTS PO Take  by mouth.     • loratadine (CLARITIN) 10 MG Tab Take 10 mg by mouth every day.     • guaiFENesin ER (MUCINEX) 600 MG TABLET SR 12 HR Take 600 mg by mouth every 12 hours.     • Multiple Vitamins-Minerals (PRESERVISION AREDS 2 PO) Take  by mouth.     • Probiotic Product (PROBIOTIC DAILY PO) Take  by mouth.       No current facility-administered medications on file prior to visit.       Allergies   Allergen Reactions   • Lisinopril      Cramps and upset stomach       Patient Active Problem List    Diagnosis Date Noted   • Sleep apnea in adult 09/28/2020   • Inflammation of soft tissue 09/28/2020   • Hypertriglyceridemia 09/28/2020   • Pain in left lower leg 08/07/2020   • Hematuria 08/07/2020   • Nocturnal oxygen desaturation 08/07/2020   • Microscopic hematuria 08/05/2020   • Lesion of bladder 08/05/2020   • Recurrent UTI 01/31/2020   • History of UTI 01/31/2020   • Drusen 10/08/2019   • Cystitis 06/12/2019   • Post-menopausal 05/01/2019   • Flu-like symptoms 04/03/2019   • Nasal congestion 04/03/2019   • Cough 04/03/2019   • Vertigo 04/03/2019   • Class 2 obesity in adult 03/13/2019   • Vision loss 08/15/2018   • Venous insufficiency 08/15/2018   • Cold sore 03/29/2018   • Lichen sclerosus of female genitalia 02/05/2018   • Acquired hypothyroidism 06/23/2017   •  "Sleep related leg cramps 04/11/2017   • Chronic left-sided low back pain without sciatica 03/17/2017   • Lichen planus 03/17/2017   • History of breast cancer 03/17/2017   • Gout of foot 09/30/2016   • Idiopathic chronic gout of right ankle 09/30/2016   • Essential hypertension 09/30/2016   • Status post total right knee replacement 08/17/2016   • Dysuria 07/26/2016   • Toe pain, right 07/26/2016   • Cellulitis 07/26/2016   • Abdominal bloating 07/26/2016   • Celiac disease 07/26/2016   • Intertrigo 07/26/2016   • Osteoarthritis of right knee 07/26/2016   • Abnormal results of liver function studies 07/26/2016   • Hearing loss 07/26/2016   • Tinnitus 07/26/2016   • Dyslipidemia 07/26/2016   • H/O total hysterectomy 07/26/2016   • Hypothyroidism 07/26/2016   • Vitamin D deficiency 07/26/2016   • Osteoporosis 07/26/2016   • Hx of breast cancer 07/26/2016   • Lack of energy 05/05/2016   • Status post right knee replacement 05/05/2016   • Other specified postprocedural states 11/11/2014   • Acquired absence of both cervix and uterus 11/11/2014       Past Medical History:   Diagnosis Date   • Back pain    • Breast cancer (HCC)    • Chickenpox    • Constipation    • Cough    • Diarrhea    • Eczema    • Fatigue    • Frequent urination    • Hx of breast cancer 7/26/2016   • Hypertension    • Hypothyroidism 7/26/2016   • Influenza    • Lichen sclerosus of female genitalia    • Nasal drainage    • Obesity    • Osteopenia 7/26/2016   • Osteoporosis    • Overweight    • Rhinitis    • Ringing in ears    • Shortness of breath    • Sputum production    • Swelling of lower extremity    • Thyroid activity decreased    • Tonsillitis    • 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    • Vision loss    • Vitamin D deficiency 7/26/2016   • Whooping cough          OBJECTIVE:   /76   Pulse 76   Temp 36.8 °C (98.2 °F)   Resp 12   Ht 1.499 m (4' 11\")   Wt 88.9 kg (196 lb)   SpO2 97%   BMI 39.59 " kg/m²   General: Well-developed well-nourished female, no acute distress  Neck: supple, no lymphadenopathy- cervical or supraclavicular, no thyromegaly  Cardiovascular: regular rate and rhythm, no murmurs, gallops, rubs  Lungs: clear to auscultation bilaterally, no wheezes, crackles, or rhonchi  Abdomen: +bowel sounds, soft, nontender, nondistended, no rebound, no guarding, no hepatosplenomegaly  Extremities: no cyanosis, clubbing, edema.  Left lateral arm region at the insertion site of deltoid with slight tenderness to palpation, no edema/ecchymosis/erythema, normal strength, no pain with arm extension with resistance.  Skin: Warm and dry  Psych: appropriate mood and affect    Labs done at [facility].  11/24/2021  TSH 2.09  Vitamin B12 521  Folate 11.2    ASSESSMENT/PLAN:    81 y.o.female with hypothyroidism, hypercholesterolemia, hypertension, history of hematuria and low vitamin D levels.    1. Hypothyroidism, unspecified type  SYNTHROID 175 MCG Tab   2. Essential hypertension     3. Herpes labialis  valacyclovir (VALTREX) 1 GM Tab   4. Soreness-type pain     5. Post-nasal drainage     6. POLST (Physician Orders for Life-Sustaining Treatment)     7. Obesity (BMI 30-39.9)     8. History of breast cancer      History of right side lumpectomy.  Dr. Carver.  Has routine imaging with ultrasound and mammogram at [facility].  Requested records to be sent to office.   9. Knee problem     10. Health education/counseling     -Hypothyroidism-stable on current dose Synthroid 175 mcg 3 days a week and remaining 4 days 150 mcg.  Labs stable.  Improvement in energy levels.  Medication refilled.  We will continue to monitor.  -Hypertension-stable.  Recommend heart healthy diet and routine exercise as tolerated.  Continue on losartan 50 mg daily.  -Herpes labialis-intermittent symptoms.  Valtrex refilled to use as needed.  -Soreness type pain of left upper lateral arm area, appears of soft tissue.  Prior injury.   Recommend routine stretching and strengthening exercises.  Monitor.  Consider further imaging as needed if no further improvements.  -Postnasal drainage-recommend use nasal sinus wash, nasal corticosteroid spray as needed.  May consider use of Claritin as needed.  -POLST-reviewed form.  Patient given new form to fill out with her son.  Patient will bring back for review.  -Patient's body mass index is 39.59 kg/m². Exercise and nutrition counseling were performed at this visit.  -History of breast cancer-patient does have routine imaging completed at [facility].  Patient advised to have records sent to our office.  -Knee problem-patient has follow-up with orthopedics next week.  -Health education and counseling-recommend healthy diet and routine exercise.  Counseled on recommendations for immunizations which patient declined today.  Reviewed may consider monoclonal antibody therapy if patient does become infected with COVID-19.    Return in about 3 months (around 3/1/2022).   Follow-up sooner as needed.    This medical record contains text that has been entered with the assistance of computer voice recognition and dictation software.  Therefore, it may contain unintended errors in text, spelling, punctuation, or grammar.

## 2021-12-02 ENCOUNTER — TELEPHONE (OUTPATIENT)
Dept: MEDICAL GROUP | Facility: IMAGING CENTER | Age: 81
End: 2021-12-02

## 2021-12-02 NOTE — TELEPHONE ENCOUNTER
[facility] calling to clarify valtrex rx. Per Dr. Pacheco instructions are 2G twice a day for one day PRN.      Return call to : [phone] ref #[id number]

## 2021-12-06 ENCOUNTER — TELEPHONE (OUTPATIENT)
Dept: MEDICAL GROUP | Facility: IMAGING CENTER | Age: 81
End: 2021-12-06

## 2021-12-06 NOTE — TELEPHONE ENCOUNTER
Patient called left message, patient would like to review physician order for life sustaining treatment and has 7 days to complete the form and would like to review with Dr. Pacheco sometime this week.     Patient states its an update from the last one completed.

## 2021-12-28 ENCOUNTER — TELEPHONE (OUTPATIENT)
Dept: MEDICAL GROUP | Facility: IMAGING CENTER | Age: 81
End: 2021-12-28

## 2021-12-29 NOTE — TELEPHONE ENCOUNTER
Pt left msg to inquire about order for synthroid 175mcg to [facility]. Returned call and left msg for pt that it was sent in on 12/1/21. Advised pt to call back with any issues.

## 2022-03-02 ENCOUNTER — OFFICE VISIT (OUTPATIENT)
Dept: MEDICAL GROUP | Facility: IMAGING CENTER | Age: 82
End: 2022-03-02
Payer: MEDICARE

## 2022-03-02 VITALS
DIASTOLIC BLOOD PRESSURE: 80 MMHG | BODY MASS INDEX: 39.92 KG/M2 | HEIGHT: 59 IN | TEMPERATURE: 98.4 F | SYSTOLIC BLOOD PRESSURE: 132 MMHG | HEART RATE: 88 BPM | OXYGEN SATURATION: 96 % | WEIGHT: 198 LBS | RESPIRATION RATE: 16 BRPM

## 2022-03-02 DIAGNOSIS — L65.9 HAIR LOSS: ICD-10-CM

## 2022-03-02 DIAGNOSIS — Z96.651 STATUS POST RIGHT KNEE REPLACEMENT: ICD-10-CM

## 2022-03-02 DIAGNOSIS — F43.9 STRESS: ICD-10-CM

## 2022-03-02 DIAGNOSIS — M25.9 KNEE PROBLEM: ICD-10-CM

## 2022-03-02 DIAGNOSIS — I10 ESSENTIAL HYPERTENSION: ICD-10-CM

## 2022-03-02 DIAGNOSIS — Z87.448 HISTORY OF HEMATURIA: ICD-10-CM

## 2022-03-02 DIAGNOSIS — L60.3 NAIL BRITTLENESS: ICD-10-CM

## 2022-03-02 DIAGNOSIS — R63.5 WEIGHT GAIN: ICD-10-CM

## 2022-03-02 DIAGNOSIS — R73.09 ELEVATED GLUCOSE: ICD-10-CM

## 2022-03-02 DIAGNOSIS — Z11.59 SCREENING FOR VIRAL DISEASE: ICD-10-CM

## 2022-03-02 DIAGNOSIS — E78.1 HYPERTRIGLYCERIDEMIA: ICD-10-CM

## 2022-03-02 DIAGNOSIS — E03.9 ACQUIRED HYPOTHYROIDISM: ICD-10-CM

## 2022-03-02 DIAGNOSIS — Z91.81 HISTORY OF FALL: ICD-10-CM

## 2022-03-02 DIAGNOSIS — E78.5 DYSLIPIDEMIA: ICD-10-CM

## 2022-03-02 PROBLEM — R09.81 NASAL CONGESTION: Status: RESOLVED | Noted: 2019-04-03 | Resolved: 2022-03-02

## 2022-03-02 PROBLEM — N30.90 CYSTITIS: Status: RESOLVED | Noted: 2019-06-12 | Resolved: 2022-03-02

## 2022-03-02 PROBLEM — Z85.3 HISTORY OF BREAST CANCER: Status: RESOLVED | Noted: 2017-03-17 | Resolved: 2022-03-02

## 2022-03-02 PROBLEM — R05.9 COUGH: Status: RESOLVED | Noted: 2019-04-03 | Resolved: 2022-03-02

## 2022-03-02 PROBLEM — R68.89 FLU-LIKE SYMPTOMS: Status: RESOLVED | Noted: 2019-04-03 | Resolved: 2022-03-02

## 2022-03-02 PROCEDURE — 99214 OFFICE O/P EST MOD 30 MIN: CPT | Performed by: FAMILY MEDICINE

## 2022-03-02 ASSESSMENT — PAIN SCALES - GENERAL: PAINLEVEL: 3=SLIGHT PAIN

## 2022-03-02 NOTE — PROGRESS NOTES
SUBJECTIVE:    Chief Complaint   Patient presents with   • Requesting Labs   • Fall     Knee pain       HPI:     Flavia Garrett is a 82 y.o. female with hypothyroidism, hypercholesterolemia, hypertension, history of hematuria and low vitamin D levels here for request of lab work.     Hair falling out and nails brittle lately. Appetite increased, stress. Weight gain, eating more. Son was sick, some stressors.   Hypothyroidism-she is on Synthroid 175 mcg Monday, Wednesday and Friday.  Remaining days 150 mcg.  She will check with her mail-order pharmacy regarding refill.    Dyslipidemia /hypertriglyceridemia-plan to repeat lab work.    Hypertension-not checking her blood pressures regularly at home.  She is on losartan 50 mg daily.  Tolerates well.    Elevated glucose on prior lab work.  History of hematuria and UTI-no current issues.      States her foot got stuck on a speed bump with her shoe bottom and she fell onto her right knee which has been replaced.  Then she fell onto her left knee.  She was evaluated at [facility].  She does have a follow-up scheduled.  Fall happened on February 15.  Has been able to walk without significant pain symptoms.    Fit and strong on zoom. Will do [employer] progam.   Flexeril for sciatica.       ROS:  No recent fevers or chills. No nausea or vomiting. No diarrhea. No chest pains or shortness of breath. No lower extremity edema.    Current Outpatient Medications on File Prior to Visit   Medication Sig Dispense Refill   • MAGNESIUM PO Take  by mouth.     • SYNTHROID 175 MCG Tab Take 175 mcg on M,W,F. Take 150 mcg on Sun, T, Th and Sat. 36 Tablet 1   • clobetasol (TEMOVATE) 0.05 % Cream CLOBETASOL PROPIONATE 0.05 % CREA 30 g 1   • Cholecalciferol (VITAMIN D3 PO) Take 5,000 Units by mouth.     • losartan (COZAAR) 50 MG Tab Take 1 tablet by mouth every day. 90 tablet 3   • SYNTHROID 150 MCG Tab Take 1 tablet by mouth every morning on an empty stomach. (Patient taking  differently: Take 150 mcg by mouth every morning on an empty stomach. 4 days a week) 90 tablet 3   • Omega-3 1000 MG Cap Take  by mouth.     • cyclobenzaprine (FLEXERIL) 5 MG tablet CYCLOBENZAPRINE HCL 5 MG TABS     • acetaminophen (TYLENOL) 500 MG Tab Take 500-1,000 mg by mouth every 6 hours as needed.     • diphenhydrAMINE-APAP, sleep, (TYLENOL PM EXTRA STRENGTH PO) Take  by mouth.     • OIL OF OREGANO PO Take  by mouth.     • MISC NATURAL PRODUCTS PO Take  by mouth.     • loratadine (CLARITIN) 10 MG Tab Take 10 mg by mouth every day.     • guaiFENesin ER (MUCINEX) 600 MG TABLET SR 12 HR Take 600 mg by mouth every 12 hours.     • Multiple Vitamins-Minerals (PRESERVISION AREDS 2 PO) Take  by mouth.     • Probiotic Product (PROBIOTIC DAILY PO) Take  by mouth.     • valacyclovir (VALTREX) 1 GM Tab Take 2 Tablets by mouth every 12 hours. 2 g twice daily for one day. 20 Tablet 0     No current facility-administered medications on file prior to visit.       Allergies   Allergen Reactions   • Lisinopril      Cramps and upset stomach       Patient Active Problem List    Diagnosis Date Noted   • Sleep apnea in adult 09/28/2020   • Inflammation of soft tissue 09/28/2020   • Hypertriglyceridemia 09/28/2020   • Pain in left lower leg 08/07/2020   • Hematuria 08/07/2020   • Nocturnal oxygen desaturation 08/07/2020   • Microscopic hematuria 08/05/2020   • Lesion of bladder 08/05/2020   • Recurrent UTI 01/31/2020   • History of UTI 01/31/2020   • Drusen 10/08/2019   • Post-menopausal 05/01/2019   • Vertigo 04/03/2019   • Class 2 obesity in adult 03/13/2019   • Vision loss 08/15/2018   • Venous insufficiency 08/15/2018   • Cold sore 03/29/2018   • Lichen sclerosus of female genitalia 02/05/2018   • Acquired hypothyroidism 06/23/2017   • Sleep related leg cramps 04/11/2017   • Chronic left-sided low back pain without sciatica 03/17/2017   • Lichen planus 03/17/2017   • Essential hypertension 09/30/2016   • Status post total right  "knee replacement 08/17/2016   • Toe pain, right 07/26/2016   • Abdominal bloating 07/26/2016   • Celiac disease 07/26/2016   • Osteoarthritis of right knee 07/26/2016   • Abnormal results of liver function studies 07/26/2016   • Hearing loss 07/26/2016   • Tinnitus 07/26/2016   • Dyslipidemia 07/26/2016   • H/O total hysterectomy 07/26/2016   • Hypothyroidism 07/26/2016   • Vitamin D deficiency 07/26/2016   • Osteoporosis 07/26/2016   • Hx of breast cancer 07/26/2016   • Lack of energy 05/05/2016   • Status post right knee replacement 05/05/2016   • Acquired absence of both cervix and uterus 11/11/2014       Past Medical History:   Diagnosis Date   • Back pain    • Breast cancer (HCC)    • Chickenpox    • Constipation    • Cough    • Cystitis 6/12/2019   • Diarrhea    • Eczema    • Fatigue    • Frequent urination    • Gout of foot 9/30/2016   • Hx of breast cancer 7/26/2016   • Hypertension    • Hypothyroidism 7/26/2016   • Idiopathic chronic gout of right ankle 9/30/2016   • Influenza    • Lichen sclerosus of female genitalia    • Nasal drainage    • Obesity    • Osteopenia 7/26/2016   • Osteoporosis    • Overweight    • Rhinitis    • Ringing in ears    • Shortness of breath    • Sputum production    • Swelling of lower extremity    • Thyroid activity decreased    • Tonsillitis    • 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    • Vision loss    • Vitamin D deficiency 7/26/2016   • Whooping cough        OBJECTIVE:   /80   Pulse 88   Temp 36.9 °C (98.4 °F)   Resp 16   Ht 1.499 m (4' 11\")   Wt 89.8 kg (198 lb)   SpO2 96%   BMI 39.99 kg/m²   General: Well-developed well-nourished female, no acute distress  Neck: supple, no lymphadenopathy- cervical or supraclavicular, no thyromegaly  Cardiovascular: regular rate and rhythm, no murmurs, gallops, rubs  Lungs: clear to auscultation bilaterally, no wheezes, crackles, or rhonchi  Abdomen: +bowel sounds, soft, nontender, " nondistended, no rebound, no guarding, no hepatosplenomegaly  Extremities: no cyanosis, clubbing, edema. Right knee well healed surgical scar, no effusion/ecchymosis or erythema.  Adequate range of motion of knees.  Skin: Warm and dry  Psych: appropriate mood and affect        ASSESSMENT/PLAN:    82 y.o. female  with hypothyroidism, hypercholesterolemia, hypertension, history of hematuria and low vitamin D levels.    1. Acquired hypothyroidism  TSH WITH REFLEX TO FT4   2. Hair loss  TSH WITH REFLEX TO FT4   3. Nail brittleness  TSH WITH REFLEX TO FT4   4. Weight gain  TSH WITH REFLEX TO FT4   5. Stress     6. Dyslipidemia  Lipid Profile   7. Hypertriglyceridemia  Lipid Profile   8. Essential hypertension  CBC WITH DIFFERENTIAL   9. Elevated glucose  HEMOGLOBIN A1C    Comp Metabolic Panel   10. History of hematuria  URINALYSIS,CULTURE IF INDICATED   11. Knee problem  VITAMIN D,25 HYDROXY   12. History of fall     13. Status post right knee replacement     14. Screening for viral disease  COVID-19 IgG, Qual   -Hypothyroidism-notes some hair, nail and weight changes.  Will repeat lab work.  Monitor.  -Stress-above symptoms may be associated with recent stress.  Recommend working on routine stress management.  Monitor.  -Dyslipidemia/hypertriglyceridemia-has been diet controlled.  Repeat labs.  Monitor.  Recommend low-cholesterol, high-fiber diet and routine exercise.  -Hypertension-stable in clinic.  Does not check regularly at home.  Continue losartan 50 mg daily.  Will continue to monitor.  Assess lab work.  -Elevated glucose-recommend healthy diet and routine exercise.  Monitor labs.  -History of hematuria-with history of UTI.  Assess urine.  -Knee problem-due to recent history of fall onto right knee and then to left.  History of right knee replacement.  Did have assessment at [facility].  Currently stable.    Recommend she follow-up with orthopedics as scheduled.    Return in about 2 weeks (around 3/16/2022).   Follow-up  after lab work is complete.    This medical record contains text that has been entered with the assistance of computer voice recognition and dictation software.  Therefore, it may contain unintended errors in text, spelling, punctuation, or grammar.

## 2022-03-15 LAB — HBA1C MFR BLD: 5.2 % (ref 0–5.6)

## 2022-04-07 ENCOUNTER — OFFICE VISIT (OUTPATIENT)
Dept: MEDICAL GROUP | Facility: IMAGING CENTER | Age: 82
End: 2022-04-07
Payer: MEDICARE

## 2022-04-07 VITALS
RESPIRATION RATE: 12 BRPM | WEIGHT: 198 LBS | HEART RATE: 86 BPM | HEIGHT: 59 IN | DIASTOLIC BLOOD PRESSURE: 90 MMHG | SYSTOLIC BLOOD PRESSURE: 138 MMHG | TEMPERATURE: 98.4 F | BODY MASS INDEX: 39.92 KG/M2 | OXYGEN SATURATION: 96 %

## 2022-04-07 DIAGNOSIS — E03.9 ACQUIRED HYPOTHYROIDISM: ICD-10-CM

## 2022-04-07 DIAGNOSIS — E78.5 DYSLIPIDEMIA: ICD-10-CM

## 2022-04-07 DIAGNOSIS — F43.29 STRESS AND ADJUSTMENT REACTION: ICD-10-CM

## 2022-04-07 DIAGNOSIS — R82.90 ABNORMAL URINALYSIS: ICD-10-CM

## 2022-04-07 DIAGNOSIS — R73.01 ELEVATED FASTING GLUCOSE: ICD-10-CM

## 2022-04-07 PROCEDURE — 99214 OFFICE O/P EST MOD 30 MIN: CPT | Performed by: FAMILY MEDICINE

## 2022-04-07 NOTE — PROGRESS NOTES
SUBJECTIVE:    Chief Complaint   Patient presents with   • Hypothyroidism     Review lab work    • Stress     Family member with suicide attempt       HPI:     Flavia Garrett is a 82 y.o. female with hypothyroidism, hypercholesterolemia, hypertension, history of hematuria and low vitamin D levels here for follow-up of lab results.  Lab work done at [facility], see media.    Hypothyroidism-TSH within good range.  She is on Synthroid 175 mcg Monday, Wednesday and Friday.  Synthroid 150 mcg on Sunday, Tuesday, Thursday and Saturday.  Has tolerated well.    Glucose 100-slightly elevated.  Does not desire to make further dietary changes at this time.    Hypercholesterolemia- does not desire further changes.   Takes omega 3 1000 mg daily.  Does not feel she could take that more than once a day currently.  Has been watching her diet.  Eats gluten-free.  Does not desire further dietary changes, medication or supplements.  She would like to just maintain at this time.    Patient does have a history of hematuria on urinalysis.  Currently denies any dysuria or hematuria.    Hypertension-blood pressure has been sporadic and varies.  At times may go up to 150.  She is on losartan 50 mg daily which she tolerates well.  Blood pressure today stable.  Has been under some stressors.  Her daughter in law try to commit suicide and is currently in a rehab center in [place] and is having a difficult time.  Patient has felt upset about this.  Patient denies SI/HI.      ROS:  No recent fevers or chills. No nausea or vomiting. No diarrhea. No chest pains or shortness of breath. No lower extremity edema.    Current Outpatient Medications on File Prior to Visit   Medication Sig Dispense Refill   • MAGNESIUM PO Take  by mouth.     • valacyclovir (VALTREX) 1 GM Tab Take 2 Tablets by mouth every 12 hours. 2 g twice daily for one day. 20 Tablet 0   • SYNTHROID 175 MCG Tab Take 175 mcg on M,W,F. Take 150 mcg on Sun, T, Th and Sat. 36 Tablet 1    • clobetasol (TEMOVATE) 0.05 % Cream CLOBETASOL PROPIONATE 0.05 % CREA 30 g 1   • Cholecalciferol (VITAMIN D3 PO) Take 5,000 Units by mouth.     • losartan (COZAAR) 50 MG Tab Take 1 tablet by mouth every day. 90 tablet 3   • SYNTHROID 150 MCG Tab Take 1 tablet by mouth every morning on an empty stomach. (Patient taking differently: Take 150 mcg by mouth every morning on an empty stomach. 4 days a week) 90 tablet 3   • Omega-3 1000 MG Cap Take  by mouth.     • cyclobenzaprine (FLEXERIL) 5 MG tablet CYCLOBENZAPRINE HCL 5 MG TABS     • acetaminophen (TYLENOL) 500 MG Tab Take 500-1,000 mg by mouth every 6 hours as needed.     • diphenhydrAMINE-APAP, sleep, (TYLENOL PM EXTRA STRENGTH PO) Take  by mouth.     • OIL OF OREGANO PO Take  by mouth.     • MISC NATURAL PRODUCTS PO Take  by mouth.     • loratadine (CLARITIN) 10 MG Tab Take 10 mg by mouth every day.     • guaiFENesin ER (MUCINEX) 600 MG TABLET SR 12 HR Take 600 mg by mouth every 12 hours.     • Multiple Vitamins-Minerals (PRESERVISION AREDS 2 PO) Take  by mouth.     • Probiotic Product (PROBIOTIC DAILY PO) Take  by mouth.       No current facility-administered medications on file prior to visit.       Allergies   Allergen Reactions   • Lisinopril      Cramps and upset stomach       Patient Active Problem List    Diagnosis Date Noted   • Sleep apnea in adult 09/28/2020   • Inflammation of soft tissue 09/28/2020   • Hypertriglyceridemia 09/28/2020   • Pain in left lower leg 08/07/2020   • Hematuria 08/07/2020   • Nocturnal oxygen desaturation 08/07/2020   • Microscopic hematuria 08/05/2020   • Lesion of bladder 08/05/2020   • Recurrent UTI 01/31/2020   • History of UTI 01/31/2020   • Drusen 10/08/2019   • Post-menopausal 05/01/2019   • Vertigo 04/03/2019   • Class 2 obesity in adult 03/13/2019   • Vision loss 08/15/2018   • Venous insufficiency 08/15/2018   • Cold sore 03/29/2018   • Lichen sclerosus of female genitalia 02/05/2018   • Acquired hypothyroidism  "06/23/2017   • Sleep related leg cramps 04/11/2017   • Chronic left-sided low back pain without sciatica 03/17/2017   • Lichen planus 03/17/2017   • Essential hypertension 09/30/2016   • Status post total right knee replacement 08/17/2016   • Toe pain, right 07/26/2016   • Abdominal bloating 07/26/2016   • Celiac disease 07/26/2016   • Osteoarthritis of right knee 07/26/2016   • Abnormal results of liver function studies 07/26/2016   • Hearing loss 07/26/2016   • Tinnitus 07/26/2016   • Dyslipidemia 07/26/2016   • H/O total hysterectomy 07/26/2016   • Hypothyroidism 07/26/2016   • Vitamin D deficiency 07/26/2016   • Osteoporosis 07/26/2016   • Hx of breast cancer 07/26/2016   • Lack of energy 05/05/2016   • Status post right knee replacement 05/05/2016   • Acquired absence of both cervix and uterus 11/11/2014       Past Medical History:   Diagnosis Date   • Back pain    • Breast cancer (HCC)    • Chickenpox    • Constipation    • Cough    • Cystitis 6/12/2019   • Diarrhea    • Eczema    • Fatigue    • Frequent urination    • Gout of foot 9/30/2016   • Hx of breast cancer 7/26/2016   • Hypertension    • Hypothyroidism 7/26/2016   • Idiopathic chronic gout of right ankle 9/30/2016   • Influenza    • Lichen sclerosus of female genitalia    • Nasal drainage    • Obesity    • Osteopenia 7/26/2016   • Osteoporosis    • Overweight    • Rhinitis    • Ringing in ears    • Shortness of breath    • Sputum production    • Swelling of lower extremity    • Thyroid activity decreased    • Tonsillitis    • 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    • Vision loss    • Vitamin D deficiency 7/26/2016   • Whooping cough        OBJECTIVE:   /90   Pulse 86   Temp 36.9 °C (98.4 °F)   Resp 12   Ht 1.499 m (4' 11\")   Wt 89.8 kg (198 lb)   SpO2 96%   BMI 39.99 kg/m²   General: Well-developed well-nourished female, no acute distress  Neck: supple, no lymphadenopathy- cervical or supraclavicular, " no thyromegaly  Cardiovascular: regular rate and rhythm, no murmurs, gallops, rubs  Lungs: clear to auscultation bilaterally, no wheezes, crackles, or rhonchi  Abdomen: +bowel sounds, soft, nontender, nondistended, no rebound, no guarding, no hepatosplenomegaly  Extremities: no cyanosis, clubbing, edema  Skin: Warm and dry  Psych: appropriate mood and affect    Cholesterol: HDL 39, triglycerides 258, , total cholesterol 196  Glucose 100, remaining CMP labs within normal range, hemoglobin A1c 5.2%.  CBC within normal range.  TSH 1.98, SARS-CoV-2 antibody IgG less than 1.00.    ASSESSMENT/PLAN:    82 y.o. female with hypothyroidism, hypercholesterolemia, hypertension, history of hematuria and low vitamin D levels.      1. Acquired hypothyroidism     2. Dyslipidemia     3. Abnormal urinalysis  URINALYSIS,CULTURE IF INDICATED   4. Elevated fasting glucose     5. Stress and adjustment reaction     -Hypothyroidism-stable labs.  Continue current medication.  Monitor in future.  -Dyslipidemia-patient does not desire any further changes in diet or lifestyle.  Does not desire supplements or medication therapies at this time.  Reviewed recommendations for low-cholesterol, high-fiber diet and routine exercise.  We will continue to monitor at this time.  -Abnormal urinalysis in past.  Recheck urine.  -Elevated fasting glucose-recommend low sugar/low processed food diet and routine exercise.  Monitor in future.  -Stress and adjustment reaction-with recent stressors.  Counseled on management of symptoms.  Recommend consider counseling therapy.    Return in about 3 months (around 7/7/2022).    This medical record contains text that has been entered with the assistance of computer voice recognition and dictation software.  Therefore, it may contain unintended errors in text, spelling, punctuation, or grammar.

## 2022-07-22 ENCOUNTER — OFFICE VISIT (OUTPATIENT)
Dept: MEDICAL GROUP | Facility: IMAGING CENTER | Age: 82
End: 2022-07-22
Payer: MEDICARE

## 2022-07-22 VITALS
DIASTOLIC BLOOD PRESSURE: 76 MMHG | HEIGHT: 59 IN | TEMPERATURE: 97.2 F | HEART RATE: 79 BPM | SYSTOLIC BLOOD PRESSURE: 140 MMHG | WEIGHT: 192 LBS | BODY MASS INDEX: 38.71 KG/M2 | OXYGEN SATURATION: 97 %

## 2022-07-22 DIAGNOSIS — M25.551 RIGHT HIP PAIN: ICD-10-CM

## 2022-07-22 DIAGNOSIS — R73.01 ELEVATED FASTING GLUCOSE: ICD-10-CM

## 2022-07-22 DIAGNOSIS — E03.9 ACQUIRED HYPOTHYROIDISM: ICD-10-CM

## 2022-07-22 DIAGNOSIS — Z87.898 HISTORY OF DIARRHEA: ICD-10-CM

## 2022-07-22 DIAGNOSIS — S70.01XA CONTUSION OF RIGHT HIP, INITIAL ENCOUNTER: ICD-10-CM

## 2022-07-22 DIAGNOSIS — Z86.39 HISTORY OF VITAMIN D DEFICIENCY: ICD-10-CM

## 2022-07-22 DIAGNOSIS — I10 ESSENTIAL HYPERTENSION: ICD-10-CM

## 2022-07-22 DIAGNOSIS — E78.5 DYSLIPIDEMIA: ICD-10-CM

## 2022-07-22 DIAGNOSIS — Z91.81 HISTORY OF FALL: ICD-10-CM

## 2022-07-22 PROCEDURE — 99214 OFFICE O/P EST MOD 30 MIN: CPT | Performed by: FAMILY MEDICINE

## 2022-07-22 ASSESSMENT — PATIENT HEALTH QUESTIONNAIRE - PHQ9: CLINICAL INTERPRETATION OF PHQ2 SCORE: 0

## 2022-07-22 NOTE — PROGRESS NOTES
SUBJECTIVE:    Chief Complaint   Patient presents with   • Follow-Up       HPI:     Flavia Garrett is a 82 y.o. female  with hypothyroidism, hypercholesterolemia, hypertension, history of hematuria and low vitamin D levels here for routine follow-up of medical issues.    Hypertension-BP elevated today. 120/77 at home.   BP medication evening losartan 50 mg daily, tolerates well.    Hypothyroidism-150 mcg Sun, T, TH and Sat. With 175 mcg M,W,F alternating.     Dyslipidemia- not on medication.  Currently diet controlled.    Elevated fasting glucose-last labs March 2022.  Does note a history of vitamin D deficiency in the past.    Went to trip to [place]- no fruits/vegetables. Thus was constipated.   Then got over heated-had heat exhaustion had fatigue. Then had bowel movement, then diarrhea for 5 days and some cough-dry.  Uncertain if it was related to having heat exhaustion. Her symptoms have since improved.    Right hip- issues. 7/9/22- fell 4 times that day, had sun dress on and was walking funny per friends. And possibly tripping on her long dress.   Hit corner step, taking off sandals to go dance on grass.   Right posterior and and shins sore. Feet tingling.  Fit and strong classes.   Had labs in March.         ROS:  No recent fevers or chills. No nausea or vomiting. No diarrhea. No chest pains or shortness of breath. No lower extremity edema.    Current Outpatient Medications on File Prior to Visit   Medication Sig Dispense Refill   • SYNTHROID 150 MCG Tab TAKE 1 TABLET 175 MCG ON MONDAY, WEDNESDAY, FRIDAY. TAKE 150MCG ON Sunday, TUESDAY, THURSDAY, and Saturday. 48 Tablet 3   • SYNTHROID 175 MCG Tab TAKE 1 TABLET 175 MCG ON MONDAY, WEDNESDAY, FRIDAY. TAKE 150MCG ON Sunday, TUESDAY, THURSDAY, and Saturday. 36 Tablet 3   • losartan (COZAAR) 50 MG Tab TAKE 1 TABLET DAILY 90 Tablet 3   • MAGNESIUM PO Take  by mouth.     • valacyclovir (VALTREX) 1 GM Tab Take 2 Tablets by mouth every 12 hours. 2 g twice  daily for one day. 20 Tablet 0   • clobetasol (TEMOVATE) 0.05 % Cream CLOBETASOL PROPIONATE 0.05 % CREA 30 g 1   • Cholecalciferol (VITAMIN D3 PO) Take 5,000 Units by mouth.     • Omega-3 1000 MG Cap Take  by mouth.     • cyclobenzaprine (FLEXERIL) 5 MG tablet CYCLOBENZAPRINE HCL 5 MG TABS     • acetaminophen (TYLENOL) 500 MG Tab Take 500-1,000 mg by mouth every 6 hours as needed.     • diphenhydrAMINE-APAP, sleep, (TYLENOL PM EXTRA STRENGTH PO) Take  by mouth.     • OIL OF OREGANO PO Take  by mouth.     • MISC NATURAL PRODUCTS PO Take  by mouth.     • loratadine (CLARITIN) 10 MG Tab Take 10 mg by mouth every day.     • guaiFENesin ER (MUCINEX) 600 MG TABLET SR 12 HR Take 600 mg by mouth every 12 hours.     • Multiple Vitamins-Minerals (PRESERVISION AREDS 2 PO) Take  by mouth.     • Probiotic Product (PROBIOTIC DAILY PO) Take  by mouth.       No current facility-administered medications on file prior to visit.       Allergies   Allergen Reactions   • Lisinopril      Cramps and upset stomach       Patient Active Problem List    Diagnosis Date Noted   • Sleep apnea in adult 09/28/2020   • Inflammation of soft tissue 09/28/2020   • Hypertriglyceridemia 09/28/2020   • Pain in left lower leg 08/07/2020   • Hematuria 08/07/2020   • Nocturnal oxygen desaturation 08/07/2020   • Microscopic hematuria 08/05/2020   • Lesion of bladder 08/05/2020   • Recurrent UTI 01/31/2020   • History of UTI 01/31/2020   • Drusen 10/08/2019   • Post-menopausal 05/01/2019   • Vertigo 04/03/2019   • Class 2 obesity in adult 03/13/2019   • Vision loss 08/15/2018   • Venous insufficiency 08/15/2018   • Cold sore 03/29/2018   • Lichen sclerosus of female genitalia 02/05/2018   • Acquired hypothyroidism 06/23/2017   • Sleep related leg cramps 04/11/2017   • Chronic left-sided low back pain without sciatica 03/17/2017   • Lichen planus 03/17/2017   • Essential hypertension 09/30/2016   • Status post total right knee replacement 08/17/2016   • Toe  "pain, right 07/26/2016   • Abdominal bloating 07/26/2016   • Celiac disease 07/26/2016   • Osteoarthritis of right knee 07/26/2016   • Abnormal results of liver function studies 07/26/2016   • Hearing loss 07/26/2016   • Tinnitus 07/26/2016   • Dyslipidemia 07/26/2016   • H/O total hysterectomy 07/26/2016   • Hypothyroidism 07/26/2016   • Vitamin D deficiency 07/26/2016   • Osteoporosis 07/26/2016   • Hx of breast cancer 07/26/2016   • Lack of energy 05/05/2016   • Status post right knee replacement 05/05/2016   • Acquired absence of both cervix and uterus 11/11/2014       Past Medical History:   Diagnosis Date   • Back pain    • Breast cancer (HCC)    • Chickenpox    • Constipation    • Cough    • Cystitis 6/12/2019   • Diarrhea    • Eczema    • Fatigue    • Frequent urination    • Gout of foot 9/30/2016   • Hx of breast cancer 7/26/2016   • Hypertension    • Hypothyroidism 7/26/2016   • Idiopathic chronic gout of right ankle 9/30/2016   • Influenza    • Lichen sclerosus of female genitalia    • Nasal drainage    • Obesity    • Osteopenia 7/26/2016   • Osteoporosis    • Overweight    • Rhinitis    • Ringing in ears    • Shortness of breath    • Sputum production    • Swelling of lower extremity    • Thyroid activity decreased    • Tonsillitis    • 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    • Vision loss    • Vitamin D deficiency 7/26/2016   • Whooping cough          OBJECTIVE:   BP (!) 140/76 (BP Location: Left arm, Patient Position: Sitting, BP Cuff Size: Adult)   Pulse 79   Temp 36.2 °C (97.2 °F) (Temporal)   Ht 1.499 m (4' 11\")   Wt 87.1 kg (192 lb)   SpO2 97%   BMI 38.78 kg/m²   General: Well-developed well-nourished female, no acute distress  Neck: supple, no lymphadenopathy- cervical or supraclavicular, no thyromegaly  Cardiovascular: regular rate and rhythm, no murmurs, gallops, rubs  Lungs: clear to auscultation bilaterally, no wheezes, crackles, or rhonchi  Abdomen: " +bowel sounds, soft, nontender, nondistended, no rebound, no guarding, no hepatosplenomegaly  Extremities: no cyanosis, clubbing, edema  Right posterior medial hip/buttock area with ecchymosis and TTP.  Hip with adequate range of motion, appears to have normal strength, no pain with internal rotation or axial load.  Bilateral knee with TTP.   Patient with slight antalgic gait.  Skin: Warm and dry  Psych: appropriate mood and affect      ASSESSMENT/PLAN:    82 y.o. female  with hypothyroidism, hypercholesterolemia, hypertension, history of hematuria and low vitamin D.     1. Essential hypertension-BP appears better controlled at home, slightly elevated in office compared to home numbers.  Continue to monitor at this time.  May bring in home blood pressure cuff for comparison.  Continue losartan 50 mg daily, consider increasing dose as needed.    2. Acquired hypothyroidism -stable.  Overall doing well.    Continue current medication.  Monitor labs in future. TSH WITH REFLEX TO FT4   3. Dyslipidemia -diet controlled.  Low-cholesterol, high-fiber diet and routine exercise recommended as tolerated.  Monitor labs in future. Comp Metabolic Panel    Lipid Profile   4. Elevated fasting glucose   Recommend low sugar/low processed food diet and routine exercise.  Monitor labs in future. Comp Metabolic Panel    HEMOGLOBIN A1C   5. History of diarrhea -appears improved at this time.  Monitor.  Follow-up as needed.    6. History of vitamin D deficiency  VITAMIN D,25 HYDROXY   7. Right hip pain -will obtain x-ray and further assess for DJD.  Recommend physical therapy once x-ray is complete. DX-HIP-UNILATERAL-WITH PELVIS-1 VIEW RIGHT    Referral to Physical Therapy      Referral to Physical Therapy   8. Contusion of right hip, initial encounter -posterior. Appears healing.     9. History of fall -appears due to wearing long dress and mis-stepping.   Recommend fall precautions.       Follow up in 3 months.     This medical record  contains text that has been entered with the assistance of computer voice recognition and dictation software.  Therefore, it may contain unintended errors in text, spelling, punctuation, or grammar.

## 2022-07-23 ENCOUNTER — HOSPITAL ENCOUNTER (OUTPATIENT)
Dept: RADIOLOGY | Facility: MEDICAL CENTER | Age: 82
End: 2022-07-23
Attending: FAMILY MEDICINE
Payer: MEDICARE

## 2022-07-23 DIAGNOSIS — M25.551 RIGHT HIP PAIN: ICD-10-CM

## 2022-07-23 PROCEDURE — 73501 X-RAY EXAM HIP UNI 1 VIEW: CPT | Mod: RT

## 2022-07-28 ENCOUNTER — TELEPHONE (OUTPATIENT)
Dept: MEDICAL GROUP | Facility: IMAGING CENTER | Age: 82
End: 2022-07-28
Payer: MEDICARE

## 2022-07-28 DIAGNOSIS — M25.551 RIGHT HIP PAIN: ICD-10-CM

## 2022-07-28 DIAGNOSIS — R93.89 ABNORMAL X-RAY: ICD-10-CM

## 2022-07-28 NOTE — TELEPHONE ENCOUNTER
Please let pt know there is irregularity of her right hip xray, likely from old trauma.   I will refer her to orthopedics.

## 2022-07-29 ENCOUNTER — HOSPITAL ENCOUNTER (OUTPATIENT)
Facility: MEDICAL CENTER | Age: 82
End: 2022-07-29
Attending: PHYSICIAN ASSISTANT
Payer: MEDICARE

## 2022-07-29 ENCOUNTER — OFFICE VISIT (OUTPATIENT)
Dept: URGENT CARE | Facility: CLINIC | Age: 82
End: 2022-07-29
Payer: MEDICARE

## 2022-07-29 VITALS
RESPIRATION RATE: 16 BRPM | TEMPERATURE: 97.9 F | DIASTOLIC BLOOD PRESSURE: 86 MMHG | SYSTOLIC BLOOD PRESSURE: 144 MMHG | HEIGHT: 59 IN | HEART RATE: 86 BPM | OXYGEN SATURATION: 97 % | WEIGHT: 194.4 LBS | BODY MASS INDEX: 39.19 KG/M2

## 2022-07-29 DIAGNOSIS — M70.61 GREATER TROCHANTERIC BURSITIS OF RIGHT HIP: ICD-10-CM

## 2022-07-29 DIAGNOSIS — N30.01 ACUTE CYSTITIS WITH HEMATURIA: ICD-10-CM

## 2022-07-29 DIAGNOSIS — M16.11 PRIMARY OSTEOARTHRITIS OF RIGHT HIP: ICD-10-CM

## 2022-07-29 LAB
APPEARANCE UR: CLEAR
BILIRUB UR STRIP-MCNC: NORMAL MG/DL
COLOR UR AUTO: YELLOW
GLUCOSE UR STRIP.AUTO-MCNC: NORMAL MG/DL
KETONES UR STRIP.AUTO-MCNC: NORMAL MG/DL
LEUKOCYTE ESTERASE UR QL STRIP.AUTO: NORMAL
NITRITE UR QL STRIP.AUTO: NORMAL
PH UR STRIP.AUTO: 5.5 [PH] (ref 5–8)
PROT UR QL STRIP: NORMAL MG/DL
RBC UR QL AUTO: NORMAL
SP GR UR STRIP.AUTO: 1.02
UROBILINOGEN UR STRIP-MCNC: 0.2 MG/DL

## 2022-07-29 PROCEDURE — 99213 OFFICE O/P EST LOW 20 MIN: CPT | Performed by: PHYSICIAN ASSISTANT

## 2022-07-29 PROCEDURE — 99000 SPECIMEN HANDLING OFFICE-LAB: CPT | Performed by: PHYSICIAN ASSISTANT

## 2022-07-29 PROCEDURE — 81002 URINALYSIS NONAUTO W/O SCOPE: CPT | Performed by: PHYSICIAN ASSISTANT

## 2022-07-29 PROCEDURE — 87086 URINE CULTURE/COLONY COUNT: CPT

## 2022-07-29 RX ORDER — ESTRADIOL 0.1 MG/G
CREAM VAGINAL
COMMUNITY
Start: 2022-05-11

## 2022-07-29 ASSESSMENT — ENCOUNTER SYMPTOMS
CONSTITUTIONAL NEGATIVE: 1
RESPIRATORY NEGATIVE: 1
GASTROINTESTINAL NEGATIVE: 1
FLANK PAIN: 0
EYES NEGATIVE: 1
NEUROLOGICAL NEGATIVE: 1
CARDIOVASCULAR NEGATIVE: 1

## 2022-07-29 NOTE — PROGRESS NOTES
"  Subjective:     Flavia Garrett  is a 82 y.o. female who presents for RLQ Pain (\"Amy had an uncomfortable feeling in my right groin area for several months. I fell on my right side on 7/10. Seems like the fall exacerbate the pain. Also been having urinary frequency\"  )       She presents today with right-sided hip pain times several months.  This pain is worsened with activity, ambulation, and laying directly on the point of the right hip.  She does note a fall on 7/10, during that fall she did strike her right buttock.  She was evaluated by her PCP after this incident and radiographic imaging at that time was negative.  She notes a previous appendectomy as a child.  She denies low back pain, changes in bowel habits, changes in appetite, fever/chills/sweats    She does note increase and urinary frequency and nocturia over the last few weeks.  Denies dysuria.  Does have a history of blood in urine, referral to urology has been placed for this, but the patient has not obtained this urology evaluation.  She does have a history of UTIs in the past.     Review of Systems   Constitutional: Negative.    HENT: Negative.    Eyes: Negative.    Respiratory: Negative.    Cardiovascular: Negative.    Gastrointestinal: Negative.    Genitourinary: Positive for frequency and hematuria. Negative for dysuria, flank pain and urgency.   Musculoskeletal: Positive for joint pain.   Neurological: Negative.       Allergies   Allergen Reactions   • Lisinopril      Cramps and upset stomach     Past Medical History:   Diagnosis Date   • Back pain    • Breast cancer (HCC)    • Chickenpox    • Constipation    • Cough    • Cystitis 6/12/2019   • Diarrhea    • Eczema    • Fatigue    • Frequent urination    • Gout of foot 9/30/2016   • Hx of breast cancer 7/26/2016   • Hypertension    • Hypothyroidism 7/26/2016   • Idiopathic chronic gout of right ankle 9/30/2016   • Influenza    • Lichen sclerosus of female genitalia    • Nasal drainage  " "  • Obesity    • Osteopenia 7/26/2016   • Osteoporosis    • Overweight    • Rhinitis    • Ringing in ears    • Shortness of breath    • Sputum production    • Swelling of lower extremity    • Thyroid activity decreased    • Tonsillitis    • 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   Constitutional:       General: She is not in acute distress.     Appearance: Normal appearance. She is not ill-appearing, toxic-appearing or diaphoretic.   HENT:      Head: Normocephalic.      Right Ear: Tympanic membrane, ear canal and external ear normal. There is no impacted cerumen.      Left Ear: Tympanic membrane, ear canal and external ear normal. There is no impacted cerumen.      Nose: No congestion or rhinorrhea.      Mouth/Throat:      Mouth: Mucous membranes are moist.      Pharynx: No oropharyngeal exudate or posterior oropharyngeal erythema.   Eyes:      General: No scleral icterus.        Right eye: No discharge.         Left eye: No discharge.      Conjunctiva/sclera: Conjunctivae normal.   Cardiovascular:      Rate and Rhythm: Normal rate and regular rhythm.   Pulmonary:      Effort: Pulmonary effort is normal. No respiratory distress.      Breath sounds: Normal breath sounds. No stridor. No wheezing or rhonchi.   Abdominal:      General: Abdomen is flat. Bowel sounds are normal. There is no distension.      Palpations: Abdomen is soft.      Tenderness: There is no abdominal tenderness. There is no right CVA tenderness, left CVA tenderness or guarding.   Musculoskeletal:      Cervical back: Neck supple.      Comments: " Tenderness to palpation over the right greater trochanteric bursa.  Pain with hip internal rotation, external rotation range of motion was limited but nonpainful.  5/5 strength of right hip musculature   Lymphadenopathy:      Cervical: No cervical adenopathy.   Neurological:      General: No focal deficit present.      Mental Status: She is alert and oriented to person, place, and time.   Psychiatric:         Mood and Affect: Mood normal.         Behavior: Behavior normal.         Thought Content: Thought content normal.         Judgment: Judgment normal.             Diagnostic testing: None    Assessment/Plan:     Encounter Diagnoses   Name Primary?   • Greater trochanteric bursitis of right hip    • Acute cystitis with hematuria    • Primary osteoarthritis of right hip         Plan for care for today's complaint includes Continue follow-up with orthopedics and primary care for her greater trochanteric bursitis.  I did offer a steroid injection for her bursitis today; however, patient did decline and states that she will discuss this injection with her orthopedic provider.  Did educate the patient that she should look online for exercises to improve this bursitis but formal physical therapy may be needed if symptoms linger.  We will order a urine culture at this time, will contact patient via [employer] message with the results and will adjust treatment plan accordingly..    See AVS Instructions below for written guidance provided to patient on after-visit management and care in addition to our verbal discussion during the visit.    Please note that this dictation was created using voice recognition software. I have attempted to correct all errors, but there may be sound-alike, spelling, grammar and possibly content errors that I did not discover before finalizing the note.    Александр Trinidad PA-C

## 2022-07-29 NOTE — TELEPHONE ENCOUNTER
Patient returned call. She states she went to urgent care today and was told she has some bursitis. She states a steroid shot was recommend. Patient is requesting to see Dr. Ramkarishna Owen as she is an established patient with him. She states she will contact their office to see if a referral is needed.

## 2022-07-29 NOTE — TELEPHONE ENCOUNTER
Received message from patient returning call. Patient did states if ortho is recommended, she would like to see Dr. Ramakrishna Owen.    Called and left 2nd message.

## 2022-07-31 LAB
BACTERIA UR CULT: NORMAL
SIGNIFICANT IND 70042: NORMAL
SITE SITE: NORMAL
SOURCE SOURCE: NORMAL

## 2022-08-23 DIAGNOSIS — E03.9 HYPOTHYROIDISM, UNSPECIFIED TYPE: ICD-10-CM

## 2022-08-23 NOTE — TELEPHONE ENCOUNTER
"Received call from patient. Patient states her mail order pharmacy is charging her full price for her Synthroid. She states the pharmacy told her to let us know to send it over without it saying \"necessary\" to be covered. Patient states she was told she would still get the Synthroid brand but under her normal copay.   Notified patient I would resend the prescriptions to Dr. Pacheco to sign and resend.  "

## 2022-08-24 RX ORDER — LEVOTHYROXINE SODIUM 0.15 MG/1
TABLET ORAL
Qty: 48 TABLET | Refills: 3 | Status: SHIPPED | OUTPATIENT
Start: 2022-08-24 | End: 2023-08-24 | Stop reason: SDUPTHER

## 2022-08-24 RX ORDER — LEVOTHYROXINE SODIUM 175 UG/1
TABLET ORAL
Qty: 36 TABLET | Refills: 3 | Status: SHIPPED | OUTPATIENT
Start: 2022-08-24 | End: 2023-08-24 | Stop reason: SDUPTHER

## 2022-10-26 ENCOUNTER — OFFICE VISIT (OUTPATIENT)
Dept: MEDICAL GROUP | Facility: IMAGING CENTER | Age: 82
End: 2022-10-26
Payer: MEDICARE

## 2022-10-26 VITALS
RESPIRATION RATE: 17 BRPM | WEIGHT: 192.6 LBS | BODY MASS INDEX: 38.83 KG/M2 | HEART RATE: 86 BPM | HEIGHT: 59 IN | OXYGEN SATURATION: 96 % | TEMPERATURE: 97.3 F | DIASTOLIC BLOOD PRESSURE: 70 MMHG | SYSTOLIC BLOOD PRESSURE: 122 MMHG

## 2022-10-26 DIAGNOSIS — E03.9 ACQUIRED HYPOTHYROIDISM: ICD-10-CM

## 2022-10-26 DIAGNOSIS — R73.01 ELEVATED FASTING GLUCOSE: ICD-10-CM

## 2022-10-26 DIAGNOSIS — E78.5 DYSLIPIDEMIA: ICD-10-CM

## 2022-10-26 DIAGNOSIS — E78.1 HYPERTRIGLYCERIDEMIA: ICD-10-CM

## 2022-10-26 DIAGNOSIS — I10 ESSENTIAL HYPERTENSION: ICD-10-CM

## 2022-10-26 PROCEDURE — 99214 OFFICE O/P EST MOD 30 MIN: CPT | Performed by: FAMILY MEDICINE

## 2022-10-26 ASSESSMENT — PAIN SCALES - GENERAL: PAINLEVEL: NO PAIN

## 2022-10-26 NOTE — PROGRESS NOTES
SUBJECTIVE:    Chief Complaint   Patient presents with    Lab Results    Hypertension Follow-up       HPI:     Flavia Garrett is a 82 y.o. female  with hypothyroidism, hypercholesterolemia, hypertension, history of hematuria and low vitamin D levels here for follow-up of hypertension, dyslipidemia, hypothyroidism and lab results.  Labs done at [facility]-see media.    Hypertension-has been stable on losartan 50 mg daily.    Dyslipidemia-cooks with butter with toast, almost not meat. Coconut oil. Grapeseed oil.  Gluten-free.    Elevated fasting glucose-A1c stable.    Hypothyroidism-has been otherwise stable on current medication.  TSH not drawn on labs.    History of vitamin D deficiency.    Bloating improved-stopped all supplements.   Diet is better.    July had heat exhaustion.   Pain on right side hip for which she was evaluated at urgent care and was noted to have bursitis. Dr. Owen- corticosteroid pack and then started PT- [facility].  SI region bothers her.   Has noticed she is a little more tired off her supplements.  Notes after busy 3 days feels tired.      ROS:  No recent fevers or chills. No nausea or vomiting. No diarrhea. No chest pains or shortness of breath. No lower extremity edema.    Current Outpatient Medications on File Prior to Visit   Medication Sig Dispense Refill    levothyroxine (SYNTHROID) 150 MCG Tab TAKE 1 TABLET 175 MCG ON MONDAY, WEDNESDAY, FRIDAY. TAKE 150MCG ON Sunday, TUESDAY, THURSDAY, and Saturday. 48 Tablet 3    levothyroxine (SYNTHROID) 175 MCG Tab TAKE 1 TABLET 175 MCG ON MONDAY, WEDNESDAY, FRIDAY. TAKE 150MCG ON Sunday, TUESDAY, THURSDAY, and Saturday. 36 Tablet 3    estradiol (ESTRACE) 0.1 MG/GM vaginal cream       losartan (COZAAR) 50 MG Tab TAKE 1 TABLET DAILY 90 Tablet 3    valacyclovir (VALTREX) 1 GM Tab Take 2 Tablets by mouth every 12 hours. 2 g twice daily for one day. 20 Tablet 0    clobetasol (TEMOVATE) 0.05 % Cream CLOBETASOL PROPIONATE 0.05 % CREA 30 g 1    Omega-3  1000 MG Cap Take  by mouth.      cyclobenzaprine (FLEXERIL) 5 MG tablet CYCLOBENZAPRINE HCL 5 MG TABS      acetaminophen (TYLENOL) 500 MG Tab Take 500-1,000 mg by mouth every 6 hours as needed.      diphenhydrAMINE-APAP, sleep, (TYLENOL PM EXTRA STRENGTH PO) Take  by mouth.      loratadine (CLARITIN) 10 MG Tab Take 10 mg by mouth every day.      guaiFENesin ER (MUCINEX) 600 MG TABLET SR 12 HR Take 600 mg by mouth every 12 hours.      MISC NATURAL PRODUCTS PO Take  by mouth. (Patient not taking: Reported on 10/26/2022)       No current facility-administered medications on file prior to visit.       Allergies   Allergen Reactions    Lisinopril      Cramps and upset stomach       Patient Active Problem List    Diagnosis Date Noted    Sleep apnea in adult 09/28/2020    Inflammation of soft tissue 09/28/2020    Hypertriglyceridemia 09/28/2020    Pain in left lower leg 08/07/2020    Hematuria 08/07/2020    Nocturnal oxygen desaturation 08/07/2020    Microscopic hematuria 08/05/2020    Lesion of bladder 08/05/2020    Recurrent UTI 01/31/2020    History of UTI 01/31/2020    Drusen 10/08/2019    Post-menopausal 05/01/2019    Vertigo 04/03/2019    Class 2 obesity in adult 03/13/2019    Vision loss 08/15/2018    Venous insufficiency 08/15/2018    Cold sore 03/29/2018    Lichen sclerosus of female genitalia 02/05/2018    Acquired hypothyroidism 06/23/2017    Sleep related leg cramps 04/11/2017    Chronic left-sided low back pain without sciatica 03/17/2017    Lichen planus 03/17/2017    Essential hypertension 09/30/2016    Status post total right knee replacement 08/17/2016    Toe pain, right 07/26/2016    Abdominal bloating 07/26/2016    Celiac disease 07/26/2016    Osteoarthritis of right knee 07/26/2016    Abnormal results of liver function studies 07/26/2016    Hearing loss 07/26/2016    Tinnitus 07/26/2016    Dyslipidemia 07/26/2016    H/O total hysterectomy 07/26/2016    Hypothyroidism 07/26/2016    Vitamin D deficiency  "07/26/2016    Osteoporosis 07/26/2016    Hx of breast cancer 07/26/2016    Lack of energy 05/05/2016    Status post right knee replacement 05/05/2016    Acquired absence of both cervix and uterus 11/11/2014       Past Medical History:   Diagnosis Date    Back pain     Breast cancer (HCC)     Chickenpox     Constipation     Cough     Cystitis 6/12/2019    Diarrhea     Eczema     Fatigue     Frequent urination     Gout of foot 9/30/2016    Hx of breast cancer 7/26/2016    Hypertension     Hypothyroidism 7/26/2016    Idiopathic chronic gout of right ankle 9/30/2016    Influenza     Lichen sclerosus of female genitalia     Nasal drainage     Obesity     Osteopenia 7/26/2016    Osteoporosis     Overweight     Rhinitis     Ringing in ears     Shortness of breath     Sputum production     Swelling of lower extremity     Thyroid activity decreased     Tonsillitis     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     Vision loss     Vitamin D deficiency 7/26/2016    Whooping cough          OBJECTIVE:   /70   Pulse 86   Temp 36.3 °C (97.3 °F) (Temporal)   Resp 17   Ht 1.499 m (4' 11\")   Wt 87.4 kg (192 lb 9.6 oz)   SpO2 96%   BMI 38.90 kg/m²   General: Well-developed well-nourished female, no acute distress  Neck: supple, no lymphadenopathy- cervical or supraclavicular, no thyromegaly  Cardiovascular: regular rate and rhythm, no murmurs, gallops, rubs  Lungs: clear to auscultation bilaterally, no wheezes, crackles, or rhonchi  Abdomen: +bowel sounds, soft, nontender, nondistended, no rebound, no guarding, no hepatosplenomegaly  Extremities: no cyanosis, clubbing, edema  Skin: Warm and dry  Psych: appropriate mood and affect      Labs: Done at [facility] 10/18/20[facility].  Total cholesterol 208, HDL 46, triglycerides 242, .  Glucose 98.  Remaining chemistry within normal range.  A1c 5.1%    ASSESSMENT/PLAN:    82 y.o.female with hypothyroidism, hypercholesterolemia, hypertension, history of " hematuria and low vitamin D levels.    1. Essential hypertension -stable.  Continue current medication.  Recommend heart healthy diet and routine exercise.  Continue to monitor.       2. Dyslipidemia -she is not on medication therapy.  She will plan to restart fish oils.  Recommend low-cholesterol, high-fiber diet and routine exercise as tolerated.  Repeat labs in 4 to 6 months. Comp Metabolic Panel    Lipid Profile    TSH WITH REFLEX TO FT4      3. Elevated fasting glucose -A1c within normal range.  Continue healthy diet and routine exercise.  Monitor labs in future.       4. Acquired hypothyroidism -stable.  Continue current medication.  Monitor labs in future. TSH WITH REFLEX TO FT4      5.      Hypertriglyceridemia-as above.  Monitor.  6.      BMI 38.0-38.9-recommend healthy diet and routine exercise.  Monitor labs.    Return in about 3 months (around 1/26/2023).    This medical record contains text that has been entered with the assistance of computer voice recognition and dictation software.  Therefore, it may contain unintended errors in text, spelling, punctuation, or grammar.

## 2022-11-09 ENCOUNTER — PATIENT MESSAGE (OUTPATIENT)
Dept: HEALTH INFORMATION MANAGEMENT | Facility: OTHER | Age: 82
End: 2022-11-09

## 2023-01-25 ENCOUNTER — OFFICE VISIT (OUTPATIENT)
Dept: MEDICAL GROUP | Facility: IMAGING CENTER | Age: 83
End: 2023-01-25
Payer: MEDICARE

## 2023-01-25 VITALS
WEIGHT: 190.8 LBS | SYSTOLIC BLOOD PRESSURE: 160 MMHG | RESPIRATION RATE: 19 BRPM | BODY MASS INDEX: 38.47 KG/M2 | DIASTOLIC BLOOD PRESSURE: 92 MMHG | OXYGEN SATURATION: 96 % | HEART RATE: 76 BPM | TEMPERATURE: 97.8 F | HEIGHT: 59 IN

## 2023-01-25 DIAGNOSIS — R73.01 ELEVATED FASTING GLUCOSE: ICD-10-CM

## 2023-01-25 DIAGNOSIS — E78.5 DYSLIPIDEMIA: ICD-10-CM

## 2023-01-25 DIAGNOSIS — I10 ESSENTIAL HYPERTENSION: ICD-10-CM

## 2023-01-25 DIAGNOSIS — M25.551 RIGHT HIP PAIN: ICD-10-CM

## 2023-01-25 DIAGNOSIS — E66.9 OBESITY (BMI 30-39.9): ICD-10-CM

## 2023-01-25 DIAGNOSIS — E55.9 VITAMIN D DEFICIENCY: ICD-10-CM

## 2023-01-25 DIAGNOSIS — E03.9 ACQUIRED HYPOTHYROIDISM: ICD-10-CM

## 2023-01-25 DIAGNOSIS — M25.811 ENLARGEMENT OF RIGHT STERNOCLAVICULAR JOINT: ICD-10-CM

## 2023-01-25 LAB
ALBUMIN SERPL BCP-MCNC: 1.8 G/DL
ALBUMIN/GLOB SERPL: NORMAL {RATIO}
ALP SERPL-CCNC: NORMAL U/L
ALT SERPL-CCNC: 22 U/L
ANION GAP SERPL CALC-SCNC: NORMAL MMOL/L
AST SERPL-CCNC: 25 U/L
BILIRUB SERPL-MCNC: NORMAL MG/DL
BUN SERPL-MCNC: 14 MG/DL
CALCIUM SERPL-MCNC: 9.6 MG/DL
CHLORIDE SERPL-SCNC: 103 MMOL/L
CHOLEST SERPL-MCNC: 217 MG/DL
CO2 SERPL-SCNC: 25 MMOL/L
CREAT SERPL-MCNC: 0.66 MG/DL
GLOBULIN SER CALC-MCNC: 2.4 G/L
GLUCOSE SERPL-MCNC: 91 MG/DL
HDLC SERPL-MCNC: 45 MG/DL
LDLC SERPL CALC-MCNC: 137 MG/DL
POTASSIUM SERPL-SCNC: 4.5 MMOL/L
PROT SERPL-MCNC: 6.7 G/DL
SODIUM SERPL-SCNC: 138 MMOL/L
TRIGL SERPL-MCNC: 213 MG/DL

## 2023-01-25 PROCEDURE — 99214 OFFICE O/P EST MOD 30 MIN: CPT | Performed by: FAMILY MEDICINE

## 2023-01-25 RX ORDER — LEVOTHYROXINE SODIUM 0.15 MG/1
TABLET ORAL
Qty: 48 TABLET | Refills: 3 | Status: CANCELLED | OUTPATIENT
Start: 2023-01-25

## 2023-01-25 RX ORDER — LEVOTHYROXINE SODIUM 175 UG/1
TABLET ORAL
Qty: 36 TABLET | Refills: 3 | Status: CANCELLED | OUTPATIENT
Start: 2023-01-25

## 2023-01-25 ASSESSMENT — PATIENT HEALTH QUESTIONNAIRE - PHQ9: CLINICAL INTERPRETATION OF PHQ2 SCORE: 0

## 2023-01-25 ASSESSMENT — PAIN SCALES - GENERAL: PAINLEVEL: NO PAIN

## 2023-01-25 NOTE — PROGRESS NOTES
SUBJECTIVE:    Chief Complaint   Patient presents with    Lab Results    Bump     Pt states she noticed it a few weeks ago on the right side by her throat.    Fatigue       HPI:     Flavia Garrett is a 82 y.o. female with hypothyroidism, hypercholesterolemia, hypertension, history of hematuria and low vitamin D levels here for lab review.     Did complete some labs at [facility].    Iron, vitamin B12 and folate at normal levels.  Vitamin D was 15.  Phosphorus normal homocysteine normal.  CMP normal.      Hypertension- losartan 50 mg daily. BP elevated in office today, but states it is not high at home. No headache, dizziness, visual changes, chest pain or shortness of breath.     Dyslipidemia- Cholesterol high at 225, triglyceride 209, HDL 47, .  Labs done at [facility] 1/23/23  Total cholesterol 217, HDL 45, triglycerides 213, . Changed diet.   Glucose 91 remaining CMP normal.  Has had elevated glucose.     Hypothyroidism-TSH- stable on levothyroxine 150 and 175 mcg doses. .   Has done 3 sets of labs.   Does note some fatigue.    Vitamin D low. 15. Stopped supplements.   Has dropper at home 7000 IU.  Vitamn B12 supplement, will plan to take some.     Right SC joint- had prior xray. Feels it is getting bigger past several weeks. Likely OA on xray.     Right hip pain- stable. Hx of fall- jammed hips. Working out, doing well. [facility].      ROS:  No recent fevers or chills. No nausea or vomiting. No diarrhea. No chest pains or shortness of breath. No lower extremity edema.    Current Outpatient Medications on File Prior to Visit   Medication Sig Dispense Refill    levothyroxine (SYNTHROID) 150 MCG Tab TAKE 1 TABLET 175 MCG ON MONDAY, WEDNESDAY, FRIDAY. TAKE 150MCG ON Sunday, TUESDAY, THURSDAY, and Saturday. 48 Tablet 3    levothyroxine (SYNTHROID) 175 MCG Tab TAKE 1 TABLET 175 MCG ON MONDAY, WEDNESDAY, FRIDAY. TAKE 150MCG ON Sunday, TUESDAY, THURSDAY, and Saturday. 36 Tablet 3     estradiol (ESTRACE) 0.1 MG/GM vaginal cream       losartan (COZAAR) 50 MG Tab TAKE 1 TABLET DAILY 90 Tablet 3    valacyclovir (VALTREX) 1 GM Tab Take 2 Tablets by mouth every 12 hours. 2 g twice daily for one day. 20 Tablet 0    clobetasol (TEMOVATE) 0.05 % Cream CLOBETASOL PROPIONATE 0.05 % CREA 30 g 1    cyclobenzaprine (FLEXERIL) 5 MG tablet CYCLOBENZAPRINE HCL 5 MG TABS      acetaminophen (TYLENOL) 500 MG Tab Take 500-1,000 mg by mouth every 6 hours as needed.      diphenhydrAMINE-APAP, sleep, (TYLENOL PM EXTRA STRENGTH PO) Take  by mouth.      loratadine (CLARITIN) 10 MG Tab Take 10 mg by mouth every day.      guaiFENesin ER (MUCINEX) 600 MG TABLET SR 12 HR Take 600 mg by mouth every 12 hours.      Omega-3 1000 MG Cap Take  by mouth. (Patient not taking: Reported on 1/25/2023)       No current facility-administered medications on file prior to visit.       Allergies   Allergen Reactions    Lisinopril      Cramps and upset stomach       Patient Active Problem List    Diagnosis Date Noted    Sleep apnea in adult 09/28/2020    Inflammation of soft tissue 09/28/2020    Hypertriglyceridemia 09/28/2020    Pain in left lower leg 08/07/2020    Hematuria 08/07/2020    Nocturnal oxygen desaturation 08/07/2020    Microscopic hematuria 08/05/2020    Lesion of bladder 08/05/2020    Recurrent UTI 01/31/2020    History of UTI 01/31/2020    Drusen 10/08/2019    Post-menopausal 05/01/2019    Vertigo 04/03/2019    Class 2 obesity in adult 03/13/2019    Vision loss 08/15/2018    Venous insufficiency 08/15/2018    Cold sore 03/29/2018    Lichen sclerosus of female genitalia 02/05/2018    Acquired hypothyroidism 06/23/2017    Sleep related leg cramps 04/11/2017    Chronic left-sided low back pain without sciatica 03/17/2017    Lichen planus 03/17/2017    Essential hypertension 09/30/2016    Status post total right knee replacement 08/17/2016    Toe pain, right 07/26/2016    Abdominal bloating 07/26/2016    Celiac disease 07/26/2016  "   Osteoarthritis of right knee 07/26/2016    Abnormal results of liver function studies 07/26/2016    Hearing loss 07/26/2016    Tinnitus 07/26/2016    Dyslipidemia 07/26/2016    H/O total hysterectomy 07/26/2016    Hypothyroidism 07/26/2016    Vitamin D deficiency 07/26/2016    Osteoporosis 07/26/2016    Hx of breast cancer 07/26/2016    Lack of energy 05/05/2016    Status post right knee replacement 05/05/2016    Acquired absence of both cervix and uterus 11/11/2014       Past Medical History:   Diagnosis Date    Back pain     Breast cancer (HCC)     Chickenpox     Constipation     Cough     Cystitis 6/12/2019    Diarrhea     Eczema     Fatigue     Frequent urination     Gout of foot 9/30/2016    Hx of breast cancer 7/26/2016    Hypertension     Hypothyroidism 7/26/2016    Idiopathic chronic gout of right ankle 9/30/2016    Influenza     Lichen sclerosus of female genitalia     Nasal drainage     Obesity     Osteopenia 7/26/2016    Osteoporosis     Overweight     Rhinitis     Ringing in ears     Shortness of breath     Sputum production     Swelling of lower extremity     Thyroid activity decreased     Tonsillitis     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     Vision loss     Vitamin D deficiency 7/26/2016    Whooping cough          OBJECTIVE:   BP (!) 160/92 (BP Location: Left arm, Patient Position: Sitting, BP Cuff Size: Adult)   Pulse 76   Temp 36.6 °C (97.8 °F) (Temporal)   Resp 19   Ht 1.499 m (4' 11\")   Wt 86.5 kg (190 lb 12.8 oz)   SpO2 96%   BMI 38.54 kg/m²   General: Well-developed well-nourished female, no acute distress  Neck: supple, no lymphadenopathy- cervical or supraclavicular, no thyromegaly  Cardiovascular: regular rate and rhythm, no murmurs, gallops, rubs  Lungs: clear to auscultation bilaterally, no wheezes, crackles, or rhonchi  Chest: Right medial SC joint region with bony prominence, area is nontender to palpation.  Abdomen: +bowel sounds, soft, " nontender, nondistended, no rebound, no guarding, no hepatosplenomegaly  Extremities: no cyanosis, clubbing, edema  Skin: Warm and dry  Psych: appropriate mood and affect    [facility]:  12/14/22  Iron, vitamin B12 and folate at normal levels.  Vitamin D was 15.  Phosphorus normal homocystine normal.  CMP normal.  Cholesterol high at 225, triglyceride 209, HDL 47, .    Labs done at [facility] 1/23/23  Total cholesterol 217, HDL 45, triglycerides 213, . Changed diet.   Glucose 91 remaining CMP normal.  TSH within normal range at 1.08..    ASSESSMENT/PLAN:    82 y.o.female with hypothyroidism, hypercholesterolemia, hypertension, history of hematuria and low vitamin D levels.      1. Essential hypertension - elevated BP in office. Appears controlled at home.   Will continue to monitor. Keep log of BP. Continue losartan 50 mg daily, increase to 100 mg daily as needed if continued elevated BP.  Continue heart healthy diet and routine exercise.  Continue to monitor.        2. Dyslipidemia -noted some improvements on repeat labs with diet lifestyle changes.  Recommend low-cholesterol, high-fiber diet and routine exercise as tolerated.  Plan to continue monitor at this time.  Plan to repeat labs in 4 to 6 months. Lipid Profile    Comp Metabolic Panel      3. Elevated fasting glucose - stable, recommend low sugar/low processed food diet and routine exercise.  Monitor in future.       4. Acquired hypothyroidism - stable, continue current medications. Monitor.  TSH WITH REFLEX TO FT4      5. Vitamin D deficiency   She will take her home supplements. Monitor.  VITAMIN D,25 HYDROXY (DEFICIENCY)      6. Enlargement of right sternoclavicular joint - right medial chest- SC joint. Had prior xray, likely DJD, but feels increase in area.   Will assess further with MRI imaging.  MR-CHEST-W/O      7. Right hip pain - improved, stable. Continue routine conditioning exercises.     8.      Patient's body mass  index is 38.54 kg/m². Exercise and nutrition counseling were performed at this visit.       Follow up after imaging as needed.   Follow up after routine labs in 4-6 months, sooner as needed.         This medical record contains text that has been entered with the assistance of computer voice recognition and dictation software.  Therefore, it may contain unintended errors in text, spelling, punctuation, or grammar.

## 2023-05-08 ENCOUNTER — TELEPHONE (OUTPATIENT)
Dept: HEALTH INFORMATION MANAGEMENT | Facility: OTHER | Age: 83
End: 2023-05-08

## 2023-06-23 ENCOUNTER — HOSPITAL ENCOUNTER (OUTPATIENT)
Facility: MEDICAL CENTER | Age: 83
End: 2023-06-23
Attending: PHYSICIAN ASSISTANT
Payer: MEDICARE

## 2023-06-23 ENCOUNTER — OFFICE VISIT (OUTPATIENT)
Dept: MEDICAL GROUP | Facility: IMAGING CENTER | Age: 83
End: 2023-06-23
Payer: MEDICARE

## 2023-06-23 VITALS
HEART RATE: 91 BPM | RESPIRATION RATE: 17 BRPM | OXYGEN SATURATION: 94 % | BODY MASS INDEX: 38.22 KG/M2 | SYSTOLIC BLOOD PRESSURE: 128 MMHG | DIASTOLIC BLOOD PRESSURE: 80 MMHG | TEMPERATURE: 97.9 F | WEIGHT: 189.6 LBS | HEIGHT: 59 IN

## 2023-06-23 DIAGNOSIS — K29.30 CHRONIC SUPERFICIAL GASTRITIS WITHOUT BLEEDING: ICD-10-CM

## 2023-06-23 DIAGNOSIS — M81.0 OSTEOPOROSIS WITHOUT CURRENT PATHOLOGICAL FRACTURE, UNSPECIFIED OSTEOPOROSIS TYPE: ICD-10-CM

## 2023-06-23 DIAGNOSIS — N95.1 MENOPAUSAL STATE: ICD-10-CM

## 2023-06-23 DIAGNOSIS — E53.8 LOW SERUM VITAMIN B12: ICD-10-CM

## 2023-06-23 DIAGNOSIS — R35.0 URINARY FREQUENCY: ICD-10-CM

## 2023-06-23 DIAGNOSIS — E23.0 HYPOPITUITARISM (HCC): ICD-10-CM

## 2023-06-23 DIAGNOSIS — Z85.3 HX OF BREAST CANCER: ICD-10-CM

## 2023-06-23 DIAGNOSIS — R14.0 ABDOMINAL BLOATING: ICD-10-CM

## 2023-06-23 DIAGNOSIS — Z78.0 POSTMENOPAUSAL STATUS (AGE-RELATED) (NATURAL): ICD-10-CM

## 2023-06-23 DIAGNOSIS — E03.9 HYPOTHYROIDISM, UNSPECIFIED TYPE: ICD-10-CM

## 2023-06-23 DIAGNOSIS — E55.9 VITAMIN D DEFICIENCY: ICD-10-CM

## 2023-06-23 PROBLEM — G47.34 NOCTURNAL OXYGEN DESATURATION: Status: RESOLVED | Noted: 2020-08-07 | Resolved: 2023-06-23

## 2023-06-23 PROBLEM — G47.62 SLEEP RELATED LEG CRAMPS: Status: RESOLVED | Noted: 2017-04-11 | Resolved: 2023-06-23

## 2023-06-23 PROBLEM — L43.9 LICHEN PLANUS: Status: RESOLVED | Noted: 2017-03-17 | Resolved: 2023-06-23

## 2023-06-23 PROBLEM — M79.662 PAIN IN LEFT LOWER LEG: Status: RESOLVED | Noted: 2020-08-07 | Resolved: 2023-06-23

## 2023-06-23 PROBLEM — G89.29 CHRONIC LEFT-SIDED LOW BACK PAIN WITHOUT SCIATICA: Status: RESOLVED | Noted: 2017-03-17 | Resolved: 2023-06-23

## 2023-06-23 PROBLEM — R42 VERTIGO: Status: RESOLVED | Noted: 2019-04-03 | Resolved: 2023-06-23

## 2023-06-23 PROBLEM — N32.9 LESION OF BLADDER: Status: RESOLVED | Noted: 2020-08-05 | Resolved: 2023-06-23

## 2023-06-23 PROBLEM — N90.4 LICHEN SCLEROSUS OF FEMALE GENITALIA: Status: RESOLVED | Noted: 2018-02-05 | Resolved: 2023-06-23

## 2023-06-23 PROBLEM — M79.89 INFLAMMATION OF SOFT TISSUE: Status: RESOLVED | Noted: 2020-09-28 | Resolved: 2023-06-23

## 2023-06-23 PROBLEM — R31.9 HEMATURIA: Status: RESOLVED | Noted: 2020-08-07 | Resolved: 2023-06-23

## 2023-06-23 PROBLEM — M54.50 CHRONIC LEFT-SIDED LOW BACK PAIN WITHOUT SCIATICA: Status: RESOLVED | Noted: 2017-03-17 | Resolved: 2023-06-23

## 2023-06-23 PROBLEM — Z87.440 HISTORY OF UTI: Status: RESOLVED | Noted: 2020-01-31 | Resolved: 2023-06-23

## 2023-06-23 PROBLEM — H54.7 VISION LOSS: Status: RESOLVED | Noted: 2018-08-15 | Resolved: 2023-06-23

## 2023-06-23 LAB
APPEARANCE UR: CLEAR
BILIRUB UR STRIP-MCNC: NEGATIVE MG/DL
COLOR UR AUTO: YELLOW
GLUCOSE UR STRIP.AUTO-MCNC: NEGATIVE MG/DL
KETONES UR STRIP.AUTO-MCNC: NEGATIVE MG/DL
LEUKOCYTE ESTERASE UR QL STRIP.AUTO: NORMAL
NITRITE UR QL STRIP.AUTO: NEGATIVE
PH UR STRIP.AUTO: 6 [PH] (ref 5–8)
PROT UR QL STRIP: NEGATIVE MG/DL
RBC UR QL AUTO: NEGATIVE
SP GR UR STRIP.AUTO: 1
UROBILINOGEN UR STRIP-MCNC: 0.2 MG/DL

## 2023-06-23 PROCEDURE — 87086 URINE CULTURE/COLONY COUNT: CPT

## 2023-06-23 PROCEDURE — 1126F AMNT PAIN NOTED NONE PRSNT: CPT | Performed by: PHYSICIAN ASSISTANT

## 2023-06-23 PROCEDURE — 96372 THER/PROPH/DIAG INJ SC/IM: CPT | Performed by: PHYSICIAN ASSISTANT

## 2023-06-23 PROCEDURE — 3079F DIAST BP 80-89 MM HG: CPT | Performed by: PHYSICIAN ASSISTANT

## 2023-06-23 PROCEDURE — 99214 OFFICE O/P EST MOD 30 MIN: CPT | Mod: 25 | Performed by: PHYSICIAN ASSISTANT

## 2023-06-23 PROCEDURE — 3074F SYST BP LT 130 MM HG: CPT | Performed by: PHYSICIAN ASSISTANT

## 2023-06-23 PROCEDURE — 81002 URINALYSIS NONAUTO W/O SCOPE: CPT | Performed by: PHYSICIAN ASSISTANT

## 2023-06-23 RX ORDER — PANTOPRAZOLE SODIUM 40 MG/1
40 TABLET, DELAYED RELEASE ORAL DAILY
Qty: 30 TABLET | Refills: 0 | Status: SHIPPED | OUTPATIENT
Start: 2023-06-23 | End: 2023-07-14 | Stop reason: SDUPTHER

## 2023-06-23 RX ORDER — ERGOCALCIFEROL 1.25 MG/1
50000 CAPSULE ORAL
Qty: 12 CAPSULE | Refills: 0 | Status: SHIPPED | OUTPATIENT
Start: 2023-06-23 | End: 2023-08-24

## 2023-06-23 RX ORDER — CYANOCOBALAMIN 1000 UG/ML
1000 INJECTION, SOLUTION INTRAMUSCULAR; SUBCUTANEOUS ONCE
Status: COMPLETED | OUTPATIENT
Start: 2023-06-23 | End: 2023-06-23

## 2023-06-23 RX ADMIN — CYANOCOBALAMIN 1000 MCG: 1000 INJECTION, SOLUTION INTRAMUSCULAR; SUBCUTANEOUS at 10:53

## 2023-06-23 ASSESSMENT — PAIN SCALES - GENERAL: PAINLEVEL: NO PAIN

## 2023-06-23 NOTE — ASSESSMENT & PLAN NOTE
Chronic, due for DEXA.  No falls or fractures.  Not currently on vitamin D.  History of vitamin D deficiency.  No calcium intake.  No routine exercise.

## 2023-06-23 NOTE — PROGRESS NOTES
Subjective:     CC:   Chief Complaint   Patient presents with    Other     Fatigue, abdominal bloating       HPI:   Flavia presents today to discuss:    Abdominal bloating  Pt admits to acute on chronic abdominal bloating and fatigue. Had a large bowel movement yesterday. She is not sure if she had spoiled almond milk. Eats a lot of fresh fruit.     Hypothyroidism  Chronic, controlled stable on Synthroid 175 mcg alternating with 150 mcg daily.  Admits to a history of Hashimoto's.    Chronic superficial gastritis without bleeding  Patient history of gastritis, no current PPI use.  Admits to chronic abdominal bloating.  No melena or diarrhea.  Takes intermittent NSAIDs, but not daily.    Hypopituitarism (HCC)  Patient admits to history of hypopituitarism.  Previously managed by endocrinology.  No recent labs.  Admits to fatigue.    Hx of breast cancer  H/O breast cancer stage 1 (2011 - radiation, lumpectomy x 2).    Osteoporosis  Chronic, due for DEXA.  No falls or fractures.  Not currently on vitamin D.  History of vitamin D deficiency.  No calcium intake.  No routine exercise.    Abdominal bloating  Admits to intermittent abdominal bloating and urinary frequency.  No abdominal pain, nausea, vomiting, diarrhea, burning with urination, incontinence.      Past Medical History:   Diagnosis Date    Acquired absence of both cervix and uterus 11/11/2014    Back pain     Breast cancer (HCC)     Chickenpox     Chronic left-sided low back pain without sciatica 3/17/2017    Constipation     Cough     Cystitis 6/12/2019    Diarrhea     Dyslipidemia 7/26/2016    Eczema     Fatigue     Frequent urination     Gout of foot 9/30/2016    H/O total hysterectomy 7/26/2016    History of UTI 1/31/2020    Hx of breast cancer 7/26/2016    Hypertension     Hypothyroidism 7/26/2016    Idiopathic chronic gout of right ankle 9/30/2016    Influenza     Lichen sclerosus of female genitalia     Nasal drainage     Nocturnal oxygen desaturation  8/7/2020    Obesity     Osteopenia 7/26/2016    Osteoporosis     Overweight     Rhinitis     Ringing in ears     Shortness of breath     Sputum production     Status post right knee replacement 5/5/2016    Status post total right knee replacement 8/17/2016    Swelling of lower extremity     Thyroid activity decreased     Tonsillitis     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     Vision loss     Vitamin D deficiency 7/26/2016    Whooping cough      Family History   Problem Relation Age of Onset    Diabetes Other         GF    Arthritis Other         GM    Arthritis Mother     Other Son         CELIAC DISEASE-SEVERE     Past Surgical History:   Procedure Laterality Date    LUMPECTOMY Right 2012    ARTHROSCOPY, KNEE      HYSTERECTOMY LAPAROSCOPY      KNEE ARTHROPLASTY TOTAL Right     OTHER      LIPOSUCTION THIGHS-LEG LIFT    VT REMV 2ND CATARACT,CORN-SCLER SECTN      VAGINAL HYSTERECTOMY TOTAL      Hysterectomy,Total Vaginal     Social History     Tobacco Use    Smoking status: Never    Smokeless tobacco: Never   Vaping Use    Vaping Use: Never used   Substance Use Topics    Alcohol use: Not Currently     Alcohol/week: 0.0 oz     Comment: min    Drug use: No     Social History     Social History Narrative    Not on file     Current Outpatient Medications Ordered in Epic   Medication Sig Dispense Refill    Celecoxib (CELEBREX PO) Take  by mouth.      Coenzyme Q10-Red Yeast Rice (CO Q-10 PLUS RED YEAST RICE PO) Take  by mouth.      vitamin D2, Ergocalciferol, (DRISDOL) 1.25 MG (23649 UT) Cap capsule Take 1 Capsule by mouth every 7 days. 12 Capsule 0    pantoprazole (PROTONIX) 40 MG Tablet Delayed Response Take 1 Tablet by mouth every day. 30 Tablet 0    losartan (COZAAR) 50 MG Tab TAKE 1 TABLET DAILY 90 Tablet 3    meloxicam (MOBIC) 15 MG tablet Take 1 Tablet by mouth every day. 30 Tablet 0    levothyroxine (SYNTHROID) 150 MCG Tab TAKE 1 TABLET 175 MCG ON MONDAY, WEDNESDAY, FRIDAY. TAKE  "150MCG ON Sunday, TUESDAY, THURSDAY, and Saturday. 48 Tablet 3    levothyroxine (SYNTHROID) 175 MCG Tab TAKE 1 TABLET 175 MCG ON MONDAY, WEDNESDAY, FRIDAY. TAKE 150MCG ON Sunday, TUESDAY, THURSDAY, and Saturday. 36 Tablet 3    estradiol (ESTRACE) 0.1 MG/GM vaginal cream       valacyclovir (VALTREX) 1 GM Tab Take 2 Tablets by mouth every 12 hours. 2 g twice daily for one day. 20 Tablet 0    clobetasol (TEMOVATE) 0.05 % Cream CLOBETASOL PROPIONATE 0.05 % CREA 30 g 1    Omega-3 1000 MG Cap Take  by mouth.      cyclobenzaprine (FLEXERIL) 5 MG tablet CYCLOBENZAPRINE HCL 5 MG TABS      acetaminophen (TYLENOL) 500 MG Tab Take 500-1,000 mg by mouth every 6 hours as needed.      diphenhydrAMINE-APAP, sleep, (TYLENOL PM EXTRA STRENGTH PO) Take  by mouth.      loratadine (CLARITIN) 10 MG Tab Take 10 mg by mouth every day.      guaiFENesin ER (MUCINEX) 600 MG TABLET SR 12 HR Take 600 mg by mouth every 12 hours.       No current [facility]-ordered facility-administered medications on file.     Lisinopril    PMH/PSH/FH/Social history reviewed.  Vaccinations discussed.  Previous records and labs reviewed. Discussed age appropriate anticipatory guidance.    ROS: see hpi  Gen: no fevers/chills  Pulm: no sob, no cough  CV: no chest pain, no palpitations, no edema  GI: no nausea/vomiting, no diarrhea  Skin: no rash    Objective:   Exam:  /80 (BP Location: Left arm, Patient Position: Sitting, BP Cuff Size: Adult)   Pulse 91   Temp 36.6 °C (97.9 °F) (Temporal)   Resp 17   Ht 1.499 m (4' 11\")   Wt 86 kg (189 lb 9.6 oz)   SpO2 94%   BMI 38.29 kg/m²    Body mass index is 38.29 kg/m².    Gen: Alert and oriented, No apparent distress.  HEENT: Head atraumatic, normocephalic. Pupils equal and round.  Neck: Neck is supple without lymphadenopathy.   Lungs: Normal effort, CTA bilaterally, no wheezes, rhonchi, or rales  CV: Regular rate and rhythm. No murmurs, rubs, or gallops.  ABD: +BS. Non-tender, non-distended. No rebound, rigidity, or " guarding.  Ext: No clubbing, cyanosis, edema.    Assessment & Plan:     83 y.o. female with the following -     1. Chronic superficial gastritis without bleeding  Noted on previous imaging, patient with chronic abdominal bloating and intermittent NSAID use.  We will start 30-day course of pantoprazole 40 mg daily, then switch to 20 mg dose next visit.  - pantoprazole (PROTONIX) 40 MG Tablet Delayed Response; Take 1 Tablet by mouth every day.  Dispense: 30 Tablet; Refill: 0    2. Abdominal bloating  May be related to gastritis, see orders below.  - CBC WITH DIFFERENTIAL; Future  - Comp Metabolic Panel; Future  - Sed Rate; Future  - CRP QUANTITIVE (NON-CARDIAC); Future  - pantoprazole (PROTONIX) 40 MG Tablet Delayed Response; Take 1 Tablet by mouth every day.  Dispense: 30 Tablet; Refill: 0    3. Hypothyroidism, unspecified type  Chronic, controlled and stable. Continue current regimen -Synthroid  - TSH; Future  - FREE THYROXINE; Future    4. Hypopituitarism (HCC)  Per patient, check labs listed below.  Obtain last endocrinology notes.  - TSH; Future  - FREE THYROXINE; Future  - ACTH; Future  - CORTISOL; Future  - IGF-1 SOMATOMEDIN; Future  - PROLACTIN; Future    5. Low serum vitamin B12  - CBC WITH DIFFERENTIAL; Future  - cyanocobalamin (VITAMIN B-12) injection 1,000 mcg    6. Osteoporosis without current pathological fracture, unspecified osteoporosis type  I would recommend taking vitamin D 2000 IU daily (will do 50,000 IU weekly x 3 months, then switch to OTC), as well as calcium 600 mg twice daily. I also recommend regular weight-bearing and muscle strengthening exercise to improve agility, strength, posture, and balance; maintain and improve bone strength; and reduce the risk of falls and fractures.     7. Vitamin D deficiency  - vitamin D2, Ergocalciferol, (DRISDOL) 1.25 MG (62547 UT) Cap capsule; Take 1 Capsule by mouth every 7 days.  Dispense: 12 Capsule; Refill: 0    8. Urinary frequency  - POCT  Urinalysis  - URINE CULTURE(NEW); Future    9. Hx of breast cancer    10. Menopausal state  - DS-BONE DENSITY STUDY (DEXA); Future    11. Postmenopausal status (age-related) (natural)  - DS-BONE DENSITY STUDY (DEXA); Future    Return in about 3 weeks (around 7/14/2023) for Annual wellness exam, Follow-up labs/tests.    My total time spent caring for the patient on the day of the encounter was 38 minutes.   This does not include time spent on separately billable procedures/tests.      Almita Valenzuela PA-C ([place])  Physician Assistant Certified  [facility]    Please note that this dictation was created using voice recognition software. I have made every reasonable attempt to correct obvious errors, but I expect that there are errors of grammar and possibly content that I did not discover before finalizing the note.

## 2023-06-23 NOTE — PATIENT INSTRUCTIONS
It was a pleasure meeting with you today at [facility]!    Your medical history/records and medications were reviewed today.     UPDATE on MyChart Results: If you have blood work, and/or imaging studies, or any other test or procedure completed, you will have access to results as soon as they become available in MyChart. Recently, these results will be available for review at the same time that your provider is able to see results!    This will likely mean you will see a result before your provider has had a chance to review and discuss with you.  Some results or care notes may be hard to understand and may be serious in nature.    We look at every result and your provider will contact you to explain what they mean and discuss appropriate next steps. Please allow for at least 72 business hours for chart and result review.     We prefer that you wait for your care team to contact you with your results.  Often, your provider will discuss your results with you at your next appointment. We look forward to continuing to partner with you in your care.    Please review my practice information below:    If you have any prescription refill requests, please send them via Buddy or discuss with your provider at the start of your office visits. Please allow 3-5 business days for lab and testing review and you will be contacted via Buddy with those results, or if advised to make a follow up appointment regarding those results, then please do so.     Once resulted, your lab/test/imaging results will show up automatically in your MyChart. Please wait for my interpretation and recommendations prior to viewing your results to avoid any unnecessary confusion or misinterpretation. I will address all of the lab values that I interpret as abnormal and message you accordingly on your MyChart. I will always send you a message about your results even if they are normal. If you do not hear back from me within 5-7 business  days after completing your tests, then please send me a message on [employer] so I can obtain your results (especially if you went to an outside lab or imaging center - [facility], [facility], etc).     If you have any additional questions or concerns beyond my interpretation of your results, please make an appointment with me to discuss in further detail.    Please only use the [employer] messaging system for questions regarding your most recent appointment or if advised to use otherwise (glucose or blood pressure reporting).     If you have any new problems or concerns, you must make an appointment to discuss. This includes any referral requests, lab requests (unless advised to notify me for pre-appt labs), medication side effects, or request for medication adjustments.     Please arrive 15 minutes prior to your appointment time to complete your check-in and intake with the medical assistant.      Thank you,    Almita Valenzuela PA-C ([place])  Physician Assistant Certified  [facility]    -----------------------------------------------------------------    Attn: Patients of [facility]:    In an effort to continue to provide excellent and efficient care to our patients, it is vital that we continue to use our resources appropriately. With that, this is a reminder that [employer] is used for prescription refill requests, test results, virtual visits, and chart review only.     Any new questions, concerns/conditions, lab/imaging requests, medication adjustments, new prescriptions, or referral requests do require an appointment (virtually or in person), unless discussed otherwise at your most recent appointment.     Thank you for your understanding,    [facility]

## 2023-06-23 NOTE — ASSESSMENT & PLAN NOTE
Admits to intermittent abdominal bloating and urinary frequency.  No abdominal pain, nausea, vomiting, diarrhea, burning with urination, incontinence.

## 2023-06-23 NOTE — ASSESSMENT & PLAN NOTE
Patient history of gastritis, no current PPI use.  Admits to chronic abdominal bloating.  No melena or diarrhea.  Takes intermittent NSAIDs, but not daily.

## 2023-06-23 NOTE — ASSESSMENT & PLAN NOTE
Patient admits to history of hypopituitarism.  Previously managed by endocrinology.  No recent labs.  Admits to fatigue.

## 2023-06-23 NOTE — ASSESSMENT & PLAN NOTE
Pt admits to acute on chronic abdominal bloating and fatigue. Had a large bowel movement yesterday. She is not sure if she had spoiled almond milk. Eats a lot of fresh fruit.

## 2023-06-23 NOTE — ASSESSMENT & PLAN NOTE
Chronic, controlled stable on Synthroid 175 mcg alternating with 150 mcg daily.  Admits to a history of Hashimoto's.

## 2023-06-25 LAB
BACTERIA UR CULT: NORMAL
SIGNIFICANT IND 70042: NORMAL
SITE SITE: NORMAL
SOURCE SOURCE: NORMAL

## 2023-06-28 ENCOUNTER — HOSPITAL ENCOUNTER (OUTPATIENT)
Dept: LAB | Facility: MEDICAL CENTER | Age: 83
End: 2023-06-28
Attending: PHYSICIAN ASSISTANT
Payer: MEDICARE

## 2023-06-28 DIAGNOSIS — E53.8 LOW SERUM VITAMIN B12: ICD-10-CM

## 2023-06-28 DIAGNOSIS — E23.0 HYPOPITUITARISM (HCC): ICD-10-CM

## 2023-06-28 DIAGNOSIS — R14.0 ABDOMINAL BLOATING: ICD-10-CM

## 2023-06-28 DIAGNOSIS — E03.9 HYPOTHYROIDISM, UNSPECIFIED TYPE: ICD-10-CM

## 2023-06-28 LAB
ALBUMIN SERPL BCP-MCNC: 4.2 G/DL (ref 3.2–4.9)
ALBUMIN/GLOB SERPL: 1.4 G/DL
ALP SERPL-CCNC: 72 U/L (ref 30–99)
ALT SERPL-CCNC: 21 U/L (ref 2–50)
ANION GAP SERPL CALC-SCNC: 12 MMOL/L (ref 7–16)
AST SERPL-CCNC: 17 U/L (ref 12–45)
BASOPHILS # BLD AUTO: 0.7 % (ref 0–1.8)
BASOPHILS # BLD: 0.05 K/UL (ref 0–0.12)
BILIRUB SERPL-MCNC: 0.6 MG/DL (ref 0.1–1.5)
BUN SERPL-MCNC: 12 MG/DL (ref 8–22)
CALCIUM ALBUM COR SERPL-MCNC: 9.3 MG/DL (ref 8.5–10.5)
CALCIUM SERPL-MCNC: 9.5 MG/DL (ref 8.5–10.5)
CHLORIDE SERPL-SCNC: 104 MMOL/L (ref 96–112)
CO2 SERPL-SCNC: 25 MMOL/L (ref 20–33)
CORTIS SERPL-MCNC: 14 UG/DL (ref 0–23)
CREAT SERPL-MCNC: 0.69 MG/DL (ref 0.5–1.4)
CRP SERPL HS-MCNC: 4.23 MG/DL (ref 0–0.75)
EOSINOPHIL # BLD AUTO: 0.11 K/UL (ref 0–0.51)
EOSINOPHIL NFR BLD: 1.6 % (ref 0–6.9)
ERYTHROCYTE [DISTWIDTH] IN BLOOD BY AUTOMATED COUNT: 49.9 FL (ref 35.9–50)
ERYTHROCYTE [SEDIMENTATION RATE] IN BLOOD BY WESTERGREN METHOD: 52 MM/HOUR (ref 0–25)
GFR SERPLBLD CREATININE-BSD FMLA CKD-EPI: 87 ML/MIN/1.73 M 2
GLOBULIN SER CALC-MCNC: 3 G/DL (ref 1.9–3.5)
GLUCOSE SERPL-MCNC: 110 MG/DL (ref 65–99)
HCT VFR BLD AUTO: 38.3 % (ref 37–47)
HGB BLD-MCNC: 12.3 G/DL (ref 12–16)
IMM GRANULOCYTES # BLD AUTO: 0.03 K/UL (ref 0–0.11)
IMM GRANULOCYTES NFR BLD AUTO: 0.4 % (ref 0–0.9)
LYMPHOCYTES # BLD AUTO: 1.64 K/UL (ref 1–4.8)
LYMPHOCYTES NFR BLD: 24.3 % (ref 22–41)
MCH RBC QN AUTO: 28.8 PG (ref 27–33)
MCHC RBC AUTO-ENTMCNC: 32.1 G/DL (ref 32.2–35.5)
MCV RBC AUTO: 89.7 FL (ref 81.4–97.8)
MONOCYTES # BLD AUTO: 0.81 K/UL (ref 0–0.85)
MONOCYTES NFR BLD AUTO: 12 % (ref 0–13.4)
NEUTROPHILS # BLD AUTO: 4.11 K/UL (ref 1.82–7.42)
NEUTROPHILS NFR BLD: 61 % (ref 44–72)
NRBC # BLD AUTO: 0 K/UL
NRBC BLD-RTO: 0 /100 WBC (ref 0–0.2)
PLATELET # BLD AUTO: 233 K/UL (ref 164–446)
PMV BLD AUTO: 10.9 FL (ref 9–12.9)
POTASSIUM SERPL-SCNC: 4.2 MMOL/L (ref 3.6–5.5)
PROLACTIN SERPL-MCNC: 7.48 NG/ML (ref 2.8–26)
PROT SERPL-MCNC: 7.2 G/DL (ref 6–8.2)
RBC # BLD AUTO: 4.27 M/UL (ref 4.2–5.4)
SODIUM SERPL-SCNC: 141 MMOL/L (ref 135–145)
T4 FREE SERPL-MCNC: 1.56 NG/DL (ref 0.93–1.7)
TSH SERPL DL<=0.005 MIU/L-ACNC: 2.3 UIU/ML (ref 0.38–5.33)
WBC # BLD AUTO: 6.8 K/UL (ref 4.8–10.8)

## 2023-06-28 PROCEDURE — 85652 RBC SED RATE AUTOMATED: CPT

## 2023-06-28 PROCEDURE — 80053 COMPREHEN METABOLIC PANEL: CPT

## 2023-06-28 PROCEDURE — 82024 ASSAY OF ACTH: CPT

## 2023-06-28 PROCEDURE — 84146 ASSAY OF PROLACTIN: CPT

## 2023-06-28 PROCEDURE — 82533 TOTAL CORTISOL: CPT

## 2023-06-28 PROCEDURE — 84305 ASSAY OF SOMATOMEDIN: CPT

## 2023-06-28 PROCEDURE — 86140 C-REACTIVE PROTEIN: CPT

## 2023-06-28 PROCEDURE — 84443 ASSAY THYROID STIM HORMONE: CPT

## 2023-06-28 PROCEDURE — 84439 ASSAY OF FREE THYROXINE: CPT

## 2023-06-28 PROCEDURE — 36415 COLL VENOUS BLD VENIPUNCTURE: CPT

## 2023-06-28 PROCEDURE — 85025 COMPLETE CBC W/AUTO DIFF WBC: CPT

## 2023-06-29 LAB — ACTH PLAS-MCNC: 43.2 PG/ML (ref 7.2–63.3)

## 2023-06-30 LAB
IGF-I SERPL-MCNC: 47 NG/ML (ref 18–193)
IGF-I Z-SCORE SERPL: -1.2

## 2023-07-13 SDOH — ECONOMIC STABILITY: FOOD INSECURITY: WITHIN THE PAST 12 MONTHS, THE FOOD YOU BOUGHT JUST DIDN'T LAST AND YOU DIDN'T HAVE MONEY TO GET MORE.: NEVER TRUE

## 2023-07-13 SDOH — ECONOMIC STABILITY: TRANSPORTATION INSECURITY
IN THE PAST 12 MONTHS, HAS LACK OF TRANSPORTATION KEPT YOU FROM MEETINGS, WORK, OR FROM GETTING THINGS NEEDED FOR DAILY LIVING?: NO

## 2023-07-13 SDOH — ECONOMIC STABILITY: HOUSING INSECURITY
IN THE LAST 12 MONTHS, WAS THERE A TIME WHEN YOU DID NOT HAVE A STEADY PLACE TO SLEEP OR SLEPT IN A SHELTER (INCLUDING NOW)?: NO

## 2023-07-13 SDOH — ECONOMIC STABILITY: INCOME INSECURITY: HOW HARD IS IT FOR YOU TO PAY FOR THE VERY BASICS LIKE FOOD, HOUSING, MEDICAL CARE, AND HEATING?: NOT HARD AT ALL

## 2023-07-13 SDOH — ECONOMIC STABILITY: INCOME INSECURITY: IN THE LAST 12 MONTHS, WAS THERE A TIME WHEN YOU WERE NOT ABLE TO PAY THE MORTGAGE OR RENT ON TIME?: NO

## 2023-07-13 SDOH — ECONOMIC STABILITY: HOUSING INSECURITY

## 2023-07-13 SDOH — HEALTH STABILITY: PHYSICAL HEALTH

## 2023-07-13 SDOH — ECONOMIC STABILITY: FOOD INSECURITY: WITHIN THE PAST 12 MONTHS, YOU WORRIED THAT YOUR FOOD WOULD RUN OUT BEFORE YOU GOT MONEY TO BUY MORE.: NEVER TRUE

## 2023-07-13 SDOH — HEALTH STABILITY: MENTAL HEALTH
STRESS IS WHEN SOMEONE FEELS TENSE, NERVOUS, ANXIOUS, OR CAN'T SLEEP AT NIGHT BECAUSE THEIR MIND IS TROUBLED. HOW STRESSED ARE YOU?: TO SOME EXTENT

## 2023-07-13 SDOH — ECONOMIC STABILITY: TRANSPORTATION INSECURITY
IN THE PAST 12 MONTHS, HAS THE LACK OF TRANSPORTATION KEPT YOU FROM MEDICAL APPOINTMENTS OR FROM GETTING MEDICATIONS?: NO

## 2023-07-13 SDOH — ECONOMIC STABILITY: TRANSPORTATION INSECURITY
IN THE PAST 12 MONTHS, HAS LACK OF RELIABLE TRANSPORTATION KEPT YOU FROM MEDICAL APPOINTMENTS, MEETINGS, WORK OR FROM GETTING THINGS NEEDED FOR DAILY LIVING?: NO

## 2023-07-13 ASSESSMENT — SOCIAL DETERMINANTS OF HEALTH (SDOH)
HOW HARD IS IT FOR YOU TO PAY FOR THE VERY BASICS LIKE FOOD, HOUSING, MEDICAL CARE, AND HEATING?: NOT HARD AT ALL
DO YOU BELONG TO ANY CLUBS OR ORGANIZATIONS SUCH AS CHURCH GROUPS UNIONS, FRATERNAL OR ATHLETIC GROUPS, OR SCHOOL GROUPS?: YES
HOW OFTEN DO YOU GET TOGETHER WITH FRIENDS OR RELATIVES?: MORE THAN THREE TIMES A WEEK
HOW OFTEN DO YOU GET TOGETHER WITH FRIENDS OR RELATIVES?: MORE THAN THREE TIMES A WEEK
IN A TYPICAL WEEK, HOW MANY TIMES DO YOU TALK ON THE PHONE WITH FAMILY, FRIENDS, OR NEIGHBORS?: MORE THAN THREE TIMES A WEEK
HOW OFTEN DO YOU ATTENT MEETINGS OF THE CLUB OR ORGANIZATION YOU BELONG TO?: MORE THAN 4 TIMES PER YEAR
HOW OFTEN DO YOU ATTENT MEETINGS OF THE CLUB OR ORGANIZATION YOU BELONG TO?: MORE THAN 4 TIMES PER YEAR
HOW OFTEN DO YOU HAVE SIX OR MORE DRINKS ON ONE OCCASION: NEVER
DO YOU BELONG TO ANY CLUBS OR ORGANIZATIONS SUCH AS CHURCH GROUPS UNIONS, FRATERNAL OR ATHLETIC GROUPS, OR SCHOOL GROUPS?: YES
HOW MANY DRINKS CONTAINING ALCOHOL DO YOU HAVE ON A TYPICAL DAY WHEN YOU ARE DRINKING: PATIENT DOES NOT DRINK
WITHIN THE PAST 12 MONTHS, YOU WORRIED THAT YOUR FOOD WOULD RUN OUT BEFORE YOU GOT THE MONEY TO BUY MORE: NEVER TRUE
IN A TYPICAL WEEK, HOW MANY TIMES DO YOU TALK ON THE PHONE WITH FAMILY, FRIENDS, OR NEIGHBORS?: MORE THAN THREE TIMES A WEEK

## 2023-07-13 ASSESSMENT — LIFESTYLE VARIABLES
HOW OFTEN DO YOU HAVE SIX OR MORE DRINKS ON ONE OCCASION: NEVER
HOW MANY STANDARD DRINKS CONTAINING ALCOHOL DO YOU HAVE ON A TYPICAL DAY: PATIENT DOES NOT DRINK

## 2023-07-14 ENCOUNTER — OFFICE VISIT (OUTPATIENT)
Dept: MEDICAL GROUP | Facility: IMAGING CENTER | Age: 83
End: 2023-07-14
Payer: MEDICARE

## 2023-07-14 VITALS
HEIGHT: 59 IN | HEART RATE: 82 BPM | OXYGEN SATURATION: 95 % | WEIGHT: 193 LBS | DIASTOLIC BLOOD PRESSURE: 52 MMHG | TEMPERATURE: 96.8 F | RESPIRATION RATE: 16 BRPM | SYSTOLIC BLOOD PRESSURE: 110 MMHG | BODY MASS INDEX: 38.91 KG/M2

## 2023-07-14 DIAGNOSIS — K29.30 CHRONIC SUPERFICIAL GASTRITIS WITHOUT BLEEDING: ICD-10-CM

## 2023-07-14 DIAGNOSIS — M81.0 OSTEOPOROSIS WITHOUT CURRENT PATHOLOGICAL FRACTURE, UNSPECIFIED OSTEOPOROSIS TYPE: ICD-10-CM

## 2023-07-14 DIAGNOSIS — Z85.3 HX OF BREAST CANCER: ICD-10-CM

## 2023-07-14 DIAGNOSIS — G31.9 CEREBRAL ATROPHY (HCC): ICD-10-CM

## 2023-07-14 DIAGNOSIS — E78.1 HYPERTRIGLYCERIDEMIA: ICD-10-CM

## 2023-07-14 DIAGNOSIS — R23.9 SKIN CHANGE: ICD-10-CM

## 2023-07-14 DIAGNOSIS — E53.8 LOW SERUM VITAMIN B12: ICD-10-CM

## 2023-07-14 DIAGNOSIS — R70.0 ESR RAISED: ICD-10-CM

## 2023-07-14 DIAGNOSIS — E03.9 HYPOTHYROIDISM, UNSPECIFIED TYPE: ICD-10-CM

## 2023-07-14 DIAGNOSIS — E66.01 CLASS 2 SEVERE OBESITY DUE TO EXCESS CALORIES WITH SERIOUS COMORBIDITY AND BODY MASS INDEX (BMI) OF 38.0 TO 38.9 IN ADULT (HCC): ICD-10-CM

## 2023-07-14 DIAGNOSIS — I10 ESSENTIAL HYPERTENSION: ICD-10-CM

## 2023-07-14 DIAGNOSIS — E55.9 VITAMIN D DEFICIENCY: ICD-10-CM

## 2023-07-14 DIAGNOSIS — R14.0 ABDOMINAL BLOATING: ICD-10-CM

## 2023-07-14 DIAGNOSIS — Z00.00 ENCOUNTER FOR ANNUAL WELLNESS EXAM IN MEDICARE PATIENT: ICD-10-CM

## 2023-07-14 PROBLEM — B00.1 COLD SORE: Status: RESOLVED | Noted: 2018-03-29 | Resolved: 2023-07-14

## 2023-07-14 PROBLEM — E66.812 CLASS 2 OBESITY IN ADULT: Status: RESOLVED | Noted: 2019-03-13 | Resolved: 2023-07-14

## 2023-07-14 PROBLEM — N32.9 LESION OF BLADDER: Status: ACTIVE | Noted: 2020-08-04

## 2023-07-14 PROBLEM — N32.81 OVERACTIVE BLADDER: Status: RESOLVED | Noted: 2020-02-09 | Resolved: 2023-07-14

## 2023-07-14 PROBLEM — E66.812 CLASS 2 SEVERE OBESITY DUE TO EXCESS CALORIES WITH SERIOUS COMORBIDITY AND BODY MASS INDEX (BMI) OF 38.0 TO 38.9 IN ADULT (HCC): Status: ACTIVE | Noted: 2023-07-14

## 2023-07-14 PROBLEM — N32.81 OVERACTIVE BLADDER: Status: ACTIVE | Noted: 2020-02-09

## 2023-07-14 PROBLEM — E66.9 CLASS 2 OBESITY IN ADULT: Status: RESOLVED | Noted: 2019-03-13 | Resolved: 2023-07-14

## 2023-07-14 PROBLEM — E23.0 HYPOPITUITARISM (HCC): Status: RESOLVED | Noted: 2023-06-23 | Resolved: 2023-07-14

## 2023-07-14 PROBLEM — R31.29 MICROSCOPIC HEMATURIA: Status: RESOLVED | Noted: 2020-08-05 | Resolved: 2023-07-14

## 2023-07-14 PROBLEM — N32.9 LESION OF BLADDER: Status: RESOLVED | Noted: 2020-08-04 | Resolved: 2023-07-14

## 2023-07-14 PROBLEM — N39.0 RECURRENT UTI: Status: RESOLVED | Noted: 2020-01-31 | Resolved: 2023-07-14

## 2023-07-14 PROCEDURE — 3074F SYST BP LT 130 MM HG: CPT | Performed by: PHYSICIAN ASSISTANT

## 2023-07-14 PROCEDURE — G0439 PPPS, SUBSEQ VISIT: HCPCS | Performed by: PHYSICIAN ASSISTANT

## 2023-07-14 PROCEDURE — 3078F DIAST BP <80 MM HG: CPT | Performed by: PHYSICIAN ASSISTANT

## 2023-07-14 PROCEDURE — 96372 THER/PROPH/DIAG INJ SC/IM: CPT | Performed by: PHYSICIAN ASSISTANT

## 2023-07-14 RX ORDER — PANTOPRAZOLE SODIUM 40 MG/1
40 TABLET, DELAYED RELEASE ORAL DAILY
Qty: 90 TABLET | Refills: 0 | Status: SHIPPED | OUTPATIENT
Start: 2023-07-14 | End: 2023-12-07

## 2023-07-14 RX ORDER — FERROUS SULFATE 325(65) MG
325 TABLET ORAL
COMMUNITY
End: 2024-03-15

## 2023-07-14 RX ORDER — DESOXIMETASONE 2.5 MG/G
CREAM TOPICAL 2 TIMES DAILY PRN
COMMUNITY
End: 2023-07-14 | Stop reason: SDUPTHER

## 2023-07-14 RX ORDER — DESOXIMETASONE 2.5 MG/G
1 CREAM TOPICAL 2 TIMES DAILY PRN
Qty: 60 G | Refills: 0 | Status: SHIPPED | OUTPATIENT
Start: 2023-07-14

## 2023-07-14 RX ORDER — CYANOCOBALAMIN 1000 UG/ML
1000 INJECTION, SOLUTION INTRAMUSCULAR; SUBCUTANEOUS ONCE
Status: COMPLETED | OUTPATIENT
Start: 2023-07-14 | End: 2023-07-14

## 2023-07-14 RX ORDER — MAGNESIUM 200 MG
TABLET ORAL
COMMUNITY
End: 2023-12-26

## 2023-07-14 RX ADMIN — CYANOCOBALAMIN 1000 MCG: 1000 INJECTION, SOLUTION INTRAMUSCULAR; SUBCUTANEOUS at 15:31

## 2023-07-14 ASSESSMENT — ACTIVITIES OF DAILY LIVING (ADL): BATHING_REQUIRES_ASSISTANCE: 0

## 2023-07-14 ASSESSMENT — PATIENT HEALTH QUESTIONNAIRE - PHQ9: CLINICAL INTERPRETATION OF PHQ2 SCORE: 0

## 2023-07-14 ASSESSMENT — ENCOUNTER SYMPTOMS: GENERAL WELL-BEING: GOOD

## 2023-07-14 ASSESSMENT — FIBROSIS 4 INDEX: FIB4 SCORE: 1.29

## 2023-07-14 NOTE — ASSESSMENT & PLAN NOTE
Patient states that she had been experiencing some numbness and tingling on the bottom of her feet and since getting B12 injection that has since resolved.  Her labs show elevated sed rate and CRP.

## 2023-07-14 NOTE — PROGRESS NOTES
Chief Complaint   Patient presents with    Medicare Annual Wellness       HPI:  Flavia is a 83 y.o. here for Medicare Annual Wellness Visit    Abdominal bloating  Patient admits to intermittent episodes of abdominal bloating, she has noticed some mild improvement since starting pantoprazole 40 mg daily.    Low serum vitamin B12  Patient states that she had been experiencing some numbness and tingling on the bottom of her feet and since getting B12 injection that has since resolved.  Her labs show elevated sed rate and CRP.      Patient Active Problem List    Diagnosis Date Noted    Cerebral atrophy (HCC) 07/14/2023    Class 2 severe obesity due to excess calories with serious comorbidity and body mass index (BMI) of 38.0 to 38.9 in adult (HCC) 07/14/2023    Chronic superficial gastritis without bleeding 06/23/2023    Low serum vitamin B12 06/23/2023    Abdominal bloating 06/23/2023    Sleep apnea in adult 09/28/2020    Hypertriglyceridemia 09/28/2020    [place] 10/08/2019    Venous insufficiency 08/15/2018    Essential hypertension 09/30/2016    Osteoarthritis of right knee 07/26/2016    Hearing loss 07/26/2016    Hypothyroidism 07/26/2016    Vitamin D deficiency 07/26/2016    Osteoporosis 07/26/2016    Hx of breast cancer 07/26/2016       Current Outpatient Medications   Medication Sig Dispense Refill    DHA-EPA-Vitamin E (OMEGA-3 COMPLEX PO) Take  by mouth. 1600 mg daily 1 tbsp      Cyanocobalamin (VITAMIN B-12) 1000 MCG SL Tab Place  under the tongue.      ferrous sulfate 325 (65 Fe) MG tablet Take 325 mg by mouth every 48 hours.      pantoprazole (PROTONIX) 40 MG Tablet Delayed Response Take 1 Tablet by mouth every day. 90 Tablet 0    silver sulfADIAZINE (SILVADENE) 1 % Cream Apply thin layer to affected area twice a day x 5 days 50 g 0    desoximetasone (TOPICORT) 0.25 % Cream Apply 1 Application topically 2 times a day as needed (rash). 60 g 0    Celecoxib (CELEBREX PO) Take  by mouth.      Coenzyme Q10-Red  Yeast Rice (CO Q-10 PLUS RED YEAST RICE PO) Take  by mouth.      vitamin D2, Ergocalciferol, (DRISDOL) 1.25 MG (15274 UT) Cap capsule Take 1 Capsule by mouth every 7 days. 12 Capsule 0    losartan (COZAAR) 50 MG Tab TAKE 1 TABLET DAILY 90 Tablet 3    meloxicam (MOBIC) 15 MG tablet Take 1 Tablet by mouth every day. 30 Tablet 0    levothyroxine (SYNTHROID) 150 MCG Tab TAKE 1 TABLET 175 MCG ON MONDAY, WEDNESDAY, FRIDAY. TAKE 150MCG ON Sunday, TUESDAY, THURSDAY, and Saturday. 48 Tablet 3    levothyroxine (SYNTHROID) 175 MCG Tab TAKE 1 TABLET 175 MCG ON MONDAY, WEDNESDAY, FRIDAY. TAKE 150MCG ON Sunday, TUESDAY, THURSDAY, and Saturday. 36 Tablet 3    estradiol (ESTRACE) 0.1 MG/GM vaginal cream       valacyclovir (VALTREX) 1 GM Tab Take 2 Tablets by mouth every 12 hours. 2 g twice daily for one day. 20 Tablet 0    clobetasol (TEMOVATE) 0.05 % Cream CLOBETASOL PROPIONATE 0.05 % CREA 30 g 1    cyclobenzaprine (FLEXERIL) 5 MG tablet CYCLOBENZAPRINE HCL 5 MG TABS      acetaminophen (TYLENOL) 500 MG Tab Take 500-1,000 mg by mouth every 6 hours as needed.      diphenhydrAMINE-APAP, sleep, (TYLENOL PM EXTRA STRENGTH PO) Take  by mouth.      loratadine (CLARITIN) 10 MG Tab Take 10 mg by mouth every day.      guaiFENesin ER (MUCINEX) 600 MG TABLET SR 12 HR Take 600 mg by mouth every 12 hours.       No current facility-administered medications for this visit.        Patient is taking medications as noted in medication list.  Current supplements as per medication list.     Allergies: Lisinopril    Current social contact/activities: yes    Is patient current with immunizations? Yes.    She  reports that she has never smoked. She has never used smokeless tobacco. She reports that she does not currently use alcohol. She reports that she does not use drugs.  Counseling given: Not Answered      ROS:    Gait: Uses no assistive device   Ostomy: No   Other tubes: No no  Amputations: No no  Chronic oxygen use No no  Last eye exam  9/2022  Wears hearing aids: No no  : Denies any urinary leakage during the last 6 months    Screening: UTD, DEXA ordered    Depression Screening  Little interest or pleasure in doing things?  0 - not at all  Feeling down, depressed, or hopeless? 0 - not at all  Patient Health Questionnaire Score: 0    If depressive symptoms identified deferred to follow up visit unless specifically addressed in assessment and plan.    Interpretation of PHQ-9 Total Score   Score Severity   1-4 No Depression   5-9 Mild Depression   10-14 Moderate Depression   15-19 Moderately Severe Depression   20-27 Severe Depression    Screening for Cognitive Impairment  Three Minute Recall (daughter, carmen, phani)   /3    [place] clock face with all 12 numbers and set the hands to show 10 past 11.  Yes    If cognitive concerns identified, deferred for follow up unless specifically addressed in assessment and plan.    Fall Risk Assessment  Has the patient had two or more falls in the last year or any fall with injury in the last year?  Yes  If fall risk identified, deferred for follow up unless specifically addressed in assessment and plan.    Safety Assessment  Throw rugs on floor.  Yes  Handrails on all stairs.  Yes  Good lighting in all hallways.  Yes  Difficulty hearing.  Yes  Patient counseled about all safety risks that were identified.    Functional Assessment ADLs  Are there any barriers preventing you from cooking for yourself or meeting nutritional needs?  No.    Are there any barriers preventing you from driving safely or obtaining transportation?  No.    Are there any barriers preventing you from using a telephone or calling for help?  No.    Are there any barriers preventing you from shopping?  No.    Are there any barriers preventing you from taking care of your own finances?  No.    Are there any barriers preventing you from managing your medications?  No.    Are there any barriers preventing you from showering, bathing or  dressing yourself?  No.    Are you currently engaging in any exercise or physical activity?  No.  Pt states not since march. Feeling exhausted   What is your perception of your health?  Good.    Advance Care Planning  Do you have an Advance Directive, Living Will, Durable Power of , or POLST? Yes  Advance Directive Living Will Durable Power of  POLST      Health Maintenance Summary            Scheduled - BONE DENSITY (Every 2 Years) Scheduled for 8/24/2023 05/21/2019  DS-BONE DENSITY STUDY (DEXA)              Postponed - IMM ZOSTER VACCINES (1 of 2) Postponed until 12/14/2023      No completion history exists for this topic.              Postponed - IMM DTaP/Tdap/Td Vaccine (1 - Tdap) Postponed until 7/14/2024      No completion history exists for this topic.              Postponed - COVID-19 Vaccine (1) Postponed until 7/14/2024      No completion history exists for this topic.              Postponed - IMM PNEUMOCOCCAL VACCINE: 65+ Years (2 - PPSV23 if available, else PCV20) Postponed until 7/14/2024 11/14/2018  Imm Admin: Pneumococcal Conjugate Vaccine (Prevnar/PCV-13)              IMM INFLUENZA (1) Next due on 9/1/2023      No completion history exists for this topic.              Annual Wellness Visit (Every 366 Days) Next due on 7/14/2024 07/14/2023  Done              IMM HEP B VACCINE (Series Information) Aged Out      No completion history exists for this topic.              HPV Vaccines (Series Information) Aged Out      No completion history exists for this topic.              IMM MENINGOCOCCAL ACWY VACCINE (Series Information) Aged Out      No completion history exists for this topic.              Discontinued - MAMMOGRAM  Discontinued        Frequency changed to Never automatically (Topic No Longer Applies)    09/07/2022  [id number]    09/07/2022  [id number]    09/07/2022  MA-SCREENING MAMMO BILAT W/TOMOSYNTHESIS W/CAD    09/07/2022   [id number]    Only the first 5 history entries have been loaded, but more history exists.              Discontinued - COLORECTAL CANCER SCREENING  Discontinued        Frequency changed to Never automatically (Topic No Longer Applies)    01/04/2019  REFERRAL TO GI FOR COLONOSCOPY    03/13/2017  COLONOSCOPY (Reason not specified - PER [facility] NO COLONOSCOPY)                    Patient Care Team:  Almita Valenzuela P.A.-C. as PCP - General (Family Medicine)    Social History     Tobacco Use    Smoking status: Never    Smokeless tobacco: Never   Vaping Use    Vaping Use: Never used   Substance Use Topics    Alcohol use: Not Currently     Alcohol/week: 0.0 oz     Comment: min    Drug use: No     Family History   Problem Relation Age of Onset    Diabetes Other         GF    Arthritis Other         GM    Arthritis Mother     Other Son         CELIAC DISEASE-SEVERE     She  has a past medical history of Acquired absence of both cervix and uterus (11/11/2014), Back pain, Breast cancer ([facility]), Chickenpox, Chronic left-sided low back pain without sciatica (3/17/2017), Constipation, Cough, Cystitis (6/12/2019), Diarrhea, Dyslipidemia (7/26/2016), Eczema, Fatigue, Frequent urination, Gout of foot (9/30/2016), H/O total hysterectomy (7/26/2016), History of UTI (1/31/2020), breast cancer (7/26/2016), Hypertension, Hypopituitarism (HCC) (6/23/2023), Hypothyroidism (7/26/2016), Idiopathic chronic gout of right ankle (9/30/2016), Influenza, Lichen sclerosus of female genitalia, Nasal drainage, Nocturnal oxygen desaturation (8/7/2020), Obesity, Osteopenia (7/26/2016), Osteoporosis, Overactive bladder (2/9/2020), Overweight, Recurrent UTI (1/31/2020), Rhinitis, Ringing in ears, Shortness of breath, Sputum production, Status post right knee replacement (5/5/2016), Status post total right knee replacement (8/17/2016), Swelling of lower extremity, Thyroid activity decreased, Tonsillitis, Unspecified disorder of the pituitary gland  "and its hypothalamic control, Vision loss, Vitamin D deficiency (7/26/2016), and Whooping cough.   Past Surgical History:   Procedure Laterality Date    LUMPECTOMY Right 2012    ARTHROSCOPY, KNEE      HYSTERECTOMY LAPAROSCOPY      KNEE ARTHROPLASTY TOTAL Right     OTHER      LIPOSUCTION THIGHS-LEG LIFT    LA REMV 2ND CATARACT,CORN-SCLER SECTN      VAGINAL HYSTERECTOMY TOTAL      Hysterectomy,Total Vaginal       Exam:   /52 (BP Location: Left arm, Patient Position: Sitting, BP Cuff Size: Adult)   Pulse 82   Temp 36 °C (96.8 °F) (Temporal)   Resp 16   Ht 1.499 m (4' 11\")   Wt 87.5 kg (193 lb)   SpO2 95%  Body mass index is 38.98 kg/m².    Hearing excellent.    Dentition good  Alert, oriented in no acute distress.  Eye contact is good, speech goal directed, affect calm      Assessment and Plan. The following treatment and monitoring plan is recommended:      1. Encounter for annual wellness exam in Medicare patient  PMH/PSH/FH/Social history reviewed.  Vaccinations discussed.  Previous records and labs reviewed. Discussed age appropriate anticipatory guidance.    2. Cerebral atrophy (HCC)  Chronic, controlled and stable. Continue current regimen -blood pressure and blood sugar control.    3. Low serum vitamin B12  - cyanocobalamin (Vitamin B-12) injection 1,000 mcg    4. Abdominal bloating  Age-related gastritis, check abdominal ultrasound.  Continue pantoprazole.  Follow-up in 1 month.  - pantoprazole (PROTONIX) 40 MG Tablet Delayed Response; Take 1 Tablet by mouth every day.  Dispense: 90 Tablet; Refill: 0  - US-ABDOMEN COMPLETE SURVEY; Future    5. Chronic superficial gastritis without bleeding  - pantoprazole (PROTONIX) 40 MG Tablet Delayed Response; Take 1 Tablet by mouth every day.  Dispense: 90 Tablet; Refill: 0    6. Skin change  Patient request a refill of chronic creams that she uses as needed for skin burn and dermatitis.  Does not use daily.  - silver sulfADIAZINE (SILVADENE) 1 % Cream; Apply " thin layer to affected area twice a day x 5 days  Dispense: 50 g; Refill: 0  - desoximetasone (TOPICORT) 0.25 % Cream; Apply 1 Application topically 2 times a day as needed (rash).  Dispense: 60 g; Refill: 0    7. ESR raised  Recheck in 4 weeks.  No chest pain, headaches, vision changes, fever, neuropathy.  - Sed Rate; Future  - CRP QUANTITIVE (NON-CARDIAC); Future    8. Hypothyroidism, unspecified type  Chronic, controlled and stable. Continue current regimen -Synthroid 175 mcg on Monday Wednesday Friday and 150 mcg on Tuesday Thursday Saturday Sunday.    9. Hypertriglyceridemia  Chronic, uncontrolled.  Recently switched to liquid omega-3's.    10. Hx of breast cancer  Status post radiation and lumpectomy x2 in 2011.    11. Essential hypertension  Chronic, controlled and stable. Continue current regimen -losartan 50 mg qd. Recommend DASH diet, exercise as tolerated. Monitor Blood Pressure at home and report any consistent readings above >140/90. Seek medical help/ER if chest pain, palpitations, shortness of breath, headache, dizziness.     12. Osteoporosis without current pathological fracture, unspecified osteoporosis type  Chronic, on vitamin D.  DEXA scheduled for 8/24/2023.    13. Class 2 severe obesity due to excess calories with serious comorbidity and body mass index (BMI) of 38.0 to 38.9 in adult (HCC)  Chronic, uncontrolled. Please continue working on lifestyle modifications. This includes accumulation of 150 minutes or more of moderate-intensity activity each week as tolerated and a healthy diet that is low in saturated fat, sodium, and cholesterol, such as the mediterranean diet that is typically high in fruits, vegetables, whole grains, beans, nuts, and seeds, includes olive oil as an important source of monounsaturated fat, DASH diet, and/or also consider a plant-based diet.  Please also increase your dietary fiber intake to 25 to 30 g a day and limit added sugar to <30 g/day.  In regards to alcohol  intake, the 2020 to 2025 Dietary Guidelines for Americans advise no more than two drinks per day for males and one drink per day for nonpregnant females, although complete cessation would be ideal to reduce risks of long term health effects from alcohol.    14. Vitamin D deficiency  Continue vitamin D 50,000 IU weekly.       Services suggested: No services needed at this time  Health Care Screening recommendations as per orders if indicated.  Referrals offered: PT/OT/Nutrition counseling/Behavioral Health/Smoking cessation as per orders if indicated.    Discussion today about general wellness and lifestyle habits:    Prevent falls and reduce trip hazards; Cautioned about securing or removing rugs.  Have a working fire alarm and carbon monoxide detector;   Engage in regular physical activity and social activities     Follow-up: Return in about 6 weeks (around 8/25/2023) for Follow-up labs/tests.

## 2023-07-14 NOTE — PATIENT INSTRUCTIONS
It was a pleasure meeting with you today at [facility]!    Your medical history/records and medications were reviewed today.     UPDATE on MyChart Results: If you have blood work, and/or imaging studies, or any other test or procedure completed, you will have access to results as soon as they become available in MyChart. Recently, these results will be available for review at the same time that your provider is able to see results!    This will likely mean you will see a result before your provider has had a chance to review and discuss with you.  Some results or care notes may be hard to understand and may be serious in nature.    We look at every result and your provider will contact you to explain what they mean and discuss appropriate next steps. Please allow for at least 72 business hours for chart and result review.     We prefer that you wait for your care team to contact you with your results.  Often, your provider will discuss your results with you at your next appointment. We look forward to continuing to partner with you in your care.    Please review my practice information below:    If you have any prescription refill requests, please send them via [employer] or discuss with your provider at the start of your office visits. Please allow 3-5 business days for lab and testing review and you will be contacted via [employer] with those results, or if advised to make a follow up appointment regarding those results, then please do so.     Once resulted, your lab/test/imaging results will show up automatically in your MyChart. Please wait for my interpretation and recommendations prior to viewing your results to avoid any unnecessary confusion or misinterpretation. I will address all of the lab values that I interpret as abnormal and message you accordingly on your MyChart. I will always send you a message about your results even if they are normal. If you do not hear back from me within 5-7 business  days after completing your tests, then please send me a message on [facility] [employer] so I can obtain your results (especially if you went to an outside lab or imaging center - [facility], [facility], etc).     If you have any additional questions or concerns beyond my interpretation of your results, please make an appointment with me to discuss in further detail.    Please only use the [facility] [employer] messaging system for questions regarding your most recent appointment or if advised to use otherwise (glucose or blood pressure reporting).     If you have any new problems or concerns, you must make an appointment to discuss. This includes any referral requests, lab requests (unless advised to notify me for pre-appt labs), medication side effects, or request for medication adjustments.     Please arrive 15 minutes prior to your appointment time to complete your check-in and intake with the medical assistant.      Thank you,    Almita Valenzuela PA-C ([place])  Physician Assistant Certified  [facility]    -----------------------------------------------------------------    Attn: Patients of [facility]:    In an effort to continue to provide excellent and efficient care to our patients, it is vital that we continue to use our resources appropriately. With that, this is a reminder that [facility] [employer] is used for prescription refill requests, test results, virtual visits, and chart review only.     Any new questions, concerns/conditions, lab/imaging requests, medication adjustments, new prescriptions, or referral requests do require an appointment (virtually or in person), unless discussed otherwise at your most recent appointment.     Thank you for your understanding,    [facility]

## 2023-07-14 NOTE — ASSESSMENT & PLAN NOTE
Patient admits to intermittent episodes of abdominal bloating, she has noticed some mild improvement since starting pantoprazole 40 mg daily.

## 2023-07-20 ENCOUNTER — OFFICE VISIT (OUTPATIENT)
Dept: SLEEP MEDICINE | Facility: MEDICAL CENTER | Age: 83
End: 2023-07-20
Attending: INTERNAL MEDICINE
Payer: MEDICARE

## 2023-07-20 VITALS
DIASTOLIC BLOOD PRESSURE: 60 MMHG | SYSTOLIC BLOOD PRESSURE: 130 MMHG | OXYGEN SATURATION: 94 % | BODY MASS INDEX: 36.89 KG/M2 | HEIGHT: 60 IN | HEART RATE: 109 BPM | WEIGHT: 187.9 LBS

## 2023-07-20 DIAGNOSIS — R05.3 CHRONIC COUGH: ICD-10-CM

## 2023-07-20 DIAGNOSIS — R00.0 TACHYCARDIA: ICD-10-CM

## 2023-07-20 DIAGNOSIS — R06.09 DOE (DYSPNEA ON EXERTION): ICD-10-CM

## 2023-07-20 PROBLEM — G47.30 SLEEP APNEA IN ADULT: Status: RESOLVED | Noted: 2020-09-28 | Resolved: 2023-07-20

## 2023-07-20 PROCEDURE — 99213 OFFICE O/P EST LOW 20 MIN: CPT | Performed by: INTERNAL MEDICINE

## 2023-07-20 PROCEDURE — 3075F SYST BP GE 130 - 139MM HG: CPT | Performed by: INTERNAL MEDICINE

## 2023-07-20 PROCEDURE — 3078F DIAST BP <80 MM HG: CPT | Performed by: INTERNAL MEDICINE

## 2023-07-20 PROCEDURE — 99214 OFFICE O/P EST MOD 30 MIN: CPT | Performed by: INTERNAL MEDICINE

## 2023-07-20 ASSESSMENT — ENCOUNTER SYMPTOMS
TREMORS: 0
HEARTBURN: 0
FALLS: 0
BACK PAIN: 0
SORE THROAT: 0
CLAUDICATION: 0
PND: 0
HEMOPTYSIS: 0
SPUTUM PRODUCTION: 0
EYE PAIN: 0
FOCAL WEAKNESS: 0
DIZZINESS: 0
VOMITING: 0
SHORTNESS OF BREATH: 1
SPEECH CHANGE: 0
ORTHOPNEA: 0
SINUS PAIN: 0
DOUBLE VISION: 0
NECK PAIN: 0
ABDOMINAL PAIN: 0
CONSTIPATION: 0
CHILLS: 0
HEADACHES: 0
DIARRHEA: 0
PALPITATIONS: 0
PHOTOPHOBIA: 0
EYE DISCHARGE: 0
FEVER: 0
WEIGHT LOSS: 0
NAUSEA: 0
DIAPHORESIS: 0
COUGH: 1
STRIDOR: 0
WEAKNESS: 0
MYALGIAS: 0
WHEEZING: 0
DEPRESSION: 0
BLURRED VISION: 0
EYE REDNESS: 0

## 2023-07-20 ASSESSMENT — FIBROSIS 4 INDEX: FIB4 SCORE: 1.29

## 2023-07-20 NOTE — PROGRESS NOTES
Chief Complaint   Patient presents with    Follow-Up     LAST SEEN 3/10/21         HPI: This patient is a 83 y.o. female whom is followed in our clinic for RUIZ last seen by DR. Templeton.  PT has a hx of stage I breast Ca dx roughly 10 years ago, DJD and hypothyroid. She is a life-long non-smoker. No known occupational or environmental exposures. 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 She was referred to us in 2021 for RUIZ. PFT from 3/2021 was normal, CXR clear. She does have chronic chest congestion, primarily when she goes to bed she needs to cough up sputum but typically swallows it. This is chronic, mild and unchanged. She does get PND and if significant will take OTC antihistamine. Over the past year she has noted increased RUIZ with activity and when she checks her SpO2 it is typically low normal but her HR may be up in the 120s. No CP. No palpitations. No edema. No wheezing. HR today is 108 and regular.     Past Medical History:   Diagnosis Date    Acquired absence of both cervix and uterus 11/11/2014    Back pain     Breast cancer (HCC)     Chickenpox     Chronic left-sided low back pain without sciatica 3/17/2017    Constipation     Cough     Cystitis 6/12/2019    Diarrhea     Dyslipidemia 7/26/2016    Eczema     Fatigue     Frequent urination     Gout of foot 9/30/2016    H/O total hysterectomy 7/26/2016    History of UTI 1/31/2020    Hx of breast cancer 7/26/2016    Hypertension     Hypopituitarism (HCC) 6/23/2023    Hypothyroidism 7/26/2016    Idiopathic chronic gout of right ankle 9/30/2016    Influenza     Lichen sclerosus of female genitalia     Nasal drainage     Nocturnal oxygen desaturation 8/7/2020    Obesity     Osteopenia 7/26/2016    Osteoporosis     Overactive bladder 2/9/2020    Overweight     Recurrent UTI 1/31/2020    Rhinitis     Ringing in ears     Shortness of breath     Sputum production     Status post right  knee replacement 5/5/2016    Status post total right knee replacement 8/17/2016    Swelling of lower extremity     Thyroid activity decreased     Tonsillitis     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     Vision loss     Vitamin D deficiency 7/26/2016    Whooping cough        Social History     Socioeconomic History    Marital status:      Spouse name: Not on file    Number of children: Not on file    Years of education: Not on file    Highest education level: Associate degree: academic program   Occupational History    Not on file   Tobacco Use    Smoking status: Never    Smokeless tobacco: Never   Vaping Use    Vaping Use: Never used   Substance and Sexual Activity    Alcohol use: Not Currently     Alcohol/week: 0.0 oz     Comment: min    Drug use: No    Sexual activity: Yes     Partners: Male   Other Topics Concern    Not on file   Social History Narrative    From [place]      Social Determinants of Health     Financial Resource Strain: Low Risk  (7/13/2023)    Overall Financial Resource Strain (CARDIA)     Difficulty of Paying Living Expenses: Not hard at all   Food Insecurity: No Food Insecurity (7/13/2023)    Hunger Vital Sign     Worried About Running Out of Food in the Last Year: Never true     Ran Out of Food in the Last Year: Never true   Transportation Needs: No Transportation Needs (7/13/2023)    PRAPARE - Transportation     Lack of Transportation (Medical): No     Lack of Transportation (Non-Medical): No   Physical Activity: Sufficiently Active (10/2/2019)    Exercise Vital Sign     Days of Exercise per Week: 3 days     Minutes of Exercise per Session: 90 min   Stress: Stress Concern Present (7/13/2023)    [employer] - Occupational Stress Questionnaire     Feeling of Stress : To some extent   Social Connections: Unknown (7/13/2023)    Social Connection and Isolation Panel [NHANES]     Frequency of Communication with Friends and  Family: More than three times a week     Frequency of Social Gatherings with Friends and Family: More than three times a week     Attends Temple Services: Not on file     Active Member of Clubs or Organizations: Yes     Attends Club or Organization Meetings: More than 4 times per year     Marital Status: Not on file   Intimate Partner Violence: Not on file   Housing Stability: Unknown (7/13/2023)    Housing Stability Vital Sign     Unable to Pay for Housing in the Last Year: No     Number of Places Lived in the Last Year: Not on file     Unstable Housing in the Last Year: No       Family History   Problem Relation Age of Onset    Diabetes Other         GF    Arthritis Other         GM    Arthritis Mother     Other Son         CELIAC DISEASE-SEVERE       Current Outpatient Medications on File Prior to Visit   Medication Sig Dispense Refill    DHA-EPA-Vitamin E (OMEGA-3 COMPLEX PO) Take  by mouth. 1600 mg daily 1 tbsp      Cyanocobalamin (VITAMIN B-12) 1000 MCG SL Tab Place  under the tongue.      ferrous sulfate 325 (65 Fe) MG tablet Take 325 mg by mouth every 48 hours.      pantoprazole (PROTONIX) 40 MG Tablet Delayed Response Take 1 Tablet by mouth every day. 90 Tablet 0    desoximetasone (TOPICORT) 0.25 % Cream Apply 1 Application topically 2 times a day as needed (rash). 60 g 0    Celecoxib (CELEBREX PO) Take  by mouth.      Coenzyme Q10-Red Yeast Rice (CO Q-10 PLUS RED YEAST RICE PO) Take  by mouth.      vitamin D2, Ergocalciferol, (DRISDOL) 1.25 MG (33240 UT) Cap capsule Take 1 Capsule by mouth every 7 days. 12 Capsule 0    losartan (COZAAR) 50 MG Tab TAKE 1 TABLET DAILY 90 Tablet 3    levothyroxine (SYNTHROID) 150 MCG Tab TAKE 1 TABLET 175 MCG ON MONDAY, WEDNESDAY, FRIDAY. TAKE 150MCG ON Sunday, TUESDAY, THURSDAY, and Saturday. 48 Tablet 3    levothyroxine (SYNTHROID) 175 MCG Tab TAKE 1 TABLET 175 MCG ON MONDAY, WEDNESDAY, FRIDAY. TAKE 150MCG ON Sunday, TUESDAY, THURSDAY, and Saturday. 36 Tablet 3     estradiol (ESTRACE) 0.1 MG/GM vaginal cream       valacyclovir (VALTREX) 1 GM Tab Take 2 Tablets by mouth every 12 hours. 2 g twice daily for one day. 20 Tablet 0    clobetasol (TEMOVATE) 0.05 % Cream CLOBETASOL PROPIONATE 0.05 % CREA 30 g 1    cyclobenzaprine (FLEXERIL) 5 MG tablet CYCLOBENZAPRINE HCL 5 MG TABS      acetaminophen (TYLENOL) 500 MG Tab Take 500-1,000 mg by mouth every 6 hours as needed.      diphenhydrAMINE-APAP, sleep, (TYLENOL PM EXTRA STRENGTH PO) Take  by mouth.      loratadine (CLARITIN) 10 MG Tab Take 10 mg by mouth every day.      guaiFENesin ER (MUCINEX) 600 MG TABLET SR 12 HR Take 600 mg by mouth every 12 hours.      silver sulfADIAZINE (SILVADENE) 1 % Cream Apply thin layer to affected area twice a day x 5 days 50 g 0    meloxicam (MOBIC) 15 MG tablet Take 1 Tablet by mouth every day. 30 Tablet 0     No current facility-administered medications on file prior to visit.       Lisinopril      ROS:   Review of Systems   Constitutional:  Negative for chills, diaphoresis, fever, malaise/fatigue and weight loss.   HENT:  Positive for congestion. Negative for ear discharge, ear pain, hearing loss, nosebleeds, sinus pain, sore throat and tinnitus.    Eyes:  Negative for blurred vision, double vision, photophobia, pain, discharge and redness.   Respiratory:  Positive for cough and shortness of breath. Negative for hemoptysis, sputum production, wheezing and stridor.    Cardiovascular:  Negative for chest pain, palpitations, orthopnea, claudication, leg swelling and PND.   Gastrointestinal:  Negative for abdominal pain, constipation, diarrhea, heartburn, nausea and vomiting.   Genitourinary:  Negative for dysuria and urgency.   Musculoskeletal:  Negative for back pain, falls, joint pain, myalgias and neck pain.   Skin:  Negative for itching and rash.   Neurological:  Negative for dizziness, tremors, speech change, focal weakness, weakness and headaches.   Endo/Heme/Allergies:  Negative for  "environmental allergies.   Psychiatric/Behavioral:  Negative for depression.        /60 (BP Location: Right arm, Patient Position: Sitting, BP Cuff Size: Adult)   Pulse (!) 109   Ht 1.511 m (4' 11.5\")   Wt 85.2 kg (187 lb 14.4 oz)   SpO2 94%   Physical Exam  Constitutional:       General: She is not in acute distress.     Appearance: Normal appearance. She is well-developed and normal weight.   HENT:      Head: Normocephalic and atraumatic.      Right Ear: External ear normal.      Left Ear: External ear normal.      Nose: Nose normal. No congestion.      Mouth/Throat:      Mouth: Mucous membranes are moist.      Pharynx: Oropharynx is clear. No oropharyngeal exudate.   Eyes:      General: No scleral icterus.     Extraocular Movements: Extraocular movements intact.      Conjunctiva/sclera: Conjunctivae normal.      Pupils: Pupils are equal, round, and reactive to light.   Neck:      Vascular: No JVD.      Trachea: No tracheal deviation.   Cardiovascular:      Rate and Rhythm: Normal rate and regular rhythm.      Heart sounds: Normal heart sounds. No murmur heard.     No friction rub. No gallop.   Pulmonary:      Effort: Pulmonary effort is normal. No accessory muscle usage or respiratory distress.      Breath sounds: Normal breath sounds. No wheezing or rales.   Abdominal:      General: There is no distension.      Palpations: Abdomen is soft.      Tenderness: There is no abdominal tenderness.   Musculoskeletal:         General: No tenderness or deformity. Normal range of motion.      Cervical back: Normal range of motion and neck supple.      Right lower leg: No edema.      Left lower leg: No edema.   Lymphadenopathy:      Cervical: No cervical adenopathy.   Skin:     General: Skin is warm and dry.      Findings: No rash.      Nails: There is no clubbing.   Neurological:      Mental Status: She is alert and oriented to person, place, and time.      Cranial Nerves: No cranial nerve deficit.      Gait: Gait " normal.   Psychiatric:         Behavior: Behavior normal.         PFTs as reviewed by me personally: as per hPI    Imaging as reviewed by me personally:  as per hPI    Assessment:  1. Chronic cough  PULMONARY FUNCTION TESTS -Test requested: Complete Pulmonary Function Test    PULMONARY FUNCTION TESTS -Test requested: Complete Pulmonary Function Test      2. RUIZ (dyspnea on exertion)  PULMONARY FUNCTION TESTS -Test requested: Complete Pulmonary Function Test    REFERRAL TO CARDIOLOGY      3. Tachycardia  REFERRAL TO CARDIOLOGY          Plan:  Chronic but new to me. Mild. I do suspect PND. We discussed a trial of flonase or trial of neti pot rinses daily in PM then addition of flonase if this does not reduce sxs. Update PFT  Chronic but new to me and recently progressive with mild tachycardia? We will update PFTs and I did place a referral to cardiology. HR regular on exam today.  Regular but new for pt. She would like evaluation by cardiology, possible event monitor. Referral placed.   Return in about 4 months (around 11/20/2023) for PFT any time.

## 2023-07-20 NOTE — PATIENT INSTRUCTIONS
Trial of intranasal steroids (fluticaonse - generic flonase) one spray each nostril at night before bed for at least two weeks   Okay to try claritin as well

## 2023-08-09 ENCOUNTER — HOSPITAL ENCOUNTER (OUTPATIENT)
Dept: RADIOLOGY | Facility: MEDICAL CENTER | Age: 83
End: 2023-08-09
Attending: PHYSICIAN ASSISTANT
Payer: MEDICARE

## 2023-08-09 DIAGNOSIS — R14.0 ABDOMINAL BLOATING: ICD-10-CM

## 2023-08-09 PROCEDURE — 76700 US EXAM ABDOM COMPLETE: CPT

## 2023-08-10 DIAGNOSIS — K80.20 CALCULUS OF GALLBLADDER WITHOUT CHOLECYSTITIS WITHOUT OBSTRUCTION: ICD-10-CM

## 2023-08-21 ENCOUNTER — HOSPITAL ENCOUNTER (OUTPATIENT)
Dept: LAB | Facility: MEDICAL CENTER | Age: 83
End: 2023-08-21
Attending: PHYSICIAN ASSISTANT
Payer: MEDICARE

## 2023-08-21 DIAGNOSIS — R70.0 ESR RAISED: ICD-10-CM

## 2023-08-21 LAB
CRP SERPL HS-MCNC: <0.3 MG/DL (ref 0–0.75)
ERYTHROCYTE [SEDIMENTATION RATE] IN BLOOD BY WESTERGREN METHOD: 16 MM/HOUR (ref 0–25)

## 2023-08-21 PROCEDURE — 85652 RBC SED RATE AUTOMATED: CPT

## 2023-08-21 PROCEDURE — 86140 C-REACTIVE PROTEIN: CPT

## 2023-08-21 PROCEDURE — 36415 COLL VENOUS BLD VENIPUNCTURE: CPT

## 2023-08-24 ENCOUNTER — HOSPITAL ENCOUNTER (OUTPATIENT)
Dept: RADIOLOGY | Facility: MEDICAL CENTER | Age: 83
End: 2023-08-24
Attending: PHYSICIAN ASSISTANT
Payer: MEDICARE

## 2023-08-24 ENCOUNTER — OFFICE VISIT (OUTPATIENT)
Dept: MEDICAL GROUP | Facility: IMAGING CENTER | Age: 83
End: 2023-08-24
Payer: MEDICARE

## 2023-08-24 VITALS
HEART RATE: 99 BPM | BODY MASS INDEX: 38.51 KG/M2 | WEIGHT: 191 LBS | TEMPERATURE: 97.4 F | SYSTOLIC BLOOD PRESSURE: 134 MMHG | DIASTOLIC BLOOD PRESSURE: 76 MMHG | HEIGHT: 59 IN | OXYGEN SATURATION: 93 %

## 2023-08-24 DIAGNOSIS — E53.8 LOW SERUM VITAMIN B12: ICD-10-CM

## 2023-08-24 DIAGNOSIS — N95.1 MENOPAUSAL STATE: ICD-10-CM

## 2023-08-24 DIAGNOSIS — M85.859 OSTEOPENIA OF HIP, UNSPECIFIED LATERALITY: ICD-10-CM

## 2023-08-24 DIAGNOSIS — Z78.0 POSTMENOPAUSAL STATUS (AGE-RELATED) (NATURAL): ICD-10-CM

## 2023-08-24 DIAGNOSIS — K80.20 CALCULUS OF GALLBLADDER WITHOUT CHOLECYSTITIS WITHOUT OBSTRUCTION: ICD-10-CM

## 2023-08-24 DIAGNOSIS — E03.9 HYPOTHYROIDISM, UNSPECIFIED TYPE: ICD-10-CM

## 2023-08-24 PROCEDURE — 3075F SYST BP GE 130 - 139MM HG: CPT | Performed by: PHYSICIAN ASSISTANT

## 2023-08-24 PROCEDURE — 99214 OFFICE O/P EST MOD 30 MIN: CPT | Mod: 25 | Performed by: PHYSICIAN ASSISTANT

## 2023-08-24 PROCEDURE — 77080 DXA BONE DENSITY AXIAL: CPT

## 2023-08-24 PROCEDURE — 3078F DIAST BP <80 MM HG: CPT | Performed by: PHYSICIAN ASSISTANT

## 2023-08-24 RX ORDER — LEVOTHYROXINE SODIUM 0.15 MG/1
TABLET ORAL
Qty: 48 TABLET | Refills: 3 | Status: SHIPPED | OUTPATIENT
Start: 2023-08-24

## 2023-08-24 RX ORDER — LEVOTHYROXINE SODIUM 175 UG/1
TABLET ORAL
Qty: 36 TABLET | Refills: 3 | Status: SHIPPED | OUTPATIENT
Start: 2023-08-24

## 2023-08-24 RX ORDER — VITAMIN B COMPLEX
2000 TABLET ORAL DAILY
COMMUNITY
End: 2024-03-15

## 2023-08-24 RX ORDER — CYANOCOBALAMIN 1000 UG/ML
1000 INJECTION, SOLUTION INTRAMUSCULAR; SUBCUTANEOUS ONCE
Status: COMPLETED | OUTPATIENT
Start: 2023-08-24 | End: 2023-08-24

## 2023-08-24 RX ADMIN — CYANOCOBALAMIN 1000 MCG: 1000 INJECTION, SOLUTION INTRAMUSCULAR; SUBCUTANEOUS at 14:32

## 2023-08-24 ASSESSMENT — FIBROSIS 4 INDEX: FIB4 SCORE: 1.29

## 2023-08-24 NOTE — ASSESSMENT & PLAN NOTE
Patient completed a bone density scan showing improvement.  No evidence of osteoporosis.  No falls or fractures.   Flap Type: Rhombic Flap

## 2023-08-24 NOTE — PROGRESS NOTES
Subjective:     CC:   Chief Complaint   Patient presents with    Lab Results       HPI:   Flavia presents today with her son to discuss recent labs.  Had elevated sed rate and CRP, recheck came back normal.    Calculus of gallbladder without cholecystitis without obstruction  Patient had an abdominal ultrasound due to chronic abdominal bloating.  Has an appointment with general surgery next week.  She admits to bloating, early satiety.  No nausea, no abdominal pain, no fever, no alternating bowel habits.    Hypothyroidism  Chronic, controlled and stable on Synthroid.  Needs refill today.    Low serum vitamin B12  Patient with history of low B12.  States that she felt good after her last B12 shot.  Interested in another one today.    Osteopenia  Patient completed a bone density scan showing improvement.  No evidence of osteoporosis.  No falls or fractures.      Past Medical History:   Diagnosis Date    Acquired absence of both cervix and uterus 11/11/2014    Back pain     Breast cancer (HCC)     Chickenpox     Chronic left-sided low back pain without sciatica 3/17/2017    Constipation     Cough     Cystitis 6/12/2019    Diarrhea     Dyslipidemia 7/26/2016    Eczema     Fatigue     Frequent urination     Gout of foot 9/30/2016    H/O total hysterectomy 7/26/2016    History of UTI 1/31/2020    Hx of breast cancer 7/26/2016    Hypertension     Hypopituitarism (HCC) 6/23/2023    Hypothyroidism 7/26/2016    Idiopathic chronic gout of right ankle 9/30/2016    Influenza     Lichen sclerosus of female genitalia     Nasal drainage     Nocturnal oxygen desaturation 8/7/2020    Obesity     Osteopenia 7/26/2016    Osteoporosis     Osteoporosis 7/26/2016    Overactive bladder 2/9/2020    Overweight     Recurrent UTI 1/31/2020    Rhinitis     Ringing in ears     Shortness of breath     Sputum production     Status post right knee replacement 5/5/2016    Status post total right knee replacement 8/17/2016    Swelling of lower  extremity     Thyroid activity decreased     Tonsillitis     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     Vision loss     Vitamin D deficiency 7/26/2016    Whooping cough      Family History   Problem Relation Age of Onset    Diabetes Other         GF    Arthritis Other         GM    Arthritis Mother     Other Son         CELIAC DISEASE-SEVERE     Past Surgical History:   Procedure Laterality Date    LUMPECTOMY Right 2012    ARTHROSCOPY, KNEE      HYSTERECTOMY LAPAROSCOPY      KNEE ARTHROPLASTY TOTAL Right     OTHER      LIPOSUCTION THIGHS-LEG LIFT    IA REMV 2ND CATARACT,CORN-SCLER SECTN      VAGINAL HYSTERECTOMY TOTAL      Hysterectomy,Total Vaginal     Social History     Tobacco Use    Smoking status: Never    Smokeless tobacco: Never   Vaping Use    Vaping Use: Never used   Substance Use Topics    Alcohol use: Not Currently     Alcohol/week: 0.0 oz     Comment: min    Drug use: No     Social History     Social History Narrative    From [place]      Current Outpatient Medications Ordered in Epic   Medication Sig Dispense Refill    vitamin D3 (CHOLECALCIFEROL) 1000 Unit (25 mcg) Tab Take 2,000 Units by mouth every day.      levothyroxine (SYNTHROID) 175 MCG Tab TAKE 1 TABLET 175 MCG ON MONDAY, WEDNESDAY, FRIDAY. TAKE 150MCG ON Sunday, TUESDAY, THURSDAY, and Saturday. 36 Tablet 3    levothyroxine (SYNTHROID) 150 MCG Tab TAKE 1 TABLET 175 MCG ON MONDAY, WEDNESDAY, FRIDAY. TAKE 150MCG ON Sunday, TUESDAY, THURSDAY, and Saturday. 48 Tablet 3    DHA-EPA-Vitamin E (OMEGA-3 COMPLEX PO) Take  by mouth. 1600 mg daily 1 tbsp      Cyanocobalamin (VITAMIN B-12) 1000 MCG SL Tab Place  under the tongue.      ferrous sulfate 325 (65 Fe) MG tablet Take 325 mg by mouth every 48 hours.      pantoprazole (PROTONIX) 40 MG Tablet Delayed Response Take 1 Tablet by mouth every day. 90 Tablet 0    silver sulfADIAZINE (SILVADENE) 1 % Cream Apply thin layer to affected area twice a day x 5 days 50 g 0     "desoximetasone (TOPICORT) 0.25 % Cream Apply 1 Application topically 2 times a day as needed (rash). 60 g 0    Celecoxib (CELEBREX PO) Take  by mouth.      Coenzyme Q10-Red Yeast Rice (CO Q-10 PLUS RED YEAST RICE PO) Take  by mouth.      losartan (COZAAR) 50 MG Tab TAKE 1 TABLET DAILY 90 Tablet 3    meloxicam (MOBIC) 15 MG tablet Take 1 Tablet by mouth every day. 30 Tablet 0    estradiol (ESTRACE) 0.1 MG/GM vaginal cream       valacyclovir (VALTREX) 1 GM Tab Take 2 Tablets by mouth every 12 hours. 2 g twice daily for one day. 20 Tablet 0    clobetasol (TEMOVATE) 0.05 % Cream CLOBETASOL PROPIONATE 0.05 % CREA 30 g 1    cyclobenzaprine (FLEXERIL) 5 MG tablet CYCLOBENZAPRINE HCL 5 MG TABS      acetaminophen (TYLENOL) 500 MG Tab Take 500-1,000 mg by mouth every 6 hours as needed.      diphenhydrAMINE-APAP, sleep, (TYLENOL PM EXTRA STRENGTH PO) Take  by mouth.      loratadine (CLARITIN) 10 MG Tab Take 10 mg by mouth every day.      guaiFENesin ER (MUCINEX) 600 MG TABLET SR 12 HR Take 600 mg by mouth every 12 hours.       No current [facility]-ordered facility-administered medications on file.     Lisinopril    PMH/PSH/FH/Social history reviewed.  Vaccinations discussed.  Previous records and labs reviewed. Discussed age appropriate anticipatory guidance.    ROS: see hpi  Gen: no fevers/chills  Pulm: no sob, no cough  CV: no chest pain, no palpitations, no edema  GI: no nausea/vomiting, no diarrhea  Skin: no rash    Objective:   Exam:  /76 (BP Location: Right arm, Patient Position: Sitting, BP Cuff Size: Adult long)   Pulse 99   Temp 36.3 °C (97.4 °F) (Temporal)   Ht 1.499 m (4' 11\")   Wt 86.6 kg (191 lb)   SpO2 93%   BMI 38.58 kg/m²    Body mass index is 38.58 kg/m².    Gen: Alert and oriented, No apparent distress.  HEENT: Head atraumatic, normocephalic. Pupils equal and round.  Neck: Neck is supple without lymphadenopathy.   Lungs: Normal effort, CTA bilaterally, no wheezes, rhonchi, or rales  CV: Regular rate " and rhythm. No murmurs, rubs, or gallops.  ABD: +BS. Non-tender, non-distended. No rebound, rigidity, or guarding.  Ext: No clubbing, cyanosis, edema.    Assessment & Plan:     83 y.o. female with the following -     1. Calculus of gallbladder without cholecystitis without obstruction  With abdominal bloating and early satiety.  No nausea.  Exam benign.  Will await general surgery recommendations.  ER if right upper quadrant pain, fever, jaundice, nausea with vomiting.    2. Low serum vitamin B12  - cyanocobalamin (Vitamin B-12) injection 1,000 mcg    3. Hypothyroidism, unspecified type  Chronic, controlled and stable. Continue current regimen -   - levothyroxine (SYNTHROID) 175 MCG Tab; TAKE 1 TABLET 175 MCG ON MONDAY, WEDNESDAY, FRIDAY. TAKE 150MCG ON Sunday, TUESDAY, THURSDAY, and Saturday.  Dispense: 36 Tablet; Refill: 3  - levothyroxine (SYNTHROID) 150 MCG Tab; TAKE 1 TABLET 175 MCG ON MONDAY, WEDNESDAY, FRIDAY. TAKE 150MCG ON Sunday, TUESDAY, THURSDAY, and Saturday.  Dispense: 48 Tablet; Refill: 3    4. Osteopenia of hip, unspecified laterality  Your bone density scan shows osteopenia (preosteoporosis). I would recommend taking vitamin D 2000 IU daily, as well as calcium 600 mg twice daily. I also recommend regular weight-bearing and muscle strengthening exercise to improve agility, strength, posture, and balance; maintain and improve bone strength; and reduce the risk of falls and fractures.  Repeat in 2 years.    Return in about 6 weeks (around 10/5/2023) for Follow-up.    Almita Valenzuela PA-C ([place])  Physician Assistant Certified  [facility]    Please note that this dictation was created using voice recognition software. I have made every reasonable attempt to correct obvious errors, but I expect that there are errors of grammar and possibly content that I did not discover before finalizing the note.

## 2023-08-24 NOTE — ASSESSMENT & PLAN NOTE
Patient had an abdominal ultrasound due to chronic abdominal bloating.  Has an appointment with general surgery next week.  She admits to bloating, early satiety.  No nausea, no abdominal pain, no fever, no alternating bowel habits.

## 2023-08-24 NOTE — PATIENT INSTRUCTIONS
It was a pleasure meeting with you today at [facility]!    Your medical history/records and medications were reviewed today.     UPDATE on MyChart Results: If you have blood work, and/or imaging studies, or any other test or procedure completed, you will have access to results as soon as they become available in MyChart. Recently, these results will be available for review at the same time that your provider is able to see results!    This will likely mean you will see a result before your provider has had a chance to review and discuss with you.  Some results or care notes may be hard to understand and may be serious in nature.    We look at every result and your provider will contact you to explain what they mean and discuss appropriate next steps. Please allow for at least 72 business hours for chart and result review.     We prefer that you wait for your care team to contact you with your results.  Often, your provider will discuss your results with you at your next appointment. We look forward to continuing to partner with you in your care.    Please review my practice information below:    If you have any prescription refill requests, please send them via [employer] or discuss with your provider at the start of your office visits. Please allow 3-5 business days for lab and testing review and you will be contacted via [employer] with those results, or if advised to make a follow up appointment regarding those results, then please do so.     Once resulted, your lab/test/imaging results will show up automatically in your MyChart. Please wait for my interpretation and recommendations prior to viewing your results to avoid any unnecessary confusion or misinterpretation. I will address all of the lab values that I interpret as abnormal and message you accordingly on your MyChart. I will always send you a message about your results even if they are normal. If you do not hear back from me within 5-7 business  days after completing your tests, then please send me a message on [employer] so I can obtain your results (especially if you went to an outside lab or imaging center - [facility], [facility], etc).     If you have any additional questions or concerns beyond my interpretation of your results, please make an appointment with me to discuss in further detail.    Please only use the Q[employer] messaging system for questions regarding your most recent appointment or if advised to use otherwise (glucose or blood pressure reporting).     If you have any new problems or concerns, you must make an appointment to discuss. This includes any referral requests, lab requests (unless advised to notify me for pre-appt labs), medication side effects, or request for medication adjustments.     Please arrive 15 minutes prior to your appointment time to complete your check-in and intake with the medical assistant.      Thank you,    Almita Valenzuela PA-C ([place])  Physician Assistant Certified  [facility]    -----------------------------------------------------------------    Attn: Patients of [facility]:    In an effort to continue to provide excellent and efficient care to our patients, it is vital that we continue to use our resources appropriately. With that, this is a reminder that [employer] is used for prescription refill requests, test results, virtual visits, and chart review only.     Any new questions, concerns/conditions, lab/imaging requests, medication adjustments, new prescriptions, or referral requests do require an appointment (virtually or in person), unless discussed otherwise at your most recent appointment.     Thank you for your understanding,    [facility]

## 2023-08-24 NOTE — ASSESSMENT & PLAN NOTE
Patient with history of low B12.  States that she felt good after her last B12 shot.  Interested in another one today.

## 2023-09-05 ENCOUNTER — TELEPHONE (OUTPATIENT)
Dept: CARDIOLOGY | Facility: MEDICAL CENTER | Age: 83
End: 2023-09-05
Payer: MEDICARE

## 2023-09-05 NOTE — TELEPHONE ENCOUNTER
Chart Prep     Spoke to patient in regards to records for NP appointment with Dr. Campoverde on 09.11.2023. Patient has been treated by Dr. Templeton about 15 years ago, all recent records in epic including labs, imaging and cardiac testing. Confirmed appointment date, time and location.

## 2023-09-06 ENCOUNTER — HOSPITAL ENCOUNTER (OUTPATIENT)
Dept: RADIOLOGY | Facility: MEDICAL CENTER | Age: 83
End: 2023-09-06
Attending: SURGERY
Payer: MEDICARE

## 2023-09-06 DIAGNOSIS — K80.20 CALCULUS OF GALLBLADDER WITHOUT CHOLECYSTITIS WITHOUT OBSTRUCTION: ICD-10-CM

## 2023-09-06 PROCEDURE — A9537 TC99M MEBROFENIN: HCPCS

## 2023-09-06 PROCEDURE — 700111 HCHG RX REV CODE 636 W/ 250 OVERRIDE (IP): Mod: JZ

## 2023-09-06 RX ORDER — MORPHINE SULFATE 4 MG/ML
3 INJECTION INTRAVENOUS ONCE
Status: COMPLETED | OUTPATIENT
Start: 2023-09-06 | End: 2023-09-06

## 2023-09-06 RX ORDER — MORPHINE SULFATE 4 MG/ML
INJECTION INTRAVENOUS
Status: COMPLETED
Start: 2023-09-06 | End: 2023-09-06

## 2023-09-06 RX ADMIN — MORPHINE SULFATE 3 MG: 4 INJECTION INTRAVENOUS at 16:13

## 2023-09-06 RX ADMIN — MORPHINE SULFATE 3 MG: 4 INJECTION, SOLUTION INTRAMUSCULAR; INTRAVENOUS at 16:13

## 2023-09-11 ENCOUNTER — OFFICE VISIT (OUTPATIENT)
Dept: CARDIOLOGY | Facility: MEDICAL CENTER | Age: 83
End: 2023-09-11
Attending: INTERNAL MEDICINE
Payer: MEDICARE

## 2023-09-11 VITALS
HEIGHT: 59 IN | WEIGHT: 191 LBS | OXYGEN SATURATION: 95 % | RESPIRATION RATE: 16 BRPM | SYSTOLIC BLOOD PRESSURE: 152 MMHG | DIASTOLIC BLOOD PRESSURE: 80 MMHG | BODY MASS INDEX: 38.51 KG/M2 | HEART RATE: 77 BPM

## 2023-09-11 DIAGNOSIS — I45.10 RBBB: ICD-10-CM

## 2023-09-11 DIAGNOSIS — I20.89 STABLE ANGINA (HCC): ICD-10-CM

## 2023-09-11 DIAGNOSIS — E66.9 CLASS 2 OBESITY: ICD-10-CM

## 2023-09-11 DIAGNOSIS — R06.09 DOE (DYSPNEA ON EXERTION): Primary | ICD-10-CM

## 2023-09-11 DIAGNOSIS — I10 ESSENTIAL HYPERTENSION: ICD-10-CM

## 2023-09-11 DIAGNOSIS — E78.2 MIXED HYPERLIPIDEMIA: ICD-10-CM

## 2023-09-11 DIAGNOSIS — I51.89 DIASTOLIC DYSFUNCTION: ICD-10-CM

## 2023-09-11 LAB — EKG IMPRESSION: NORMAL

## 2023-09-11 PROCEDURE — 93005 ELECTROCARDIOGRAM TRACING: CPT | Performed by: INTERNAL MEDICINE

## 2023-09-11 PROCEDURE — 3079F DIAST BP 80-89 MM HG: CPT | Performed by: INTERNAL MEDICINE

## 2023-09-11 PROCEDURE — 3077F SYST BP >= 140 MM HG: CPT | Performed by: INTERNAL MEDICINE

## 2023-09-11 PROCEDURE — 93010 ELECTROCARDIOGRAM REPORT: CPT | Performed by: INTERNAL MEDICINE

## 2023-09-11 PROCEDURE — 99204 OFFICE O/P NEW MOD 45 MIN: CPT | Performed by: INTERNAL MEDICINE

## 2023-09-11 PROCEDURE — 99213 OFFICE O/P EST LOW 20 MIN: CPT | Performed by: INTERNAL MEDICINE

## 2023-09-11 RX ORDER — METOPROLOL SUCCINATE 25 MG/1
25 TABLET, EXTENDED RELEASE ORAL DAILY
Qty: 90 TABLET | Refills: 3 | Status: ON HOLD | OUTPATIENT
Start: 2023-09-11 | End: 2024-02-05

## 2023-09-11 RX ORDER — ATORVASTATIN CALCIUM 20 MG/1
20 TABLET, FILM COATED ORAL NIGHTLY
Qty: 90 TABLET | Refills: 3 | Status: ON HOLD | OUTPATIENT
Start: 2023-09-11 | End: 2024-02-05

## 2023-09-11 ASSESSMENT — FIBROSIS 4 INDEX: FIB4 SCORE: 1.29

## 2023-09-11 NOTE — PATIENT INSTRUCTIONS
Heart ultrasound  Stress test - cardiac PET  Metoprolol 25mg in the am, take in pm if makes you feel tired  Start atorvastatin 20mg evening  Send me an am and pm BP log on MyChart in 1 week, 1 hour after taking your pills.  Once your gallbladder issues are sorted out, let me know so we can re-discuss using semaglutide / Wegovy.  Look into Noom

## 2023-09-11 NOTE — PROGRESS NOTES
CARDIOLOGY NEW PATIENT:    PCP: Almita Valenzuela P.A.-C.  Referred by Dr. Vela - Pulmonary medicine    1. RUIZ (dyspnea on exertion)    2. Essential hypertension    3. Mixed hyperlipidemia    4. Stable angina (HCC)    5. Diastolic dysfunction    6. Class 2 obesity    7. BMI 38.0-38.9,adult    8. RBBB        Flavia Garrett is referred for dyspnea on exertion concerns.    Chief Complaint   Patient presents with    Hypertension     NP    Tachycardia       History: Flavia Garrett is a 83 y.o. female with history of stage I breast cancer about in 2011 (partial right sided lumpectomy with LN dissection), gallbladder stones / sludge, RBBB, dyslipidemia, hypertension, arthritis and hypothyroidism is referred from the pulmonary medicine clinic for dyspnea on exertion concerns of tachycardia.    She met with Dr. Vela with the pulmonary medicine team 7/20/2023 and referred for evaluation for her dyspnea on exertion and tachycardia episodes.    She finished 6 months of PT until 4/2023 (back pain).    She has exertional SOB and tachycardia / palpitations, needing to rest about 15 min to feel better, even with house chores. Worst last 6 months, with minimal chest tingling. Denies RACHEL, syncope, presyncope, orthopnea, PND. Lost some weight over last 6 months.    Checks BP am and pm, takes losartan 5mg in pm. In am usually 130/80's mmHg, in pm 120-130/70's mmHg.    Normal TSH 6/2023. Normal A1C 3/2022 5.2%.    Trying to eat HH diet.    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   Poor exercise capacity.   With stress ST changes noted due to bundle branch block, but not suggestive of ischemia   Overall negative stress electrocardiogram for ischemia.    TTE  "3/2021: Personally reviewed  Normal LV systolic function, LVEF 60%  Grade 1 LV diastolic dysfunction  No significant valvular abnormalities  Normal IVC size  Unable to estimate RVSP    Functional status:  MMRC Grade: 2-3  0: Breathless only during strenuous exercise  1: Short of breath when hurrying or going up a small hill  2: Walks slower than friends due to breathlessness, has to stop at own pace  3: Stops to catch breath on level ground after 100m  4: Breathless with ambulating around house or ADLs       PE:  BP (!) 152/80 (BP Location: Left arm, Patient Position: Sitting, BP Cuff Size: Adult)   Pulse 77   Resp 16   Ht 1.499 m (4' 11\")   Wt 86.6 kg (191 lb)   SpO2 95%   BMI 38.58 kg/m²     Gen: well  HEENT: Symmetric face. Anicteric sclerae. Moist mucus membranes  NECK: No JVD.   CARDIAC: Regular, Normal S1, S2, No murmur  VASCULATURE: carotids are normal bilaterally without bruit  RESP: Clear to auscultation bilaterally   EXT: No edema, no clubbing or cyanosis. Varicose veins noted.  SKIN: Warm and dry  NEURO: No gross deficits  PSYCH: Appropriate affect, participates in conversation    The ASCVD Risk score ([place] DK, et al., 2019) failed to calculate.    Past Medical History:   Diagnosis Date    Acquired absence of both cervix and uterus 11/11/2014    Back pain     Breast cancer (HCC)     Chickenpox     Chronic left-sided low back pain without sciatica 3/17/2017    Constipation     Cough     Cystitis 6/12/2019    Diarrhea     Dyslipidemia 7/26/2016    Eczema     Fatigue     Frequent urination     Gout of foot 9/30/2016    H/O total hysterectomy 7/26/2016    History of UTI 1/31/2020    Hx of breast cancer 7/26/2016    Hypertension     Hypopituitarism (HCC) 6/23/2023    Hypothyroidism 7/26/2016    Idiopathic chronic gout of right ankle 9/30/2016    Influenza     Lichen sclerosus of female genitalia     Nasal drainage     Nocturnal oxygen desaturation 8/7/2020    Obesity     Osteopenia 7/26/2016    " Osteoporosis     Osteoporosis 7/26/2016    Overactive bladder 2/9/2020    Overweight     Recurrent UTI 1/31/2020    Rhinitis     Ringing in ears     Shortness of breath     Sputum production     Status post right knee replacement 5/5/2016    Status post total right knee replacement 8/17/2016    Swelling of lower extremity     Thyroid activity decreased     Tonsillitis     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     Vision loss     Vitamin D deficiency 7/26/2016    Whooping cough      Past Surgical History:   Procedure Laterality Date    LUMPECTOMY Right 2012    ARTHROSCOPY, KNEE      HYSTERECTOMY LAPAROSCOPY      KNEE ARTHROPLASTY TOTAL Right     OTHER      LIPOSUCTION THIGHS-LEG LIFT    IN REMV 2ND CATARACT,CORN-SCLER SECTN      VAGINAL HYSTERECTOMY TOTAL      Hysterectomy,Total Vaginal     No Active Allergies  Outpatient Encounter Medications as of 9/11/2023   Medication Sig Dispense Refill    atorvastatin (LIPITOR) 20 MG Tab Take 1 Tablet by mouth every evening. 90 Tablet 3    metoprolol SR (TOPROL XL) 25 MG TABLET SR 24 HR Take 1 Tablet by mouth every day. 90 Tablet 3    vitamin D3 (CHOLECALCIFEROL) 1000 Unit (25 mcg) Tab Take 2,000 Units by mouth every day.      levothyroxine (SYNTHROID) 175 MCG Tab TAKE 1 TABLET 175 MCG ON MONDAY, WEDNESDAY, FRIDAY. TAKE 150MCG ON Sunday, TUESDAY, THURSDAY, and Saturday. 36 Tablet 3    levothyroxine (SYNTHROID) 150 MCG Tab TAKE 1 TABLET 175 MCG ON MONDAY, WEDNESDAY, FRIDAY. TAKE 150MCG ON Sunday, TUESDAY, THURSDAY, and Saturday. 48 Tablet 3    DHA-EPA-Vitamin E (OMEGA-3 COMPLEX PO) Take  by mouth. 1600 mg daily 1 tbsp      Cyanocobalamin (VITAMIN B-12) 1000 MCG SL Tab Place  under the tongue.      ferrous sulfate 325 (65 Fe) MG tablet Take 325 mg by mouth every 48 hours.      pantoprazole (PROTONIX) 40 MG Tablet Delayed Response Take 1 Tablet by mouth every day. 90 Tablet 0    silver sulfADIAZINE (SILVADENE) 1 % Cream Apply thin layer to  affected area twice a day x 5 days 50 g 0    desoximetasone (TOPICORT) 0.25 % Cream Apply 1 Application topically 2 times a day as needed (rash). 60 g 0    Celecoxib (CELEBREX PO) Take  by mouth.      losartan (COZAAR) 50 MG Tab TAKE 1 TABLET DAILY 90 Tablet 3    meloxicam (MOBIC) 15 MG tablet Take 1 Tablet by mouth every day. 30 Tablet 0    estradiol (ESTRACE) 0.1 MG/GM vaginal cream       valacyclovir (VALTREX) 1 GM Tab Take 2 Tablets by mouth every 12 hours. 2 g twice daily for one day. 20 Tablet 0    clobetasol (TEMOVATE) 0.05 % Cream CLOBETASOL PROPIONATE 0.05 % CREA 30 g 1    cyclobenzaprine (FLEXERIL) 5 MG tablet CYCLOBENZAPRINE HCL 5 MG TABS      acetaminophen (TYLENOL) 500 MG Tab Take 500-1,000 mg by mouth every 6 hours as needed.      diphenhydrAMINE-APAP, sleep, (TYLENOL PM EXTRA STRENGTH PO) Take  by mouth.      loratadine (CLARITIN) 10 MG Tab Take 10 mg by mouth every day.      guaiFENesin ER (MUCINEX) 600 MG TABLET SR 12 HR Take 600 mg by mouth every 12 hours.      [DISCONTINUED] Coenzyme Q10-Red Yeast Rice (CO Q-10 PLUS RED YEAST RICE PO) Take  by mouth.       No facility-administered encounter medications on file as of 9/11/2023.     Social History     Socioeconomic History    Marital status:      Spouse name: Not on file    Number of children: Not on file    Years of education: Not on file    Highest education level: Associate degree: academic program   Occupational History    Not on file   Tobacco Use    Smoking status: Never    Smokeless tobacco: Never   Vaping Use    Vaping Use: Never used   Substance and Sexual Activity    Alcohol use: Not Currently     Alcohol/week: 0.0 oz     Comment: min    Drug use: No    Sexual activity: Yes     Partners: Male   Other Topics Concern    Not on file   Social History Narrative    From [place]      Social Determinants of Health     Financial Resource Strain: Low Risk  (7/13/2023)    Overall Financial Resource Strain (CARDIA)     Difficulty of Paying  Living Expenses: Not hard at all   Food Insecurity: No Food Insecurity (7/13/2023)    Hunger Vital Sign     Worried About Running Out of Food in the Last Year: Never true     Ran Out of Food in the Last Year: Never true   Transportation Needs: No Transportation Needs (7/13/2023)    PRAPARE - Transportation     Lack of Transportation (Medical): No     Lack of Transportation (Non-Medical): No   Physical Activity: Sufficiently Active (10/2/2019)    Exercise Vital Sign     Days of Exercise per Week: 3 days     Minutes of Exercise per Session: 90 min   Stress: Stress Concern Present (7/13/2023)    [employer] [place] [employer] - Occupational Stress Questionnaire     Feeling of Stress : To some extent   Social Connections: Unknown (7/13/2023)    Social Connection and Isolation Panel [NHANES]     Frequency of Communication with Friends and Family: More than three times a week     Frequency of Social Gatherings with Friends and Family: More than three times a week     Attends Taoism Services: Not on file     Active Member of Clubs or Organizations: Yes     Attends Club or Organization Meetings: More than 4 times per year     Marital Status: Not on file   Intimate Partner Violence: Not on file   Housing Stability: Unknown (7/13/2023)    Housing Stability Vital Sign     Unable to Pay for Housing in the Last Year: No     Number of Places Lived in the Last Year: Not on file     Unstable Housing in the Last Year: No     Family History   Problem Relation Age of Onset    Arthritis Mother     Other Son         CELIAC DISEASE-SEVERE    Diabetes Other         GF    Arthritis Other         GM    Heart Disease Neg Hx          Studies    Lab Results   Component Value Date/Time    TSHULTRASEN 2.300 06/28/2023 0738        Lab Results   Component Value Date/Time    FREET4 1.56 06/28/2023 0738      Lab Results   Component Value Date/Time    HBA1C 5.2 03/15/2022 12:00 AM     Lab Results   Component Value Date/Time     CHOLSTRLTOT 217 2023 12:00 AM     2023 12:00 AM    HDL 45 2023 12:00 AM    TRIGLYCERIDE 213 2023 12:00 AM       Lab Results   Component Value Date/Time    SODIUM 141 2023 07:38 AM    POTASSIUM 4.2 2023 07:38 AM    CHLORIDE 104 2023 07:38 AM    CO2 25 2023 07:38 AM    GLUCOSE 110 (H) 2023 07:38 AM    BUN 12 2023 07:38 AM    CREATININE 0.69 2023 07:38 AM     Lab Results   Component Value Date/Time    ALKPHOSPHAT 72 2023 07:38 AM    ASTSGOT 17 2023 07:38 AM    ALTSGPT 21 2023 07:38 AM    TBILIRUBIN 0.6 2023 07:38 AM        Echocardiogram:  No results found for this or any previous visit.    EC2023 personally reviewed and interpreted  Sinus rhythm   Right bundle branch block   No previous ECG available for comparison   Electronically Signed On 2023 10:51:48 PDT by John Campoverde MD     Assessment and Recommendations:    Problem List Items Addressed This Visit       Essential hypertension    Relevant Medications    atorvastatin (LIPITOR) 20 MG Tab    metoprolol SR (TOPROL XL) 25 MG TABLET SR 24 HR    Other Relevant Orders    EKG (Completed)    EC-ECHOCARDIOGRAM COMPLETE W/ CONT    Class 2 obesity    Mixed hyperlipidemia    Relevant Medications    atorvastatin (LIPITOR) 20 MG Tab    metoprolol SR (TOPROL XL) 25 MG TABLET SR 24 HR    RUIZ (dyspnea on exertion) - Primary    Relevant Orders    EC-ECHOCARDIOGRAM COMPLETE W/ CONT    [id number] PET W/FLOW RESERVE    BMI 38.0-38.9,adult    Diastolic dysfunction    Relevant Orders    EC-ECHOCARDIOGRAM COMPLETE W/ CONT    [id number] PET W/FLOW RESERVE    Stable angina (HCC)    Relevant Medications    atorvastatin (LIPITOR) 20 MG Tab    metoprolol SR (TOPROL XL) 25 MG TABLET SR 24 HR    Other Relevant Orders    EC-ECHOCARDIOGRAM COMPLETE W/ CONT    [id number] PET W/FLOW RESERVE    RBBB    Relevant Medications    atorvastatin (LIPITOR) 20 MG Tab     metoprolol SR (TOPROL XL) 25 MG TABLET SR 24 HR     Ms. Garrett is stable from a cardiovascular standpoint with clinical concerns of either microvascular angina or chronic HFpEF related to diastolic dysfunction.  Does not appear to be in decompensated heart failure.    We will arrange for an echocardiogram and cardiac PET/flow reserve stress testing    Advised her to start metoprolol 25 mg in the morning, in the evening.  She feels tired on it.    Her 10-year estimated ASCVD risk (assuming age 81) is > 20%, discussed with her the risks and benefits of starting moderate intensity statin and she agrees to start atorvastatin 20 mg in the evening for primary ASCVD prevention purposes at this time    Advised her to send me an a.m. and p.m. blood pressure log via [employer] in 1 week after starting metoprolol and will adjust her antihypertensive regimen accordingly.  She currently takes losartan 50 mg in the evening. Target BP < 140/80mmHg.  This will be important given her underlying LV diastolic dysfunction.    She tries her best to stay active and eat heart healthy diet.  Mention to her the option of utilizing semaglutide to help with weight loss and her metabolic syndrome which will be important for diastolic dysfunction management if there are concerns for hypertension and stable angina.  However, she is currently dealing with gallbladder concerns and we will readdress semaglutide initiation afterwards given the potential risk for GI upset, nausea and vomiting with semaglutide.    She will also explore Noom as a tool to help with her weight loss approach.    Thank you for the opportunity to be involved in Flavia Garrett 's care; and please reach out with any questions or concerns.    Return in about 3 months (around 12/11/2023).    John Campoverde MD, MPH [facility]  Interventional Cardiologist  [facility]   of Clinical Internal Medicine - [facility]  JUSTYN    ~ Portions of this note were completed using voice recognition software (Dragon Naturally speaking software) . Occasional transcription errors may have escaped proof reading. I have made every reasonable attempt to correct obvious errors, but I expect that there are errors of grammar and possibly content that I did not discover before finalizing the note. ~

## 2023-09-13 ENCOUNTER — HOSPITAL ENCOUNTER (OUTPATIENT)
Dept: RADIOLOGY | Facility: MEDICAL CENTER | Age: 83
End: 2023-09-13
Attending: INTERNAL MEDICINE
Payer: MEDICARE

## 2023-09-13 DIAGNOSIS — R06.09 DOE (DYSPNEA ON EXERTION): ICD-10-CM

## 2023-09-13 DIAGNOSIS — I20.89 STABLE ANGINA (HCC): ICD-10-CM

## 2023-09-13 DIAGNOSIS — I51.89 DIASTOLIC DYSFUNCTION: ICD-10-CM

## 2023-09-13 PROCEDURE — 78431 MYOCRD IMG PET RST&STRS CT: CPT

## 2023-09-14 PROCEDURE — 78434 AQMBF PET REST & RX STRESS: CPT | Mod: 26 | Performed by: STUDENT IN AN ORGANIZED HEALTH CARE EDUCATION/TRAINING PROGRAM

## 2023-09-14 PROCEDURE — 78431 MYOCRD IMG PET RST&STRS CT: CPT | Mod: 26 | Performed by: STUDENT IN AN ORGANIZED HEALTH CARE EDUCATION/TRAINING PROGRAM

## 2023-09-14 PROCEDURE — 93018 CV STRESS TEST I&R ONLY: CPT | Performed by: STUDENT IN AN ORGANIZED HEALTH CARE EDUCATION/TRAINING PROGRAM

## 2023-09-19 ENCOUNTER — TELEPHONE (OUTPATIENT)
Dept: CARDIOLOGY | Facility: MEDICAL CENTER | Age: 83
End: 2023-09-19
Payer: MEDICARE

## 2023-09-19 NOTE — TELEPHONE ENCOUNTER
----- Message from John Campoverde M.D. sent at 9/19/2023  3:52 PM PDT -----  Carmelo Rocha,  Can you please reassure her that the stress test does not suggest major artery narrowings or blockages, and if the metoprolol helped with her symptoms ? And if she has any BP log she can share with us? Thank you much,

## 2023-09-19 NOTE — RESULT ENCOUNTER NOTE
Carmelo Rocha,  Can you please reassure her that the stress test does not suggest major artery narrowings or blockages, and if the metoprolol helped with her symptoms ? And if she has any BP log she can share with us? Thank you much,

## 2023-09-19 NOTE — TELEPHONE ENCOUNTER
Phone Number Called: [phone]    Call outcome: Did not leave a detailed message. Requested patient to call back.    Message: Called to discuss the below referenced note from AVRIL Perkins result message sent with same.

## 2023-09-20 ENCOUNTER — HOSPITAL ENCOUNTER (OUTPATIENT)
Dept: CARDIOLOGY | Facility: MEDICAL CENTER | Age: 83
End: 2023-09-20
Attending: INTERNAL MEDICINE
Payer: MEDICARE

## 2023-09-20 DIAGNOSIS — I51.89 DIASTOLIC DYSFUNCTION: ICD-10-CM

## 2023-09-20 DIAGNOSIS — R06.09 DOE (DYSPNEA ON EXERTION): ICD-10-CM

## 2023-09-20 DIAGNOSIS — I20.89 STABLE ANGINA (HCC): ICD-10-CM

## 2023-09-20 DIAGNOSIS — I10 ESSENTIAL HYPERTENSION: ICD-10-CM

## 2023-09-20 PROCEDURE — 93306 TTE W/DOPPLER COMPLETE: CPT

## 2023-09-20 PROCEDURE — 700117 HCHG RX CONTRAST REV CODE 255: Performed by: INTERNAL MEDICINE

## 2023-09-20 RX ADMIN — HUMAN ALBUMIN MICROSPHERES AND PERFLUTREN 3 ML: 10; .22 INJECTION, SOLUTION INTRAVENOUS at 16:30

## 2023-09-21 ENCOUNTER — TELEPHONE (OUTPATIENT)
Dept: CARDIOLOGY | Facility: MEDICAL CENTER | Age: 83
End: 2023-09-21
Payer: MEDICARE

## 2023-09-21 NOTE — TELEPHONE ENCOUNTER
AL            Caller: Flavia Garrett      Topic/issue: Patient was calling about her atorvastatin (LIPITOR) 20 MG Tab, metoprolol SR (TOPROL XL) 25 MG TABLET SR 24 HR medications. She is feeling tired and sluggish because of them and she was asking for a call back      Callback Number: [phone]      Thank you  -Robert THACKER

## 2023-09-22 LAB
LV EJECT FRACT  99904: 60
LV EJECT FRACT MOD 2C 99903: 47.41
LV EJECT FRACT MOD 4C 99902: 37.91
LV EJECT FRACT MOD BP 99901: 45.58

## 2023-09-22 PROCEDURE — 93306 TTE W/DOPPLER COMPLETE: CPT | Mod: 26 | Performed by: INTERNAL MEDICINE

## 2023-09-22 NOTE — TELEPHONE ENCOUNTER
Pt returned call. She feels that the metoprolol is helping  but she still feels out of breath with exertion. She is going to take the metoprolol in the evening starting tonight because she feels more tired after taking it.     Pt took her blood pressure twice, however she took her BP before taking her medication. Instructed her to take her BP 1.5-2hrs after taking her medication and to continue monitoring.     BP readings:  9-22 @ 0730: 152/85 (did not take her medication since yesterday morning because she decided to try taking it in the evening)  9-21 @ 0730: 138/73      To AL: Updates for your review.

## 2023-09-22 NOTE — TELEPHONE ENCOUNTER
Phone Number Called: [phone]    Call outcome: Left detailed message and asked that patient return call.     Message: Called to discuss s/s she is experiencing and to get her recent BP log for AL review.

## 2023-10-05 ENCOUNTER — OFFICE VISIT (OUTPATIENT)
Dept: MEDICAL GROUP | Facility: IMAGING CENTER | Age: 83
End: 2023-10-05
Payer: MEDICARE

## 2023-10-05 VITALS
WEIGHT: 190 LBS | BODY MASS INDEX: 38.3 KG/M2 | OXYGEN SATURATION: 96 % | HEART RATE: 86 BPM | TEMPERATURE: 97.2 F | RESPIRATION RATE: 16 BRPM | SYSTOLIC BLOOD PRESSURE: 130 MMHG | HEIGHT: 59 IN | DIASTOLIC BLOOD PRESSURE: 78 MMHG

## 2023-10-05 DIAGNOSIS — E78.2 MIXED HYPERLIPIDEMIA: ICD-10-CM

## 2023-10-05 DIAGNOSIS — I10 ESSENTIAL HYPERTENSION: ICD-10-CM

## 2023-10-05 DIAGNOSIS — K29.30 CHRONIC SUPERFICIAL GASTRITIS WITHOUT BLEEDING: ICD-10-CM

## 2023-10-05 PROCEDURE — 1126F AMNT PAIN NOTED NONE PRSNT: CPT | Performed by: PHYSICIAN ASSISTANT

## 2023-10-05 PROCEDURE — 3078F DIAST BP <80 MM HG: CPT | Performed by: PHYSICIAN ASSISTANT

## 2023-10-05 PROCEDURE — 99214 OFFICE O/P EST MOD 30 MIN: CPT | Performed by: PHYSICIAN ASSISTANT

## 2023-10-05 PROCEDURE — 3075F SYST BP GE 130 - 139MM HG: CPT | Performed by: PHYSICIAN ASSISTANT

## 2023-10-05 RX ORDER — LOSARTAN POTASSIUM AND HYDROCHLOROTHIAZIDE 12.5; 5 MG/1; MG/1
1 TABLET ORAL DAILY
Qty: 90 TABLET | Refills: 3 | Status: SHIPPED | OUTPATIENT
Start: 2023-10-05 | End: 2023-11-02 | Stop reason: SINTOL

## 2023-10-05 ASSESSMENT — FIBROSIS 4 INDEX: FIB4 SCORE: 1.29

## 2023-10-05 ASSESSMENT — PAIN SCALES - GENERAL: PAINLEVEL: NO PAIN

## 2023-10-05 NOTE — ASSESSMENT & PLAN NOTE
Chronic, controlled and stable on metoprolol 25 mg daily and losartan 50 mg daily.  Managed by cardiology.  History of angina and diastolic function.

## 2023-10-05 NOTE — PATIENT INSTRUCTIONS
It was a pleasure meeting with you today at [facility]!    Your medical history/records and medications were reviewed today.     UPDATE on MyChart Results: If you have blood work, and/or imaging studies, or any other test or procedure completed, you will have access to results as soon as they become available in MyChart. Recently, these results will be available for review at the same time that your provider is able to see results!    This will likely mean you will see a result before your provider has had a chance to review and discuss with you.  Some results or care notes may be hard to understand and may be serious in nature.    We look at every result and your provider will contact you to explain what they mean and discuss appropriate next steps. Please allow for at least 72 business hours for chart and result review.     We prefer that you wait for your care team to contact you with your results.  Often, your provider will discuss your results with you at your next appointment. We look forward to continuing to partner with you in your care.    Please review my practice information below:    If you have any prescription refill requests, please send them via [employer] or discuss with your provider at the start of your office visits. Please allow 3-5 business days for lab and testing review and you will be contacted via [employer] with those results, or if advised to make a follow up appointment regarding those results, then please do so.     Once resulted, your lab/test/imaging results will show up automatically in your MyChart. Please wait for my interpretation and recommendations prior to viewing your results to avoid any unnecessary confusion or misinterpretation. I will address all of the lab values that I interpret as abnormal and message you accordingly on your MyChart. I will always send you a message about your results even if they are normal. If you do not hear back from me within 5-7 business  days after completing your tests, then please send me a message on [email or web address] so I can obtain your results (especially if you went to an outside lab or imaging center - [facility], [facility], etc).     If you have any additional questions or concerns beyond my interpretation of your results, please make an appointment with me to discuss in further detail.    Please only use the [email or web address] messaging system for questions regarding your most recent appointment or if advised to use otherwise (glucose or blood pressure reporting).     If you have any new problems or concerns, you must make an appointment to discuss. This includes any referral requests, lab requests (unless advised to notify me for pre-appt labs), medication side effects, or request for medication adjustments.     Please arrive 15 minutes prior to your appointment time to complete your check-in and intake with the medical assistant.      Thank you,    Almita Valenzuela PA-C ([place])  Physician Assistant Certified  [facility]    -----------------------------------------------------------------    Attn: Patients of [facility]:    In an effort to continue to provide excellent and efficient care to our patients, it is vital that we continue to use our resources appropriately. With that, this is a reminder that [email or web address] is used for prescription refill requests, test results, virtual visits, and chart review only.     Any new questions, concerns/conditions, lab/imaging requests, medication adjustments, new prescriptions, or referral requests do require an appointment (virtually or in person), unless discussed otherwise at your most recent appointment.     Thank you for your understanding,    [facility]

## 2023-10-05 NOTE — ASSESSMENT & PLAN NOTE
Chronic, still taking pantoprazole 40 mg daily.  Saw general surgery due to cholelithiasis, currently just monitoring.  Patient has been working on dietary changes.  Does drink lemon and vinegar several days a week and eats a lot of tomatoes.  No tobacco or alcohol use.

## 2023-10-05 NOTE — ASSESSMENT & PLAN NOTE
Saw cardiology and is now taking atorvastatin rather than red yeast rice.  Completed nuclear cardiac PET, no ischemia noted.

## 2023-10-05 NOTE — PROGRESS NOTES
Subjective:     CC:   Chief Complaint   Patient presents with    Hypertension Follow-up       HPI:   Flavia presents today with her son to discuss:    Chronic superficial gastritis without bleeding  Chronic, still taking pantoprazole 40 mg daily.  Saw general surgery due to cholelithiasis, currently just monitoring.  Patient has been working on dietary changes.  Does drink lemon and vinegar several days a week and eats a lot of tomatoes.  No tobacco or alcohol use.    Essential hypertension  Chronic, controlled and stable on metoprolol 25 mg daily and losartan 50 mg daily.  Managed by cardiology.  History of angina and diastolic function.    Mixed hyperlipidemia  Saw cardiology and is now taking atorvastatin rather than red yeast rice.  Completed nuclear cardiac PET, no ischemia noted.      Past Medical History:   Diagnosis Date    Acquired absence of both cervix and uterus 11/11/2014    Back pain     Breast cancer (HCC)     Chickenpox     Chronic left-sided low back pain without sciatica 3/17/2017    Constipation     Cough     Cystitis 6/12/2019    Diarrhea     Dyslipidemia 7/26/2016    Eczema     Fatigue     Frequent urination     Gout of foot 9/30/2016    H/O total hysterectomy 7/26/2016    History of UTI 1/31/2020    Hx of breast cancer 7/26/2016    Hypertension     Hypopituitarism (HCC) 6/23/2023    Hypothyroidism 7/26/2016    Idiopathic chronic gout of right ankle 9/30/2016    Influenza     Lichen sclerosus of female genitalia     Nasal drainage     Nocturnal oxygen desaturation 8/7/2020    Obesity     Osteopenia 7/26/2016    Osteoporosis     Osteoporosis 7/26/2016    Overactive bladder 2/9/2020    Overweight     Recurrent UTI 1/31/2020    Rhinitis     Ringing in ears     Shortness of breath     Sputum production     Status post right knee replacement 5/5/2016    Status post total right knee replacement 8/17/2016    Swelling of lower extremity     Thyroid activity decreased     Tonsillitis     Unspecified  disorder of the pituitary gland and its hypothalamic control     Vision loss     Vitamin D deficiency 7/26/2016    Whooping cough      Family History   Problem Relation Age of Onset    Arthritis Mother     Other Son         CELIAC DISEASE-SEVERE    Diabetes Other         GF    Arthritis Other         GM    Heart Disease Neg Hx      Past Surgical History:   Procedure Laterality Date    LUMPECTOMY Right 2012    ARTHROSCOPY, KNEE      HYSTERECTOMY LAPAROSCOPY      KNEE ARTHROPLASTY TOTAL Right     OTHER      LIPOSUCTION THIGHS-LEG LIFT    AR REMV 2ND CATARACT,CORN-SCLER SECTN      VAGINAL HYSTERECTOMY TOTAL      Hysterectomy,Total Vaginal     Social History     Tobacco Use    Smoking status: Never    Smokeless tobacco: Never   Vaping Use    Vaping Use: Never used   Substance Use Topics    Alcohol use: Not Currently     Alcohol/week: 0.0 oz     Comment: min    Drug use: No     Social History     Social History Narrative    From [place]      Current Outpatient Medications Ordered in Epic   Medication Sig Dispense Refill    atorvastatin (LIPITOR) 20 MG Tab Take 1 Tablet by mouth every evening. 90 Tablet 3    metoprolol SR (TOPROL XL) 25 MG TABLET SR 24 HR Take 1 Tablet by mouth every day. 90 Tablet 3    vitamin D3 (CHOLECALCIFEROL) 1000 Unit (25 mcg) Tab Take 2,000 Units by mouth every day.      levothyroxine (SYNTHROID) 175 MCG Tab TAKE 1 TABLET 175 MCG ON MONDAY, WEDNESDAY, FRIDAY. TAKE 150MCG ON Sunday, TUESDAY, THURSDAY, and Saturday. 36 Tablet 3    levothyroxine (SYNTHROID) 150 MCG Tab TAKE 1 TABLET 175 MCG ON MONDAY, WEDNESDAY, FRIDAY. TAKE 150MCG ON Sunday, TUESDAY, THURSDAY, and Saturday. 48 Tablet 3    DHA-EPA-Vitamin E (OMEGA-3 COMPLEX PO) Take  by mouth. 1600 mg daily 1 tbsp      Cyanocobalamin (VITAMIN B-12) 1000 MCG SL Tab Place  under the tongue.      ferrous sulfate 325 (65 Fe) MG tablet Take 325 mg by mouth every 48 hours.      pantoprazole (PROTONIX) 40 MG Tablet Delayed Response Take 1 Tablet by  "mouth every day. 90 Tablet 0    silver sulfADIAZINE (SILVADENE) 1 % Cream Apply thin layer to affected area twice a day x 5 days 50 g 0    desoximetasone (TOPICORT) 0.25 % Cream Apply 1 Application topically 2 times a day as needed (rash). 60 g 0    Celecoxib (CELEBREX PO) Take  by mouth.      losartan (COZAAR) 50 MG Tab TAKE 1 TABLET DAILY 90 Tablet 3    meloxicam (MOBIC) 15 MG tablet Take 1 Tablet by mouth every day. 30 Tablet 0    estradiol (ESTRACE) 0.1 MG/GM vaginal cream       valacyclovir (VALTREX) 1 GM Tab Take 2 Tablets by mouth every 12 hours. 2 g twice daily for one day. 20 Tablet 0    clobetasol (TEMOVATE) 0.05 % Cream CLOBETASOL PROPIONATE 0.05 % CREA 30 g 1    cyclobenzaprine (FLEXERIL) 5 MG tablet CYCLOBENZAPRINE HCL 5 MG TABS      acetaminophen (TYLENOL) 500 MG Tab Take 500-1,000 mg by mouth every 6 hours as needed.      loratadine (CLARITIN) 10 MG Tab Take 10 mg by mouth every day.      guaiFENesin ER (MUCINEX) 600 MG TABLET SR 12 HR Take 600 mg by mouth every 12 hours.      diphenhydrAMINE-APAP, sleep, (TYLENOL PM EXTRA STRENGTH PO) Take  by mouth. (Patient not taking: Reported on 10/5/2023)       No current Epic-ordered facility-administered medications on file.     Patient has no known allergies.    PMH/PSH/FH/Social history reviewed.  Vaccinations discussed.  Previous records and labs reviewed. Discussed age appropriate anticipatory guidance.    ROS: see hpi  Gen: no fevers/chills  Pulm: no cough  CV: no chest pain, no palpitations, no edema  GI: no nausea/vomiting, no diarrhea  Skin: no rash    Objective:   Exam:  /78 (BP Location: Left arm, Patient Position: Sitting, BP Cuff Size: Large adult)   Pulse 86   Temp 36.2 °C (97.2 °F) (Temporal)   Resp 16   Ht 1.499 m (4' 11\")   Wt 86.2 kg (190 lb)   SpO2 96%   BMI 38.38 kg/m²    Body mass index is 38.38 kg/m².    Gen: Alert and oriented, No apparent distress.  HEENT: Head atraumatic, normocephalic. Pupils equal and " round.  Lungs: Normal effort, CTA bilaterally, no wheezes, rhonchi, or rales  CV: Regular rate and rhythm. No murmurs, rubs, or gallops.  Ext: No clubbing, cyanosis, edema.    Assessment & Plan:     83 y.o. female with the following -     1. Essential hypertension  Chronic, controlled and stable. Continue current regimen -metoprolol 25 mg daily and losartan 50 mg daily. Recommend DASH diet, exercise as tolerated. Monitor Blood Pressure at home and report any consistent readings above >140/90. Seek medical help/ER if chest pain, palpitations, shortness of breath, headache, dizziness.  Cardiology notes reviewed.    2. Chronic superficial gastritis without bleeding  Discussed dietary changes including limiting acidic foods such as lemon, vinegar, tomatoes.  May finish the rest of the pantoprazole bottle and then trial being off of PPI.  If symptoms recur, then will resume and refer to GI.    3. Mixed hyperlipidemia  Chronic, controlled and stable. Continue current regimen -atorvastatin 20 mg nightly.  Follow-up with cardiology as scheduled.    Return in about 6 months (around 4/5/2024) for Follow-up.    Almita Valenzuela PA-C ([place])  Physician Assistant Certified  [facility]    Please note that this dictation was created using voice recognition software. I have made every reasonable attempt to correct obvious errors, but I expect that there are errors of grammar and possibly content that I did not discover before finalizing the note.

## 2023-11-02 ENCOUNTER — TELEPHONE (OUTPATIENT)
Dept: CARDIOLOGY | Facility: MEDICAL CENTER | Age: 83
End: 2023-11-02
Payer: MEDICARE

## 2023-11-02 DIAGNOSIS — I10 ESSENTIAL HYPERTENSION: ICD-10-CM

## 2023-11-02 RX ORDER — LOSARTAN POTASSIUM 50 MG/1
50 TABLET ORAL DAILY
Qty: 90 TABLET | Refills: 3 | Status: SHIPPED | OUTPATIENT
Start: 2023-11-02 | End: 2023-12-07

## 2023-11-02 NOTE — TELEPHONE ENCOUNTER
AL    Caller: Flavia Garrett    Topic/issue: MEDICAL ADVICE    Flavia states that she is having low blood pressure associated with fatigue, weakness and lightheadedness. She would like a call back to discuss these symptoms. Please advise.    BP: 102/65    Thank you,  Arsalan ROSE    Callback Number: [phone] (home)

## 2023-11-02 NOTE — TELEPHONE ENCOUNTER
Noted below:    John Campoverde M.D.  You 2 minutes ago (3:42 PM)     MARIUM  Thanks Josefina.     She can stop the losartan-HCTZ and resume losartan 50mg.        Phone Number Called: [phone]    Call outcome: Spoke to patient regarding message below.    Message: Called to inform patient she can stop the losartan-HCTZ and resume losartan.

## 2023-11-29 ENCOUNTER — PATIENT MESSAGE (OUTPATIENT)
Dept: HEALTH INFORMATION MANAGEMENT | Facility: OTHER | Age: 83
End: 2023-11-29

## 2023-12-07 ENCOUNTER — OFFICE VISIT (OUTPATIENT)
Dept: CARDIOLOGY | Facility: MEDICAL CENTER | Age: 83
End: 2023-12-07
Attending: INTERNAL MEDICINE
Payer: MEDICARE

## 2023-12-07 VITALS
HEIGHT: 59 IN | OXYGEN SATURATION: 96 % | WEIGHT: 184 LBS | DIASTOLIC BLOOD PRESSURE: 70 MMHG | HEART RATE: 79 BPM | SYSTOLIC BLOOD PRESSURE: 122 MMHG | RESPIRATION RATE: 18 BRPM | BODY MASS INDEX: 37.09 KG/M2

## 2023-12-07 DIAGNOSIS — E78.2 MIXED HYPERLIPIDEMIA: ICD-10-CM

## 2023-12-07 DIAGNOSIS — T46.6X5A MYALGIA DUE TO STATIN: ICD-10-CM

## 2023-12-07 DIAGNOSIS — I10 ESSENTIAL HYPERTENSION: ICD-10-CM

## 2023-12-07 DIAGNOSIS — I51.89 DIASTOLIC DYSFUNCTION: ICD-10-CM

## 2023-12-07 DIAGNOSIS — R06.09 DOE (DYSPNEA ON EXERTION): ICD-10-CM

## 2023-12-07 DIAGNOSIS — I20.89 MICROVASCULAR ANGINA (HCC): Primary | ICD-10-CM

## 2023-12-07 DIAGNOSIS — I20.89 STABLE ANGINA (HCC): ICD-10-CM

## 2023-12-07 DIAGNOSIS — M79.10 MYALGIA DUE TO STATIN: ICD-10-CM

## 2023-12-07 DIAGNOSIS — I35.8 AORTIC VALVE SCLEROSIS: ICD-10-CM

## 2023-12-07 PROCEDURE — 99213 OFFICE O/P EST LOW 20 MIN: CPT | Performed by: INTERNAL MEDICINE

## 2023-12-07 PROCEDURE — 3078F DIAST BP <80 MM HG: CPT | Performed by: INTERNAL MEDICINE

## 2023-12-07 PROCEDURE — 99214 OFFICE O/P EST MOD 30 MIN: CPT | Performed by: INTERNAL MEDICINE

## 2023-12-07 PROCEDURE — 3074F SYST BP LT 130 MM HG: CPT | Performed by: INTERNAL MEDICINE

## 2023-12-07 RX ORDER — LOSARTAN POTASSIUM 25 MG/1
25 TABLET ORAL DAILY
Qty: 90 TABLET | Refills: 3 | Status: SHIPPED | OUTPATIENT
Start: 2023-12-07 | End: 2024-03-15 | Stop reason: SDUPTHER

## 2023-12-07 RX ORDER — SPIRONOLACTONE 25 MG/1
25 TABLET ORAL DAILY
Qty: 90 TABLET | Refills: 3 | Status: SHIPPED | OUTPATIENT
Start: 2023-12-07 | End: 2024-03-15

## 2023-12-07 ASSESSMENT — FIBROSIS 4 INDEX: FIB4 SCORE: 1.29

## 2023-12-07 NOTE — PATIENT INSTRUCTIONS
Take spironolactone 25mg in the AM  Take losartan 25mg instead of 50mg  If in 1 week you feel ok, start taking metoprolol 25mg instead of 12.5mg and update me in 1-2 weeks  Will try to arrange the PFT to be done sooner  Think about the right heart cath we talked about

## 2023-12-07 NOTE — PROGRESS NOTES
CARDIOLOGY established PATIENT:    PCP: Almita Valenzuela P.A.-C.    1. Microvascular angina    2. Essential hypertension    3. RUIZ (dyspnea on exertion)    4. Mixed hyperlipidemia    5. Stable angina    6. Diastolic dysfunction    7. Aortic valve sclerosis    8. Myalgia due to statin        Flavia Garrett is here for follow-up for stable angina due to microvascular disease, hypertension and dyslipidemia    Chief Complaint   Patient presents with    Shortness of Breath     F/V Dx: RUIZ (dyspnea on exertion)      Hypertension       History:  Flavia Garrett is a 83 y.o. female with history of stage I breast cancer about in 2011 (partial right sided lumpectomy with LN dissection), gallbladder stones / sludge, RBBB, dyslipidemia, hypertension, arthritis, stable angina due to microvascular disease, and hypothyroidism is referred from the pulmonary medicine clinic for dyspnea on exertion concerns of tachycardia.     She met with Dr. Vela with the pulmonary medicine team 7/20/2023 and referred for evaluation for her dyspnea on exertion and tachycardia episodes. She finished 6 months of PT until 4/2023 (back pain).    Clinic 9/2023: Due to ongoing dyspnea on exertion, arrange for a TTE, cardiac PET, started metoprolol, offered semaglutide trial however deferred given gallbladder issues, and started atorvastatin 20 mg. TTE with normal LVEF, aortic valve sclerosis without stenosis, grade 1 LV diastolic dysfunction.    Change losartan-HCTZ to losartan 50 mg 11/2/23 due to feeling more tired and lightheaded.    Fell July 2023 due to tripping.     Checks BP and H takes losartan 50mg in am and metoprolol 12.5mg in pm. BP < 130/80mmHg. Feels tired with /60's mmHg and prefers higher values.    Worsening RUIZ over last few months. Did not tolerate atorvastatin and HCTZ due to cramps and fatigue. Denies CP, chest pressure, RACHEL, dizziness, syncope, orthopnea or PND.     Trying to eat HH diet.    Son and cousin with her  "today.     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  Normal LV size, thickness and systolic function with normal estimated   LVEF 60%  Grade I LV diastolic dysfunction with normal estimated LA pressure.  Mildly dilated LA.  Normal RV size and systolic function.  AV sclerosis without stenosis.  Normal IVC size  Unable to estimate RVSP  No pericardial effusion     Lipid panel 1/2023:  ,      Normal A1C 3/2022 5.2%.     Treadmill stress test 6/2021:  Baseline rhythm sinus with right bundle branch block.   Poor exercise capacity.   With stress ST changes noted due to bundle branch block, but not suggestive of ischemia   Overall negative stress electrocardiogram for ischemia.     TTE 3/2021: Personally reviewed  Normal LV systolic function, LVEF 60%  Grade 1 LV diastolic dysfunction  No significant valvular abnormalities  Normal IVC size  Unable to estimate RVSP         PE:  /70 (BP Location: Left arm, Patient Position: Sitting, BP Cuff Size: Adult)   Pulse 79   Resp 18   Ht 1.499 m (4' 11\")   Wt 83.5 kg (184 lb)   SpO2 96%   BMI 37.16 kg/m²     Gen: well  HEENT: Symmetric face. Anicteric sclerae. Moist mucus membranes  NECK: No JVD.   CARDIAC: Regular, Normal S1, S2, No murmur  VASCULATURE: carotids are normal bilaterally without bruit  RESP: Clear to auscultation bilaterally   EXT: No edema, no clubbing or cyanosis  SKIN: Warm and dry  NEURO: No gross deficits  PSYCH: Appropriate affect, participates in conversation    The ASCVD Risk score (Delbert GOVEA, et al., " 2019) failed to calculate.    Past Medical History:   Diagnosis Date    Acquired absence of both cervix and uterus 11/11/2014    Back pain     Breast cancer (HCC)     Chickenpox     Chronic left-sided low back pain without sciatica 3/17/2017    Constipation     Cough     Cystitis 6/12/2019    Diarrhea     Dyslipidemia 7/26/2016    Eczema     Fatigue     Frequent urination     Gout of foot 9/30/2016    H/O total hysterectomy 7/26/2016    History of UTI 1/31/2020    Hx of breast cancer 7/26/2016    Hypertension     Hypopituitarism (HCC) 6/23/2023    Hypothyroidism 7/26/2016    Idiopathic chronic gout of right ankle 9/30/2016    Influenza     Lichen sclerosus of female genitalia     Nasal drainage     Nocturnal oxygen desaturation 8/7/2020    Obesity     Osteopenia 7/26/2016    Osteoporosis     Osteoporosis 7/26/2016    Overactive bladder 2/9/2020    Overweight     Recurrent UTI 1/31/2020    Rhinitis     Ringing in ears     Shortness of breath     Sputum production     Status post right knee replacement 5/5/2016    Status post total right knee replacement 8/17/2016    Swelling of lower extremity     Thyroid activity decreased     Tonsillitis     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     Vision loss     Vitamin D deficiency 7/26/2016    Whooping cough      Past Surgical History:   Procedure Laterality Date    LUMPECTOMY Right 2012    ARTHROSCOPY, KNEE      HYSTERECTOMY LAPAROSCOPY      KNEE ARTHROPLASTY TOTAL Right     OTHER      LIPOSUCTION THIGHS-LEG LIFT    IA REMV 2ND CATARACT,CORN-SCLER SECTN      VAGINAL HYSTERECTOMY TOTAL      Hysterectomy,Total Vaginal     No Known Allergies  Outpatient Encounter Medications as of 12/7/2023   Medication Sig Dispense Refill    spironolactone (ALDACTONE) 25 MG Tab Take 1 Tablet by mouth every day. 90 Tablet 3    losartan (COZAAR) 25 MG Tab Take 1 Tablet by mouth every day. 90 Tablet 3    metoprolol SR (TOPROL XL) 25 MG TABLET SR 24 HR Take 1  Tablet by mouth every day. 90 Tablet 3    vitamin D3 (CHOLECALCIFEROL) 1000 Unit (25 mcg) Tab Take 2,000 Units by mouth every day.      levothyroxine (SYNTHROID) 175 MCG Tab TAKE 1 TABLET 175 MCG ON MONDAY, WEDNESDAY, FRIDAY. TAKE 150MCG ON Sunday, TUESDAY, THURSDAY, and Saturday. 36 Tablet 3    levothyroxine (SYNTHROID) 150 MCG Tab TAKE 1 TABLET 175 MCG ON MONDAY, WEDNESDAY, FRIDAY. TAKE 150MCG ON Sunday, TUESDAY, THURSDAY, and Saturday. 48 Tablet 3    DHA-EPA-Vitamin E (OMEGA-3 COMPLEX PO) Take  by mouth. 1600 mg daily 1 tbsp      Cyanocobalamin (VITAMIN B-12) 1000 MCG SL Tab Place  under the tongue.      ferrous sulfate 325 (65 Fe) MG tablet Take 325 mg by mouth every 48 hours.      silver sulfADIAZINE (SILVADENE) 1 % Cream Apply thin layer to affected area twice a day x 5 days 50 g 0    desoximetasone (TOPICORT) 0.25 % Cream Apply 1 Application topically 2 times a day as needed (rash). 60 g 0    Celecoxib (CELEBREX PO) Take  by mouth.      meloxicam (MOBIC) 15 MG tablet Take 1 Tablet by mouth every day. 30 Tablet 0    estradiol (ESTRACE) 0.1 MG/GM vaginal cream       valacyclovir (VALTREX) 1 GM Tab Take 2 Tablets by mouth every 12 hours. 2 g twice daily for one day. 20 Tablet 0    clobetasol (TEMOVATE) 0.05 % Cream CLOBETASOL PROPIONATE 0.05 % CREA 30 g 1    cyclobenzaprine (FLEXERIL) 5 MG tablet CYCLOBENZAPRINE HCL 5 MG TABS      acetaminophen (TYLENOL) 500 MG Tab Take 500-1,000 mg by mouth every 6 hours as needed.      loratadine (CLARITIN) 10 MG Tab Take 10 mg by mouth every day.      guaiFENesin ER (MUCINEX) 600 MG TABLET SR 12 HR Take 600 mg by mouth every 12 hours.      [DISCONTINUED] losartan (COZAAR) 50 MG Tab Take 1 Tablet by mouth every day. 90 Tablet 3    atorvastatin (LIPITOR) 20 MG Tab Take 1 Tablet by mouth every evening. (Patient not taking: Reported on 12/7/2023) 90 Tablet 3    [DISCONTINUED] pantoprazole (PROTONIX) 40 MG Tablet Delayed Response Take 1 Tablet by mouth every day. (Patient not  taking: Reported on 12/7/2023) 90 Tablet 0    [DISCONTINUED] diphenhydrAMINE-APAP, sleep, (TYLENOL PM EXTRA STRENGTH PO) Take  by mouth. (Patient not taking: Reported on 10/5/2023)       No facility-administered encounter medications on file as of 12/7/2023.     Social History     Socioeconomic History    Marital status:      Spouse name: Not on file    Number of children: Not on file    Years of education: Not on file    Highest education level: Associate degree: academic program   Occupational History    Not on file   Tobacco Use    Smoking status: Never    Smokeless tobacco: Never   Vaping Use    Vaping Use: Never used   Substance and Sexual Activity    Alcohol use: Not Currently     Alcohol/week: 0.0 oz     Comment: min    Drug use: No    Sexual activity: Yes     Partners: Male   Other Topics Concern    Not on file   Social History Narrative    From [place]      Social Determinants of Health     Financial Resource Strain: Low Risk  (7/13/2023)    Overall Financial Resource Strain (CARDIA)     Difficulty of Paying Living Expenses: Not hard at all   Food Insecurity: No Food Insecurity (7/13/2023)    Hunger Vital Sign     Worried About Running Out of Food in the Last Year: Never true     Ran Out of Food in the Last Year: Never true   Transportation Needs: No Transportation Needs (7/13/2023)    PRAPARE - Transportation     Lack of Transportation (Medical): No     Lack of Transportation (Non-Medical): No   Physical Activity: Sufficiently Active (10/2/2019)    Exercise Vital Sign     Days of Exercise per Week: 3 days     Minutes of Exercise per Session: 90 min   Stress: Stress Concern Present (7/13/2023)    [employer] - Occupational Stress Questionnaire     Feeling of Stress : To some extent   Social Connections: Unknown (7/13/2023)    Social Connection and Isolation Panel [NHANES]     Frequency of Communication with Friends and Family: More than three times a week      Frequency of Social Gatherings with Friends and Family: More than three times a week     Attends Restorationist Services: Not on file     Active Member of Clubs or Organizations: Yes     Attends Club or Organization Meetings: More than 4 times per year     Marital Status: Not on file   Intimate Partner Violence: Not on file   Housing Stability: Unknown (7/13/2023)    Housing Stability Vital Sign     Unable to Pay for Housing in the Last Year: No     Number of Places Lived in the Last Year: Not on file     Unstable Housing in the Last Year: No     Family History   Problem Relation Age of Onset    Arthritis Mother     Other Son         CELIAC DISEASE-SEVERE    Diabetes Other         GF    Arthritis Other         GM    Heart Disease Neg Hx          Studies    Lab Results   Component Value Date/Time    TSHULTRASEN 2.300 06/28/2023 0738        Lab Results   Component Value Date/Time    FREET4 1.56 06/28/2023 0738      Lab Results   Component Value Date/Time    HBA1C 5.2 03/15/2022 12:00 AM     Lab Results   Component Value Date/Time    CHOLSTRLTOT 217 01/24/2023 12:00 AM     01/24/2023 12:00 AM    HDL 45 01/24/2023 12:00 AM    TRIGLYCERIDE 213 01/24/2023 12:00 AM       Lab Results   Component Value Date/Time    SODIUM 141 06/28/2023 07:38 AM    POTASSIUM 4.2 06/28/2023 07:38 AM    CHLORIDE 104 06/28/2023 07:38 AM    CO2 25 06/28/2023 07:38 AM    GLUCOSE 110 (H) 06/28/2023 07:38 AM    BUN 12 06/28/2023 07:38 AM    CREATININE 0.69 06/28/2023 07:38 AM     Lab Results   Component Value Date/Time    ALKPHOSPHAT 72 06/28/2023 07:38 AM    ASTSGOT 17 06/28/2023 07:38 AM    ALTSGPT 21 06/28/2023 07:38 AM    TBILIRUBIN 0.6 06/28/2023 07:38 AM        Echocardiogram:  No results found for this or any previous visit.        Assessment and Recommendations:    Problem List Items Addressed This Visit       Essential hypertension    Relevant Medications    spironolactone (ALDACTONE) 25 MG Tab    losartan (COZAAR) 25 MG Tab    Mixed  hyperlipidemia    Relevant Medications    spironolactone (ALDACTONE) 25 MG Tab    losartan (COZAAR) 25 MG Tab    RUIZ (dyspnea on exertion)    Diastolic dysfunction    Relevant Medications    spironolactone (ALDACTONE) 25 MG Tab    Microvascular angina - Primary    Relevant Medications    spironolactone (ALDACTONE) 25 MG Tab    losartan (COZAAR) 25 MG Tab    Other Relevant Orders    Referral to Cardiac Rehab    Aortic valve sclerosis    Relevant Medications    spironolactone (ALDACTONE) 25 MG Tab    losartan (COZAAR) 25 MG Tab     Other Visit Diagnoses       Myalgia due to statin              Ms. Garrett's symptoms are most likely related to microvascular angina and underlying concomitant LV diastolic dysfunction.  Was unfortunately intolerant to higher doses of metoprolol and HCTZ.    Discussed with her the option of a right heart catheterization -+ left heart catheterization/coronary angiography for more definitive assessment of any underlying signs of pulm hypertension and obstructive CAD.     They requested if I can assist in getting her PFTs to be done sooner than January 2024, I messaged her pulmonary physician Dr. Vela in that regards    We agreed to start with the following plan:  -Start spironolactone 25 mg in the morning  -Decrease losartan to 25 mg from 50 mg  -Advised her and her family to think about the right heart catheterization as discussed today in clinic and update me via [employer] if they want to proceed  -If 1 week after the above medication changes she feels okay, to increase her metoprolol back to 25 mg in the evening    Will readdress statin therapy based on progress and follow up, given concerns with statin induced myalgias with trialing atorvastatin 20 mg.    Thank you for the opportunity to be involved in Flavia Garrett 's care; and please reach out with any questions or concerns.    Return in about 3 months (around 3/7/2024).    John Campoverde MD, MPH FACC [facility]  Interventional  Cardiologist  [facility]   of Clinical Internal Medicine - [facility] Jaziel ALEJANDRA    ~ Portions of this note were completed using voice recognition software (Dragon Naturally speaking software) . Occasional transcription errors may have escaped proof reading. I have made every reasonable attempt to correct obvious errors, but I expect that there are errors of grammar and possibly content that I did not discover before finalizing the note. ~

## 2023-12-08 ENCOUNTER — NON-PROVIDER VISIT (OUTPATIENT)
Dept: SLEEP MEDICINE | Facility: MEDICAL CENTER | Age: 83
End: 2023-12-08
Attending: INTERNAL MEDICINE
Payer: MEDICARE

## 2023-12-08 VITALS — HEIGHT: 60 IN | BODY MASS INDEX: 37.5 KG/M2 | WEIGHT: 191 LBS

## 2023-12-08 DIAGNOSIS — R05.3 CHRONIC COUGH: ICD-10-CM

## 2023-12-08 PROCEDURE — 94726 PLETHYSMOGRAPHY LUNG VOLUMES: CPT | Mod: 26 | Performed by: INTERNAL MEDICINE

## 2023-12-08 PROCEDURE — 94729 DIFFUSING CAPACITY: CPT | Performed by: INTERNAL MEDICINE

## 2023-12-08 PROCEDURE — 94726 PLETHYSMOGRAPHY LUNG VOLUMES: CPT | Performed by: INTERNAL MEDICINE

## 2023-12-08 PROCEDURE — 94060 EVALUATION OF WHEEZING: CPT | Performed by: INTERNAL MEDICINE

## 2023-12-08 PROCEDURE — 94060 EVALUATION OF WHEEZING: CPT | Mod: 26 | Performed by: INTERNAL MEDICINE

## 2023-12-08 PROCEDURE — 94729 DIFFUSING CAPACITY: CPT | Mod: 26 | Performed by: INTERNAL MEDICINE

## 2023-12-08 ASSESSMENT — PULMONARY FUNCTION TESTS
FEV1_PERCENT_CHANGE: 11
FEV1/FVC: 85
FEV1/FVC_PERCENT_PREDICTED: 77
FVC_PERCENT_PREDICTED: 94
FEV1: 1.88
FEV1/FVC: 88
FEV1_PERCENT_PREDICTED: 105
FEV1/FVC_PREDICTED: 77
FEV1_PERCENT_PREDICTED: 116
FEV1/FVC_PERCENT_LLN: 65
FEV1: 1.7
FVC: 2.01
FVC_PERCENT_PREDICTED: 101
FEV1/FVC_PERCENT_CHANGE: 3
FVC: 2.15
FEV1_PREDICTED: 1.62
FEV1/FVC_PERCENT_PREDICTED: 113
FEV1_LLN: 1.35
FEV1/FVC: 85
FEV1/FVC_PERCENT_PREDICTED: 115
FEV1/FVC: 87.44
FVC_LLN: 1.76
FVC_PREDICTED: 2.11
FEV1/FVC_PERCENT_PREDICTED: 112
FEV1_PERCENT_CHANGE: 6
FEV1/FVC_PERCENT_PREDICTED: 109
FEV1/FVC_PERCENT_CHANGE: 183

## 2023-12-08 ASSESSMENT — FIBROSIS 4 INDEX: FIB4 SCORE: 1.29

## 2023-12-08 NOTE — PROCEDURES
Technician: WAYNE Bronson    Technician Comment:  Good patient effort & cooperation.  The results of this test meet the ATS/ERS standards for acceptability & reproducibility.  Test was performed on the i[employer] Body Plethysmograph-Elite DX system.  Predicted values were GLI-2012 for spirometry, GLI-2020 for DLCO, ITS for Lung Volumes.  The DLCO was uncorrected for Hgb.  A bronchodilator of levalbuterol HFA -2puffs via spacer administered.  DLCO performed during dilation period.    Interpretation:      Normal pulmonary function testing with partial response to bronchodilators.  Flow volume loops with concavity of the expiratory loop suggestive of peripheral airway obstruction    Josse CA MD am the interpretor and author of this note

## 2023-12-11 ENCOUNTER — TELEPHONE (OUTPATIENT)
Dept: CARDIOLOGY | Facility: MEDICAL CENTER | Age: 83
End: 2023-12-11
Payer: MEDICARE

## 2023-12-11 ENCOUNTER — TELEPHONE (OUTPATIENT)
Dept: SCHEDULING | Facility: IMAGING CENTER | Age: 83
End: 2023-12-11
Payer: MEDICARE

## 2023-12-11 DIAGNOSIS — R06.02 SOB (SHORTNESS OF BREATH): ICD-10-CM

## 2023-12-11 NOTE — TELEPHONE ENCOUNTER
AL    Caller: Flavia Alejandrina Garrett     Topic/issue: Patient called because she recently had her PFT done that was ordered by Dr. Auguste. She said that during that PFT they had her use an inhaler that helped her significantly, so she is inquiring if this is a medication that Dr. Auguste will be able to prescribe her.    Please advise        Callback Number: [phone] (home)     Thank you,    Cathy GARCIA `

## 2023-12-11 NOTE — TELEPHONE ENCOUNTER
Phone Number Called:  [phone]      Call outcome: Spoke to patient regarding message below.    Message: Advised pt to reach out to pulmonary physician Dr. Vela or PCP for inhaler.

## 2023-12-11 NOTE — TELEPHONE ENCOUNTER
"Dane    Name: Flavia Garrett     Topic: Patient and son called stating \"Lata\" had given the patient a chamber for an inhaler. Patient is requesting medication be ordered. Son states patient is unable to make it twenty feet without difficulty breathing. Patient will be going to urgent care 12/12/2023 if unable to resolve issue tonight.    Symptoms:   SOB    Best call back number: [phone]     Thank you,  Jordana DE LA ROSA"

## 2023-12-13 RX ORDER — LEVALBUTEROL TARTRATE 45 UG/1
2 AEROSOL, METERED ORAL EVERY 4 HOURS PRN
Qty: 1 EACH | Refills: 6 | Status: SHIPPED | OUTPATIENT
Start: 2023-12-13 | End: 2024-03-18 | Stop reason: SDUPTHER

## 2023-12-20 ENCOUNTER — TELEPHONE (OUTPATIENT)
Dept: CARDIOLOGY | Facility: MEDICAL CENTER | Age: 83
End: 2023-12-20
Payer: MEDICARE

## 2023-12-20 NOTE — TELEPHONE ENCOUNTER
Returned patient call. Review of chart completed- no recent messages from Josefina noted.  Patient is just reviewing mychart messages and saw result notes from 9/13/23 NM-PET. She was confused if she still needs heart cath. Discussed AL 12/7/23 OV notes with patient. Patient appreciative of clarification and will discuss with family. She will get back to us once they decide to proceed. Patient also states she will take BP for 2 weeks and return to us. All questions and concerns answered.

## 2023-12-20 NOTE — TELEPHONE ENCOUNTER
AL    Caller: Flavia    Topic/issue: Returning call to [facility]    Callback Number: [phone]    Thank you,   Lucy KAUR

## 2023-12-26 ENCOUNTER — OFFICE VISIT (OUTPATIENT)
Dept: MEDICAL GROUP | Facility: IMAGING CENTER | Age: 83
End: 2023-12-26
Payer: MEDICARE

## 2023-12-26 VITALS
OXYGEN SATURATION: 97 % | TEMPERATURE: 96.9 F | HEART RATE: 77 BPM | HEIGHT: 59 IN | BODY MASS INDEX: 39.11 KG/M2 | DIASTOLIC BLOOD PRESSURE: 64 MMHG | SYSTOLIC BLOOD PRESSURE: 122 MMHG | WEIGHT: 194 LBS

## 2023-12-26 DIAGNOSIS — R19.06 EPIGASTRIC LUMP: ICD-10-CM

## 2023-12-26 DIAGNOSIS — L08.9 TOE INFECTION: ICD-10-CM

## 2023-12-26 DIAGNOSIS — Z02.89 ENCOUNTER FOR COMPLETION OF FORM WITH PATIENT: ICD-10-CM

## 2023-12-26 PROBLEM — K82.9 DISORDER OF GALLBLADDER: Status: ACTIVE | Noted: 2023-08-27

## 2023-12-26 PROCEDURE — 99214 OFFICE O/P EST MOD 30 MIN: CPT | Performed by: PHYSICIAN ASSISTANT

## 2023-12-26 PROCEDURE — 3078F DIAST BP <80 MM HG: CPT | Performed by: PHYSICIAN ASSISTANT

## 2023-12-26 PROCEDURE — 3074F SYST BP LT 130 MM HG: CPT | Performed by: PHYSICIAN ASSISTANT

## 2023-12-26 ASSESSMENT — FIBROSIS 4 INDEX: FIB4 SCORE: 1.29

## 2023-12-26 NOTE — ASSESSMENT & PLAN NOTE
Patient complains of a persistently painful right second toe after an injury.  She has been on doxycycline and has also seen podiatry.  States that she had part of her nail removed but is still having discomfort, swelling, redness, tenderness.  She is no longer soaking her toe, but was previously doing warm soaks with Dial soap.

## 2023-12-26 NOTE — ASSESSMENT & PLAN NOTE
"Patient states that 3 weeks ago she was \"hurrying around her house\" and tripped over her rowing machine.  She fell forward and landed on her abdomen with an outstretched arm.  She states that she landed on carpet and did not hit her head.  She did not lose consciousness.  Since the fall she has noticed a lump along her upper abdomen.  Minimal discomfort when she presses on the area.  No nausea, vomiting, bowel habit changes.  No shortness of breath.  "

## 2023-12-26 NOTE — PATIENT INSTRUCTIONS
It was a pleasure meeting with you today at [facility]!    Your medical history/records and medications were reviewed today.     UPDATE on MyChart Results: If you have blood work, and/or imaging studies, or any other test or procedure completed, you will have access to results as soon as they become available in MyChart. Recently, these results will be available for review at the same time that your provider is able to see results!    This will likely mean you will see a result before your provider has had a chance to review and discuss with you.  Some results or care notes may be hard to understand and may be serious in nature.    We look at every result and your provider will contact you to explain what they mean and discuss appropriate next steps. Please allow for at least 72 business hours for chart and result review.     We prefer that you wait for your care team to contact you with your results.  Often, your provider will discuss your results with you at your next appointment. We look forward to continuing to partner with you in your care.    Please review my practice information below:    If you have any prescription refill requests, please send them via [employer] or discuss with your provider at the start of your office visits. Please allow 3-5 business days for lab and testing review and you will be contacted via [employer] with those results, or if advised to make a follow up appointment regarding those results, then please do so.     Once resulted, your lab/test/imaging results will show up automatically in your MyChart. Please wait for my interpretation and recommendations prior to viewing your results to avoid any unnecessary confusion or misinterpretation. I will address all of the lab values that I interpret as abnormal and message you accordingly on your MyChart. I will always send you a message about your results even if they are normal. If you do not hear back from me within 5-7 business  days after completing your tests, then please send me a message on [employer] so I can obtain your results (especially if you went to an outside lab or imaging center - [facility], [facility], etc).     If you have any additional questions or concerns beyond my interpretation of your results, please make an appointment with me to discuss in further detail.    Please only use the [employer] messaging system for questions regarding your most recent appointment or if advised to use otherwise (glucose or blood pressure reporting).     If you have any new problems or concerns, you must make an appointment to discuss. This includes any referral requests, lab requests (unless advised to notify me for pre-appt labs), medication side effects, or request for medication adjustments.     Please arrive 15 minutes prior to your appointment time to complete your check-in and intake with the medical assistant.      Thank you,    Almita Valenzuela PA-C ([place])  Physician Assistant Certified  [facility]    -----------------------------------------------------------------    Attn: Patients of [facility]:    In an effort to continue to provide excellent and efficient care to our patients, it is vital that we continue to use our resources appropriately. With that, this is a reminder that [employer] is used for prescription refill requests, test results, virtual visits, and chart review only.     Any new questions, concerns/conditions, lab/imaging requests, medication adjustments, new prescriptions, or referral requests do require an appointment (virtually or in person), unless discussed otherwise at your most recent appointment.     Thank you for your understanding,    [facility]

## 2023-12-26 NOTE — PROGRESS NOTES
"Subjective:     CC:   Chief Complaint   Patient presents with    Paperwork    Toe Injury     Toe on right foot still is stiff and hurting    Fall     12/7 landed on abdomen and left elbow, feels lump by diaphragm, Swollen on left side       HPI:   Flavia presents today for 's license renewal.  No car accidents or frequent tickets.  No seizures or syncope.  No difficulty with reactivity, no weakness, no problems with  strength.  No vision changes.  Still needs to see her ophthalmologist.    Toe infection  Patient complains of a persistently painful right second toe after an injury.  She has been on doxycycline and has also seen podiatry.  States that she had part of her nail removed but is still having discomfort, swelling, redness, tenderness.  She is no longer soaking her toe, but was previously doing warm soaks with Dial soap.    Epigastric lump  Patient states that 3 weeks ago she was \"hurrying around her house\" and tripped over her rowing machine.  She fell forward and landed on her abdomen with an outstretched arm.  She states that she landed on carpet and did not hit her head.  She did not lose consciousness.  Since the fall she has noticed a lump along her upper abdomen.  Minimal discomfort when she presses on the area.  No nausea, vomiting, bowel habit changes.  No shortness of breath.      Past Medical History:   Diagnosis Date    Acquired absence of both cervix and uterus 11/11/2014    Back pain     Breast cancer (HCC)     Chickenpox     Chronic left-sided low back pain without sciatica 3/17/2017    Constipation     Cough     Cystitis 6/12/2019    Diarrhea     Dyslipidemia 7/26/2016    Eczema     Fatigue     Frequent urination     Gout of foot 9/30/2016    H/O total hysterectomy 7/26/2016    History of UTI 1/31/2020    Hx of breast cancer 7/26/2016    Hypertension     Hypopituitarism (HCC) 6/23/2023    Hypothyroidism 7/26/2016    Idiopathic chronic gout of right ankle 9/30/2016    Influenza     " Lichen sclerosus of female genitalia     Nasal drainage     Nocturnal oxygen desaturation 8/7/2020    Obesity     Osteopenia 7/26/2016    Osteoporosis     Osteoporosis 7/26/2016    Overactive bladder 2/9/2020    Overweight     Recurrent UTI 1/31/2020    Rhinitis     Ringing in ears     Shortness of breath     Sputum production     Status post right knee replacement 5/5/2016    Status post total right knee replacement 8/17/2016    Swelling of lower extremity     Thyroid activity decreased     Tonsillitis     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     Vision loss     Vitamin D deficiency 7/26/2016    Whooping cough      Family History   Problem Relation Age of Onset    Arthritis Mother     Other Son         CELIAC DISEASE-SEVERE    Diabetes Other         GF    Arthritis Other         GM    Heart Disease Neg Hx      Past Surgical History:   Procedure Laterality Date    LUMPECTOMY Right 2012    ARTHROSCOPY, KNEE      HYSTERECTOMY LAPAROSCOPY      KNEE ARTHROPLASTY TOTAL Right     OTHER      LIPOSUCTION THIGHS-LEG LIFT    LA REMV 2ND CATARACT,CORN-SCLER SECTN      VAGINAL HYSTERECTOMY TOTAL      Hysterectomy,Total Vaginal     Social History     Tobacco Use    Smoking status: Never    Smokeless tobacco: Never   Vaping Use    Vaping Use: Never used   Substance Use Topics    Alcohol use: Not Currently     Alcohol/week: 0.0 oz     Comment: min    Drug use: No     Social History     Social History Narrative    From [place]      Current Outpatient Medications Ordered in Epic   Medication Sig Dispense Refill    mupirocin (BACTROBAN) 2 % Ointment Apply 1 Application topically 2 times a day. 22 g 0    levalbuterol (XOPENEX HFA) 45 MCG/ACT inhaler Inhale 2 Puffs every four hours as needed for Shortness of Breath. 1 Each 6    spironolactone (ALDACTONE) 25 MG Tab Take 1 Tablet by mouth every day. 90 Tablet 3    losartan (COZAAR) 25 MG Tab Take 1 Tablet by mouth every day. (Patient taking  differently: Take 25 mg by mouth every day. 1/2 a pill daily) 90 Tablet 3    metoprolol SR (TOPROL XL) 25 MG TABLET SR 24 HR Take 1 Tablet by mouth every day. 90 Tablet 3    levothyroxine (SYNTHROID) 175 MCG Tab TAKE 1 TABLET 175 MCG ON MONDAY, WEDNESDAY, FRIDAY. TAKE 150MCG ON Sunday, TUESDAY, THURSDAY, and Saturday. 36 Tablet 3    levothyroxine (SYNTHROID) 150 MCG Tab TAKE 1 TABLET 175 MCG ON MONDAY, WEDNESDAY, FRIDAY. TAKE 150MCG ON Sunday, TUESDAY, THURSDAY, and Saturday. 48 Tablet 3    clobetasol (TEMOVATE) 0.05 % Cream CLOBETASOL PROPIONATE 0.05 % CREA 30 g 1    acetaminophen (TYLENOL) 500 MG Tab Take 500-1,000 mg by mouth every 6 hours as needed.      loratadine (CLARITIN) 10 MG Tab Take 10 mg by mouth every day.      ergocalciferol (DRISDOL) 91710 UNIT capsule Take 1 Capsule by mouth every 7 days.      doxycycline (VIBRAMYCIN) 100 MG Cap TAKE 1 CAPSULE BY MOUTH TWICE A DAY FOR 7 DAYS      pantoprazole (PROTONIX) 40 MG Tablet Delayed Response Take 1 Tablet by mouth every day.      meloxicam (MOBIC) 15 MG tablet Take 1 Tablet by mouth every day.      atorvastatin (LIPITOR) 20 MG Tab Take 1 Tablet by mouth every evening. (Patient not taking: Reported on 12/7/2023) 90 Tablet 3    vitamin D3 (CHOLECALCIFEROL) 1000 Unit (25 mcg) Tab Take 2,000 Units by mouth every day.      DHA-EPA-Vitamin E (OMEGA-3 COMPLEX PO) Take  by mouth. 1600 mg daily 1 tbsp      ferrous sulfate 325 (65 Fe) MG tablet Take 325 mg by mouth every 48 hours.      silver sulfADIAZINE (SILVADENE) 1 % Cream Apply thin layer to affected area twice a day x 5 days 50 g 0    desoximetasone (TOPICORT) 0.25 % Cream Apply 1 Application topically 2 times a day as needed (rash). 60 g 0    Celecoxib (CELEBREX PO) Take  by mouth.      meloxicam (MOBIC) 15 MG tablet Take 1 Tablet by mouth every day. 30 Tablet 0    estradiol (ESTRACE) 0.1 MG/GM vaginal cream       valacyclovir (VALTREX) 1 GM Tab Take 2 Tablets by mouth every 12 hours. 2 g twice daily for one  "day. 20 Tablet 0    cyclobenzaprine (FLEXERIL) 5 MG tablet CYCLOBENZAPRINE HCL 5 MG TABS      guaiFENesin ER (MUCINEX) 600 MG TABLET SR 12 HR Take 600 mg by mouth every 12 hours.       No current [facility]-ordered facility-administered medications on file.     Lisinopril    PMH/PSH/FH/Social history reviewed.  Vaccinations discussed.  Previous records and labs reviewed. Discussed age appropriate anticipatory guidance.    ROS: see hpi  Gen: no fevers/chills  Pulm: no sob, no cough  CV: no chest pain, no palpitations, no edema  GI: no nausea/vomiting, no diarrhea  Skin: no rash    Objective:   Exam:  /64 (BP Location: Left arm, Patient Position: Sitting, BP Cuff Size: Adult)   Pulse 77   Temp 36.1 °C (96.9 °F) (Temporal)   Ht 1.499 m (4' 11\")   Wt 88 kg (194 lb)   SpO2 97%   BMI 39.18 kg/m²    Body mass index is 39.18 kg/m².    Gen: Alert and oriented, No apparent distress.  HEENT: Head atraumatic, normocephalic. Pupils equal and round.  Neck: Neck is supple without lymphadenopathy.   Lungs: Normal effort, CTA bilaterally, no wheezes, rhonchi, or rales  CV: Regular rate and rhythm. No murmurs, rubs, or gallops.  ABD: +BS. Non-tender, non-distended. No rebound, rigidity, or guarding.  2 x 2 centimeter epigastric round lump felt with mild tenderness.  Freely movable.  Ext: No clubbing, cyanosis, edema.  Strength 5 out of 5 bilateral upper and lower extremities.  Right second medial distal toe with erythema, warmth, and tenderness.  No fluctuance.  Cranial nerves II through XII are intact, no focal deficits.    Assessment & Plan:     83 y.o. female with the following -     1. Encounter for completion of form with patient  DMV renewal form filled out and returned to patient.  She will see her ophthalmologist for the visual portion of the form.    2. Toe infection  Resume soaking toe in warm water with Dial soap, after soaking apply thin layer of mupirocin ointment to affected area.  Follow-up with podiatry for " further management.  - mupirocin (BACTROBAN) 2 % Ointment; Apply 1 Application topically 2 times a day.  Dispense: 22 g; Refill: 0    3. Epigastric lump  Will check imaging, further recommendations to follow.  - US-ABDOMEN LTD (SOFT TISSUE); Future  - US-ABDOMEN COMPLETE SURVEY; Future    Return for As scheduled, Will notify patient to follow-up pending tests.    Almita Valenzuela PA-C ([place])  Physician Assistant Certified  [facility]    Please note that this dictation was created using voice recognition software. I have made every reasonable attempt to correct obvious errors, but I expect that there are errors of grammar and possibly content that I did not discover before finalizing the note.

## 2024-01-02 ENCOUNTER — TELEPHONE (OUTPATIENT)
Dept: MEDICAL GROUP | Facility: IMAGING CENTER | Age: 84
End: 2024-01-02
Payer: MEDICARE

## 2024-01-02 NOTE — TELEPHONE ENCOUNTER
Caller Name: Flavia Garrett   Call Back Number: [phone] (home)      How would the patient prefer to be contacted with a response: Phone call do NOT leave a detailed message    Pt request having forms fill out for [place] card for DMV.  She saw you last week regards to DMV 12/26/2023  Please advice she needs an appointment

## 2024-01-09 ENCOUNTER — TELEPHONE (OUTPATIENT)
Dept: CARDIOLOGY | Facility: MEDICAL CENTER | Age: 84
End: 2024-01-09
Payer: MEDICARE

## 2024-01-10 ENCOUNTER — TELEPHONE (OUTPATIENT)
Dept: CARDIOLOGY | Facility: MEDICAL CENTER | Age: 84
End: 2024-01-10
Payer: MEDICARE

## 2024-01-10 DIAGNOSIS — I20.89 MICROVASCULAR ANGINA (HCC): ICD-10-CM

## 2024-01-10 DIAGNOSIS — I50.32 CHRONIC HEART FAILURE WITH PRESERVED EJECTION FRACTION (HFPEF) (HCC): ICD-10-CM

## 2024-01-10 DIAGNOSIS — R06.09 DOE (DYSPNEA ON EXERTION): ICD-10-CM

## 2024-01-10 DIAGNOSIS — I20.89 STABLE ANGINA (HCC): ICD-10-CM

## 2024-01-10 DIAGNOSIS — I51.89 DIASTOLIC DYSFUNCTION: ICD-10-CM

## 2024-01-10 NOTE — TELEPHONE ENCOUNTER
"AL  Caller: Flavia Garciao     Topic/issue: \". Due to your continued shortness of breath he recommends you have a right and left heart catheterization that he discussed with you at your last office visit in December. Please let us know if you are agreeable and we can get this scheduled for you. \" Per patients notes, she would like to discuss this procedure, she is requesting a call back .    Callback Number: [phone]     Thank you  Taylor FAN"

## 2024-01-10 NOTE — TELEPHONE ENCOUNTER
Noted below from AL:  John Campoverde M.D.  You2 hours ago (9:44 AM)     And I ordered sotagliflozin for her shortness of breath, 200mg daily. To De[place]ra who will assist her in patient assistance program to hopefully get it for an affordable price. We will also mail her a free trial offer sheet.     John Campoverde M.D.  You; Lloyd 2 hours ago (9:40 AM)     Misti,    I reviewed her notes and log.  Blood pressure is normal and well-controlled on current regimen.    Going forward, I recommend arranging for a right and left heart catheterization which I mentioned to her and her family during her last clinic visit if she continues to have shortness of breath symptoms.  Please give her a call once we have some time and confirm if she is still interested to proceed with these procedures so we can arrange accordingly.    ADITYA Desai.     Attempted to call patient, left message.  ShwrÃ¼m message also sent

## 2024-01-11 ENCOUNTER — APPOINTMENT (OUTPATIENT)
Dept: RADIOLOGY | Facility: MEDICAL CENTER | Age: 84
End: 2024-01-11
Attending: PHYSICIAN ASSISTANT
Payer: MEDICARE

## 2024-01-11 ENCOUNTER — TELEPHONE (OUTPATIENT)
Dept: CARDIOLOGY | Facility: MEDICAL CENTER | Age: 84
End: 2024-01-11

## 2024-01-11 DIAGNOSIS — R19.06 EPIGASTRIC LUMP: ICD-10-CM

## 2024-01-11 PROCEDURE — 76700 US EXAM ABDOM COMPLETE: CPT

## 2024-01-11 NOTE — TELEPHONE ENCOUNTER
PA started.    ARIELLE MUSE (Jean: [id number])  Rx #: [id number]  Need Help? Call us at [phone]  Status  New (Not sent to plan)  Drug  Inpefa 200MG tablets  ePA cloud logo  Form  [employer] Electronic PA Form (2017 [employer])  Original Claim Info  75 Prior Authorization Required

## 2024-01-11 NOTE — TELEPHONE ENCOUNTER
"Called and spoke to patient. She states she fell 12/7 \"pretty hard\" before she came in for her appointment and didn't tell AL because she didn't want to go off the track of her heart. Today had US on Abdomen area because she hit the petal of her rowing machine and hit it pretty hard. Patient had to go mid-conversation due to her house alarm going off. Will call patient back in a few minutes to complete conversation.     Returned call, and discussed with patient. Patient is agreeable to proceed with heart cath procedure.     Patient also states she wants to hold off on the new medication Sotagliflozin unless it is going to be a while before cath procedure.     AL: ADITYA regarding medication. Thank you.   Lloyd: Patient agreeable and ready to schedule. Thank you.       "

## 2024-01-11 NOTE — TELEPHONE ENCOUNTER
PA sent to plan.    ARIELLE MUSE (Jean: [id number])  PA Case ID #: [id number]  Rx #: [id number]  Need Help? Call us at [phone]  Status  sent iconSent to Plan today  Drug  Inpefa 200MG tablets  ePA cloud logo  Form  [employer] Electronic PA Form (2017 NCPDP)  Original Claim Info  75 Prior Authorization Required

## 2024-01-12 NOTE — TELEPHONE ENCOUNTER
PA denied as patient has not failed formulary, formulary alternative not provided.     ARIELLE MUSE (Jean: [id number])  PA Case ID #: [id number]  Rx #: [id number]  Need Help? Call us at [phone]  Outcome  Denied today  Your PA request has been denied. Additional information will be provided in the denial communication. (Message 1140)  Drug  Inpefa 200MG tablets  ePA cloud logo  Form  [facility] Electronic PA Form (2017 NCPDP)  Original Claim Info  75 Prior Authorization Required

## 2024-01-12 NOTE — TELEPHONE ENCOUNTER
Patient is schedule on 2-5-24 for a R&L hrt w/nitric oxide with John Campoverde. Patient was told to hold spironolactone AM day of procedure and to check in at 9:30 for an 11:30 procedure. Updated H&P to be done on admit by NP. Pre admit to call patient.

## 2024-01-15 NOTE — TELEPHONE ENCOUNTER
Noted below:    John Campoverde M.D.  You1 minute ago (8:09 AM)     MARIUM  Will address an alternative after her cath. Thank you Josefnia

## 2024-01-16 ENCOUNTER — APPOINTMENT (OUTPATIENT)
Dept: ADMISSIONS | Facility: MEDICAL CENTER | Age: 84
End: 2024-01-16
Attending: INTERNAL MEDICINE
Payer: MEDICARE

## 2024-01-24 ENCOUNTER — PRE-ADMISSION TESTING (OUTPATIENT)
Dept: ADMISSIONS | Facility: MEDICAL CENTER | Age: 84
End: 2024-01-24
Attending: INTERNAL MEDICINE
Payer: MEDICARE

## 2024-02-01 ENCOUNTER — PRE-ADMISSION TESTING (OUTPATIENT)
Dept: ADMISSIONS | Facility: MEDICAL CENTER | Age: 84
End: 2024-02-01
Attending: INTERNAL MEDICINE
Payer: MEDICARE

## 2024-02-01 DIAGNOSIS — Z01.810 PRE-OPERATIVE CARDIOVASCULAR EXAMINATION: ICD-10-CM

## 2024-02-01 DIAGNOSIS — Z01.812 PRE-OPERATIVE LABORATORY EXAMINATION: ICD-10-CM

## 2024-02-01 LAB
APTT PPP: 29.6 SEC (ref 24.7–36)
EKG IMPRESSION: NORMAL
ERYTHROCYTE [DISTWIDTH] IN BLOOD BY AUTOMATED COUNT: 52 FL (ref 35.9–50)
GFR SERPLBLD CREATININE-BSD FMLA CKD-EPI: 87 ML/MIN/1.73 M 2
HCT VFR BLD AUTO: 40.6 % (ref 37–47)
HGB BLD-MCNC: 12.8 G/DL (ref 12–16)
INR PPP: 1.01 (ref 0.87–1.13)
MCH RBC QN AUTO: 28.3 PG (ref 27–33)
MCHC RBC AUTO-ENTMCNC: 31.5 G/DL (ref 32.2–35.5)
MCV RBC AUTO: 89.8 FL (ref 81.4–97.8)
PLATELET # BLD AUTO: 262 K/UL (ref 164–446)
PMV BLD AUTO: 10.2 FL (ref 9–12.9)
PROTHROMBIN TIME: 13.4 SEC (ref 12–14.6)
RBC # BLD AUTO: 4.52 M/UL (ref 4.2–5.4)
WBC # BLD AUTO: 6 K/UL (ref 4.8–10.8)

## 2024-02-01 PROCEDURE — 36415 COLL VENOUS BLD VENIPUNCTURE: CPT

## 2024-02-01 PROCEDURE — 85027 COMPLETE CBC AUTOMATED: CPT

## 2024-02-01 PROCEDURE — 85730 THROMBOPLASTIN TIME PARTIAL: CPT

## 2024-02-01 PROCEDURE — 85610 PROTHROMBIN TIME: CPT

## 2024-02-01 PROCEDURE — 80053 COMPREHEN METABOLIC PANEL: CPT

## 2024-02-01 PROCEDURE — 93005 ELECTROCARDIOGRAM TRACING: CPT

## 2024-02-01 PROCEDURE — 93010 ELECTROCARDIOGRAM REPORT: CPT | Performed by: INTERNAL MEDICINE

## 2024-02-02 LAB
ALBUMIN SERPL BCP-MCNC: 4.3 G/DL (ref 3.2–4.9)
ALBUMIN/GLOB SERPL: 1.7 G/DL
ALP SERPL-CCNC: 77 U/L (ref 30–99)
ALT SERPL-CCNC: 15 U/L (ref 2–50)
ANION GAP SERPL CALC-SCNC: 13 MMOL/L (ref 7–16)
AST SERPL-CCNC: 14 U/L (ref 12–45)
BILIRUB SERPL-MCNC: 0.4 MG/DL (ref 0.1–1.5)
BUN SERPL-MCNC: 17 MG/DL (ref 8–22)
CALCIUM ALBUM COR SERPL-MCNC: 9.3 MG/DL (ref 8.5–10.5)
CALCIUM SERPL-MCNC: 9.5 MG/DL (ref 8.5–10.5)
CHLORIDE SERPL-SCNC: 102 MMOL/L (ref 96–112)
CO2 SERPL-SCNC: 23 MMOL/L (ref 20–33)
CREAT SERPL-MCNC: 0.68 MG/DL (ref 0.5–1.4)
GLOBULIN SER CALC-MCNC: 2.6 G/DL (ref 1.9–3.5)
GLUCOSE SERPL-MCNC: 96 MG/DL (ref 65–99)
POTASSIUM SERPL-SCNC: 4.4 MMOL/L (ref 3.6–5.5)
PROT SERPL-MCNC: 6.9 G/DL (ref 6–8.2)
SODIUM SERPL-SCNC: 138 MMOL/L (ref 135–145)

## 2024-02-05 ENCOUNTER — APPOINTMENT (OUTPATIENT)
Dept: CARDIOLOGY | Facility: MEDICAL CENTER | Age: 84
End: 2024-02-05
Attending: INTERNAL MEDICINE
Payer: MEDICARE

## 2024-02-05 ENCOUNTER — HOSPITAL ENCOUNTER (OUTPATIENT)
Facility: MEDICAL CENTER | Age: 84
End: 2024-02-05
Attending: INTERNAL MEDICINE | Admitting: INTERNAL MEDICINE
Payer: MEDICARE

## 2024-02-05 VITALS
WEIGHT: 189.15 LBS | DIASTOLIC BLOOD PRESSURE: 55 MMHG | RESPIRATION RATE: 16 BRPM | TEMPERATURE: 97.6 F | SYSTOLIC BLOOD PRESSURE: 124 MMHG | HEART RATE: 83 BPM | OXYGEN SATURATION: 91 % | HEIGHT: 59 IN | BODY MASS INDEX: 38.13 KG/M2

## 2024-02-05 DIAGNOSIS — I51.89 DIASTOLIC DYSFUNCTION: ICD-10-CM

## 2024-02-05 DIAGNOSIS — I20.89 MICROVASCULAR ANGINA (HCC): ICD-10-CM

## 2024-02-05 DIAGNOSIS — I20.89 STABLE ANGINA (HCC): ICD-10-CM

## 2024-02-05 DIAGNOSIS — R06.09 DOE (DYSPNEA ON EXERTION): ICD-10-CM

## 2024-02-05 LAB
CALCULATED OXYGEN CONTENT: NORMAL
CALCULATED OXYGEN CONTENT: NORMAL
OXYHEMOGLOBIN: NORMAL
OXYHEMOGLOBIN: NORMAL
TOTAL HEMOGLOBIN: NORMAL
TOTAL HEMOGLOBIN: NORMAL

## 2024-02-05 PROCEDURE — 700105 HCHG RX REV CODE 258: Performed by: INTERNAL MEDICINE

## 2024-02-05 PROCEDURE — 700117 HCHG RX CONTRAST REV CODE 255: Performed by: INTERNAL MEDICINE

## 2024-02-05 PROCEDURE — 160046 HCHG PACU - 1ST 60 MINS PHASE II

## 2024-02-05 PROCEDURE — A9270 NON-COVERED ITEM OR SERVICE: HCPCS

## 2024-02-05 PROCEDURE — 99152 MOD SED SAME PHYS/QHP 5/>YRS: CPT | Performed by: INTERNAL MEDICINE

## 2024-02-05 PROCEDURE — 93460 R&L HRT ART/VENTRICLE ANGIO: CPT | Mod: 26 | Performed by: INTERNAL MEDICINE

## 2024-02-05 PROCEDURE — 160035 HCHG PACU - 1ST 60 MINS PHASE I

## 2024-02-05 PROCEDURE — 99152 MOD SED SAME PHYS/QHP 5/>YRS: CPT

## 2024-02-05 PROCEDURE — 85018 HEMOGLOBIN: CPT | Performed by: INTERNAL MEDICINE

## 2024-02-05 PROCEDURE — 160002 HCHG RECOVERY MINUTES (STAT)

## 2024-02-05 PROCEDURE — 700102 HCHG RX REV CODE 250 W/ 637 OVERRIDE(OP)

## 2024-02-05 PROCEDURE — 160047 HCHG PACU  - EA ADDL 30 MINS PHASE II

## 2024-02-05 PROCEDURE — 700111 HCHG RX REV CODE 636 W/ 250 OVERRIDE (IP): Mod: JG

## 2024-02-05 PROCEDURE — 700101 HCHG RX REV CODE 250

## 2024-02-05 RX ORDER — ROSUVASTATIN CALCIUM 5 MG/1
5 TABLET, COATED ORAL EVERY EVENING
Qty: 90 TABLET | Refills: 3 | Status: SHIPPED | OUTPATIENT
Start: 2024-02-05 | End: 2024-03-15 | Stop reason: SINTOL

## 2024-02-05 RX ORDER — LIDOCAINE HYDROCHLORIDE 20 MG/ML
INJECTION, SOLUTION INFILTRATION; PERINEURAL
Status: COMPLETED
Start: 2024-02-05 | End: 2024-02-05

## 2024-02-05 RX ORDER — HEPARIN SODIUM 1000 [USP'U]/ML
INJECTION, SOLUTION INTRAVENOUS; SUBCUTANEOUS
Status: COMPLETED
Start: 2024-02-05 | End: 2024-02-05

## 2024-02-05 RX ORDER — HEPARIN SODIUM 200 [USP'U]/100ML
INJECTION, SOLUTION INTRAVENOUS
Status: COMPLETED
Start: 2024-02-05 | End: 2024-02-05

## 2024-02-05 RX ORDER — SODIUM CHLORIDE 9 MG/ML
1000 INJECTION, SOLUTION INTRAVENOUS CONTINUOUS
Status: DISCONTINUED | OUTPATIENT
Start: 2024-02-05 | End: 2024-02-05 | Stop reason: HOSPADM

## 2024-02-05 RX ORDER — MIDAZOLAM HYDROCHLORIDE 1 MG/ML
INJECTION INTRAMUSCULAR; INTRAVENOUS
Status: COMPLETED
Start: 2024-02-05 | End: 2024-02-05

## 2024-02-05 RX ORDER — VERAPAMIL HYDROCHLORIDE 2.5 MG/ML
INJECTION, SOLUTION INTRAVENOUS
Status: COMPLETED
Start: 2024-02-05 | End: 2024-02-05

## 2024-02-05 RX ORDER — ASPIRIN 81 MG/1
TABLET, CHEWABLE ORAL
Status: COMPLETED
Start: 2024-02-05 | End: 2024-02-05

## 2024-02-05 RX ORDER — METOPROLOL SUCCINATE 25 MG/1
37.5 TABLET, EXTENDED RELEASE ORAL EVERY EVENING
Qty: 135 TABLET | Refills: 35 | Status: SHIPPED | OUTPATIENT
Start: 2024-02-05

## 2024-02-05 RX ADMIN — NITROGLYCERIN 10 ML: 20 INJECTION INTRAVENOUS at 12:53

## 2024-02-05 RX ADMIN — IOHEXOL 45 ML: 350 INJECTION, SOLUTION INTRAVENOUS at 13:30

## 2024-02-05 RX ADMIN — FENTANYL CITRATE 25 MCG: 50 INJECTION, SOLUTION INTRAMUSCULAR; INTRAVENOUS at 13:31

## 2024-02-05 RX ADMIN — ASPIRIN 81 MG 324 MG: 81 TABLET ORAL at 12:54

## 2024-02-05 RX ADMIN — SODIUM CHLORIDE 1000 ML: 9 INJECTION, SOLUTION INTRAVENOUS at 10:53

## 2024-02-05 RX ADMIN — HEPARIN SODIUM 2000 UNITS: 200 INJECTION, SOLUTION INTRAVENOUS at 12:55

## 2024-02-05 RX ADMIN — LIDOCAINE HYDROCHLORIDE: 20 INJECTION, SOLUTION INFILTRATION; PERINEURAL at 12:53

## 2024-02-05 RX ADMIN — MIDAZOLAM HYDROCHLORIDE 1 MG: 1 INJECTION, SOLUTION INTRAMUSCULAR; INTRAVENOUS at 13:31

## 2024-02-05 RX ADMIN — HEPARIN SODIUM: 1000 INJECTION, SOLUTION INTRAVENOUS; SUBCUTANEOUS at 12:53

## 2024-02-05 RX ADMIN — VERAPAMIL HYDROCHLORIDE 2.5 MG: 2.5 INJECTION, SOLUTION INTRAVENOUS at 12:53

## 2024-02-05 ASSESSMENT — FIBROSIS 4 INDEX: FIB4 SCORE: 1.13

## 2024-02-05 NOTE — DISCHARGE INSTRUCTIONS
2 weeks heart monitor will be arranged    For the next 7 days, check your blood pressure in AM and PM 2 hours after taking her pills and send me a log via [employer]    Take 1-1/2 pills of the metoprolol every evening    POST ANGIOGRAM  General Care Instructions  Maintain a bandage over the incision site for 24 hours.  It's normal to find a small bruise or dime-sized lump at the insertion site. This should disappear within a few weeks.  Do not apply lotions or powders to the site.  Do not immerse the catheter insertion site in water (bathtub/swimming) for five days. It is ok to shower 24 hours after the procedure.  You may resume your normal diet immediately; on the day of your procedure, drink 6-10 glasses of water to help flush the contrast liquid out of your system.  If the doctor inserted the catheter in through your groin:  Walking short distances on a flat surface is OK. Limit going up/down stairs for the first 2 days.  DO NOT do yard work, drive, squat, lift heavy objects, or play sports for 2 days; or until your health care provider tells you it is OK.  If the doctor inserted the catheter in your arm:  For 3 days, DO NOT lift anything heavier than 10 pounds (approximately a gallon of milk). DO NOT do any heavy pushing, pulling, or twisting.    Medications  If your current medications need to be changed, you will be provided with an updated list of your medications prior to discharge.  If you take warfarin (Coumadin), resume taking your usual dose the evening after the procedure.  DO NOT STOP taking prescribed blood thinning (anti-platelet) medications unless instructed by your cardiologist.  These medications include:  Aspirin, Clopidogrel (Plavix), Ticagrelor (Brilinta), or Prasugrel (Effient)   If you take one of the following anticoagulants, RESUME 24 HOURS after your procedure:  Apixiban (Eliquis), Rivaroxaban (Xarelto), Dabigatran (Pradaxa), Edoxaban (Savaysa)  If you take metformin (Glucophage), RESUME 48  HOURS after your procedure.    When to call your healthcare provider  Call your cardiologist right away at [phone] if you have any of the following:   Problems/Concerns taking any of your prescribed heart medicines.   The insertion site has increasing pain, swelling, redness, bleeding, or drainage.   Your arm or leg below where the insertion site changes color, is cool, or is numb.   You have chest pain or shortness of breath that does not go away with rest.   Your pulse feels irregular -- very slow (less than 60 beats/minute) or very fast (over 100 beats/minute).   You have dizziness, fainting, or you are very tired.   You are coughing up blood or yellow or green mucus.   You have chills or a fever over 101°F (38.3°C).    If there is bleeding at the catheter insertion site, apply pressure for 10 minutes.  If bleeding persists, call 911, and continue to hold pressure until advanced medical support arrives.        Exercising Safely After Percutaneous Coronary Intervention (PCI)  After percutaneous coronary intervention (PCI), which involves angioplasty and often stenting, it's important to focus on your heart health. Exercise can help strengthen your heart. It can also help you feel good and improve your overall health. Talk with your health care provider or cardiac rehab team member about good options for you.  Start slowly. Work up to more vigorous exercise as you get stronger. Aim for at least 150 minutes of exercise each week.  Include aerobic activities. These make the heart beat faster. They work the heart and lungs, and improve the body's ability to use oxygen. Good choices include walking, swimming, and biking .  Always follow your doctor's recommendation for exercise.   You have been referred to cardiac rehabilitation, which is important for your recovery.  You may contact [facility]'s Intensive Cardiac Rehab Program at [phone] to learn more and schedule a visit.        Lifestyle Management After  Percutaneous Coronary Intervention (PCI)  Percutaneous coronary intervention (PCI)  involves angioplasty and often stenting. This procedure can open arteries and relieve symptoms. But, it doesn't cure coronary artery disease. New blockages can still form. You need to take steps to prevent this by managing risk factors. Doing so will help make your heart and arteries healthier. Your doctor may prescribe cardiac rehabilitation to help with this lifelong process.  Understanding risk factors  Some risk factors for coronary artery disease can be controlled. These include smoking, high blood pressure, cholesterol, diabetes, and obesity. They can be managed with medication, diet, and exercise. Support and counseling can also play a role. The effort will pay off! Managing risk factors can help you be more active, feel better, and reduce the risk of heart attack.    If you smoke, quit!  If your doctor has been urging you to quit smoking, it's for good reasons. Smoking damages your heart, blood vessels, and lungs. The good news is that quitting can halt or even reverse the damage of smoking. To quit now:  Get medical help. Ask your doctor for advice on stop-smoking programs. Also ask about medications or nicotine replacement therapy products that may help you quit smoking.  Get support. Join a support group. Ask for help from your family and friends.  Don't give up. It often takes several tries to succeed in quitting smoking.  Avoid secondhand smoke. Ask family and friends not to smoke around you.

## 2024-02-05 NOTE — PROCEDURES
Cardiac Catheterization Laboratory Procedure Note    DATE: 2/5/2024    : John Campoverde MD, MPH    PROCEDURES PERFORMED:  Right heart catheterization  Left heart catheterization  Coronary angiography  Ultrasound-guided right brachial venous access  Ultrasound-guided right radial arterial access  Moderate conscious sedation    INDICATIONS:  Stable angina, chronic dyspnea on exertion, microvascular angina    CONSENT:  The complete alternatives, risks, and benefits of the procedure were explained to the patient. Informed consent was obtained prior to the procedure.    MEDICATIONS:  Lidocaine  Fentanyl  Midazolam  Nitroglycerin  Verapamil  Heparin  Aspirin    MODERATE CONSCIOUS SEDATION:  I personally supervised the administration of moderate conscious sedation by the nursing staff for 11 minutes.  Start time: 1318  End time: 1329    CONTRAST: Omnipaque 45 cc    RADIATION DOSE (Air Kerma): 142 mGy    FLUOROSCOPY TIME: 5.1 minutes    ACCESS:  6-Slovak Glidesheath in the right cephalic/brachial vein  6-Slovak Glidesheath in the right radial artery    ESTIMATED BLOOD LOSS: <10 cc    COMPLICATIONS: None    PROCEDURE IN DETAIL:     The patient was brought to the cardiac catheterization laboratory in the fasting state. The skin over the right antecubital fossa was prepped and draped in the usual sterile fashion. Right brachial vein access was obtained using US guidance modified Seldinger technique, using a sterile technique. Then a 6-Slovak Slender Glidesheath was inserted over a microwire into the right cephalic/brachial vein. A 6-Slovak balloon-tipped pulmonary artery catheter was then advanced into the right subclavian vein where the balloon was inflated.  The catheter was then advanced into the right atrium, right ventricle, pulmonary artery, and wedge position with sequential pressures measured. The balloon was deflated and pulmonary artery saturation was obtained for estimation of cardiac output by the Jemma  No call needed, Hospitalist.   method. Cardiac output was then estimated using the thermodilution method. Following completion of right heart catheterization, we proceeded with the coronary angiogram and left heart catheterization.    Following completion of right heart catheterization, lidocaine infiltration was used to anesthetize the tissue over the right radial artery. Using the micropuncture technique, a 6-Polish Glidesheath was inserted in the right radial artery. A 4 Polish JR4 diagnostic catheter was then advanced over a standard J-wire into the left ventricular cavity where it was gently aspirated, flushed, and then withdrawn across the aortic valve with sequential pressures measured. This catheter was then used to engage the ostium of the right coronary artery and cineangiograms were obtained in multiple projections for complete evaluation of the right coronary system. This catheter was then exchanged over a J-wire to a 4 Polish JL 3.5 diagnostic catheter which was used to engage the ostium of the left coronary artery and and cineangiograms were obtained in multiple projections for complete evaluation of the left coronary system. Following completion of coronary angiography, all wires, catheters, and sheaths were removed.  A TR band was placed over the right wrist using the patent hemostasis technique.  Manual pressure was applied to the right brachial vein access site for hemostasis.      [facility] HEMODYNAMICS:   Significant respiratory variation was not present  Mean arterial pressure : 100 mmHg  Right atrial pressure: 10mmHg  Right ventricular pressure: 36/13 mmHg  Pulmonary artery pressure: 35/20/25 mmHg  Mean end-expiratory pulmonary capillary wedge pressure: 15 mmHg  Transpulmonary gradient: 10 mmHg  Pulmonary artery saturation: 75.3%  Systemic arterial saturation: 95.5%   Jemma cardiac output: 4.25 L/min  Jemma cardiac index: 2.36 L/min/m2  Thermodilution cardiac output: 5.6 L/min  Thermodilution cardiac index: 3.1 L/min/m2  Pulmonary  vascular resistance: <2 Wood units  Systemic Vascular resistance: ~ 1440 dyne s/cm5 assuming cardiac output 5 L/min  Cardiac Power Output () = mean arterial pressure × CO/451 = 1.1 (Favorable > 0.6 W)  Cardiac Power Index (CPI) = mean arterial pressure × CI/451 = 0.61 (Normal CPI is 0.5-0.7 W/m2)  Kael = PA pulse pressure / RA pressure = 1.5 (Favorable > 0.9)    [facility] HEMODYNAMICS:  Aortic pressure: 140 /77/100 mmHg  LVEDP: 18 mmHg  No significant transaortic gradient on pullback    CORONARY ANGIOGRAPHY:  The left main coronary artery: Normal-appearing large caliber vessel that trifurcates to LAD, left circumflex and small ramus intermedius  The left anterior descending coronary artery: Large in caliber vessel, nontransapical, with small distal caliber.  Gives rise to 2 medium caliber diagonal branches.  Mild nonobstructive CAD distally.  Slow flow  The left circumflex coronary artery: Large in caliber vessel with mild slow coronary flow.  Gives rise to a large bifurcating OM  The right coronary artery: Large-caliber dominant vessel with mild slow flow phenomena.    IMPRESSION:  1.  Mildly elevated right heart filling pressures: CVP 10mmHg  2.  Mildly elevated left heart filling pressures: PCWP 15-18mmHg  3.  Mild postcapillary pulmonary hypertension: mean PA 25mmHg, PASP 35mmHg, PVR < 2WU  4.  Normal resting cardiac output  5.  Mildly elevated SVR ~ 1400  6.  Favorable , CPI and kael  7 . Mild slow coronary flow phenomena in all epicardial coronary arteries consistent with microvascular disease which was suspected on the recent cardiac PET scan    RECOMMENDATIONS:  2-weeks cardiac event monitor will be arranged on discharge to exclude any underlying arrhythmias contributing to her ongoing shortness of breath concerns  Increase metoprolol to succinate to 37.5mg at night for ongoing microvascular angina treatment.  I explained to her that the findings today do not necessarily fully explain her reportedly quality  of life limiting symptoms.  Start rosuvastatin 5 mg every evening, off atorvastatin given myalgias  Cardiac rehab referral placed for stable angina/microvascular angina  Advised her to keep an a.m. and p.m. blood pressure log for the next week and send it to me via [employer] to ensure controlled blood pressures at home  TR band protocol    NOTIFICATION:  The patient's son and cousin were called and notified of the results and above recommendations.    Outpatient pulmonologist, Dr. Vela, updated.    John Campoverde MD, MPH [facility]  Interventional Cardiologist  [facility]   of Clinical Internal Medicine - [facility] [place]

## 2024-02-06 ENCOUNTER — TELEPHONE (OUTPATIENT)
Dept: CARDIOLOGY | Facility: MEDICAL CENTER | Age: 84
End: 2024-02-06
Payer: MEDICARE

## 2024-02-06 NOTE — TELEPHONE ENCOUNTER
Noted below:    ----- Message from John Campoverde M.D. sent at 2/5/2024  2:06 PM PST -----  Regarding: appt in March instead of April  Team, can we please try to get this patient sooner into my clinic, sometime in March instead of April ?  Thank you very much.    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  Thank you so much.

## 2024-02-06 NOTE — TELEPHONE ENCOUNTER
Called and spoke to Ms. Garrett. Rescheduled pt to 3/15.    Pt is incredibly impressed with the procedure yesterday and very grateful for the care she received. She is very happy to be in AL's care.    To AL, I thought you should know...

## 2024-02-06 NOTE — OR NURSING
1345 Arrived from cath lab ID verified. Report received. Attached to monitors. Right radial access site dressing clean dry intact soft. Vss    1445 3 cc of air removed from Tr band clean    1500 3 cc of air removed from TR band clean     1515 3 cc of air removed from TR band clean     1530 3 cc of air removed from TR band clean. Tr band removed gauze and Tegaderm applied.     1623 APN Ambers at beside talking to patient.     1632 Escorted via walking with responsible adult and all personal belongings.

## 2024-02-07 ENCOUNTER — OFFICE VISIT (OUTPATIENT)
Dept: SLEEP MEDICINE | Facility: MEDICAL CENTER | Age: 84
End: 2024-02-07
Attending: INTERNAL MEDICINE
Payer: MEDICARE

## 2024-02-07 VITALS
HEIGHT: 59 IN | OXYGEN SATURATION: 95 % | BODY MASS INDEX: 37.7 KG/M2 | WEIGHT: 187 LBS | SYSTOLIC BLOOD PRESSURE: 128 MMHG | DIASTOLIC BLOOD PRESSURE: 68 MMHG | HEART RATE: 81 BPM

## 2024-02-07 DIAGNOSIS — R05.3 CHRONIC COUGH: ICD-10-CM

## 2024-02-07 DIAGNOSIS — R06.09 DOE (DYSPNEA ON EXERTION): ICD-10-CM

## 2024-02-07 PROCEDURE — 99214 OFFICE O/P EST MOD 30 MIN: CPT | Performed by: INTERNAL MEDICINE

## 2024-02-07 PROCEDURE — 99211 OFF/OP EST MAY X REQ PHY/QHP: CPT | Performed by: INTERNAL MEDICINE

## 2024-02-07 PROCEDURE — 3078F DIAST BP <80 MM HG: CPT | Performed by: INTERNAL MEDICINE

## 2024-02-07 PROCEDURE — 3074F SYST BP LT 130 MM HG: CPT | Performed by: INTERNAL MEDICINE

## 2024-02-07 ASSESSMENT — ENCOUNTER SYMPTOMS
NECK PAIN: 0
PALPITATIONS: 0
FOCAL WEAKNESS: 0
SINUS PAIN: 0
DIAPHORESIS: 0
SPUTUM PRODUCTION: 0
NAUSEA: 0
CHILLS: 0
DOUBLE VISION: 0
EYE PAIN: 0
SHORTNESS OF BREATH: 0
ABDOMINAL PAIN: 0
HEMOPTYSIS: 0
SORE THROAT: 0
BACK PAIN: 0
EYE DISCHARGE: 0
COUGH: 0
CLAUDICATION: 0
VOMITING: 0
BLURRED VISION: 0
WHEEZING: 0
FEVER: 0
CONSTIPATION: 0
SPEECH CHANGE: 0
PHOTOPHOBIA: 0
STRIDOR: 0
PND: 0
TREMORS: 0
EYE REDNESS: 0
WEAKNESS: 0
WEIGHT LOSS: 0
HEARTBURN: 0
HEADACHES: 0
MYALGIAS: 0
FALLS: 0
DEPRESSION: 0
DIZZINESS: 0
ORTHOPNEA: 0
DIARRHEA: 0

## 2024-02-07 ASSESSMENT — PATIENT HEALTH QUESTIONNAIRE - PHQ9: CLINICAL INTERPRETATION OF PHQ2 SCORE: 0

## 2024-02-07 ASSESSMENT — FIBROSIS 4 INDEX: FIB4 SCORE: 1.13

## 2024-02-07 NOTE — PROGRESS NOTES
Chief Complaint   Patient presents with    Follow-Up     LAST SEEN 7/20/23    Results     PFT 12/8/23         HPI: This patient is a 84 y.o. female whom is followed in our clinic for RUIZ and chronic cough last seen by me on 7/20/23.  PT has a hx of stage I breast Ca dx roughly 10 years ago, DJD and hypothyroid. She is a life-long non-smoker. No known occupational or environmental exposures. 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 She was referred to us in 2021 for RUIZ. PFT from 3/2021 was normal, CXR clear. She does have chronic chest congestion, primarily when she goes to bed she needs to cough up sputum but typically swallows it. This is chronic, mild and unchanged. She does get PND and if significant will take OTC antihistamine.  She had been experiencing increased RUIZ for about a year at our last visit. No triggers other than activity.  We referred her to cardiology for resting tachycardia and obtain pulmonary function testing in December.  These showed normal airflows with normal lung volumes and normal DLCO.  She did have bronchodilator responsiveness and felt subjectively improved after the bronchodilator during testing.  We prescribed Xopenex for as needed use which she has benefited from significantly and is using at most 3 times per day but typically twice daily.  She has also seen cardiology and underwent left and right heart cath which showed mild elevation of left-sided filling pressures but no evidence of precapillary pulmonary hypertension or obstructive coronary artery disease.  Metoprolol was increased and she is taking spironolactone.  She feels markedly improved after catheterization procedure and with use of albuterol.  She is planning to start cardiac rehab and is wearing cardiac event monitor currently.    Past Medical History:   Diagnosis Date    Acquired absence of both cervix and uterus 11/11/2014    Back pain      Breast cancer (HCC)     Cancer (HCC)     Chickenpox     Chronic left-sided low back pain without sciatica 03/17/2017    Constipation     Cough     Cystitis 06/12/2019    Diarrhea     Dyslipidemia 07/26/2016    Eczema     Fatigue     Frequent urination     Gout of foot 09/30/2016    H/O total hysterectomy 07/26/2016    High cholesterol     History of UTI 01/31/2020    Hx of breast cancer 07/26/2016    Hypertension     Hypopituitarism (HCC) 06/23/2023    Hypothyroidism 07/26/2016    Idiopathic chronic gout of right ankle 09/30/2016    Influenza     Lichen sclerosus of female genitalia     Nasal drainage     Nocturnal oxygen desaturation 08/07/2020    Obesity     Osteopenia 07/26/2016    Osteoporosis     Osteoporosis 07/26/2016    Overactive bladder 02/09/2020    Overweight     Recurrent UTI 01/31/2020    Rhinitis     Ringing in ears     Shortness of breath     Sputum production     Status post right knee replacement 05/05/2016    Status post total right knee replacement 08/17/2016    Swelling of lower extremity     Thyroid activity decreased     Tonsillitis     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     Vision loss     Vitamin D deficiency 07/26/2016    Whooping cough        Social History     Socioeconomic History    Marital status:      Spouse name: Not on file    Number of children: Not on file    Years of education: Not on file    Highest education level: Associate degree: academic program   Occupational History    Not on file   Tobacco Use    Smoking status: Never    Smokeless tobacco: Never   Vaping Use    Vaping Use: Never used   Substance and Sexual Activity    Alcohol use: Never     Comment: min    Drug use: No    Sexual activity: Yes     Partners: Male   Other Topics Concern    Not on file   Social History Narrative    From [place]      Social Determinants of Health     Financial Resource Strain: Low Risk  (7/13/2023)    Overall Financial Resource Strain  (CARDIA)     Difficulty of Paying Living Expenses: Not hard at all   Food Insecurity: No Food Insecurity (7/13/2023)    Hunger Vital Sign     Worried About Running Out of Food in the Last Year: Never true     Ran Out of Food in the Last Year: Never true   Transportation Needs: No Transportation Needs (7/13/2023)    PRAPARE - Transportation     Lack of Transportation (Medical): No     Lack of Transportation (Non-Medical): No   Physical Activity: Sufficiently Active (10/2/2019)    Exercise Vital Sign     Days of Exercise per Week: 3 days     Minutes of Exercise per Session: 90 min   Stress: Stress Concern Present (7/13/2023)    [employer] - Occupational Stress Questionnaire     Feeling of Stress : To some extent   Social Connections: Unknown (7/13/2023)    Social Connection and Isolation Panel [NHANES]     Frequency of Communication with Friends and Family: More than three times a week     Frequency of Social Gatherings with Friends and Family: More than three times a week     Attends [employer] Services: Not on file     Active Member of Clubs or Organizations: Yes     Attends Club or Organization Meetings: More than 4 times per year     Marital Status: Not on file   Intimate Partner Violence: Not on file   Housing Stability: Unknown (7/13/2023)    Housing Stability Vital Sign     Unable to Pay for Housing in the Last Year: No     Number of Places Lived in the Last Year: Not on file     Unstable Housing in the Last Year: No       Family History   Problem Relation Age of Onset    Arthritis Mother     Other Son         CELIAC DISEASE-SEVERE    Diabetes Other         GF    Arthritis Other         GM    Heart Disease Neg Hx        Current Outpatient Medications on File Prior to Visit   Medication Sig Dispense Refill    levalbuterol (XOPENEX HFA) 45 MCG/ACT inhaler Inhale 2 Puffs every four hours as needed for Shortness of Breath. 1 Each 6    metoprolol SR (TOPROL XL) 25 MG TABLET SR 24 HR Take  1.5 Tablets by mouth every evening. 135 Tablet 35    rosuvastatin (CRESTOR) 5 MG Tab Take 1 Tablet by mouth every evening. 90 Tablet 3    Sotagliflozin 200 MG Tab Take 200 mg by mouth every day. 90 Tablet 3    mupirocin (BACTROBAN) 2 % Ointment Apply 1 Application topically 2 times a day. 22 g 0    ergocalciferol (DRISDOL) 41038 UNIT capsule Take 1 Capsule by mouth every 7 days. (Patient not taking: Reported on 1/24/2024)      pantoprazole (PROTONIX) 40 MG Tablet Delayed Response Take 1 Tablet by mouth every day.      meloxicam (MOBIC) 15 MG tablet Take 1 Tablet by mouth every day. (Patient not taking: Reported on 1/24/2024)      spironolactone (ALDACTONE) 25 MG Tab Take 1 Tablet by mouth every day. 90 Tablet 3    losartan (COZAAR) 25 MG Tab Take 1 Tablet by mouth every day. 90 Tablet 3    vitamin D3 (CHOLECALCIFEROL) 1000 Unit (25 mcg) Tab Take 2,000 Units by mouth every day.      levothyroxine (SYNTHROID) 175 MCG Tab TAKE 1 TABLET 175 MCG ON MONDAY, WEDNESDAY, FRIDAY. TAKE 150MCG ON Sunday, TUESDAY, THURSDAY, and Saturday. 36 Tablet 3    levothyroxine (SYNTHROID) 150 MCG Tab TAKE 1 TABLET 175 MCG ON MONDAY, WEDNESDAY, FRIDAY. TAKE 150MCG ON Sunday, TUESDAY, THURSDAY, and Saturday. 48 Tablet 3    DHA-EPA-Vitamin E (OMEGA-3 COMPLEX PO) Take  by mouth. 1600 mg daily 1 tbsp      ferrous sulfate 325 (65 Fe) MG tablet Take 325 mg by mouth every 48 hours. (Patient not taking: Reported on 1/24/2024)      silver sulfADIAZINE (SILVADENE) 1 % Cream Apply thin layer to affected area twice a day x 5 days 50 g 0    desoximetasone (TOPICORT) 0.25 % Cream Apply 1 Application topically 2 times a day as needed (rash). 60 g 0    Celecoxib (CELEBREX PO) Take  by mouth. (Patient not taking: Reported on 1/24/2024)      meloxicam (MOBIC) 15 MG tablet Take 1 Tablet by mouth every day. (Patient not taking: Reported on 1/24/2024) 30 Tablet 0    estradiol (ESTRACE) 0.1 MG/GM vaginal cream 1 time a day as needed.      valacyclovir (VALTREX)  "1 GM Tab Take 2 Tablets by mouth every 12 hours. 2 g twice daily for one day. 20 Tablet 0    clobetasol (TEMOVATE) 0.05 % Cream CLOBETASOL PROPIONATE 0.05 % CREA 30 g 1    cyclobenzaprine (FLEXERIL) 5 MG tablet 3 times a day as needed.      acetaminophen (TYLENOL) 500 MG Tab Take 500-1,000 mg by mouth every 6 hours as needed.      loratadine (CLARITIN) 10 MG Tab Take 10 mg by mouth every day.      guaiFENesin ER (MUCINEX) 600 MG TABLET SR 12 HR Take 600 mg by mouth every 12 hours.       No current facility-administered medications on file prior to visit.       Lisinopril, Atorvastatin, and Other misc      ROS:   Review of Systems   Constitutional:  Negative for chills, diaphoresis, fever, malaise/fatigue and weight loss.   HENT:  Negative for congestion, ear discharge, ear pain, hearing loss, nosebleeds, sinus pain, sore throat and tinnitus.    Eyes:  Negative for blurred vision, double vision, photophobia, pain, discharge and redness.   Respiratory:  Negative for cough, hemoptysis, sputum production, shortness of breath, wheezing and stridor.    Cardiovascular:  Negative for chest pain, palpitations, orthopnea, claudication, leg swelling and PND.   Gastrointestinal:  Negative for abdominal pain, constipation, diarrhea, heartburn, nausea and vomiting.   Genitourinary:  Negative for dysuria and urgency.   Musculoskeletal:  Negative for back pain, falls, joint pain, myalgias and neck pain.   Skin:  Negative for itching and rash.   Neurological:  Negative for dizziness, tremors, speech change, focal weakness, weakness and headaches.   Endo/Heme/Allergies:  Negative for environmental allergies.   Psychiatric/Behavioral:  Negative for depression.        /68 (BP Location: Left arm, Patient Position: Sitting)   Pulse 81   Ht 1.499 m (4' 11\")   Wt 84.8 kg (187 lb)   SpO2 95%   Physical Exam  Constitutional:       General: She is not in acute distress.     Appearance: Normal appearance. She is well-developed and " normal weight.   HENT:      Head: Normocephalic and atraumatic.      Right Ear: External ear normal.      Left Ear: External ear normal.      Nose: Nose normal. No congestion.      Mouth/Throat:      Mouth: Mucous membranes are moist.      Pharynx: Oropharynx is clear. No oropharyngeal exudate.   Eyes:      General: No scleral icterus.     Extraocular Movements: Extraocular movements intact.      Conjunctiva/sclera: Conjunctivae normal.      Pupils: Pupils are equal, round, and reactive to light.   Neck:      Vascular: No JVD.      Trachea: No tracheal deviation.   Cardiovascular:      Rate and Rhythm: Normal rate and regular rhythm.      Heart sounds: Normal heart sounds. No murmur heard.     No friction rub. No gallop.   Pulmonary:      Effort: Pulmonary effort is normal. No accessory muscle usage or respiratory distress.      Breath sounds: Normal breath sounds. No wheezing or rales.   Abdominal:      General: There is no distension.      Palpations: Abdomen is soft.      Tenderness: There is no abdominal tenderness.   Musculoskeletal:         General: No tenderness or deformity. Normal range of motion.      Cervical back: Normal range of motion and neck supple.      Right lower leg: No edema.      Left lower leg: No edema.   Lymphadenopathy:      Cervical: No cervical adenopathy.   Skin:     General: Skin is warm and dry.      Findings: No rash.      Nails: There is no clubbing.   Neurological:      Mental Status: She is alert and oriented to person, place, and time.      Cranial Nerves: No cranial nerve deficit.      Gait: Gait normal.   Psychiatric:         Behavior: Behavior normal.         PFTs as reviewed by me personally: as per hPI    Imaging as reviewed by me personally:  as per hPI    Assessment:  1. RUIZ (dyspnea on exertion)  Spacer/Aero-Holding Chambers Device      2. Chronic cough  Spacer/Aero-Holding Chambers Device          Plan:  This has been slowly progressive which is somewhat atypical for  reactive airways disease however she does have bronchodilator response on pulmonary function testing despite lack of airflow obstruction in addition to subjective improvement following bronchodilator which we will continue.  No clear indication for inhaled corticosteroids.  Also likely has some component of diastolic dysfunction being managed by cardiology.  Greatly appreciate their assistance and input.  Overall patient states this is well-controlled at present and deemed secondary to postnasal drip in the past.  Return in about 4 months (around 6/7/2024).

## 2024-03-01 ENCOUNTER — TELEPHONE (OUTPATIENT)
Dept: CARDIOLOGY | Facility: MEDICAL CENTER | Age: 84
End: 2024-03-01
Payer: MEDICARE

## 2024-03-06 NOTE — RESULT ENCOUNTER NOTE
Can you please update her that her heart monitor is reassuring with rare extra heart beats.    And please ask her for updates on the recent increase in metoprolol dose, initiation of Crestor 5mg and any BP issues based on home log ?    Thank you!

## 2024-03-08 ENCOUNTER — TELEPHONE (OUTPATIENT)
Dept: CARDIOLOGY | Facility: MEDICAL CENTER | Age: 84
End: 2024-03-08
Payer: MEDICARE

## 2024-03-09 NOTE — TELEPHONE ENCOUNTER
Pt came in to drop off her BP readings for her upcoming appt. Scanned into media and delivered to medical asst. AA

## 2024-03-09 NOTE — TELEPHONE ENCOUNTER
Phone Number Called: [phone]     Call outcome:  No answer    This RN will call again next week.

## 2024-03-09 NOTE — TELEPHONE ENCOUNTER
----- Message from John Campoverde M.D. sent at 3/6/2024  8:29 AM PST -----  Can you please update her that her heart monitor is reassuring with rare extra heart beats.    And please ask her for updates on the recent increase in metoprolol dose, initiation of Crestor 5mg and any BP issues based on home log ?    Thank you!

## 2024-03-12 NOTE — TELEPHONE ENCOUNTER
Noted below:     John Campoverde M.D.  You; Almita Valenzuela P.A.-C.12 minutes ago (11:09 AM)     MARIUM Rocha: Let's stop metoprolol and rosuvastatin for 4 weeks and see if that makes any difference.    Almita: we share care for this patient. Recent extensive cardiac workup do not explain the gravity of her symptoms of being very/ extremely tired. I will attempt if stopping metoprolol / statin will help at all but fyi in case you need to work with her on other issues re her ongoing fatigue.     Called pt at [phone] and discussed AL recommendations. Pt verbalized understanding and will stop metoprolol and rosuvastatin.

## 2024-03-12 NOTE — TELEPHONE ENCOUNTER
Phone Number Called: [phone]     Call outcome: Spoke to patient regarding message below.    Message: Spoke to pt about AL recommendations. Patient dropped off detailed BP log to  (found in media).     Flavia is not feeling very well. She is very fatigued. Pt is discouraged and hoping to feel better so that she can enjoy life again.     To AL, please review BP log found in media and advise based on this information. Thank you!

## 2024-03-15 ENCOUNTER — OFFICE VISIT (OUTPATIENT)
Dept: CARDIOLOGY | Facility: MEDICAL CENTER | Age: 84
End: 2024-03-15
Attending: PLASTIC SURGERY
Payer: MEDICARE

## 2024-03-15 VITALS
DIASTOLIC BLOOD PRESSURE: 80 MMHG | HEIGHT: 59 IN | WEIGHT: 185 LBS | HEART RATE: 115 BPM | OXYGEN SATURATION: 95 % | SYSTOLIC BLOOD PRESSURE: 120 MMHG | RESPIRATION RATE: 18 BRPM | BODY MASS INDEX: 37.29 KG/M2

## 2024-03-15 DIAGNOSIS — I20.89 MICROVASCULAR ANGINA (HCC): ICD-10-CM

## 2024-03-15 DIAGNOSIS — I35.8 AORTIC VALVE SCLEROSIS: ICD-10-CM

## 2024-03-15 DIAGNOSIS — I50.32 CHRONIC HEART FAILURE WITH PRESERVED EJECTION FRACTION (HFPEF) (HCC): Primary | ICD-10-CM

## 2024-03-15 DIAGNOSIS — I47.11 INAPPROPRIATE SINUS TACHYCARDIA (HCC): ICD-10-CM

## 2024-03-15 DIAGNOSIS — M79.10 MYALGIA DUE TO STATIN: ICD-10-CM

## 2024-03-15 DIAGNOSIS — I45.10 RBBB: ICD-10-CM

## 2024-03-15 DIAGNOSIS — T46.6X5A MYALGIA DUE TO STATIN: ICD-10-CM

## 2024-03-15 DIAGNOSIS — I10 ESSENTIAL HYPERTENSION: ICD-10-CM

## 2024-03-15 DIAGNOSIS — R06.09 DOE (DYSPNEA ON EXERTION): ICD-10-CM

## 2024-03-15 PROBLEM — R53.82 CHRONIC FATIGUE: Status: ACTIVE | Noted: 2024-03-15

## 2024-03-15 PROCEDURE — 3074F SYST BP LT 130 MM HG: CPT | Performed by: INTERNAL MEDICINE

## 2024-03-15 PROCEDURE — 99214 OFFICE O/P EST MOD 30 MIN: CPT | Performed by: INTERNAL MEDICINE

## 2024-03-15 PROCEDURE — 3079F DIAST BP 80-89 MM HG: CPT | Performed by: INTERNAL MEDICINE

## 2024-03-15 PROCEDURE — 99213 OFFICE O/P EST LOW 20 MIN: CPT | Performed by: INTERNAL MEDICINE

## 2024-03-15 RX ORDER — LOSARTAN POTASSIUM 25 MG/1
25 TABLET ORAL DAILY
Qty: 90 TABLET | Refills: 3 | Status: SHIPPED | OUTPATIENT
Start: 2024-03-15

## 2024-03-15 ASSESSMENT — FIBROSIS 4 INDEX: FIB4 SCORE: 1.13

## 2024-03-15 NOTE — PROGRESS NOTES
CARDIOLOGY established PATIENT:    PCP: Almita Valenzuela P.A.-C.    1. Chronic heart failure with preserved ejection fraction (HFpEF) ([facility])    2. RUIZ (dyspnea on exertion)    3. RBBB    4. Myalgia due to statin    5. Aortic valve sclerosis    6. Microvascular angina    7. Inappropriate sinus tachycardia    8. Essential hypertension        Flavia Garrett is here for follow-up for microvascular angina and chronic HFrEF.    Chief Complaint   Patient presents with    Shortness of Breath     F/V Dx: RUIZ (dyspnea on exertion)      Hypertension    Hyperlipidemia       History:      Flavia Garrett is a 84 y.o. 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  Is here for follow-up.     She met with Dr. Vela with the pulmonary medicine team 7/20/2023 and referred for evaluation for her dyspnea on exertion and tachycardia episodes. She finished 6 months of PT until 4/2023 (back pain).     Clinic 9/2023: Due to ongoing dyspnea on exertion, arrange for a TTE, cardiac PET, started metoprolol, offered semaglutide trial however deferred given gallbladder issues, and started atorvastatin 20 mg. TTE with normal LVEF, aortic valve sclerosis without stenosis, grade 1 LV diastolic dysfunction.     Change losartan-HCTZ to losartan 50 mg 11/2/23 due to feeling more tired and lightheaded.     Fell July 2023 due to tripping.    Clinic 12/2023: Started spironolactone 25 mg, decrease losartan to 25 mg, discussed R/LHC.    2/2024 after the R/LHC: Cardiac monitor, increase metoprolol, started rosuvastatin 5 mg, referred to cardiac rehab    Stopped metoprolol, rosuvastatin and spironolactone (blurry eyes , fatigue, mind fog).  Feeling a lot better ever since. Still with RUIZ and left upper chest pain that occurs later in the day (resolved after massaging her left upper chest). No RACHEL.  Noticed less dyspnea on exertion since starting Inpefa. Taking Inpefa 200mg through [facility] ($75/month).     6 min walk test today: lowest sat 90%, peak  bpm. [place] SOB. On RA. Starting HR 115bpm.    Planning ICR in April 2024.    Cardiac event monitor 2/2024: Personally reviewed  Rare PACs and PVCs   Rare brief SVT   Patient triggered events correlated predominantly sinus rhythm and rare PVCs   Reassuring cardiac event monitor findings     RHC and LHC: 2/5/24 personally performed  1.  Mildly elevated right heart filling pressures: CVP 10mmHg  2.  Mildly elevated left heart filling pressures: PCWP 15-18mmHg  3.  Mild postcapillary pulmonary hypertension: mean PA 25mmHg, PASP 35mmHg, PVR < 2WU  4.  Normal resting cardiac output  5.  Mildly elevated SVR ~ 1400  6.  Favorable , CPI and kael  7 . Mild slow coronary flow phenomena in all epicardial coronary arteries consistent with microvascular disease which was suspected on the recent cardiac PET scan    Cardiac PET 9/2023: Personally reviewed  No ischemia  TID 1.0  Normal estimated LVEF  Reduced CFR 1.8     TTE 9/22/23: Personally reviewed  Compared to the prior study on 3/18/21, no significant changes.  Normal LV size, thickness and systolic function with normal estimated   LVEF 60%  Grade I LV diastolic dysfunction with normal estimated LA pressure.  Mildly dilated LA.  Normal RV size and systolic function.  AV sclerosis without stenosis.  Normal IVC size  Unable to estimate RVSP  No pericardial effusion     Lipid panel 1/2023:  ,       Normal A1C 3/2022 5.2%.     Treadmill stress test 6/2021:  Baseline rhythm sinus with right bundle branch block.   Poor exercise capacity.   With stress ST changes noted due to bundle branch block, but not suggestive of ischemia   Overall negative stress  "electrocardiogram for ischemia.     TTE 3/2021: Personally reviewed  Normal LV systolic function, LVEF 60%  Grade 1 LV diastolic dysfunction  No significant valvular abnormalities  Normal IVC size  Unable to estimate RVSP     Functional status:     NYHA Class: II-III  I: no symptoms with activity. Normal  II. Mild symptoms with normal physical activity and comfortable at rest.  III: Moderate symptoms with less than normal physical activity. Only comfortable at rest  IV: Severe symptoms with symptoms of heart failure, even at rest.      PE:  /80 (BP Location: Left arm, Patient Position: Sitting, BP Cuff Size: Adult)   Pulse (!) 115   Resp 18   Ht 1.499 m (4' 11\")   Wt 83.9 kg (185 lb)   SpO2 95%   BMI 37.37 kg/m²     Gen: well  HEENT: Symmetric face.  NECK: No JVD.   CARDIAC: Regular, Normal S1, S2, No murmur  VASCULATURE: carotids are normal bilaterally without bruit  RESP: Clear to auscultation bilaterally   EXT: No edema  SKIN: Warm and dry  NEURO: No gross deficits  PSYCH: Appropriate affect, participates in conversation    The ASCVD Risk score (Delbert DK, et al., 2019) failed to calculate.    Past Medical History:   Diagnosis Date    Acquired absence of both cervix and uterus 11/11/2014    Back pain     Breast cancer (HCC)     Cancer (HCC)     Chickenpox     Chronic left-sided low back pain without sciatica 03/17/2017    Constipation     Cough     Cystitis 06/12/2019    Diarrhea     Dyslipidemia 07/26/2016    Eczema     Fatigue     Frequent urination     Gout of foot 09/30/2016    H/O total hysterectomy 07/26/2016    High cholesterol     History of UTI 01/31/2020    Hx of breast cancer 07/26/2016    Hypertension     Hypopituitarism (HCC) 06/23/2023    Hypothyroidism 07/26/2016    Idiopathic chronic gout of right ankle 09/30/2016    Influenza     Lichen sclerosus of female genitalia     Nasal drainage     Nocturnal oxygen desaturation 08/07/2020    Obesity     Osteopenia 07/26/2016    Osteoporosis     " "Osteoporosis 07/26/2016    Overactive bladder 02/09/2020    Overweight     Recurrent UTI 01/31/2020    Rhinitis     Ringing in ears     Shortness of breath     Sputum production     Status post right knee replacement 05/05/2016    Status post total right knee replacement 08/17/2016    Swelling of lower extremity     Thyroid activity decreased     Tonsillitis     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     Vision loss     Vitamin D deficiency 07/26/2016    Whooping cough      Past Surgical History:   Procedure Laterality Date    LUMPECTOMY Right 2012    ARTHROSCOPY, KNEE      HYSTERECTOMY LAPAROSCOPY      KNEE ARTHROPLASTY TOTAL Right     OTHER      LIPOSUCTION THIGHS-LEG LIFT    DE REMV 2ND CATARACT,CORN-SCLER SECTN      VAGINAL HYSTERECTOMY TOTAL      Hysterectomy,Total Vaginal     Allergies   Allergen Reactions    Lisinopril      Cramps and upset stomach    Other Reaction(s): Not available    Atorvastatin Myalgia     Leg pain    Other Misc      Hydroc hlorathiazide - rxn - \"too dehydrated\" per pt     Outpatient Encounter Medications as of 3/15/2024   Medication Sig Dispense Refill    ivabradine (CORLANOR) 5 MG Tab tablet Take 1 Tablet by mouth 2 times a day with meals. 180 Tablet 3    losartan (COZAAR) 25 MG Tab Take 1 Tablet by mouth every day. 90 Tablet 3    Spacer/Aero-Holding Chambers Device 1 Device as needed (SOB). 1 Each 0    Sotagliflozin 200 MG Tab Take 200 mg by mouth every day. 90 Tablet 3    mupirocin (BACTROBAN) 2 % Ointment Apply 1 Application topically 2 times a day. 22 g 0    levalbuterol (XOPENEX HFA) 45 MCG/ACT inhaler Inhale 2 Puffs every four hours as needed for Shortness of Breath. 1 Each 6    levothyroxine (SYNTHROID) 175 MCG Tab TAKE 1 TABLET 175 MCG ON MONDAY, WEDNESDAY, FRIDAY. TAKE 150MCG ON Sunday, TUESDAY, THURSDAY, and Saturday. 36 Tablet 3    levothyroxine (SYNTHROID) 150 MCG Tab TAKE 1 TABLET 175 MCG ON MONDAY, WEDNESDAY, FRIDAY. TAKE 150MCG ON " Sunday, TUESDAY, THURSDAY, and Saturday. 48 Tablet 3    DHA-EPA-Vitamin E (OMEGA-3 COMPLEX PO) Take  by mouth. 1600 mg daily 1 tbsp      silver sulfADIAZINE (SILVADENE) 1 % Cream Apply thin layer to affected area twice a day x 5 days 50 g 0    desoximetasone (TOPICORT) 0.25 % Cream Apply 1 Application topically 2 times a day as needed (rash). 60 g 0    estradiol (ESTRACE) 0.1 MG/GM vaginal cream 1 time a day as needed.      valacyclovir (VALTREX) 1 GM Tab Take 2 Tablets by mouth every 12 hours. 2 g twice daily for one day. 20 Tablet 0    clobetasol (TEMOVATE) 0.05 % Cream CLOBETASOL PROPIONATE 0.05 % CREA 30 g 1    cyclobenzaprine (FLEXERIL) 5 MG tablet 3 times a day as needed.      acetaminophen (TYLENOL) 500 MG Tab Take 500-1,000 mg by mouth every 6 hours as needed.      loratadine (CLARITIN) 10 MG Tab Take 10 mg by mouth every day.      metoprolol SR (TOPROL XL) 25 MG TABLET SR 24 HR Take 1.5 Tablets by mouth every evening. (Patient not taking: Reported on 3/15/2024) 135 Tablet 35    [DISCONTINUED] rosuvastatin (CRESTOR) 5 MG Tab Take 1 Tablet by mouth every evening. 90 Tablet 3    [DISCONTINUED] ergocalciferol (DRISDOL) 03289 UNIT capsule Take 1 Capsule by mouth every 7 days. (Patient not taking: Reported on 1/24/2024)      [DISCONTINUED] pantoprazole (PROTONIX) 40 MG Tablet Delayed Response Take 1 Tablet by mouth every day. (Patient not taking: Reported on 3/15/2024)      [DISCONTINUED] meloxicam (MOBIC) 15 MG tablet Take 1 Tablet by mouth every day. (Patient not taking: Reported on 1/24/2024)      [DISCONTINUED] spironolactone (ALDACTONE) 25 MG Tab Take 1 Tablet by mouth every day. (Patient not taking: Reported on 3/15/2024) 90 Tablet 3    [DISCONTINUED] losartan (COZAAR) 25 MG Tab Take 1 Tablet by mouth every day. 90 Tablet 3    [DISCONTINUED] vitamin D3 (CHOLECALCIFEROL) 1000 Unit (25 mcg) Tab Take 2,000 Units by mouth every day. (Patient not taking: Reported on 3/15/2024)      [DISCONTINUED] ferrous sulfate  325 (65 Fe) MG tablet Take 325 mg by mouth every 48 hours. (Patient not taking: Reported on 1/24/2024)      [DISCONTINUED] Celecoxib (CELEBREX PO) Take  by mouth. (Patient not taking: Reported on 1/24/2024)      [DISCONTINUED] meloxicam (MOBIC) 15 MG tablet Take 1 Tablet by mouth every day. (Patient not taking: Reported on 1/24/2024) 30 Tablet 0    [DISCONTINUED] guaiFENesin ER (MUCINEX) 600 MG TABLET SR 12 HR Take 600 mg by mouth every 12 hours. (Patient not taking: Reported on 3/15/2024)       No facility-administered encounter medications on file as of 3/15/2024.     Social History     Socioeconomic History    Marital status:      Spouse name: Not on file    Number of children: Not on file    Years of education: Not on file    Highest education level: Associate degree: academic program   Occupational History    Not on file   Tobacco Use    Smoking status: Never    Smokeless tobacco: Never   Vaping Use    Vaping Use: Never used   Substance and Sexual Activity    Alcohol use: Never     Comment: min    Drug use: No    Sexual activity: Yes     Partners: Male   Other Topics Concern    Not on file   Social History Narrative    From [place]      Social Determinants of Health     Financial Resource Strain: Low Risk  (7/13/2023)    Overall Financial Resource Strain (CARDIA)     Difficulty of Paying Living Expenses: Not hard at all   Food Insecurity: No Food Insecurity (7/13/2023)    Hunger Vital Sign     Worried About Running Out of Food in the Last Year: Never true     Ran Out of Food in the Last Year: Never true   Transportation Needs: No Transportation Needs (7/13/2023)    PRAPARE - Transportation     Lack of Transportation (Medical): No     Lack of Transportation (Non-Medical): No   Physical Activity: Sufficiently Active (10/2/2019)    Exercise Vital Sign     Days of Exercise per Week: 3 days     Minutes of Exercise per Session: 90 min   Stress: Stress Concern Present (7/13/2023)    [place] [employer]  Occupational Health - Occupational Stress Questionnaire     Feeling of Stress : To some extent   Social Connections: Unknown (7/13/2023)    Social Connection and Isolation Panel [NHANES]     Frequency of Communication with Friends and Family: More than three times a week     Frequency of Social Gatherings with Friends and Family: More than three times a week     Attends [employer]al Services: Not on file     Active Member of Clubs or Organizations: Yes     Attends Club or Organization Meetings: More than 4 times per year     Marital Status: Not on file   Intimate Partner Violence: Not on file   Housing Stability: Unknown (7/13/2023)    Housing Stability Vital Sign     Unable to Pay for Housing in the Last Year: No     Number of Places Lived in the Last Year: Not on file     Unstable Housing in the Last Year: No     Family History   Problem Relation Age of Onset    Arthritis Mother     Other Son         CELIAC DISEASE-SEVERE    Diabetes Other         GF    Arthritis Other         GM    Heart Disease Neg Hx          Studies    Lab Results   Component Value Date/Time    TSHULTRASEN 2.300 06/28/2023 0738        Lab Results   Component Value Date/Time    FREET4 1.56 06/28/2023 0738      Lab Results   Component Value Date/Time    HBA1C 5.2 03/15/2022 12:00 AM     Lab Results   Component Value Date/Time    CHOLSTRLTOT 217 01/24/2023 12:00 AM     01/24/2023 12:00 AM    HDL 45 01/24/2023 12:00 AM    TRIGLYCERIDE 213 01/24/2023 12:00 AM       Lab Results   Component Value Date/Time    SODIUM 138 02/01/2024 11:04 AM    POTASSIUM 4.4 02/01/2024 11:04 AM    CHLORIDE 102 02/01/2024 11:04 AM    CO2 23 02/01/2024 11:04 AM    GLUCOSE 96 02/01/2024 11:04 AM    BUN 17 02/01/2024 11:04 AM    CREATININE 0.68 02/01/2024 11:04 AM     Lab Results   Component Value Date/Time    ALKPHOSPHAT 77 02/01/2024 11:04 AM    ASTSGOT 14 02/01/2024 11:04 AM    ALTSGPT 15 02/01/2024 11:04 AM    TBILIRUBIN 0.4 02/01/2024 11:04 AM         Echocardiogram:  No results found for this or any previous visit.          Assessment and Recommendations:    Problem List Items Addressed This Visit          Cardiology Medicine Problems    Essential hypertension    Relevant Medications    ivabradine (CORLANOR) 5 MG Tab tablet    losartan (COZAAR) 25 MG Tab    Microvascular angina    Relevant Medications    ivabradine (CORLANOR) 5 MG Tab tablet    losartan (COZAAR) 25 MG Tab    Chronic heart failure with preserved ejection fraction (HFpEF) ([facility]) - Primary    Relevant Medications    ivabradine (CORLANOR) 5 MG Tab tablet    losartan (COZAAR) 25 MG Tab    Inappropriate sinus tachycardia    Relevant Medications    ivabradine (CORLANOR) 5 MG Tab tablet    losartan (COZAAR) 25 MG Tab       Other    RUIZ (dyspnea on exertion)    RBBB    Relevant Medications    ivabradine (CORLANOR) 5 MG Tab tablet    losartan (COZAAR) 25 MG Tab    Aortic valve sclerosis    Relevant Medications    ivabradine (CORLANOR) 5 MG Tab tablet    losartan (COZAAR) 25 MG Tab    Myalgia due to statin     We reviewed today at length the pathophysiology of chronic HFpEF with microvascular disease; and to continue Inpefa 200mg daily since it seems that his helping.    Intolerant to statins.    Given her inappropriate sinus tachycardia, meanwhile she will try 12.5 mg of metoprolol every evening and I prescribed ivabradine 5 mg twice daily with hopes he will go through insurance.  Otherwise, we will try either verapamil or diltiazem with the hopes they do not cause any issues down the higher dose of metoprolol causes such as brain fog and fatigue.    Blood pressure seems to be normal and well-controlled on losartan 25 mg    Off spironolactone due to possible side effects.  Stopped it with metoprolol and rosuvastatin hence unclear if constellation of symptoms is related to only 1 of these medications or due to or 3.    Congratulated her on her willingness to participate in cardiac rehab next month with  the hopes that she will become better condition from a cardiovascular standpoint.    Thank you for the opportunity to be involved in Flavia Garrett 's care; and please reach out with any questions or concerns.    Return in about 6 months (around 9/15/2024).    John Campoverde MD, MPH [facility]  Interventional Cardiologist  [facility]   of Clinical Internal Medicine - [facility]    ~ Portions of this note were completed using voice recognition software (Dragon Naturally speaking software) . Occasional transcription errors may have escaped proof reading. I have made every reasonable attempt to correct obvious errors, but I expect that there are errors of grammar and possibly content that I did not discover before finalizing the note. ~

## 2024-03-15 NOTE — PATIENT INSTRUCTIONS
Try metoprolol 12.5mg at night  Ivabradine 5mg Am and PM ordered, hopefully it will go through insurance. This will replace metoprolol.  Update me in 2 weeks

## 2024-03-18 DIAGNOSIS — R06.02 SOB (SHORTNESS OF BREATH): ICD-10-CM

## 2024-03-18 RX ORDER — LEVALBUTEROL TARTRATE 45 UG/1
2 AEROSOL, METERED ORAL EVERY 4 HOURS PRN
Qty: 3 EACH | Refills: 3 | Status: SHIPPED | OUTPATIENT
Start: 2024-03-18

## 2024-04-01 ENCOUNTER — NON-PROVIDER VISIT (OUTPATIENT)
Dept: CARDIOLOGY | Facility: MEDICAL CENTER | Age: 84
End: 2024-04-01
Payer: MEDICARE

## 2024-04-01 DIAGNOSIS — I20.89 STABLE ANGINA (HCC): Primary | ICD-10-CM

## 2024-04-01 PROCEDURE — G0423 INTENS CARDIAC REHAB NO EXER: HCPCS | Mod: 59 | Performed by: INTERNAL MEDICINE

## 2024-04-01 PROCEDURE — G0422 INTENS CARDIAC REHAB W/EXERC: HCPCS | Performed by: INTERNAL MEDICINE

## 2024-04-01 NOTE — PROGRESS NOTES
Patient arrived for initial 1:1 Intensive Cardiac Rehabilitation Consultation and Education session with the Registered Nurse.  A total of 60 minutes was spent with the patient during which time a focused cardiovascular assessment was completed and musculoskeletal limitations were addressed in preparation to safely start the exercise portion of the program.  Education regarding the program was explained including exercise goals, precautions, and target heart rate during exercise.  Nutrition goals were reviewed and patient was introduced to the [facility] Program.  Stress management goals were discussed and patient concerns and questions were answered at this time. Patient arrived for education at 1330 and visit was concluded at 1430.  Exercise time was from 1430 to 1530.

## 2024-04-02 ENCOUNTER — NON-PROVIDER VISIT (OUTPATIENT)
Dept: CARDIOLOGY | Facility: MEDICAL CENTER | Age: 84
End: 2024-04-02
Payer: MEDICARE

## 2024-04-02 ENCOUNTER — TELEPHONE (OUTPATIENT)
Dept: CARDIOLOGY | Facility: MEDICAL CENTER | Age: 84
End: 2024-04-02

## 2024-04-02 DIAGNOSIS — I20.89 STABLE ANGINA (HCC): ICD-10-CM

## 2024-04-02 PROCEDURE — G0423 INTENS CARDIAC REHAB NO EXER: HCPCS | Mod: 59 | Performed by: INTERNAL MEDICINE

## 2024-04-02 PROCEDURE — G0422 INTENS CARDIAC REHAB W/EXERC: HCPCS | Performed by: INTERNAL MEDICINE

## 2024-04-02 NOTE — TELEPHONE ENCOUNTER
AL    Caller: Flavia Garrett    Topic/issue: Patient returning Josefina's call. States she will be home the rest of the day if you are able to call again.    Callback Number: [phone]     Thank you,  Olga THACKER

## 2024-04-02 NOTE — PROGRESS NOTES
Flavia Garrett attended Intensive Cardiac Rehab today from 1300 to 1500. During her time she exercised and attended class. Her education today was a video titled: Metabolic Syndrome and Belly Fat. Patient received handouts and class discussion pertaining to the topic.

## 2024-04-02 NOTE — TELEPHONE ENCOUNTER
Phone Number Called: [phone]     Call outcome: Did not leave a detailed message. Requested patient to call back.

## 2024-04-02 NOTE — TELEPHONE ENCOUNTER
AL    Caller: Flavia Garrett     Topic/issue: Pt called in regards to the her new medication that are her beat blocker, pt would like a call back to discuss further, please advise.    Callback Number: [phone] (home)      Thank you,     Taurus GODOY

## 2024-04-03 ENCOUNTER — NON-PROVIDER VISIT (OUTPATIENT)
Dept: CARDIOLOGY | Facility: MEDICAL CENTER | Age: 84
End: 2024-04-03
Payer: MEDICARE

## 2024-04-03 DIAGNOSIS — I20.89 STABLE ANGINA (HCC): ICD-10-CM

## 2024-04-03 PROCEDURE — G0423 INTENS CARDIAC REHAB NO EXER: HCPCS | Mod: 59 | Performed by: INTERNAL MEDICINE

## 2024-04-03 PROCEDURE — G0422 INTENS CARDIAC REHAB W/EXERC: HCPCS | Performed by: INTERNAL MEDICINE

## 2024-04-03 NOTE — PROGRESS NOTES
Flavia Garrett attended Intensive Cardiac Rehab today from 1300 to 1500. During her time she exercised and attended class. Her education today was a COOKING CLASS titled: FAST HEALTHY BREAKFASTS.. Patient received handouts and class discussion pertaining to the topic.

## 2024-04-04 ENCOUNTER — APPOINTMENT (OUTPATIENT)
Dept: MEDICAL GROUP | Facility: IMAGING CENTER | Age: 84
End: 2024-04-04
Payer: MEDICARE

## 2024-04-04 ENCOUNTER — NON-PROVIDER VISIT (OUTPATIENT)
Dept: CARDIOLOGY | Facility: MEDICAL CENTER | Age: 84
End: 2024-04-04
Payer: MEDICARE

## 2024-04-04 DIAGNOSIS — I20.89 STABLE ANGINA (HCC): ICD-10-CM

## 2024-04-04 PROCEDURE — G0423 INTENS CARDIAC REHAB NO EXER: HCPCS | Mod: 59 | Performed by: INTERNAL MEDICINE

## 2024-04-04 PROCEDURE — G0422 INTENS CARDIAC REHAB W/EXERC: HCPCS | Performed by: INTERNAL MEDICINE

## 2024-04-04 NOTE — TELEPHONE ENCOUNTER
"Called and spoke to Flavia. She states that Corlanor is making her feel \"off\". She believes that taking 1/2 tablet BID would be better.     To AL, please advise on pt's request to decrease ivabradine by half. Thank you!  "

## 2024-04-04 NOTE — TELEPHONE ENCOUNTER
AL    Caller: Flavia Garrett    Topic/issue: RETURNING RN CALL    Please call patient prior to 12:00pm or after 4pm. Or tomorrow morning. Please advise    Thank you,  Arsalan ROSE    Callback Number: [phone] (home)

## 2024-04-04 NOTE — PROGRESS NOTES
Flavia Garrett attended Intensive Cardiac Rehab today from 1300 to 1500. During her time she exercised and attended class. Her education today was a workshop titled: Yasmine Ramirez. Patient received handouts and class discussion pertaining to the topic.

## 2024-04-09 ENCOUNTER — NON-PROVIDER VISIT (OUTPATIENT)
Dept: CARDIOLOGY | Facility: MEDICAL CENTER | Age: 84
End: 2024-04-09
Payer: MEDICARE

## 2024-04-09 DIAGNOSIS — I20.89 STABLE ANGINA (HCC): ICD-10-CM

## 2024-04-09 PROCEDURE — G0423 INTENS CARDIAC REHAB NO EXER: HCPCS | Mod: 59 | Performed by: INTERNAL MEDICINE

## 2024-04-09 PROCEDURE — G0422 INTENS CARDIAC REHAB W/EXERC: HCPCS | Performed by: INTERNAL MEDICINE

## 2024-04-09 NOTE — PROGRESS NOTES
Flavia Garrett attended Intensive Cardiac Rehab today from 1300 to 1500. During her time she exercised and attended class. Her education today was a video titled: How Our Thoughts Heal Our Hearts. Patient received handouts and class discussion pertaining to the topic.

## 2024-04-10 ENCOUNTER — NON-PROVIDER VISIT (OUTPATIENT)
Dept: CARDIOLOGY | Facility: MEDICAL CENTER | Age: 84
End: 2024-04-10
Payer: MEDICARE

## 2024-04-10 DIAGNOSIS — I20.89 STABLE ANGINA (HCC): ICD-10-CM

## 2024-04-10 PROCEDURE — G0422 INTENS CARDIAC REHAB W/EXERC: HCPCS | Performed by: INTERNAL MEDICINE

## 2024-04-10 PROCEDURE — G0423 INTENS CARDIAC REHAB NO EXER: HCPCS | Mod: 59 | Performed by: INTERNAL MEDICINE

## 2024-04-10 NOTE — PROGRESS NOTES
Flavia Garrett attended Intensive Cardiac Rehab today from 1300 to 1500. During her time she exercised and attended class. Her education today was COOKING CLASS titled: SALADS & DRESSINGS. Patient received handouts and class discussion pertaining to the topic.

## 2024-04-11 ENCOUNTER — NON-PROVIDER VISIT (OUTPATIENT)
Dept: CARDIOLOGY | Facility: MEDICAL CENTER | Age: 84
End: 2024-04-11
Payer: MEDICARE

## 2024-04-11 DIAGNOSIS — I20.89 STABLE ANGINA (HCC): ICD-10-CM

## 2024-04-11 PROCEDURE — G0423 INTENS CARDIAC REHAB NO EXER: HCPCS | Mod: 59 | Performed by: INTERNAL MEDICINE

## 2024-04-11 PROCEDURE — G0422 INTENS CARDIAC REHAB W/EXERC: HCPCS | Performed by: INTERNAL MEDICINE

## 2024-04-11 NOTE — PROGRESS NOTES
Flavia Garrett attended Intensive Cardiac Rehab today from 1300 to 1500. During her time she exercised and attended class. Her education today was a workshop titled: Reducing Stress. Patient received handouts and class discussion pertaining to the topic.

## 2024-04-16 ENCOUNTER — NON-PROVIDER VISIT (OUTPATIENT)
Dept: CARDIOLOGY | Facility: MEDICAL CENTER | Age: 84
End: 2024-04-16
Payer: MEDICARE

## 2024-04-16 DIAGNOSIS — I20.89 STABLE ANGINA (HCC): ICD-10-CM

## 2024-04-16 PROCEDURE — G0423 INTENS CARDIAC REHAB NO EXER: HCPCS | Mod: 59 | Performed by: INTERNAL MEDICINE

## 2024-04-16 PROCEDURE — G0422 INTENS CARDIAC REHAB W/EXERC: HCPCS | Performed by: INTERNAL MEDICINE

## 2024-04-17 ENCOUNTER — NON-PROVIDER VISIT (OUTPATIENT)
Dept: CARDIOLOGY | Facility: MEDICAL CENTER | Age: 84
End: 2024-04-17
Payer: MEDICARE

## 2024-04-17 DIAGNOSIS — I20.89 STABLE ANGINA (HCC): ICD-10-CM

## 2024-04-17 PROCEDURE — G0423 INTENS CARDIAC REHAB NO EXER: HCPCS | Mod: 59 | Performed by: INTERNAL MEDICINE

## 2024-04-17 PROCEDURE — G0422 INTENS CARDIAC REHAB W/EXERC: HCPCS | Performed by: INTERNAL MEDICINE

## 2024-04-17 NOTE — PROGRESS NOTES
Flavia Garrett attended Intensive Cardiac Rehab today from 1300 to 1500. During her time she exercised and attended class. Her education today was a video titled: Targeting Your Nutritional Priorities. Patient received handouts and class discussion pertaining to the topic.

## 2024-04-17 NOTE — PROGRESS NOTES
Flavia Garrett attended Intensive Cardiac Rehab today from 1300 to 1500. During her time she exercised and attended class. Her education today was a cooking school titled: [employer]. Patient received handouts and class discussion pertaining to the topic.

## 2024-04-18 ENCOUNTER — NON-PROVIDER VISIT (OUTPATIENT)
Dept: CARDIOLOGY | Facility: MEDICAL CENTER | Age: 84
End: 2024-04-18
Payer: MEDICARE

## 2024-04-18 DIAGNOSIS — I20.89 STABLE ANGINA (HCC): ICD-10-CM

## 2024-04-18 PROCEDURE — G0423 INTENS CARDIAC REHAB NO EXER: HCPCS | Mod: 59 | Performed by: INTERNAL MEDICINE

## 2024-04-18 PROCEDURE — G0422 INTENS CARDIAC REHAB W/EXERC: HCPCS | Performed by: INTERNAL MEDICINE

## 2024-04-18 NOTE — PROGRESS NOTES
Flavia Garrett attended Intensive Cardiac Rehab today from 1300 to 1500. During her time she exercised and attended class. Her education today was a workshop titled: Targeting Your Nutritional Priorities. Patient received handouts and class discussion pertaining to the topic.

## 2024-04-23 ENCOUNTER — NON-PROVIDER VISIT (OUTPATIENT)
Dept: CARDIOLOGY | Facility: MEDICAL CENTER | Age: 84
End: 2024-04-23
Payer: MEDICARE

## 2024-04-23 DIAGNOSIS — I20.89 STABLE ANGINA (HCC): ICD-10-CM

## 2024-04-23 PROCEDURE — G0423 INTENS CARDIAC REHAB NO EXER: HCPCS | Mod: 59 | Performed by: INTERNAL MEDICINE

## 2024-04-23 PROCEDURE — G0422 INTENS CARDIAC REHAB W/EXERC: HCPCS | Performed by: INTERNAL MEDICINE

## 2024-04-23 NOTE — PROGRESS NOTES
Flavia Garrett attended Intensive Cardiac Rehab today from 1300 to 1500. During her time she exercised and attended class. Her education today was a video titled: Biomechanical Limitations. Patient received handouts and class discussion pertaining to the topic.

## 2024-04-24 ENCOUNTER — NON-PROVIDER VISIT (OUTPATIENT)
Dept: CARDIOLOGY | Facility: MEDICAL CENTER | Age: 84
End: 2024-04-24
Payer: MEDICARE

## 2024-04-24 DIAGNOSIS — I20.89 STABLE ANGINA (HCC): ICD-10-CM

## 2024-04-24 PROCEDURE — G0422 INTENS CARDIAC REHAB W/EXERC: HCPCS | Performed by: INTERNAL MEDICINE

## 2024-04-24 PROCEDURE — G0423 INTENS CARDIAC REHAB NO EXER: HCPCS | Mod: 59 | Performed by: INTERNAL MEDICINE

## 2024-04-24 NOTE — PROGRESS NOTES
Flavia Garrett attended Intensive Cardiac Rehab today from 1300 to 1500. During her time she exercised and attended class. Her education today was a nutrition class titled: Sides and Sauces. Patient received handouts and class discussion pertaining to the topic.

## 2024-04-25 ENCOUNTER — NON-PROVIDER VISIT (OUTPATIENT)
Dept: CARDIOLOGY | Facility: MEDICAL CENTER | Age: 84
End: 2024-04-25
Payer: MEDICARE

## 2024-04-25 DIAGNOSIS — I20.89 STABLE ANGINA (HCC): ICD-10-CM

## 2024-04-25 PROCEDURE — G0423 INTENS CARDIAC REHAB NO EXER: HCPCS | Mod: 59 | Performed by: INTERNAL MEDICINE

## 2024-04-25 PROCEDURE — G0422 INTENS CARDIAC REHAB W/EXERC: HCPCS | Performed by: INTERNAL MEDICINE

## 2024-04-25 NOTE — PROGRESS NOTES
Flavia Garrett attended Intensive Cardiac Rehab today from 1300 to 1500. During her time she exercised and attended class. Her education today was a workshop titled: Balance and Fall Prevention. Patient received handouts and class discussion pertaining to the topic.

## 2024-04-29 ENCOUNTER — ANESTHESIA EVENT (OUTPATIENT)
Dept: SURGERY | Facility: MEDICAL CENTER | Age: 84
DRG: 481 | End: 2024-04-29
Payer: MEDICARE

## 2024-04-29 ENCOUNTER — APPOINTMENT (OUTPATIENT)
Dept: RADIOLOGY | Facility: MEDICAL CENTER | Age: 84
DRG: 481 | End: 2024-04-29
Payer: MEDICARE

## 2024-04-29 ENCOUNTER — APPOINTMENT (OUTPATIENT)
Dept: RADIOLOGY | Facility: MEDICAL CENTER | Age: 84
DRG: 481 | End: 2024-04-29
Attending: EMERGENCY MEDICINE
Payer: MEDICARE

## 2024-04-29 ENCOUNTER — APPOINTMENT (OUTPATIENT)
Dept: RADIOLOGY | Facility: MEDICAL CENTER | Age: 84
DRG: 481 | End: 2024-04-29
Attending: ORTHOPAEDIC SURGERY
Payer: MEDICARE

## 2024-04-29 ENCOUNTER — ANESTHESIA (OUTPATIENT)
Dept: SURGERY | Facility: MEDICAL CENTER | Age: 84
DRG: 481 | End: 2024-04-29
Payer: MEDICARE

## 2024-04-29 PROBLEM — Z78.9 FULL CODE STATUS: Status: ACTIVE | Noted: 2024-04-29

## 2024-04-29 PROBLEM — I47.11 INAPPROPRIATE SINUS TACHYCARDIA (HCC): Status: ACTIVE | Noted: 2024-04-29

## 2024-04-29 PROBLEM — M80.052A: Status: ACTIVE | Noted: 2024-04-29

## 2024-04-29 PROBLEM — I10 PRIMARY HYPERTENSION: Status: ACTIVE | Noted: 2024-04-29

## 2024-04-29 PROBLEM — S72.453S: Status: ACTIVE | Noted: 2024-04-29

## 2024-04-29 PROBLEM — I50.32 CHRONIC HEART FAILURE WITH PRESERVED EJECTION FRACTION (HCC): Status: ACTIVE | Noted: 2024-04-29

## 2024-04-29 PROBLEM — S72.452A CLOSED COMMINUTED SUPRACONDYLAR FRACTURE OF FEMUR, LEFT, INITIAL ENCOUNTER (HCC): Status: ACTIVE | Noted: 2024-04-29

## 2024-04-29 PROBLEM — Z01.818 ENCOUNTER FOR PREOPERATIVE ASSESSMENT: Status: ACTIVE | Noted: 2024-04-29

## 2024-04-29 RX ORDER — DEXAMETHASONE SODIUM PHOSPHATE 4 MG/ML
INJECTION, SOLUTION INTRA-ARTICULAR; INTRALESIONAL; INTRAMUSCULAR; INTRAVENOUS; SOFT TISSUE PRN
Status: DISCONTINUED | OUTPATIENT
Start: 2024-04-29 | End: 2024-04-29 | Stop reason: SURG

## 2024-04-29 RX ORDER — ONDANSETRON 2 MG/ML
INJECTION INTRAMUSCULAR; INTRAVENOUS PRN
Status: DISCONTINUED | OUTPATIENT
Start: 2024-04-29 | End: 2024-04-29 | Stop reason: SURG

## 2024-04-29 RX ORDER — LIDOCAINE HYDROCHLORIDE 20 MG/ML
INJECTION, SOLUTION EPIDURAL; INFILTRATION; INTRACAUDAL; PERINEURAL PRN
Status: DISCONTINUED | OUTPATIENT
Start: 2024-04-29 | End: 2024-04-29 | Stop reason: SURG

## 2024-04-29 RX ORDER — TRANEXAMIC ACID 100 MG/ML
INJECTION, SOLUTION INTRAVENOUS PRN
Status: DISCONTINUED | OUTPATIENT
Start: 2024-04-29 | End: 2024-04-29 | Stop reason: SURG

## 2024-04-29 RX ORDER — BUPIVACAINE HYDROCHLORIDE 2.5 MG/ML
INJECTION, SOLUTION EPIDURAL; INFILTRATION; INTRACAUDAL PRN
Status: DISCONTINUED | OUTPATIENT
Start: 2024-04-29 | End: 2024-04-29 | Stop reason: SURG

## 2024-04-29 RX ORDER — SODIUM CHLORIDE, SODIUM LACTATE, POTASSIUM CHLORIDE, CALCIUM CHLORIDE 600; 310; 30; 20 MG/100ML; MG/100ML; MG/100ML; MG/100ML
INJECTION, SOLUTION INTRAVENOUS
Status: DISCONTINUED | OUTPATIENT
Start: 2024-04-29 | End: 2024-04-29 | Stop reason: SURG

## 2024-04-29 RX ORDER — KETOROLAC TROMETHAMINE 15 MG/ML
INJECTION, SOLUTION INTRAMUSCULAR; INTRAVENOUS PRN
Status: DISCONTINUED | OUTPATIENT
Start: 2024-04-29 | End: 2024-04-29 | Stop reason: SURG

## 2024-04-29 RX ORDER — CEFAZOLIN SODIUM 1 G/3ML
INJECTION, POWDER, FOR SOLUTION INTRAMUSCULAR; INTRAVENOUS PRN
Status: DISCONTINUED | OUTPATIENT
Start: 2024-04-29 | End: 2024-04-29 | Stop reason: SURG

## 2024-04-29 RX ORDER — EPHEDRINE SULFATE 50 MG/ML
INJECTION, SOLUTION INTRAVENOUS PRN
Status: DISCONTINUED | OUTPATIENT
Start: 2024-04-29 | End: 2024-04-29 | Stop reason: SURG

## 2024-04-29 RX ORDER — PHENYLEPHRINE HCL IN 0.9% NACL 1 MG/10 ML
SYRINGE (ML) INTRAVENOUS PRN
Status: DISCONTINUED | OUTPATIENT
Start: 2024-04-29 | End: 2024-04-29 | Stop reason: SURG

## 2024-04-29 RX ORDER — HYDROMORPHONE HYDROCHLORIDE 2 MG/ML
INJECTION, SOLUTION INTRAMUSCULAR; INTRAVENOUS; SUBCUTANEOUS PRN
Status: DISCONTINUED | OUTPATIENT
Start: 2024-04-29 | End: 2024-04-29 | Stop reason: SURG

## 2024-04-29 RX ORDER — ROCURONIUM BROMIDE 10 MG/ML
INJECTION, SOLUTION INTRAVENOUS PRN
Status: DISCONTINUED | OUTPATIENT
Start: 2024-04-29 | End: 2024-04-29 | Stop reason: SURG

## 2024-04-29 RX ADMIN — SODIUM CHLORIDE, POTASSIUM CHLORIDE, SODIUM LACTATE AND CALCIUM CHLORIDE: 600; 310; 30; 20 INJECTION, SOLUTION INTRAVENOUS at 15:28

## 2024-04-29 RX ADMIN — Medication 200 MCG: at 15:40

## 2024-04-29 RX ADMIN — ROCURONIUM BROMIDE 20 MG: 50 INJECTION, SOLUTION INTRAVENOUS at 17:08

## 2024-04-29 RX ADMIN — SUGAMMADEX 200 MG: 100 INJECTION, SOLUTION INTRAVENOUS at 18:13

## 2024-04-29 RX ADMIN — DEXAMETHASONE SODIUM PHOSPHATE 4 MG: 4 INJECTION INTRA-ARTICULAR; INTRALESIONAL; INTRAMUSCULAR; INTRAVENOUS; SOFT TISSUE at 15:33

## 2024-04-29 RX ADMIN — PROPOFOL 120 MG: 10 INJECTION, EMULSION INTRAVENOUS at 15:33

## 2024-04-29 RX ADMIN — FENTANYL CITRATE 50 MCG: 50 INJECTION, SOLUTION INTRAMUSCULAR; INTRAVENOUS at 15:50

## 2024-04-29 RX ADMIN — CEFAZOLIN 2 G: 1 INJECTION, POWDER, FOR SOLUTION INTRAMUSCULAR; INTRAVENOUS at 15:33

## 2024-04-29 RX ADMIN — EPHEDRINE SULFATE 5 MG: 50 INJECTION, SOLUTION INTRAVENOUS at 15:42

## 2024-04-29 RX ADMIN — Medication 100 MCG: at 17:21

## 2024-04-29 RX ADMIN — SODIUM CHLORIDE, POTASSIUM CHLORIDE, SODIUM LACTATE AND CALCIUM CHLORIDE: 600; 310; 30; 20 INJECTION, SOLUTION INTRAVENOUS at 17:23

## 2024-04-29 RX ADMIN — TRANEXAMIC ACID 1000 MG: 100 INJECTION, SOLUTION INTRAVENOUS at 15:57

## 2024-04-29 RX ADMIN — ROCURONIUM BROMIDE 50 MG: 50 INJECTION, SOLUTION INTRAVENOUS at 15:33

## 2024-04-29 RX ADMIN — BUPIVACAINE HYDROCHLORIDE 50 ML: 2.5 INJECTION, SOLUTION EPIDURAL; INFILTRATION; INTRACAUDAL at 15:41

## 2024-04-29 RX ADMIN — KETOROLAC TROMETHAMINE 15 MG: 15 INJECTION, SOLUTION INTRAMUSCULAR; INTRAVENOUS at 18:11

## 2024-04-29 RX ADMIN — FENTANYL CITRATE 50 MCG: 50 INJECTION, SOLUTION INTRAMUSCULAR; INTRAVENOUS at 15:30

## 2024-04-29 RX ADMIN — ONDANSETRON 4 MG: 2 INJECTION INTRAMUSCULAR; INTRAVENOUS at 18:11

## 2024-04-29 RX ADMIN — LIDOCAINE HYDROCHLORIDE 100 MG: 20 INJECTION, SOLUTION EPIDURAL; INFILTRATION; INTRACAUDAL at 15:33

## 2024-04-29 RX ADMIN — DEXAMETHASONE SODIUM PHOSPHATE 3.3 MG: 4 INJECTION, SOLUTION INTRA-ARTICULAR; INTRALESIONAL; INTRAMUSCULAR; INTRAVENOUS; SOFT TISSUE at 15:41

## 2024-04-29 RX ADMIN — HYDROMORPHONE HYDROCHLORIDE 0.5 MG: 2 INJECTION INTRAMUSCULAR; INTRAVENOUS; SUBCUTANEOUS at 16:39

## 2024-04-29 RX ADMIN — HYDROMORPHONE HYDROCHLORIDE 0.5 MG: 2 INJECTION INTRAMUSCULAR; INTRAVENOUS; SUBCUTANEOUS at 16:09

## 2024-04-29 NOTE — ANESTHESIA PROCEDURE NOTES
Airway    Date/Time: 4/29/2024 3:34 PM    Performed by: Miguel Downing M.D.  Authorized by: Miguel Downing M.D.    Location:  OR  Urgency:  Elective  Indications for Airway Management:  Anesthesia      Spontaneous Ventilation: absent    Sedation Level:  Deep  Preoxygenated: Yes    Patient Position:  Sniffing  Mask Difficulty Assessment:  0 - not attempted  Final Airway Type:  Endotracheal airway  Final Endotracheal Airway:  ETT  Cuffed: Yes    Technique Used for Successful ETT Placement:  Direct laryngoscopy  Devices/Methods Used in Placement:  Cricoid pressure    Insertion Site:  Oral  Blade Type:  Froylan  Laryngoscope Blade/Videolaryngoscope Blade Size:  3  ETT Size (mm):  7.0  Measured from:  Teeth  ETT to Teeth (cm):  22  Placement Verified by: auscultation and capnometry    Cormack-Lehane Classification:  Grade IIa - partial view of glottis  Number of Attempts at Approach:  1

## 2024-04-29 NOTE — ANESTHESIA PREPROCEDURE EVALUATION
Case: [id number] Date/Time: 04/29/24 1517    Procedure: ORIF, FRACTURE, DISTAL FEMUR (Left)    Location: [place] [address] / SURGERY [facility]    Surgeons: Jasiel Tolbert M.D.            Relevant Problems   No relevant active problems       Physical Exam    Airway   Mallampati: II  TM distance: >3 FB  Neck ROM: full       Cardiovascular - normal exam  Rhythm: regular  Rate: normal  (-) murmur     Dental - normal exam           Pulmonary - normal exam  Breath sounds clear to auscultation     Abdominal    Neurological - normal exam                   Anesthesia Plan    ASA 2       Plan - general and peripheral nerve block     Peripheral nerve block will be post-op pain control  Airway plan will be ETT          Induction: intravenous    Postoperative Plan: Postoperative administration of opioids is intended.    Pertinent diagnostic labs and testing reviewed    Informed Consent:    Anesthetic plan and risks discussed with patient.    Use of blood products discussed with: patient whom consented to blood products.

## 2024-04-29 NOTE — ANESTHESIA PROCEDURE NOTES
Peripheral Block    Date/Time: 4/29/2024 3:37 PM    Performed by: Miguel Downing M.D.  Authorized by: Miguel Downing M.D.    Patient Location:  OR  Start Time:  4/29/2024 3:37 PM  End Time:  4/29/2024 3:41 PM  Reason for Block: at surgeon's request and post-op pain management ONLY    patient identified, IV checked, site marked, risks and benefits discussed, surgical consent, monitors and equipment checked, pre-op evaluation and timeout performed    Patient Position:  Supine  Prep: ChloraPrep    Monitoring:  Heart rate, continuous pulse ox and cardiac monitor  Block Region:  Lower Extremity  Lower Extremity - Block Type:  FEMORAL nerve block, Supra-Inguinal Fascia Iliaca approach    Laterality:  Left  Procedures: ultrasound guided  Image captured, interpreted and electronically stored.  Block Type:  Single-shot  Needle Length:  100mm  Needle Gauge:  21 G  Needle Localization:  Ultrasound guidance  Ultrasound picture in chart  Injection Assessment:  Negative aspiration for heme, no paresthesia on injection, incremental injection and local visualized surrounding nerve on ultrasound  Evidence of intravascular injection: No     Suprainguinal Fascia Iliaca Block   With the patient supine, the US transducer was placed perpendicular to the ASIS of the operative side. The ASIS was identified at a point where the internal oblique, transversus abdominis and psoas major are stacked medial to the iliacus muscle. The plane in between was the fascia iliaca. A nerve block needle was advanced into the fascia iliaca and local anesthetic was injected deep/lateral to the fascia iliaca, just above the iliacus muscle.

## 2024-04-30 NOTE — ANESTHESIA TIME REPORT
Anesthesia Start and Stop Event Times       Date Time Event    4/29/2024 1514 Ready for Procedure     1528 Anesthesia Start     1822 Anesthesia Stop          Responsible Staff  04/29/24      Name Role Begin End    Miguel Downing M.D. Anesth 1528 1822          Overtime Reason:  no overtime (within assigned shift)    Comments:

## 2024-05-01 ENCOUNTER — HOSPITAL ENCOUNTER (INPATIENT)
Facility: REHABILITATION | Age: 84
LOS: 13 days | DRG: 560 | End: 2024-05-14
Attending: PHYSICAL MEDICINE & REHABILITATION | Admitting: PHYSICAL MEDICINE & REHABILITATION
Payer: MEDICARE

## 2024-05-01 DIAGNOSIS — I10 PRIMARY HYPERTENSION: ICD-10-CM

## 2024-05-01 DIAGNOSIS — S72.452A: ICD-10-CM

## 2024-05-01 DIAGNOSIS — E03.9 HYPOTHYROIDISM, UNSPECIFIED TYPE: ICD-10-CM

## 2024-05-01 DIAGNOSIS — N39.0 URINARY TRACT INFECTION WITHOUT HEMATURIA, SITE UNSPECIFIED: ICD-10-CM

## 2024-05-01 DIAGNOSIS — M80.052A: ICD-10-CM

## 2024-05-01 PROBLEM — S72.001A HIP FRACTURE, RIGHT, CLOSED, INITIAL ENCOUNTER (HCC): Status: ACTIVE | Noted: 2024-05-01

## 2024-05-01 PROCEDURE — 99223 1ST HOSP IP/OBS HIGH 75: CPT | Performed by: PHYSICAL MEDICINE & REHABILITATION

## 2024-05-01 RX ORDER — HYDROMORPHONE HYDROCHLORIDE 2 MG/ML
0.5 INJECTION, SOLUTION INTRAMUSCULAR; INTRAVENOUS; SUBCUTANEOUS
Status: DISCONTINUED | OUTPATIENT
Start: 2024-05-01 | End: 2024-05-01

## 2024-05-01 RX ORDER — LOSARTAN POTASSIUM 25 MG/1
25 TABLET ORAL DAILY
Status: DISCONTINUED | OUTPATIENT
Start: 2024-05-02 | End: 2024-05-06

## 2024-05-01 RX ORDER — POLYETHYLENE GLYCOL 3350 17 G/17G
1 POWDER, FOR SOLUTION ORAL
Status: DISCONTINUED | OUTPATIENT
Start: 2024-05-01 | End: 2024-05-14 | Stop reason: HOSPADM

## 2024-05-01 RX ORDER — LEVALBUTEROL TARTRATE 45 UG/1
2 AEROSOL, METERED ORAL EVERY 4 HOURS PRN
Status: DISCONTINUED | OUTPATIENT
Start: 2024-05-01 | End: 2024-05-14 | Stop reason: HOSPADM

## 2024-05-01 RX ORDER — OXYCODONE HYDROCHLORIDE 5 MG/1
2.5 TABLET ORAL
Status: DISCONTINUED | OUTPATIENT
Start: 2024-05-01 | End: 2024-05-01

## 2024-05-01 RX ORDER — UREA 10 %
5 LOTION (ML) TOPICAL NIGHTLY
Status: DISCONTINUED | OUTPATIENT
Start: 2024-05-01 | End: 2024-05-01

## 2024-05-01 RX ORDER — ONDANSETRON 4 MG/1
4 TABLET, ORALLY DISINTEGRATING ORAL 4 TIMES DAILY PRN
Status: DISCONTINUED | OUTPATIENT
Start: 2024-05-01 | End: 2024-05-14 | Stop reason: HOSPADM

## 2024-05-01 RX ORDER — AMOXICILLIN 250 MG
2 CAPSULE ORAL 2 TIMES DAILY
Status: DISCONTINUED | OUTPATIENT
Start: 2024-05-01 | End: 2024-05-06

## 2024-05-01 RX ORDER — LANOLIN ALCOHOL/MO/W.PET/CERES
10 CREAM (GRAM) TOPICAL
Status: DISCONTINUED | OUTPATIENT
Start: 2024-05-01 | End: 2024-05-14 | Stop reason: HOSPADM

## 2024-05-01 RX ORDER — OXYCODONE HYDROCHLORIDE 10 MG/1
10 TABLET ORAL
Status: DISCONTINUED | OUTPATIENT
Start: 2024-05-01 | End: 2024-05-01

## 2024-05-01 RX ORDER — ECHINACEA PURPUREA EXTRACT 125 MG
2 TABLET ORAL PRN
Status: DISCONTINUED | OUTPATIENT
Start: 2024-05-01 | End: 2024-05-14 | Stop reason: HOSPADM

## 2024-05-01 RX ORDER — OXYCODONE HYDROCHLORIDE 5 MG/1
5 TABLET ORAL
Status: DISCONTINUED | OUTPATIENT
Start: 2024-05-01 | End: 2024-05-14 | Stop reason: HOSPADM

## 2024-05-01 RX ORDER — OXYCODONE HYDROCHLORIDE 5 MG/1
2.5 TABLET ORAL
Status: DISCONTINUED | OUTPATIENT
Start: 2024-05-01 | End: 2024-05-14 | Stop reason: HOSPADM

## 2024-05-01 RX ORDER — ONDANSETRON 2 MG/ML
4 INJECTION INTRAMUSCULAR; INTRAVENOUS 4 TIMES DAILY PRN
Status: DISCONTINUED | OUTPATIENT
Start: 2024-05-01 | End: 2024-05-14 | Stop reason: HOSPADM

## 2024-05-01 RX ORDER — LEVOTHYROXINE SODIUM 0.07 MG/1
150 TABLET ORAL
Status: DISCONTINUED | OUTPATIENT
Start: 2024-05-02 | End: 2024-05-14 | Stop reason: HOSPADM

## 2024-05-01 RX ORDER — LANOLIN ALCOHOL/MO/W.PET/CERES
4.5 CREAM (GRAM) TOPICAL
Status: DISCONTINUED | OUTPATIENT
Start: 2024-05-01 | End: 2024-05-14 | Stop reason: HOSPADM

## 2024-05-01 RX ORDER — OMEPRAZOLE 20 MG/1
20 CAPSULE, DELAYED RELEASE ORAL DAILY
Status: DISCONTINUED | OUTPATIENT
Start: 2024-05-01 | End: 2024-05-14 | Stop reason: HOSPADM

## 2024-05-01 RX ORDER — HYDRALAZINE HYDROCHLORIDE 25 MG/1
25 TABLET, FILM COATED ORAL EVERY 8 HOURS PRN
Status: DISCONTINUED | OUTPATIENT
Start: 2024-05-01 | End: 2024-05-14 | Stop reason: HOSPADM

## 2024-05-01 RX ORDER — OXYCODONE HYDROCHLORIDE 5 MG/1
5 TABLET ORAL
Status: DISCONTINUED | OUTPATIENT
Start: 2024-05-01 | End: 2024-05-01

## 2024-05-01 RX ORDER — ENOXAPARIN SODIUM 100 MG/ML
40 INJECTION SUBCUTANEOUS DAILY
Status: DISCONTINUED | OUTPATIENT
Start: 2024-05-01 | End: 2024-05-14 | Stop reason: HOSPADM

## 2024-05-01 RX ADMIN — SENNOSIDES AND DOCUSATE SODIUM 2 TABLET: 50; 8.6 TABLET ORAL at 22:00

## 2024-05-01 RX ADMIN — Medication 4.5 MG: at 21:10

## 2024-05-01 RX ADMIN — OXYCODONE 5 MG: 5 TABLET ORAL at 13:22

## 2024-05-01 RX ADMIN — OMEPRAZOLE 20 MG: 20 CAPSULE, DELAYED RELEASE ORAL at 17:36

## 2024-05-01 RX ADMIN — ENOXAPARIN SODIUM 40 MG: 100 INJECTION SUBCUTANEOUS at 17:35

## 2024-05-01 ASSESSMENT — LIFESTYLE VARIABLES
HAVE PEOPLE ANNOYED YOU BY CRITICIZING YOUR DRINKING: NO
HOW MANY TIMES IN THE PAST YEAR HAVE YOU HAD 5 OR MORE DRINKS IN A DAY: 0
EVER FELT BAD OR GUILTY ABOUT YOUR DRINKING: NO
TOTAL SCORE: 0
ALCOHOL_USE: YES
DOES PATIENT WANT TO STOP DRINKING: NO
HAVE YOU EVER FELT YOU SHOULD CUT DOWN ON YOUR DRINKING: NO
EVER_SMOKED: NEVER
AVERAGE NUMBER OF DAYS PER WEEK YOU HAVE A DRINK CONTAINING ALCOHOL: 1
TOTAL SCORE: 0
EVER HAD A DRINK FIRST THING IN THE MORNING TO STEADY YOUR NERVES TO GET RID OF A HANGOVER: NO
ON A TYPICAL DAY WHEN YOU DRINK ALCOHOL HOW MANY DRINKS DO YOU HAVE: 1
TOTAL SCORE: 0
CONSUMPTION TOTAL: NEGATIVE

## 2024-05-01 ASSESSMENT — PATIENT HEALTH QUESTIONNAIRE - PHQ9
1. LITTLE INTEREST OR PLEASURE IN DOING THINGS: NOT AT ALL
2. FEELING DOWN, DEPRESSED, IRRITABLE, OR HOPELESS: NOT AT ALL
SUM OF ALL RESPONSES TO PHQ9 QUESTIONS 1 AND 2: 0
SUM OF ALL RESPONSES TO PHQ9 QUESTIONS 1 AND 2: 0
2. FEELING DOWN, DEPRESSED, IRRITABLE, OR HOPELESS: NOT AT ALL
1. LITTLE INTEREST OR PLEASURE IN DOING THINGS: NOT AT ALL

## 2024-05-01 ASSESSMENT — COPD QUESTIONNAIRES
DURING THE PAST 4 WEEKS HOW MUCH DID YOU FEEL SHORT OF BREATH: NONE/LITTLE OF THE TIME
COPD SCREENING SCORE: 2
HAVE YOU SMOKED AT LEAST 100 CIGARETTES IN YOUR ENTIRE LIFE: NO/DON'T KNOW
DO YOU EVER COUGH UP ANY MUCUS OR PHLEGM?: NO/ONLY WITH OCCASIONAL COLDS OR INFECTIONS

## 2024-05-01 ASSESSMENT — PAIN SCALES - GENERAL: PAIN_LEVEL: 3

## 2024-05-01 ASSESSMENT — FIBROSIS 4 INDEX
FIB4 SCORE: 2.15
FIB4 SCORE: 2.15

## 2024-05-01 ASSESSMENT — PAIN DESCRIPTION - PAIN TYPE: TYPE: ACUTE PAIN;SURGICAL PAIN

## 2024-05-01 NOTE — PROGRESS NOTES
Physical Medicine & Rehabilitation  History and Physical (H&P)  &     Post Admission Physician Evaluation (KODAK)       Date of Admission: 5/1/2024  Date of Service: 5/1/2024   Flavia Velazquez  RH16/01    [facility] Code to Support Admission: 0008.11 - Orthopaedic Disorders: Status Post Unilateral Hip Fracture  Etiologic Diagnosis: Closed comminuted supracondylar fracture of femur, left, initial encounter ([facility])    _______________________________________________    Chief Complaint: Weakness    History of Present Illness:  84 female with past medical history of HFrEF aortic valve sclerosis, microvascular angina, inappropriate sinus tachycardia, right bundle branch block hypertension, osteoporosis, breast cancer status post lumpectomy and radiation therapy presented after ground-level fall.  Imaging noted with distal femur fracture.s/p Open reduction internal fixation left intra-articular distal femur fracture on 4/29.    Today her pain is controlled. On 1L nasal canula    Review of Systems:     Comprehensive 14 point ROS was reviewed and all were negative except as noted elsewhere in this document.     Past Medical History:  No past medical history on file.    Past Surgical History:  Past Surgical History:   Procedure Laterality Date    ORIF, FRACTURE, FEMUR Left 4/29/2024    Procedure: ORIF, FRACTURE, DISTAL FEMUR;  Surgeon: Jasiel Tolbert M.D.;  Location: SURGERY [facility];  Service: Trauma    IRRIGATION & DEBRIDEMENT GENERAL Left 4/29/2024    Procedure: IRRIGATION AND DEBRIDEMENT, LEFT FEMUR;  Surgeon: Jasiel Tolbert M.D.;  Location: SURGERY [facility];  Service: Trauma       Family History:  No family history on file.    Medications:  Current Facility-Administered Medications   Medication Dose    enoxaparin (Lovenox) inj 40 mg  40 mg    ivabradine (Corlanor) tablet 5 mg  5 mg    [START ON 5/2/2024] levothyroxine (Synthroid) tablet 150 mcg  150 mcg    [START ON 5/3/2024] levothyroxine (Synthroid) tablet 175  mcg  175 mcg    [START ON 5/2/2024] losartan (Cozaar) tablet 25 mg  25 mg    [START ON 5/2/2024] Sotagliflozin TABS 200 mg  200 mg    levalbuterol (Xopenex HFA) inhaler 2 Puff  2 Puff    melatonin tablet 10.5 mg  10.5 mg    Respiratory Therapy Consult      Pharmacy Consult Request ...Pain Management Review 1 Each  1 Each    hydrALAZINE (Apresoline) tablet 25 mg  25 mg    senna-docusate (Pericolace Or Senokot S) 8.6-50 MG per tablet 2 Tablet  2 Tablet    And    polyethylene glycol/lytes (Miralax) Packet 1 Packet  1 Packet    omeprazole (PriLOSEC) capsule 20 mg  20 mg    ondansetron (Zofran ODT) dispertab 4 mg  4 mg    Or    ondansetron (Zofran) syringe/vial injection 4 mg  4 mg    sodium chloride (Ocean) 0.65 % nasal spray 2 Spray  2 Spray    melatonin tablet 4.5 mg  4.5 mg    oxyCODONE immediate-release (Roxicodone) tablet 2.5 mg  2.5 mg    Or    oxyCODONE immediate-release (Roxicodone) tablet 5 mg  5 mg       Allergies:  Atorvastatin, Hydrochlorothiazide, and Lisinopril    Psychosocial History:  Prior Living Situation:   Housing / Facility: 1 Story House  Steps Into Home: 1  Steps In Home: 0  Lives with - Patient's Self Care Capacity: Alone and Able to Care For Self  Equipment Owned: Front-Wheel Walker, Single Point Cane, Tub / Shower Seat     Prior Level of Function / Living Situation:   Physical Therapy: Prior Services: Home-Independent, Other (Comments) (Cardiac Rehab)  Housing / Facility: 1 Story House  Steps Into Home: 1  Steps In Home: 0  Rail: None  Bathroom Set up: Walk In Shower, Grab Bars, Shower Chair  Equipment Owned: Front-Wheel Walker, Single Point Cane, Tub / Shower Seat  Lives with - Patient's Self Care Capacity: Alone and Able to Care For Self  Bed Mobility: Independent  Transfer Status: Independent  Ambulation: Independent  Assistive Devices Used: None  Stairs: Independent  Current Level of Function:   Gait Level Of Assist: Unable to Participate  Supine to Sit: Moderate Assist  Sit to Supine:  "Moderate Assist  Scooting: Minimal Assist (seated)  Rolling: Minimal Assist to Rt.  Comments: flat bed, no rail  Sit to Stand: Contact Guard Assist  Bed, Chair, Wheelchair Transfer: Contact Guard Assist  Toilet Transfers: Minimal Assist (to/from commode)  Transfer Method: Stand Pivot (FWW)  Sitting in Chair: 5 min on [facility]  Sitting Edge of Bed: 7 min  Standin min  Occupational Therapy:   Self Feeding: Independent  Grooming / Hygiene: Independent  Bathing: Independent  Dressing: Independent  Toileting: Independent  Medication Management: Independent  Laundry: Independent  Kitchen Mobility: Independent  Finances: Independent  Home Management: Independent  Shopping: Independent  Prior Level Of Mobility: Independent Without Device in Community, Independent Without Device in Home (Pt reports limited distances due to HF)  Driving / Transportation: Driving Independent  Prior Services: Home-Independent, Other (Comments) (Cardiac Rehab)  Housing / Facility: [address] [place] [address]  Occupation (Pre-Hospital Vocational): Retired Due To Age  Leisure Interests: Exercise, Other (Comments) (Dancing, Edilberto)  Current Level of Function:   Lower Body Dressing: Total Assist (manage socks)  Toileting: Total Assist (urination on commode)    CURRENT LEVEL OF FUNCTION:   Same as level of function prior to admission to [facility]    Physical Examination:     VITAL SIGNS:   height is 1.499 m (4' 11\") and weight is 80 kg (176 lb 5.9 oz). Her oral temperature is 36.6 °C (97.8 °F). Her blood pressure is 100/50 and her pulse is 85. Her respiration is 18 and oxygen saturation is 96%.   General: Well developed, Well nourished, No Acute Distress  HEENT: Normocephalic, atraumatic. Anicteric sclera  Cardiac: Physiologic rate and regular rhythm, no murmurs  Pulmonary: Lungs are clear to auscultation bilaterally, comfortable on room air  Abdomen: Soft, non-tender, non-distended. Bowel sounds normoactive.   Extremities: Warm and well " perfused, no clubbing, cyanosis. No edema.   Skin: No visible rashes or lesions.   Psych: calm, no agitation, congruent mood/affect    NEUROLOGIC EXAM:  Gen:  Alert and oriented to person, place, date, and circumstance. Appropriately interactive  Speech/Language/Comprehension: Fluent speech w/o paraphasic errors, follows simple and complex commands without L/R confusion .?  Attention: Attentive to conversation.   Memory: Recalls her primary reason for being in the hospital  Judgment: Demonstrates appropriate use of call light     Cranial Nerves:   II:   Visual fields full to confrontation. Pupils equally round & reactive to light. ??  III, IV, VI:  EOMI w/o nystagmus or diplopia. No ptosis.????  V:  Sensation intact to light touch. ??  VII:  Face symmetric without weakness.????  VIII:  Hears functionally.????  IX, X:  Voice normal. Palate elevates symmetrically.????  XI:  Trapezii full.????  XII:  Tongue protrudes midline.????    Motor:?  ?? Delt???? Bi???? Tri???? Wrist ??  Ext?? DIP flex ??  (FDP)?? Finger ABd?? IP???? Quad???? Hamst???? TibAnt???? EHL???? Plantar  flexion   ???? C5???? C6???? C7???? ?C6?? C8/T1?? C8/T1???? L2???? L3???? L4-S1???? L4???? L5???? S1   L???? 5/5 5/5 5/5 5/5 5/5 5/5 3/5 3/5 3/5 5/5 5/5 5/5   R???? 5/5 5/5 5/5 5/5 5/5 5/5 5/5 5/5 5/5 5/5 5/5 5/5       Sensation: Intact to light touch in the major dermatomes of the upper and lower extremities.       Reflexes: Reflexes are 2+ at the biceps, triceps, brachioradialis, patella, and achilles.      Laboratory Values:  Recent Labs     04/29/24  1254 05/01/24  0140   SODIUM 138 135   POTASSIUM 3.9 4.1   CHLORIDE 105 102   CO2 18* 21   GLUCOSE 103* 109*   BUN 14 28*   CREATININE 0.57 1.20   CALCIUM 9.5 8.3*     Recent Labs     04/29/24  1254 04/30/24  0934 05/01/24  0140   WBC 6.2 8.4 6.9   RBC 4.58 3.23* 3.01*   HEMOGLOBIN 13.0 9.2* 8.5*   HEMATOCRIT 40.7 29.1* 27.2*   MCV 88.9 90.1 90.4   MCH 28.4 28.5 28.2   MCHC 31.9* 31.6* 31.3*   RDW 50.4*  50.6* 51.1*   PLATELETCT 198 247 207   MPV 11.4 10.4 10.4     Recent Labs     04/29/24  1254   APTT 26.3   INR 1.09       Primary Rehabilitation Diagnosis:    This patient is a 84 y.o. female admitted for acute inpatient rehabilitation with Closed comminuted supracondylar fracture of femur, left, initial encounter ([facility]).    Impairments:   Cognitive  ADLs/IADLs  Mobility  Speech    Secondary Diagnosis/Medical Co-morbidities Affecting Function  Heart failure, HTN, hypotension    Relevant Changes Since Preadmission Evaluation:    Status unchanged    The patient's rehabilitation potential is Good  The patient's medical prognosis is good    Rehabilitation Plan:   Discussion and Recommendations:   1. The patient requires an acute inpatient rehabilitation program with a coordinated program of care at an intensity and frequency not available at a lower level of care. This recommendation is substantiated by the patient's medical physicians who recommend that the patient's intervention and assessment of medical issues needs to be done at an acute level of care for patient's safety and maximum outcome.   2. A coordinated program of care will be supplied by an interdisciplinary team of physical therapy, occupational therapy, rehab physician, rehab nursing, and, if needed, speech therapy and rehab psychology. Rehab team presents a patient-specific rehabilitation and education program concentrating on prevention of future problems related to accessibility, mobility, skin, bowel, bladder, sexuality, and psychosocial and medical/surgical problems.   3. Need for Rehabilitation Physician: The rehab physician will be evaluating the patient on a multi-weekly basis to help coordinate the program of care. The rehab physician communicates between medical physicians, therapists, and nurses to maximize the patient's potential outcome. Specific areas in which the rehab physician will be providing daily assessment include the following:   A.  Assessing the patient's heart rate and blood pressure response (vitals monitoring) to activity and making adjustments in medications or conservative measures as needed.   B. The rehab physician will be assessing the frequency at which the program can be increased to allow the patient to reach optimal functional outcome.   C. The rehab physician will also provide assessments in daily skin care, especially in light of patient's impairments in mobility.   D. The rehab physician will provide special expertise in understanding how to work with functional impairment and recommend appropriate interventions, compensatory techniques, and education that will facilitate the patient's outcome.   4. Rehab R.N.   The rehab RN will be working with patient to carry over in room mobility and activities of daily living when the patient is not in 3 hours of skilled therapy. Rehab nursing will be working in conjunction with rehab physician to address all the medical issues above and continue to assess laboratory work and discuss abnormalities with the treating physicians, assess vitals, and response to activity, and discuss and report abnormalities with the rehab physician. Rehab RN will also continue daily skin care, supervise bladder/bowel program, instruct in medication administration, and ensure patient safety.   5. Rehab Therapy: Therapies to treat at intensity and frequency of (may change after completion of evaluation by all therapeutic disciplines):       PT:  Physical therapy to address mobility, transfer, gait training and evaluation for adaptive equipment needs 1 hour/day at least 5 days/week for the duration of the ELOS (see below)       OT:  Occupational therapy to address ADLs, self-care, home management training, functional mobility/transfers and assistive device evaluation, and community re-integration 1  hour/day at least 5 days/week for the duration of the ELOS (see below).        ST/Dysphagia:  Speech therapy to  address speech, language, and cognitive deficits as well as swallowing difficulties with retraining/dysphagia management and community re-integration with comprehension, expression, cognitive training 1  hour/day at least 5 days/week for the duration of the ELOS (see below).     Medical management / Rehabilitation Issues/ Adverse Potential as part of rehabilitation plan     Rehabilitation Issues/Adverse Potential  1.  Femur fracture: Patient demonstrates functional deficits in strength, balance, coordination, and ADL's. Patient is admitted to [facility] for comprehensive rehabilitation therapy as described below.   Rehabilitation nursing monitors bowel and bladder control, educates on medication administration, co-morbidities and monitors patient safety.      2.  Neurostimulants: None at this time but continue to assess daily for need to initiate should status change.    3.  DVT prophylaxis:  Patient is on Loveox for anticoagulation upon transfer. Encourage OOB. Monitor daily for signs and symptoms of DVT including but not limited to swelling and pain to prevent the development of DVT that may interfere with therapies.    4.  GI prophylaxis:  On prilosec to prevent gastritis/dyspepsia which may interfere with therapies.    5.  Pain: No issues with pain currently / Controlled with oxycodone    6.  Nutrition/Dysphagia: Dietician monitors nutrient intake, recommend supplements prn and provide nutrition education to pt/family to promote optimal nutrition for wound healing/recovery.     7.  Bladder/bowel:  Start bowel and bladder program as described below, to prevent constipation, urinary retention (which may lead to UTI), and urinary incontinence (which will impact upon pt's functional independence).   - TV Q3h while awake with post void bladder scans, I&O cath for PVRs >400  - up to commode after meal     8.  Skin/dermal ulcer prophylaxis: Monitor for new skin conditions with q.2 h. turns as  required to prevent the development of skin breakdown.     9.  Cognition/Behavior: As needed psychologist provides adjustment counseling to illness and psychosocial barriers that may be potential barriers to rehabilitation.     10. Respiratory therapy: RT performs O2 management prn, breathing retraining, pulmonary hygiene and bronchospasm management prn to optimize participation in therapies.     Medical Co-Morbidities/Adverse Potential Affecting Function:       Closed comminuted supracondylar fracture of femur, left, initial encounter  s/p Open reduction internal fixation left intra-articular distal femur fracture on 4/29.   Wt bearing status - TTWB LLE        Primary hypertension  As per history.  Blood pressure goal less than 130/80.  Continue home losartan 25 mg daily     Chronic heart failure with preserved ejection fraction   As per history with last echocardiogram in September 2023 EF of 60%.  Appears to be euvolemic.  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     Acute hypoxic Respiratory Distress:  -2/2 surgery + opioid use    Urinary Retension  - Timed voids with PVR q4H x3. If PVR > 400mL or if patient is unable to void, straight cath patient.    Constipation  -  Colace, Senna BID on admission  - Goal of 1BM/day.  -    Circadian Rhythm disorder:   Recommend lights on during the day/off at night, minimize nighttime interruptions as able.     Mood  - at risk of adjustment disorder, depression, and anxiety due to functional decline    ID:  - at risk for Urinary tract infection    Skin/Wounds:  - Pressure relief q2h while in bed. Close monitoring for signs of breakdown    Pain:  - Neuroceptic - On Tylenol prn    DVT prophylaxis:  Patient is on Lovenox.     GI prophylaxis:  On Prilosec 20mg daily       -Follow-up Ortho    I personally performed a  complete drug regimen review and no potential clinically significant medication issues were identified.     Goals/Expected Level of Function Based on Current Medical and Functional Status:  (may change based on patient's medical status and rate of impairment recovery)  Transfers:   Supervision  Mobility/Gait:   Supervision  ADL's:   Supervision  Cognition:  min assist    DISPOSITION: Discharge to pre-morbid independent living setting with the supportive care of patient's family.    ELOS: 10-14 days  ____________________________________    Miguel Goins MD  Physical Medicine & Rehabilitation   Brain Injury Medicine   ____________________________________    Pt was seen today for 79 min, and entire time spent in face-to-face contact was >50% in counseling and coordination of care as detailed in A/P above.

## 2024-05-01 NOTE — PROGRESS NOTES
4 Eyes Skin Assessment Completed by DONIS Parry and DONIS Nicole.    Head WDL  Ears WDL  Nose WDL  Mouth WDL  Neck WDL  Breast/Chest WDL  Shoulder Blades WDL  Spine WDL  (R) Arm/Elbow/Hand WDL  (L) Arm/Elbow/Hand Bruising  Abdomen WDL  Groin WDL  Scrotum/Coccyx/Buttocks WDL  (R) Leg WDL  (L) Leg Bruising and Swelling  (R) Heel/Foot/Toe WDL  (L) Heel/Foot/Toe Swelling          Devices In Places Blood Pressure Cuff      Interventions In Place Waffle Overlay and Pillows    Possible Skin Injury Yes    Pictures Uploaded Into Epic Yes  Wound Consult Placed N/A  RN Wound Prevention Protocol Ordered Yes

## 2024-05-01 NOTE — PROGRESS NOTES
Pt arrived at [facility] from [facility] via [facility]. Gut to follow for diagnosis of left femur fx SP ORIF. Initial assessment initiated. Pt oriented to room and facility routine and safety measures; pt education binder provided and discussed. Pt A/O x 4, continent of bowel and bladder. Max x2 assist for transfers. All wounds photographed and documented; photos uploaded to . Pt denies pain or discomfort at this time. Pt positioned for comfort in bed. Call light within reach, safety measures in place.

## 2024-05-01 NOTE — FLOWSHEET NOTE
05/01/24 1400   Events/Summary/Plan   Events/Summary/Plan Pt on .5Lpm NC very tired, NO Hx dx COPD, uses Xopenex for shortness of breath, wean O2   Vital Signs   Pulse 83   Respiration 17   Pulse Oximetry 95 %   $ Pulse Oximetry (Spot Check) Yes   Respiratory Assessment   Respiratory Pattern Within Normal Limits   Level of Consciousness Alert   Chest Exam   Work Of Breathing / Effort Within Normal Limits   Breath Sounds   RUL Breath Sounds Clear   RML Breath Sounds Clear   RLL Breath Sounds Clear;Diminished   NO Breath Sounds Clear   LLL Breath Sounds Clear;Diminished   Secretions   Cough Non Productive   Oxygen   O2 (LPM) 0.5   O2 Delivery Device Silicone Nasal Cannula   Smoking History   Have you ever smoked Never

## 2024-05-01 NOTE — ANESTHESIA POSTPROCEDURE EVALUATION
Patient: Mo Newman    Procedure Summary       Date: 04/29/24 Room / Location: [place] [address] / SURGERY [facility]    Anesthesia Start: 1528 Anesthesia Stop: 1822    Procedures:       ORIF, FRACTURE, DISTAL FEMUR (Left: Leg Upper)      IRRIGATION AND DEBRIDEMENT, LEFT FEMUR (Left: Leg Upper) Diagnosis: (Left open intra-articular distal femur fracture)    Surgeons: Jasiel Tolbert M.D. Responsible Provider: Miguel Downing M.D.    Anesthesia Type: general, peripheral nerve block ASA Status: 2            Final Anesthesia Type: general, peripheral nerve block  Last vitals  BP   Blood Pressure : 126/57    Temp   36.9 °C (98.4 °F)    Pulse   85   Resp   15    SpO2   95 %      Anesthesia Post Evaluation    Patient location during evaluation: PACU  Patient participation: complete - patient participated  Level of consciousness: awake and alert  Pain score: 3    Airway patency: patent  Anesthetic complications: no  Cardiovascular status: hemodynamically stable  Respiratory status: acceptable  Hydration status: euvolemic    PONV: none          There were no known notable events for this encounter.

## 2024-05-01 NOTE — H&P
Physical Medicine & Rehabilitation  History and Physical (H&P)  &     Post Admission Physician Evaluation (KODAK)       Date of Admission: 5/1/2024  Date of Service: 5/1/2024   Flavia Velazquez  RH16/01    [facility] Code to Support Admission: 0008.11 - Orthopaedic Disorders: Status Post Unilateral Hip Fracture  Etiologic Diagnosis: Closed comminuted supracondylar fracture of femur, left, initial encounter ([facility])    _______________________________________________    Chief Complaint: Weakness    History of Present Illness:  84 female with past medical history of HFrEF aortic valve sclerosis, microvascular angina, inappropriate sinus tachycardia, right bundle branch block hypertension, osteoporosis, breast cancer status post lumpectomy and radiation therapy presented after ground-level fall.  Imaging noted with distal femur fracture.s/p Open reduction internal fixation left intra-articular distal femur fracture on 4/29.    s/p Open reduction internal fixation left intra-articular distal femur fracture on 4/29 by Dr Tolbert    Today her pain is controlled. On 1L nasal canula    Review of Systems:     Comprehensive 14 point ROS was reviewed and all were negative except as noted elsewhere in this document.     Past Medical History:  No past medical history on file.    Past Surgical History:  Past Surgical History:   Procedure Laterality Date    ORIF, FRACTURE, FEMUR Left 4/29/2024    Procedure: ORIF, FRACTURE, DISTAL FEMUR;  Surgeon: Jasiel Tolbert M.D.;  Location: SURGERY [facility];  Service: Trauma    IRRIGATION & DEBRIDEMENT GENERAL Left 4/29/2024    Procedure: IRRIGATION AND DEBRIDEMENT, LEFT FEMUR;  Surgeon: Jasiel Tolbert M.D.;  Location: SURGERY [facility];  Service: Trauma       Family History:  No family history on file.    Medications:  Current Facility-Administered Medications   Medication Dose    enoxaparin (Lovenox) inj 40 mg  40 mg    ivabradine (Corlanor) tablet 5 mg  5 mg    [START ON 5/2/2024]  levothyroxine (Synthroid) tablet 150 mcg  150 mcg    [START ON 5/3/2024] levothyroxine (Synthroid) tablet 175 mcg  175 mcg    [START ON 5/2/2024] losartan (Cozaar) tablet 25 mg  25 mg    [START ON 5/2/2024] Sotagliflozin TABS 200 mg  200 mg    levalbuterol (Xopenex HFA) inhaler 2 Puff  2 Puff    melatonin tablet 10.5 mg  10.5 mg    Respiratory Therapy Consult      Pharmacy Consult Request ...Pain Management Review 1 Each  1 Each    hydrALAZINE (Apresoline) tablet 25 mg  25 mg    senna-docusate (Pericolace Or Senokot S) 8.6-50 MG per tablet 2 Tablet  2 Tablet    And    polyethylene glycol/lytes (Miralax) Packet 1 Packet  1 Packet    omeprazole (PriLOSEC) capsule 20 mg  20 mg    ondansetron (Zofran ODT) dispertab 4 mg  4 mg    Or    ondansetron (Zofran) syringe/vial injection 4 mg  4 mg    sodium chloride (Ocean) 0.65 % nasal spray 2 Spray  2 Spray    melatonin tablet 4.5 mg  4.5 mg    oxyCODONE immediate-release (Roxicodone) tablet 2.5 mg  2.5 mg    Or    oxyCODONE immediate-release (Roxicodone) tablet 5 mg  5 mg       Allergies:  Atorvastatin, Hydrochlorothiazide, and Lisinopril    Psychosocial History:  Prior Living Situation:   Housing / Facility: 1 Story House  Steps Into Home: 1  Steps In Home: 0  Lives with - Patient's Self Care Capacity: Alone and Able to Care For Self  Equipment Owned: Front-Wheel Walker, Single Point Cane, Tub / Shower Seat     Prior Level of Function / Living Situation:   Physical Therapy: Prior Services: Home-Independent, Other (Comments) (Cardiac Rehab)  Housing / Facility: 1 Story House  Steps Into Home: 1  Steps In Home: 0  Rail: None  Bathroom Set up: Walk In Shower, Grab Bars, Shower Chair  Equipment Owned: Front-Wheel Walker, Single Point Cane, Tub / Shower Seat  Lives with - Patient's Self Care Capacity: Alone and Able to Care For Self  Bed Mobility: Independent  Transfer Status: Independent  Ambulation: Independent  Assistive Devices Used: None  Stairs: Independent  Current Level  "of Function:   Gait Level Of Assist: Unable to Participate  Supine to Sit: Moderate Assist  Sit to Supine: Moderate Assist  Scooting: Minimal Assist (seated)  Rolling: Minimal Assist to Rt.  Comments: flat bed, no rail  Sit to Stand: Contact Guard Assist  Bed, Chair, Wheelchair Transfer: Contact Guard Assist  Toilet Transfers: Minimal Assist (to/from commode)  Transfer Method: Stand Pivot (FWW)  Sitting in Chair: 5 min on [facility]  Sitting Edge of Bed: 7 min  Standin min  Occupational Therapy:   Self Feeding: Independent  Grooming / Hygiene: Independent  Bathing: Independent  Dressing: Independent  Toileting: Independent  Medication Management: Independent  Laundry: Independent  Kitchen Mobility: Independent  Finances: Independent  Home Management: Independent  Shopping: Independent  Prior Level Of Mobility: Independent Without Device in Community, Independent Without Device in Home (Pt reports limited distances due to HF)  Driving / Transportation: Driving Independent  Prior Services: Home-Independent, Other (Comments) (Cardiac Rehab)  Housing / Facility: [address] [place] [address]  Occupation (Pre-Hospital Vocational): Retired Due To Age  Leisure Interests: Exercise, Other (Comments) (Dancing, Edilberto)  Current Level of Function:   Lower Body Dressing: Total Assist (manage socks)  Toileting: Total Assist (urination on commode)    CURRENT LEVEL OF FUNCTION:   Same as level of function prior to admission to [facility]    Physical Examination:     VITAL SIGNS:   height is 1.499 m (4' 11\") and weight is 80 kg (176 lb 5.9 oz). Her oral temperature is 36.6 °C (97.8 °F). Her blood pressure is 100/50 and her pulse is 85. Her respiration is 18 and oxygen saturation is 96%.   General: Well developed, Well nourished, No Acute Distress  HEENT: Normocephalic, atraumatic. Anicteric sclera  Cardiac: Physiologic rate and regular rhythm, no murmurs  Pulmonary: Lungs are clear to auscultation bilaterally, comfortable on room " air  Abdomen: Soft, non-tender, non-distended. Bowel sounds normoactive.   Extremities: Warm and well perfused, no clubbing, cyanosis. No edema.   Skin: No visible rashes or lesions.   Psych: calm, no agitation, congruent mood/affect    NEUROLOGIC EXAM:  Gen:  Alert and oriented to person, place, date, and circumstance. Appropriately interactive  Speech/Language/Comprehension: Fluent speech w/o paraphasic errors, follows simple and complex commands without L/R confusion .?  Attention: Attentive to conversation.   Memory: Recalls her primary reason for being in the hospital  Judgment: Demonstrates appropriate use of call light     Cranial Nerves:   II:   Visual fields full to confrontation. Pupils equally round & reactive to light. ??  III, IV, VI:  EOMI w/o nystagmus or diplopia. No ptosis.????  V:  Sensation intact to light touch. ??  VII:  Face symmetric without weakness.????  VIII:  Hears functionally.????  IX, X:  Voice normal. Palate elevates symmetrically.????  XI:  Trapezii full.????  XII:  Tongue protrudes midline.????    Motor:?  ?? Delt???? Bi???? Tri???? Wrist ??  Ext?? DIP flex ??  (FDP)?? Finger ABd?? IP???? Quad???? Hamst???? TibAnt???? EHL???? Plantar  flexion   ???? C5???? C6???? C7???? ?C6?? C8/T1?? C8/T1???? L2???? L3???? L4-S1???? L4???? L5???? S1   L???? 5/5 5/5 5/5 5/5 5/5 5/5 3/5 3/5 3/5 5/5 5/5 5/5   R???? 5/5 5/5 5/5 5/5 5/5 5/5 5/5 5/5 5/5 5/5 5/5 5/5       Sensation: Intact to light touch in the major dermatomes of the upper and lower extremities.       Reflexes: Reflexes are 2+ at the biceps, triceps, brachioradialis, patella, and achilles.      Laboratory Values:  Recent Labs     04/29/24  1254 05/01/24  0140   SODIUM 138 135   POTASSIUM 3.9 4.1   CHLORIDE 105 102   CO2 18* 21   GLUCOSE 103* 109*   BUN 14 28*   CREATININE 0.57 1.20   CALCIUM 9.5 8.3*     Recent Labs     04/29/24  1254 04/30/24  0934 05/01/24  0140   WBC 6.2 8.4 6.9   RBC 4.58 3.23* 3.01*   HEMOGLOBIN 13.0 9.2* 8.5*    HEMATOCRIT 40.7 29.1* 27.2*   MCV 88.9 90.1 90.4   MCH 28.4 28.5 28.2   MCHC 31.9* 31.6* 31.3*   RDW 50.4* 50.6* 51.1*   PLATELETCT 198 247 207   MPV 11.4 10.4 10.4     Recent Labs     04/29/24  1254   APTT 26.3   INR 1.09       Primary Rehabilitation Diagnosis:    This patient is a 84 y.o. female admitted for acute inpatient rehabilitation with Closed comminuted supracondylar fracture of femur, left, initial encounter ([facility]).    Impairments:   Cognitive  ADLs/IADLs  Mobility  Speech    Secondary Diagnosis/Medical Co-morbidities Affecting Function  Heart failure, HTN, hypotension    Relevant Changes Since Preadmission Evaluation:    Status unchanged    The patient's rehabilitation potential is Good  The patient's medical prognosis is good    Rehabilitation Plan:   Discussion and Recommendations:   1. The patient requires an acute inpatient rehabilitation program with a coordinated program of care at an intensity and frequency not available at a lower level of care. This recommendation is substantiated by the patient's medical physicians who recommend that the patient's intervention and assessment of medical issues needs to be done at an acute level of care for patient's safety and maximum outcome.   2. A coordinated program of care will be supplied by an interdisciplinary team of physical therapy, occupational therapy, rehab physician, rehab nursing, and, if needed, speech therapy and rehab psychology. Rehab team presents a patient-specific rehabilitation and education program concentrating on prevention of future problems related to accessibility, mobility, skin, bowel, bladder, sexuality, and psychosocial and medical/surgical problems.   3. Need for Rehabilitation Physician: The rehab physician will be evaluating the patient on a multi-weekly basis to help coordinate the program of care. The rehab physician communicates between medical physicians, therapists, and nurses to maximize the patient's potential  outcome. Specific areas in which the rehab physician will be providing daily assessment include the following:   A. Assessing the patient's heart rate and blood pressure response (vitals monitoring) to activity and making adjustments in medications or conservative measures as needed.   B. The rehab physician will be assessing the frequency at which the program can be increased to allow the patient to reach optimal functional outcome.   C. The rehab physician will also provide assessments in daily skin care, especially in light of patient's impairments in mobility.   D. The rehab physician will provide special expertise in understanding how to work with functional impairment and recommend appropriate interventions, compensatory techniques, and education that will facilitate the patient's outcome.   4. Rehab R.N.   The rehab RN will be working with patient to carry over in room mobility and activities of daily living when the patient is not in 3 hours of skilled therapy. Rehab nursing will be working in conjunction with rehab physician to address all the medical issues above and continue to assess laboratory work and discuss abnormalities with the treating physicians, assess vitals, and response to activity, and discuss and report abnormalities with the rehab physician. Rehab RN will also continue daily skin care, supervise bladder/bowel program, instruct in medication administration, and ensure patient safety.   5. Rehab Therapy: Therapies to treat at intensity and frequency of (may change after completion of evaluation by all therapeutic disciplines):       PT:  Physical therapy to address mobility, transfer, gait training and evaluation for adaptive equipment needs 1 hour/day at least 5 days/week for the duration of the ELOS (see below)       OT:  Occupational therapy to address ADLs, self-care, home management training, functional mobility/transfers and assistive device evaluation, and community re-integration 1   hour/day at least 5 days/week for the duration of the ELOS (see below).        ST/Dysphagia:  Speech therapy to address speech, language, and cognitive deficits as well as swallowing difficulties with retraining/dysphagia management and community re-integration with comprehension, expression, cognitive training 1  hour/day at least 5 days/week for the duration of the ELOS (see below).     Medical management / Rehabilitation Issues/ Adverse Potential as part of rehabilitation plan     Rehabilitation Issues/Adverse Potential  1.  Femur fracture: Patient demonstrates functional deficits in strength, balance, coordination, and ADL's. Patient is admitted to [facility] for comprehensive rehabilitation therapy as described below.   Rehabilitation nursing monitors bowel and bladder control, educates on medication administration, co-morbidities and monitors patient safety.      2.  Neurostimulants: None at this time but continue to assess daily for need to initiate should status change.    3.  DVT prophylaxis:  Patient is on Loveox for anticoagulation upon transfer. Encourage OOB. Monitor daily for signs and symptoms of DVT including but not limited to swelling and pain to prevent the development of DVT that may interfere with therapies.    4.  GI prophylaxis:  On prilosec to prevent gastritis/dyspepsia which may interfere with therapies.    5.  Pain: No issues with pain currently / Controlled with oxycodone    6.  Nutrition/Dysphagia: Dietician monitors nutrient intake, recommend supplements prn and provide nutrition education to pt/family to promote optimal nutrition for wound healing/recovery.     7.  Bladder/bowel:  Start bowel and bladder program as described below, to prevent constipation, urinary retention (which may lead to UTI), and urinary incontinence (which will impact upon pt's functional independence).   - TV Q3h while awake with post void bladder scans, I&O cath for PVRs >400  - up to  commode after meal     8.  Skin/dermal ulcer prophylaxis: Monitor for new skin conditions with q.2 h. turns as required to prevent the development of skin breakdown.     9.  Cognition/Behavior: As needed psychologist provides adjustment counseling to illness and psychosocial barriers that may be potential barriers to rehabilitation.     10. Respiratory therapy: RT performs O2 management prn, breathing retraining, pulmonary hygiene and bronchospasm management prn to optimize participation in therapies.     Medical Co-Morbidities/Adverse Potential Affecting Function:       Closed comminuted supracondylar fracture of femur, left, initial encounter  s/p Open reduction internal fixation left intra-articular distal femur fracture on 4/29 by Dr Tolbert  Weightbearing status: Touchdown weightbearing left lower extremity for 6 weeks then transition to weightbearing as tolerated, range of motion as tolerated left knee  DVT prophylaxis: SCDs and lovenox until mobilizing well, then aspirin 81mg BID for 4 weeks  Outpatient plan: The patient will follow up in clinic in 2 weeks for wound check, suture/staple removal (if applicable), and xrays.  If the patient is at a facility at that time, wound check and suture/staple removal may be performed by nursing care and the patient should instead follow up at the 6 week post operative darline for repeat clinical check and xrays.        Primary hypertension  As per history.  Blood pressure goal less than 130/80.  Continue home losartan 25 mg daily     Chronic heart failure with preserved ejection fraction   As per history with last echocardiogram in September 2023 EF of 60%.  Appears to be euvolemic.  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     Acute hypoxic Respiratory Distress:  -2/2 surgery + opioid use    Urinary  Retension  - Timed voids with PVR q4H x3. If PVR > 400mL or if patient is unable to void, straight cath patient.    Constipation  -  Colace, Senna BID on admission  - Goal of 1BM/day.  -    Circadian Rhythm disorder:   Recommend lights on during the day/off at night, minimize nighttime interruptions as able.     Mood  - at risk of adjustment disorder, depression, and anxiety due to functional decline    ID:  - at risk for Urinary tract infection    Skin/Wounds:  - Pressure relief q2h while in bed. Close monitoring for signs of breakdown    Pain:  - Neuroceptic - On Tylenol prn    DVT prophylaxis:  Patient is on Lovenox.     GI prophylaxis:  On Prilosec 20mg daily       -Follow-up Ortho    I personally performed a complete drug regimen review and no potential clinically significant medication issues were identified.     Goals/Expected Level of Function Based on Current Medical and Functional Status:  (may change based on patient's medical status and rate of impairment recovery)  Transfers:   Supervision  Mobility/Gait:   Supervision  ADL's:   Supervision  Cognition:  min assist    DISPOSITION: Discharge to pre-morbid independent living setting with the supportive care of patient's family.    ELOS: 10-14 days  ____________________________________    Miguel Goins MD  Physical Medicine & Rehabilitation   Brain Injury Medicine   ____________________________________    Pt was seen today for 79 min, and entire time spent in face-to-face contact was >50% in counseling and coordination of care as detailed in A/P above.

## 2024-05-02 ENCOUNTER — APPOINTMENT (OUTPATIENT)
Dept: PHYSICAL THERAPY | Facility: REHABILITATION | Age: 84
DRG: 560 | End: 2024-05-02
Attending: PHYSICAL MEDICINE & REHABILITATION
Payer: MEDICARE

## 2024-05-02 ENCOUNTER — APPOINTMENT (OUTPATIENT)
Dept: OCCUPATIONAL THERAPY | Facility: REHABILITATION | Age: 84
DRG: 560 | End: 2024-05-02
Attending: PHYSICAL MEDICINE & REHABILITATION
Payer: MEDICARE

## 2024-05-02 ENCOUNTER — APPOINTMENT (OUTPATIENT)
Dept: SPEECH THERAPY | Facility: REHABILITATION | Age: 84
DRG: 560 | End: 2024-05-02
Attending: PHYSICAL MEDICINE & REHABILITATION
Payer: MEDICARE

## 2024-05-02 LAB
25(OH)D3 SERPL-MCNC: 14 NG/ML (ref 30–100)
ALBUMIN SERPL BCP-MCNC: 3.2 G/DL (ref 3.2–4.9)
ALBUMIN/GLOB SERPL: 1.5 G/DL
ALP SERPL-CCNC: 53 U/L (ref 30–99)
ALT SERPL-CCNC: 5 U/L (ref 2–50)
ANION GAP SERPL CALC-SCNC: 10 MMOL/L (ref 7–16)
AST SERPL-CCNC: 25 U/L (ref 12–45)
BASOPHILS # BLD AUTO: 0.3 % (ref 0–1.8)
BASOPHILS # BLD: 0.02 K/UL (ref 0–0.12)
BILIRUB SERPL-MCNC: 0.4 MG/DL (ref 0.1–1.5)
BUN SERPL-MCNC: 25 MG/DL (ref 8–22)
CALCIUM ALBUM COR SERPL-MCNC: 8.9 MG/DL (ref 8.5–10.5)
CALCIUM SERPL-MCNC: 8.3 MG/DL (ref 8.5–10.5)
CHLORIDE SERPL-SCNC: 102 MMOL/L (ref 96–112)
CO2 SERPL-SCNC: 22 MMOL/L (ref 20–33)
CREAT SERPL-MCNC: 0.92 MG/DL (ref 0.5–1.4)
EOSINOPHIL # BLD AUTO: 0.1 K/UL (ref 0–0.51)
EOSINOPHIL NFR BLD: 1.6 % (ref 0–6.9)
ERYTHROCYTE [DISTWIDTH] IN BLOOD BY AUTOMATED COUNT: 48.8 FL (ref 35.9–50)
GFR SERPLBLD CREATININE-BSD FMLA CKD-EPI: 62 ML/MIN/1.73 M 2
GLOBULIN SER CALC-MCNC: 2.2 G/DL (ref 1.9–3.5)
GLUCOSE SERPL-MCNC: 100 MG/DL (ref 65–99)
HCT VFR BLD AUTO: 24.9 % (ref 37–47)
HGB BLD-MCNC: 8.1 G/DL (ref 12–16)
IMM GRANULOCYTES # BLD AUTO: 0.03 K/UL (ref 0–0.11)
IMM GRANULOCYTES NFR BLD AUTO: 0.5 % (ref 0–0.9)
LYMPHOCYTES # BLD AUTO: 1.46 K/UL (ref 1–4.8)
LYMPHOCYTES NFR BLD: 22.6 % (ref 22–41)
MAGNESIUM SERPL-MCNC: 2 MG/DL (ref 1.5–2.5)
MCH RBC QN AUTO: 28.7 PG (ref 27–33)
MCHC RBC AUTO-ENTMCNC: 32.5 G/DL (ref 32.2–35.5)
MCV RBC AUTO: 88.3 FL (ref 81.4–97.8)
MONOCYTES # BLD AUTO: 0.73 K/UL (ref 0–0.85)
MONOCYTES NFR BLD AUTO: 11.3 % (ref 0–13.4)
NEUTROPHILS # BLD AUTO: 4.11 K/UL (ref 1.82–7.42)
NEUTROPHILS NFR BLD: 63.7 % (ref 44–72)
NRBC # BLD AUTO: 0 K/UL
NRBC BLD-RTO: 0 /100 WBC (ref 0–0.2)
PLATELET # BLD AUTO: 200 K/UL (ref 164–446)
PMV BLD AUTO: 11 FL (ref 9–12.9)
POTASSIUM SERPL-SCNC: 4.5 MMOL/L (ref 3.6–5.5)
PROT SERPL-MCNC: 5.4 G/DL (ref 6–8.2)
RBC # BLD AUTO: 2.82 M/UL (ref 4.2–5.4)
SODIUM SERPL-SCNC: 134 MMOL/L (ref 135–145)
WBC # BLD AUTO: 6.5 K/UL (ref 4.8–10.8)

## 2024-05-02 PROCEDURE — 99232 SBSQ HOSP IP/OBS MODERATE 35: CPT | Performed by: PHYSICAL MEDICINE & REHABILITATION

## 2024-05-02 RX ORDER — ACETAMINOPHEN 325 MG/1
650 TABLET ORAL EVERY 4 HOURS PRN
Status: DISCONTINUED | OUTPATIENT
Start: 2024-05-02 | End: 2024-05-14 | Stop reason: HOSPADM

## 2024-05-02 RX ORDER — VITAMIN B COMPLEX
1000 TABLET ORAL DAILY
Status: DISCONTINUED | OUTPATIENT
Start: 2024-05-02 | End: 2024-05-14 | Stop reason: HOSPADM

## 2024-05-02 RX ADMIN — LEVOTHYROXINE SODIUM 150 MCG: 0.07 TABLET ORAL at 05:36

## 2024-05-02 RX ADMIN — SENNOSIDES AND DOCUSATE SODIUM 2 TABLET: 50; 8.6 TABLET ORAL at 20:17

## 2024-05-02 RX ADMIN — OMEPRAZOLE 20 MG: 20 CAPSULE, DELAYED RELEASE ORAL at 08:12

## 2024-05-02 RX ADMIN — SENNOSIDES AND DOCUSATE SODIUM 2 TABLET: 50; 8.6 TABLET ORAL at 08:13

## 2024-05-02 RX ADMIN — ENOXAPARIN SODIUM 40 MG: 100 INJECTION SUBCUTANEOUS at 16:58

## 2024-05-02 RX ADMIN — Medication 4.5 MG: at 20:17

## 2024-05-02 RX ADMIN — OXYCODONE 5 MG: 5 TABLET ORAL at 08:12

## 2024-05-02 RX ADMIN — Medication 1000 UNITS: at 16:59

## 2024-05-02 RX ADMIN — LOSARTAN POTASSIUM 25 MG: 25 TABLET, FILM COATED ORAL at 05:37

## 2024-05-02 ASSESSMENT — BRIEF INTERVIEW FOR MENTAL STATUS (BIMS)
ASKED TO RECALL BLUE: YES, AFTER CUEING (A COLOR")"
ASKED TO RECALL SOCK: YES, AFTER CUEING (SOMETHING TO WEAR")"
WHAT MONTH IS IT: ACCURATE WITHIN 5 DAYS
INITIAL REPETITION OF BED BLUE SOCK - FIRST ATTEMPT: 3
WHAT YEAR IS IT: CORRECT
ASKED TO RECALL BED: YES, NO CUE REQUIRED
BIMS SUMMARY SCORE: 13
WHAT DAY OF THE WEEK IS IT: CORRECT

## 2024-05-02 ASSESSMENT — GAIT ASSESSMENTS: GAIT LEVEL OF ASSIST: UNABLE TO PARTICIPATE

## 2024-05-02 ASSESSMENT — PAIN DESCRIPTION - PAIN TYPE: TYPE: ACUTE PAIN;SURGICAL PAIN

## 2024-05-02 ASSESSMENT — ACTIVITIES OF DAILY LIVING (ADL)
TOILET_TRANSFER_DESCRIPTION: GRAB BAR;INCREASED TIME;INITIAL PREPARATION FOR TASK;SUPERVISION FOR SAFETY;VERBAL CUEING
TOILETING: INDEPENDENT
BED_CHAIR_WHEELCHAIR_TRANSFER_DESCRIPTION: INCREASED TIME;SET-UP OF EQUIPMENT;SQUAT PIVOT TRANSFER TO WHEELCHAIR;SUPERVISION FOR SAFETY;VERBAL CUEING
BED_CHAIR_WHEELCHAIR_TRANSFER_DESCRIPTION: VERBAL CUEING;SUPERVISION FOR SAFETY;INCREASED TIME;INITIAL PREPARATION FOR TASK

## 2024-05-02 NOTE — DISCHARGE PLANNING
CASE MANAGEMENT INITIAL ASSESSMENT    Admit Date:  5/1/2024     I met with pt to discuss role of case management / discharge planning / team conference. She is very pleasant. Patient is a  84 y.o. female transferred from [facility] where she was hospitalized from 4/29 to 5/1/2024 .    Diagnosis: Hip fracture, right, closed, initial encounter ([facility]) [S72.001A]    Co-morbidities:   Patient Active Problem List    Diagnosis Date Noted    Hip fracture, right, closed, initial encounter ([facility]) 05/01/2024    Closed comminuted supracondylar fracture of femur, left, initial encounter ([facility]) 04/29/2024    Full code status 04/29/2024    Encounter for preoperative assessment 04/29/2024    Inappropriate sinus tachycardia ([facility]) 04/29/2024    Chronic heart failure with preserved ejection fraction ([facility]) 04/29/2024    Primary hypertension 04/29/2024    Pathological fracture of left femur due to age-related osteoporosis ([facility]) 04/29/2024     Prior Living Situation:  Housing / Facility: 1 Story House w/ 2 [facility]; +walk in shower  Lives with - Patient's Self Care Capacity: Alone and Able to Care For Self    Prior Level of Function:  Medication Management: Independent  Finances: Independent  Home Management: Independent  Shopping: Independent  Prior Level Of Mobility: Independent Without Device in Community, Independent Without Device in Home  Driving / Transportation: Driving Independent    Support Systems:  Primary : Ray Garrett Son ; pt also has a SO who is 89 years old - they do not live together   Other support systems: 3 adult children who live in [place]; +dtr in [facility]  Power of  (Name & Phone): none    Previous Services Utilized:   Equipment Owned: Front-Wheel Walker, Single Point Cane  Prior Services: Cardiac Rehab    Other Information:  Primary Payor Source: Medicare A  Primary Care Practitioner : Almita BURK  Other MDs: Dr Tomasz Suggs    Patient / Family Goal:  Patient / Family Goal: Return home w/  assistance from family - dtr in law Cher will be able to stay w/ pt @ time of dc    Plan:  1. Continue to follow patient through hospitalization and provide discharge planning in collaboration with patient, family, physicians and ancillary services.     2. Utilize community resources to ensure a safe discharge.   Anticipate home health, possible wc/cushion, follow up with pcp/ ortho

## 2024-05-02 NOTE — THERAPY
Physical Therapy   Initial Evaluation     Patient Name: Flavia Garrett  Age:  84 y.o., Sex:  female  Medical Record #: [id number]  Today's Date: 5/2/2024     Subjective    Pt is in her bed and agreeable to participate in therapy. She express being tired and reports lower back pain PS 2/10.     Objective       05/02/24 1301   PT Charge Group   PT Therapeutic Activities (Units) 1   PT Evaluation PT Evaluation Mod   PT Total Time Spent   PT Individual Total Time Spent (Mins) 60   Prior Living Situation   Prior Services Home-Independent;Other (Comments)  (out patient cardiac rehab)   Housing / Facility 1 Story House   Steps Into Home 1  (1 from garage and a threshold in front door)   Equipment Owned Front-Wheel Walker;Single Point Cane   Lives with - Patient's Self Care Capacity Alone and Able to Care For Self   Comments daughter in law will stay with her after dc   Prior Level of Functional Mobility   Bed Mobility Independent   Transfer Status Independent   Ambulation Independent   Assistive Devices Used None   Stairs Independent   Prior Functioning: Everyday Activities   Self Care Independent   Indoor Mobility (Ambulation) Independent   Stairs Independent   Functional Cognition Independent   Prior Device Use None of the given options   Vitals   Pulse 81   Patient BP Position Sitting   Blood Pressure  102/55   Respiration 20   Pulse Oximetry 96 %   O2 Delivery Device None - Room Air   Pain   Intervention Medication (see MAR)   Pain 0 - 10 Group   Location Back   Location Orientation Lower   Pain Rating Scale (NPRS) 2   Description Aching   Comfort Goal Comfort with Movement;Perform Activity   Cognition    Level of Consciousness Alert   Active ROM Lower Body    Comments L hip limited by pain   Strength Lower Body   Lt Hip Flexion Strength 3- (F-)   Lt Hip Abduction Strength 3- (F-)   Lt Knee Extension Strength 3- (F-)   Sensation Lower Body   Lower Extremity Sensation   WDL   Balance Assessment   Sitting Balance (Static)  Fair   Sitting Balance (Dynamic) Fair -   Standing Balance (Static) Poor   Standing Balance (Dynamic) Poor -   Weight Shift Sitting Fair   Weight Shift Standing Poor   Bed Mobility    Supine to Sit Moderate Assist   Sit to Supine Moderate Assist   Sit to Stand Moderate Assist   Scooting Minimal Assist   Rolling Minimum Assist to Lt.   Roll Left and Right   Assistance Needed Physical assistance;Verbal cues;Supervision   Physical Assistance Level 26%-50%   CARE Score - Roll Left and Right 3   Roll Left and Right Discharge Goal   Discharge Goal 6   Sit to Lying   Assistance Needed Verbal cues;Supervision;Physical assistance   Physical Assistance Level 51%-75%   CARE Score - Sit to Lying 2   Sit to Lying Discharge Goal   Discharge Goal 6   Lying to Sitting on Side of Bed   Assistance Needed Verbal cues;Supervision;Physical assistance   Physical Assistance Level 51%-75%   CARE Score - Lying to Sitting on Side of Bed 2   Lying to Sitting on Side of Bed Discharge Goal   Discharge Goal 6   Sit to Stand   Assistance Needed Physical assistance;Verbal cues;Supervision   Physical Assistance Level 51%-75%   CARE Score - Sit to Stand 2   Sit to Stand Discharge Goal   Discharge Goal 6   Chair/Bed-to-Chair Transfer   Assistance Needed Physical assistance;Verbal cues;Supervision   Physical Assistance Level 51%-75%   CARE Score - Chair/Bed-to-Chair Transfer 2   Chair/Bed-to-Chair Transfer Discharge Goal   Discharge Goal 6   Car Transfer   Reason if not Attempted Safety concerns   CARE Score - Car Transfer 88   Car Transfer Discharge Goal   Discharge Goal 4   Walk 10 Feet   Reason if not Attempted Medical concerns   CARE Score - Walk 10 Feet 88   Walk 10 Feet Discharge Goal   Discharge Goal 6   Walk 50 Feet with Two Turns   Reason if not Attempted Medical concerns   CARE Score - Walk 50 Feet with Two Turns 88   Walk 50 Feet with Two Turns Discharge Goal   Discharge Goal 6   Walk 150 Feet   Reason if not Attempted Medical concerns    CARE Score - Walk 150 Feet 88   Walk 150 Feet Discharge Goal   Discharge Goal 6   Walking 10 Feet on Uneven Surfaces   Reason if not Attempted Medical concerns   CARE Score - Walking 10 Feet on Uneven Surfaces 88   Walking 10 Feet on Uneven Surfaces Discharge Goal   Discharge Goal 4   1 Step (Curb)   Reason if not Attempted Medical concerns   CARE Score - 1 Step (Curb) 88   1 Step (Curb) Discharge Goal   Discharge Goal 4   4 Steps   Reason if not Attempted Medical concerns   CARE Score - 4 Steps 88   4 Steps Discharge Goal   Discharge Goal 4   12 Steps   Reason if not Attempted Activity not applicable   CARE Score - 12 Steps 9   12 Steps Discharge Goal   Discharge Goal 9   Picking Up Object   Reason if not Attempted Medical concerns   CARE Score - Picking Up Object 88   Picking Up Object Discharge Goal   Discharge Goal 6   Wheel 50 Feet with Two Turns   Assistance Needed Physical assistance   Physical Assistance Level Total assistance   CARE Score - Wheel 50 Feet with Two Turns 1   Type of Wheelchair/Scooter Manual   Wheel 50 Feet with Two Turns Discharge Goal   Discharge Goal 6   Wheel 150 Feet   Assistance Needed Physical assistance   Physical Assistance Level Total assistance   CARE Score - Wheel 150 Feet 1   Type of Wheelchair/Scooter Manual   Wheel 150 Feet Discharge Goal   Discharge Goal 6   Gait Functional Level of Assist    Gait Level Of Assist Unable to Participate   Wheelchair Functional Level of Assist   Wheelchair Assist Total Assist   Stairs Functional Level of Assist   Level of Assist with Stairs Unable to Participate   Transfer Functional Level of Assist   Bed, Chair, Wheelchair Transfer Moderate Assist   Bed Chair Wheelchair Transfer Description Verbal cueing;Supervision for safety;Increased time;Initial preparation for task   Problem List    Problems Pain;Impaired Bed Mobility;Impaired Transfers;Impaired Ambulation;Functional Strength Deficit;Impaired Balance;Decreased Activity Tolerance    Precautions   Precautions Fall Risk;Toe Touch Weight Bearing Left Lower Extremity   Current Discharge Plan   Current Discharge Plan Return to Prior Living Situation   Interdisciplinary Plan of Care Collaboration   IDT Collaboration with  Nursing   Patient Position at End of Therapy In Bed;Bed Alarm On;Call Light within Reach;Tray Table within Reach;Phone within Reach   Collaboration Comments placing dressing on her L LE   Physical Therapist Assigned   Assigned PT / Treatment Time / Comments PT 30/60, tech assist for gait training   Benefit   Therapy Benefit Patient Would Benefit from Inpatient Rehabilitation Physical Therapy to Maximize Functional [place] with ADLs, IADLs and Mobility.   Strengths & Barriers   Strengths Able to follow instructions;Alert and oriented;Effective communication skills;Independent prior level of function;Pleasant and cooperative   Barriers Decreased endurance;Fatigue;Generalized weakness;Impaired activity tolerance;Impaired balance;Pain       Assessment  Patient is 84 y.o.female with past medical history of HFrEF aortic valve sclerosis, microvascular angina, inappropriate sinus tachycardia, right bundle branch block hypertension, osteoporosis, breast cancer status post lumpectomy and radiation therapy presented after ground-level fall.  Imaging noted with distal femur fracture.s/p Open reduction internal fixation left intra-articular distal femur fracture on 4/29.     s/p Open reduction internal fixation left intra-articular distal femur fracture on 4/29 by Dr Tolbert   .  Additional factors influencing patient status / progress (ie: cognitive factors, co-morbidities, social support, etc): Pt is TTWB on L Omar 6 weeks then transition to WBAT.. She was attending out patient cardiac rehab program prior to fall incident. She completed 12 sessions and still has 24 sessions left. Her goal is to go back to cardiac rehab and be independent again. PT discussed poc and goals with pt. She  verbalized understanding and agreed.Pt easily fatigued needing pacing of activities.     Plan  Recommend Physical Therapy 30-60 minutes per day 5-7 days per week for 10-14 days for the following treatments:  PT Gait Training, PT Therapeutic Exercises, PT Neuro Re-Ed/Balance, PT Therapeutic Activity, and PT Evaluation.    Passport items to be completed:  Get in/out of bed safely, in/out of a vehicle, safely use mobility device, walk or wheel around home/community, navigate up and down stairs, show how to get up/down from the ground, ensure home is accessible, demonstrate HEP, complete caregiver training    Goals:  Long term and short term goals have been discussed with patient and they are in agreement.    Physical Therapy Problems (Active)       Problem: Mobility       Dates: Start:  05/02/24         Goal: STG-Within one week, patient will propel wheelchair household distances x 50 feet, SBA       Dates: Start:  05/02/24            Goal: STG-Within one week, patient will ambulate household distance x 50 feet using FWW, CGA maintaining TTWB on L LE       Dates: Start:  05/02/24               Problem: Mobility Transfers       Dates: Start:  05/02/24         Goal: STG-Within one week, patient will perform bed mobility, CGA       Dates: Start:  05/02/24            Goal: STG-Within one week, patient will transfer bed to chair, CGA       Dates: Start:  05/02/24               Problem: PT-Long Term Goals       Dates: Start:  05/02/24         Goal: LTG-By discharge, patient will ambulate x 150 feet using FWW, SUP       Dates: Start:  05/02/24            Goal: LTG-By discharge, patient will transfer one surface to another, SUP using FWW       Dates: Start:  05/02/24            Goal: LTG-By discharge, patient will ambulate up/down a curb using FWW, SUP       Dates: Start:  05/02/24            Goal: LTG-By discharge, patient will transfer in/out of a car, SUP       Dates: Start:  05/02/24

## 2024-05-02 NOTE — CARE PLAN
"The patient is Stable - Low risk of patient condition declining or worsening         Progress made toward(s) clinical / shift goals:    Problem: Knowledge Deficit - Standard  Goal: Patient and family/care givers will demonstrate understanding of plan of care, disease process/condition, diagnostic tests and medications  Outcome: Progressing     Problem: Skin Integrity  Goal: Skin integrity is maintained or improved  Outcome: Progressing     Problem: Fall Risk - Rehab  Goal: Patient will remain free from falls  Outcome: Progressing  Note: Kelli Min Fall risk Assessment Score: 13    Moderate fall risk Interventions  - Bed and strip alarm   - Yellow sign by the door   - Yellow wrist band \"Fall risk\"  - Room near to the nurse station  - Do not leave patient unattended in the bathroom  - Fall risk education provided       Problem: Pain - Standard  Goal: Alleviation of pain or a reduction in pain to the patient’s comfort goal  Outcome: Progressing       "

## 2024-05-02 NOTE — THERAPY
"Occupational Therapy   Initial Evaluation     Patient Name: Flavia Garrett  Age:  84 y.o., Sex:  female  Medical Record #: [id number]  Today's Date: 5/2/2024     Subjective    Pt pleasant and motivated to participate in OT     Objective     05/02/24 0701   OT Charge Group   Charges Yes   OT Self Care / ADL (Units) 1   OT Evaluation OT Evaluation Mod   OT Total Time Spent   OT Individual Total Time Spent (Mins) 60   Prior Living Situation   Housing / Facility 1 Story House   Steps Into Home 1   Bathroom Set up Walk In Shower  (4\" lip)   Equipment Owned Front-Wheel Walker;Single Point Cane;Tub / Shower Seat;Grab Bar(s) In Tub / Shower   Lives with - Patient's Self Care Capacity Alone and Able to Care For Self   Prior Level of ADL Function   Self Feeding Independent   Grooming / Hygiene Independent   Bathing Independent   Dressing Independent   Toileting Independent   Prior Level of IADL Function   Medication Management Independent   Laundry Independent   Kitchen Mobility Independent   Finances Independent   Home Management Independent   Shopping Independent   Prior Level Of Mobility Independent Without Device in Community;Independent Without Device in Home   Driving / Transportation Driving Independent   Prior Functioning: Everyday Activities   Self Care Independent   Indoor Mobility (Ambulation) Independent   Stairs Independent   Functional Cognition Independent   Prior Device Use None of the given options   Vitals   O2 Delivery Device None - Room Air   Pain   Intervention Declines   Pain 0 - 10 Group   Location Hip   Location Orientation Left   Cognition    Level of Consciousness Alert   ABS (Agitated Behavior Scale)   Agitated Behavior Scale Performed No   Cognitive Pattern Assessment   Cognitive Pattern Assessment Used BIMS   Brief Interview for Mental Status (BIMS)   Repetition of Three Words (First Attempt) 3   Temporal Orientation: Year Correct   Temporal Orientation: Month Accurate within 5 days   Temporal " "Orientation: Day Correct   Recall: \"Sock\" Yes, after cueing (\"something to wear\")   Recall: \"Blue\" Yes, after cueing (\"a color\")   Recall: \"Bed\" Yes, no cue required   BIMS Summary Score 13   Confusion Assessment Method (CAM)   Is there evidence of an acute change in mental status from the patient's baseline? No   Inattention Behavior not present   Disorganized thinking Behavior not present   Altered level of consciousness Behavior not present   Active ROM Upper Body   Active ROM Upper Body  WDL   Balance Assessment   Sitting Balance (Static) Fair -   Sitting Balance (Dynamic) Fair -   Standing Balance (Static) Trace +   Standing Balance (Dynamic) Trace +   Weight Shift Sitting Fair   Weight Shift Standing Poor   Bed Mobility    Supine to Sit Moderate Assist   Sit to Stand Minimal Assist   Scooting Contact Guard Assist   Rolling Minimum Assist to Lt.   Coordination Upper Body   Coordination WDL   Eating   Assistance Needed Set-up / clean-up   Physical Assistance Level No physical assistance   CARE Score - Eating 5   Eating Discharge Goal   Discharge Goal 6   Oral Hygiene   Assistance Needed Set-up / clean-up   Physical Assistance Level No physical assistance   CARE Score - Oral Hygiene 5   Oral Hygiene Discharge Goal   Discharge Goal 6   Shower/Bathe Self   Assistance Needed Physical assistance   Physical Assistance Level 25% or less   CARE Score - Shower/Bathe Self 3   Shower/Bathe Self Discharge Goal   Discharge Goal 5   Upper Body Dressing   Assistance Needed Supervision   Physical Assistance Level No physical assistance   CARE Score - Upper Body Dressing 4   Upper Body Dressing Discharge Goal   Discharge Goal 6   Lower Body Dressing   Assistance Needed Physical assistance   Physical Assistance Level 51%-75%   CARE Score - Lower Body Dressing 2   Lower Body Dressing Discharge Goal   Discharge Goal 6   Putting On/Taking Off Footwear   Assistance Needed Physical assistance   Physical Assistance Level 51%-75%   CARE " Score - Putting On/Taking Off Footwear 2   Putting On/Taking Off Footwear Discharge Goal   Discharge Goal 6   Toileting Hygiene   Assistance Needed Physical assistance   Physical Assistance Level 51%-75%   CARE Score - Toileting Hygiene 2   Toileting Hygiene Discharge Goal   Discharge Goal 6   Toilet Transfer   Assistance Needed Physical assistance   Physical Assistance Level 25% or less   CARE Score - Toilet Transfer 3   Toilet Transfer Discharge Goal   Discharge Goal 6   Hearing, Speech, and Vision   Ability to Hear Adequate   Ability to See in Adequate Light Adequate   Expression of Ideas and Wants Without difficulty   Understanding Verbal and Non-Verbal Content Understands   Functional Level of Assist   Eating Supervision   Grooming Supervision;Seated   Bathing Minimal Assist   Bathing Description Grab bar;Hand held shower;Tub bench;Assit wtih lower extremities;Increased time;Initial preparation for task;Set-up of equipment;Supervision for safety   Upper Body Dressing Stand by Assist   Lower Body Dressing Maximal Assist   Toileting Maximal Assist   Toileting Description Assist for hygiene;Assist to pull pants up;Assist to pull pants down;Assist for standing balance;Grab bar;Increased time;Initial preparation for task;Supervision for safety;Verbal cueing   Bed, Chair, Wheelchair Transfer Minimal Assist   Toilet Transfers Minimal Assist   Tub / Shower Transfers Minimal Assist   Problem List   Problem List Decreased Active Daily Living Skills;Decreased Homemaking Skills;Decreased Functional Mobility;Decreased Activity Tolerance;Impaired Postural Control / Balance   Precautions   Precautions Fall Risk;Toe Touch Weight Bearing Left Lower Extremity   Current Discharge Plan   Current Discharge Plan Return to Prior Living Situation   Benefit    Therapy Benefit Patient Would Benefit from Inpatient Rehab Occupational Therapy to Maximize [place] with ADLs, IADLs and Functional Mobility.   Interdisciplinary Plan of  Care Collaboration   IDT Collaboration with  Nursing   Patient Position at End of Therapy Seated;Chair Alarm On;Tray Table within Reach;Call Light within Reach;Phone within Reach   OT DME Recommendations   Bathroom Equipment   (TBD)   Additional Equipment   (TBD)   Strengths & Barriers   Strengths Able to follow instructions;Alert and oriented;Independent prior level of function;Motivated for self care and independence;Pleasant and cooperative;Willingly participates in therapeutic activities   Barriers Decreased endurance;Fatigue;Generalized weakness;Impaired activity tolerance;Limited mobility;Pain   Occupational Therapist Assigned   Assigned OT / Treatment Time / Comments Sharon Nagel (non-primary), 60-90 minutes throughout day     *Addendum added below d/t missed fill-in     05/02/24 0701   Functional Level of Assist   Eating Supervision   Eating Description Increased time;Set-up of equipment or meal/tube feeding;Supervision for safety   Grooming Supervision;Seated   Grooming Description Increased time;Supervision for safety;Seated in wheelchair at sink   Bathing Minimal Assist   Bathing Description Grab bar;Hand held shower;Tub bench;Assit wtih lower extremities;Increased time;Initial preparation for task;Set-up of equipment;Supervision for safety   Upper Body Dressing Stand by Assist   Upper Body Dressing Description Increased time;Initial preparation for task;Supervision for safety   Lower Body Dressing Maximal Assist   Lower Body Dressing Description Grab bar;Increased time;Assist with threading into pant leg;Initial preparation for task;Set-up of equipment;Supervision for safety;Verbal cueing   Toileting Maximal Assist   Toileting Description Assist for hygiene;Assist to pull pants up;Assist to pull pants down;Assist for standing balance;Grab bar;Increased time;Initial preparation for task;Supervision for safety;Verbal cueing   Bed, Chair, Wheelchair Transfer Minimal Assist   Bed Chair Wheelchair Transfer  Description Increased time;Set-up of equipment;Squat pivot transfer to wheelchair;Supervision for safety;Verbal cueing   Toilet Transfers Minimal Assist   Toilet Transfer Description Grab bar;Increased time;Initial preparation for task;Supervision for safety;Verbal cueing   Tub / Shower Transfers Minimal Assist   Tub Shower Transfer Description Grab bar;Shower bench;Increased time;Set-up of equipment;Initial preparation for task;Supervision for safety;Verbal cueing     Assessment  Patient is 84 y.o. female s/p L ORIF d/t fall. Additional factors influencing patient status / progress (ie: cognitive factors, co-morbidities, social support, etc): HFrEF aortic valve sclerosis, microvascular angina, inappropriate sinus tachycardia, right bundle branch block hypertension, osteoporosis, breast cancer status post lumpectomy and radiation therapy.      Pt seen for eval, presenting w/ deficits noted above. Pt is at most mod A for functional mobility and max A for ADLs. Pt lives alone and reports I PLOF. Pt would benefit from OT in order to facilitate and maximize functional gains.    Plan  Recommend Occupational Therapy  minutes per day 5-7 days per week for 12 days for the following treatments:  OT Group Therapy, OT Self Care/ADL, OT Manual Ther Technique, OT Neuro Re-Ed/Balance, OT Therapeutic Activity, OT Evaluation, and OT Therapeutic Exercise.    Passport items to be completed:  Perform bathroom transfers, complete dressing, complete feeding, get ready for the day, prepare a simple meal, participate in household tasks, adapt home for safety needs, demonstrate home exercise program, complete caregiver training     Goals:  Long term and short term goals have been discussed with patient and they are in agreement.    Occupational Therapy Goals (Active)       Problem: Bathing       Dates: Start:  05/02/24         Goal: STG-Within one week, patient will bathe w/ CGA and AE/DME as needed       Dates: Start:  05/02/24                Problem: Dressing       Dates: Start:  05/02/24         Goal: STG-Within one week, patient will dress LB w/ mod A and AE as needed       Dates: Start:  05/02/24               Problem: Functional Transfers       Dates: Start:  05/02/24         Goal: STG-Within one week, patient will transfer consistently w/ CGA and LRAD       Dates: Start:  05/02/24               Problem: IADL's       Dates: Start:  05/02/24         Goal: STG-Within one week, patient will prepare a meal w/ min A and LRAD       Dates: Start:  05/02/24               Problem: OT Long Term Goals       Dates: Start:  05/02/24         Goal: LTG-By discharge, patient will complete basic self care tasks w/ mod I and AE/DME as needed       Dates: Start:  05/02/24            Goal: LTG-By discharge, patient will complete basic home management w/ SPV and LRAD       Dates: Start:  05/02/24               Problem: Toileting       Dates: Start:  05/02/24         Goal: STG-Within one week, patient will complete toileting tasks w/ mod A and AE/DME as needed       Dates: Start:  05/02/24

## 2024-05-02 NOTE — CARE PLAN
Problem: Skin Integrity  Goal: Patient's skin integrity will be maintained or improve  Outcome: Progressing  Note: Patient's skin remains intact and free from new or accidental injury this shift.  Will continue to monitor.   The patient is Stable - Low risk of patient condition declining or worsening         Progress made toward(s) clinical / shift goals:  incision healing, no new injury or breakdown    Patient is not progressing towards the following goals:

## 2024-05-02 NOTE — PROGRESS NOTES
"  Physical Medicine & Rehabilitation Progress Note    Encounter Date: 2024    Chief Complaint: Weakness    Interval Events (Subjective):  Seen in therapy. Pain controlled. Slept well    Objective:  VITAL SIGNS: /68   Pulse 74   Temp 36.2 °C (97.2 °F) (Oral)   Resp 20   Ht 1.499 m (4' 11\")   Wt 80 kg (176 lb 5.9 oz)   SpO2 93%   BMI 35.62 kg/m²   Gen: No acute distress, well developed well nourished adult  HEENT: Normal Cephalic Atraumatic, Normal conjunctiva.   CV: warm extremities, well perfused, no edema  Resp: symmetric chest rise, breathing comfortably on room air  Abd: Soft, Non distended  Extremities: normal bulk, no atrophy  Skin: no visible rashes or lesions.   Neuro: alert, awake  Psych: Mood and affect appropriate and congruent    Laboratory Values:  Recent Results (from the past 72 hour(s))   ECG    Collection Time: 24  1:51 PM   Result Value Ref Range    Report       [facility] Emergency Dept.    Test Date:  2024  Pt Name:    CRAIG MALAGON              Department: ER  MRN:        [id number]                      Room:       [facility]  Gender:     Female                       Technician: [id number]  :        [id number]                   Requested By:LAUREN PAULINO  Order #:    [id number]                    Reading MD:    Measurements  Intervals                                Axis  Rate:       68                           P:          25  AZ:         182                          QRS:        17  QRSD:       139                          T:          8  QT:         445  QTc:        474    Interpretive Statements  Sinus rhythm  Right bundle branch block  No previous ECG available for comparison     CBC WITH DIFFERENTIAL    Collection Time: 24  9:34 AM   Result Value Ref Range    WBC 8.4 4.8 - 10.8 K/uL    RBC 3.23 (L) 4.20 - 5.40 M/uL    Hemoglobin 9.2 (L) 12.0 - 16.0 g/dL    Hematocrit 29.1 (L) 37.0 - 47.0 %    MCV 90.1 81.4 - 97.8 fL    MCH 28.5 27.0 - 33.0 pg "    MCHC 31.6 (L) 32.2 - 35.5 g/dL    RDW 50.6 (H) 35.9 - 50.0 fL    Platelet Count 247 164 - 446 K/uL    MPV 10.4 9.0 - 12.9 fL    Neutrophils-Polys 70.10 44.00 - 72.00 %    Lymphocytes 14.00 (L) 22.00 - 41.00 %    Monocytes 15.40 (H) 0.00 - 13.40 %    Eosinophils 0.00 0.00 - 6.90 %    Basophils 0.10 0.00 - 1.80 %    Immature Granulocytes 0.40 0.00 - 0.90 %    Nucleated RBC 0.00 0.00 - 0.20 /100 WBC    Neutrophils (Absolute) 5.85 1.82 - 7.42 K/uL    Lymphs (Absolute) 1.17 1.00 - 4.80 K/uL    Monos (Absolute) 1.29 (H) 0.00 - 0.85 K/uL    Eos (Absolute) 0.00 0.00 - 0.51 K/uL    Baso (Absolute) 0.01 0.00 - 0.12 K/uL    Immature Granulocytes (abs) 0.03 0.00 - 0.11 K/uL    NRBC (Absolute) 0.00 K/uL   CBC with Differential    Collection Time: 05/01/24  1:40 AM   Result Value Ref Range    WBC 6.9 4.8 - 10.8 K/uL    RBC 3.01 (L) 4.20 - 5.40 M/uL    Hemoglobin 8.5 (L) 12.0 - 16.0 g/dL    Hematocrit 27.2 (L) 37.0 - 47.0 %    MCV 90.4 81.4 - 97.8 fL    MCH 28.2 27.0 - 33.0 pg    MCHC 31.3 (L) 32.2 - 35.5 g/dL    RDW 51.1 (H) 35.9 - 50.0 fL    Platelet Count 207 164 - 446 K/uL    MPV 10.4 9.0 - 12.9 fL    Neutrophils-Polys 56.60 44.00 - 72.00 %    Lymphocytes 27.40 22.00 - 41.00 %    Monocytes 13.70 (H) 0.00 - 13.40 %    Eosinophils 1.60 0.00 - 6.90 %    Basophils 0.30 0.00 - 1.80 %    Immature Granulocytes 0.40 0.00 - 0.90 %    Nucleated RBC 0.00 0.00 - 0.20 /100 WBC    Neutrophils (Absolute) 3.93 1.82 - 7.42 K/uL    Lymphs (Absolute) 1.90 1.00 - 4.80 K/uL    Monos (Absolute) 0.95 (H) 0.00 - 0.85 K/uL    Eos (Absolute) 0.11 0.00 - 0.51 K/uL    Baso (Absolute) 0.02 0.00 - 0.12 K/uL    Immature Granulocytes (abs) 0.03 0.00 - 0.11 K/uL    NRBC (Absolute) 0.00 K/uL   Basic Metabolic Panel    Collection Time: 05/01/24  1:40 AM   Result Value Ref Range    Sodium 135 135 - 145 mmol/L    Potassium 4.1 3.6 - 5.5 mmol/L    Chloride 102 96 - 112 mmol/L    Co2 21 20 - 33 mmol/L    Glucose 109 (H) 65 - 99 mg/dL    Bun 28 (H) 8 - 22 mg/dL     Creatinine 1.20 0.50 - 1.40 mg/dL    Calcium 8.3 (L) 8.5 - 10.5 mg/dL    Anion Gap 12.0 7.0 - 16.0   MAGNESIUM    Collection Time: 05/01/24  1:40 AM   Result Value Ref Range    Magnesium 2.0 1.5 - 2.5 mg/dL   PHOSPHORUS    Collection Time: 05/01/24  1:40 AM   Result Value Ref Range    Phosphorus 4.1 2.5 - 4.5 mg/dL   ESTIMATED GFR    Collection Time: 05/01/24  1:40 AM   Result Value Ref Range    GFR (CKD-EPI) 45 (A) >60 mL/min/1.73 m 2   FERRITIN    Collection Time: 05/01/24  1:40 AM   Result Value Ref Range    Ferritin 173.0 10.0 - 291.0 ng/mL   IRON/TOTAL IRON BIND    Collection Time: 05/01/24  1:40 AM   Result Value Ref Range    Iron 31 (L) 40 - 170 ug/dL    Total Iron Binding 249 (L) 250 - 450 ug/dL    Unsat Iron Binding 218 110 - 370 ug/dL    % Saturation 12 (L) 15 - 55 %   CBC with Differential    Collection Time: 05/02/24  5:49 AM   Result Value Ref Range    WBC 6.5 4.8 - 10.8 K/uL    RBC 2.82 (L) 4.20 - 5.40 M/uL    Hemoglobin 8.1 (L) 12.0 - 16.0 g/dL    Hematocrit 24.9 (L) 37.0 - 47.0 %    MCV 88.3 81.4 - 97.8 fL    MCH 28.7 27.0 - 33.0 pg    MCHC 32.5 32.2 - 35.5 g/dL    RDW 48.8 35.9 - 50.0 fL    Platelet Count 200 164 - 446 K/uL    MPV 11.0 9.0 - 12.9 fL    Neutrophils-Polys 63.70 44.00 - 72.00 %    Lymphocytes 22.60 22.00 - 41.00 %    Monocytes 11.30 0.00 - 13.40 %    Eosinophils 1.60 0.00 - 6.90 %    Basophils 0.30 0.00 - 1.80 %    Immature Granulocytes 0.50 0.00 - 0.90 %    Nucleated RBC 0.00 0.00 - 0.20 /100 WBC    Neutrophils (Absolute) 4.11 1.82 - 7.42 K/uL    Lymphs (Absolute) 1.46 1.00 - 4.80 K/uL    Monos (Absolute) 0.73 0.00 - 0.85 K/uL    Eos (Absolute) 0.10 0.00 - 0.51 K/uL    Baso (Absolute) 0.02 0.00 - 0.12 K/uL    Immature Granulocytes (abs) 0.03 0.00 - 0.11 K/uL    NRBC (Absolute) 0.00 K/uL   Comp Metabolic Panel (CMP)    Collection Time: 05/02/24  5:49 AM   Result Value Ref Range    Sodium 134 (L) 135 - 145 mmol/L    Potassium 4.5 3.6 - 5.5 mmol/L    Chloride 102 96 - 112 mmol/L     Co2 22 20 - 33 mmol/L    Anion Gap 10.0 7.0 - 16.0    Glucose 100 (H) 65 - 99 mg/dL    Bun 25 (H) 8 - 22 mg/dL    Creatinine 0.92 0.50 - 1.40 mg/dL    Calcium 8.3 (L) 8.5 - 10.5 mg/dL    Correct Calcium 8.9 8.5 - 10.5 mg/dL    AST(SGOT) 25 12 - 45 U/L    ALT(SGPT) 5 2 - 50 U/L    Alkaline Phosphatase 53 30 - 99 U/L    Total Bilirubin 0.4 0.1 - 1.5 mg/dL    Albumin 3.2 3.2 - 4.9 g/dL    Total Protein 5.4 (L) 6.0 - 8.2 g/dL    Globulin 2.2 1.9 - 3.5 g/dL    A-G Ratio 1.5 g/dL   Magnesium    Collection Time: 05/02/24  5:49 AM   Result Value Ref Range    Magnesium 2.0 1.5 - 2.5 mg/dL   Vitamin D, 25-hydroxy (blood)    Collection Time: 05/02/24  5:49 AM   Result Value Ref Range    25-Hydroxy   Vitamin D 25 14 (L) 30 - 100 ng/mL   ESTIMATED GFR    Collection Time: 05/02/24  5:49 AM   Result Value Ref Range    GFR (CKD-EPI) 62 >60 mL/min/1.73 m 2       Medications:  Scheduled Medications   Medication Dose Frequency    vitamin D3  1,000 Units DAILY    enoxaparin (LOVENOX) injection  40 mg DAILY AT 1800    ivabradine  5 mg BID WITH MEALS    levothyroxine  150 mcg QAM TU,TH,SA,FALL    [START ON 5/3/2024] levothyroxine  175 mcg MO, WE + FR    losartan  25 mg DAILY    Sotagliflozin  200 mg DAILY    Pharmacy Consult Request  1 Each PHARMACY TO DOSE    senna-docusate  2 Tablet BID    omeprazole  20 mg DAILY    melatonin  4.5 mg QHS     PRN medications: levalbuterol, melatonin, Respiratory Therapy Consult, hydrALAZINE, senna-docusate **AND** polyethylene glycol/lytes, ondansetron **OR** ondansetron, sodium chloride, oxyCODONE immediate-release **OR** oxyCODONE immediate-release **OR** [DISCONTINUED] HYDROmorphone    Diet:  Current Diet Order   Procedures    Diet Order Diet: Regular       Medical Decision Making and Plan:  Closed comminuted supracondylar fracture of femur, left, initial encounter  s/p Open reduction internal fixation left intra-articular distal femur fracture on 4/29 by Dr Tolbert  Weightbearing status: Touchdown  weightbearing left lower extremity for 6 weeks then transition to weightbearing as tolerated, range of motion as tolerated left knee  DVT prophylaxis: SCDs and lovenox until mobilizing well, then aspirin 81mg BID for 4 weeks  Outpatient plan: The patient will follow up in clinic in 2 weeks for wound check, suture/staple removal (if applicable), and xrays.  If the patient is at a facility at that time, wound check and suture/staple removal may be performed by nursing care and the patient should instead follow up at the 6 week post operative darline for repeat clinical check and xrays.        Primary hypertension  As per history.  Blood pressure goal less than 130/80.  Continue home losartan 25 mg daily     Chronic heart failure with preserved ejection fraction   As per history with last echocardiogram in September 2023 EF of 60%.  Appears to be euvolemic.  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      Acute hypoxic Respiratory Distress:  -2/2 surgery + opioid use     Urinary Retension  - Timed voids with PVR q4H x3. If PVR > 400mL or if patient is unable to void, straight cath patient.     Constipation  -  Colace, Senna BID on admission  - Goal of 1BM/day.  -    Circadian Rhythm disorder:   Recommend lights on during the day/off at night, minimize nighttime interruptions as able.     Mood  - at risk of adjustment disorder, depression, and anxiety due to functional decline     ID:  - at risk for Urinary tract infection     Skin/Wounds:  - Pressure relief q2h while in bed. Close monitoring for signs of breakdown     Pain:  - Neuroceptic - continue tylenol and oxycodone prn     DVT prophylaxis:  continue lovenox     GI prophylaxis:  On Prilosec 20mg daily         -Follow-up Ortho    ____________________________________    Miguel Goins MD  Physical Medicine &  Rehabilitation   Brain Injury Medicine   ____________________________________

## 2024-05-02 NOTE — THERAPY
Speech Language Pathology   Initial Assessment     Patient Name: Flavia Garrett  AGE:  84 y.o., SEX:  female  Medical Record #: [id number]  Today's Date: 5/2/2024     Subjective    Patient was willing to participate in cognitive linguistic evaluation. Patient lives alone with IPLOF. Reports ongoing memory decline over a few years, but denies changes to cognition with current hospitalization.      Objective       05/02/24 0932   Evaluation Charges   Charges Yes   SLP Speech Language Evaluation Speech Sound Language Comprehension   SLP Total Time Spent   SLP Individual Total Time Spent (Mins) 60   Prior Living Situation   Prior Services Home-Independent   Housing / Facility 1 Story House   Lives with - Patient's Self Care Capacity Alone and Able to Care For Self   Prior Level Of Function   Communication Within Functional Limits   Swallow Within Functional Limits   Dentition Intact   Dentures None   Hearing Within Functional Limits for Evaluation   Hearing Aid None   Vision Within Functional Limits for Evaluation   Patient's Primary Language English   Occupation (Pre-Hospital Vocational) Retired Due To Age   Functional Level of Assist   Comprehension Independent   Expression Independent   Social Interaction Independent   Problem Solving Modified Independent   Problem Solving Description Increased time   Memory Moderate Assist  (min-mod)   Memory Description Verbal cueing;Increased time;Bed/chair alarm   Outcome Measures   Outcome Measures Utilized SCCAN   SCCAN (Scales of Cognitive and Communicative Ability for Neurorehabilitation)   Oral Expression - Raw Score 19   Oral Expression - Scale Performance Score 100   Orientation - Raw Score 12   Orientation - Scale Performance Score 100   Memory - Raw Score 9   Memory - Scale Performance Score 47   Speech Comprehension - Raw Score 11   Speech Comprehension - Scale Performance Score 85   Reading Comprehension - Raw Score 11   Reading Comprehension - Scale Performance Score 92    Writing - Raw Score 7   Writing - Scale Performance Score 100   Attention - Raw Score 14   Attention - Scale Performance Score 88   Problem Solving - Raw Score 22   Problem Solving - Scale Performance Score 96   SCCAN Total Raw Score 78   SCCAN Degree of Severity Mild Impairment   Problem List   Problem List Cognitive-Linguistic Deficits   Current Discharge Plan   Current Discharge Plan Return to Prior Living Situation   Benefit   Therapy Benefit Patient would not benefit from Inpatient Rehab Speech-Language Pathology.  No further Speech Therapy recommended at this time.   Speech Language Pathologist Assigned   Assigned SLP / Treatment Time / Comments No ST 5/2/24         Assessment    Patient is 84 y.o. female with a diagnosis of femur fracture.  Additional factors influencing patient status/progress (ie: cognitive factors, co-morbidities, social support, etc): IPLOF, lives alone, motivated and cooperative.    Cognitive linguistic evaluation was completed. Patient participated in SCCAN with a final raw score of 78 indicating mild cognitive deficits. Patient demonstrated difficulties on memory subtests, but reports this is baseline. Denies changes to memory with current hospitalization. All other cognitive domains received scores above 84%. Discussed memory strategies with patient and provided education.     Plan  ST is not recommended at this time as patient's cognition is likely at baseline.       Goals:  No ST goals.     Speech Therapy Problems (Active)       There are no active problems.

## 2024-05-02 NOTE — PROGRESS NOTES
NURSING DAILY NOTE    Name: Flavia Velazquez   Date of Admission: 5/1/2024   Admitting Diagnosis: Closed comminuted supracondylar fracture of femur, left, initial encounter ([facility])  Attending Physician: Miguel Goins M.d.  Allergies: Atorvastatin, Hydrochlorothiazide, and Lisinopril    Safety  Patient Assist  max  Patient Precautions     Precaution Comments     Bed Transfer Status     Toilet Transfer Status      Assistive Devices  Rails, Wheelchair  Oxygen  Silicone Nasal Cannula  Diet/Therapeutic Dining  Current Diet Order   Procedures    Diet Order Diet: Regular     Pill Administration  whole  Agitated Behavioral Scale     ABS Level of Severity       Fall Risk  Has the patient had a fall this admission?   No  Kelli Min Fall Risk Scoring  13, MODERATE RISK  Fall Risk Safety Measures  bed alarm, chair alarm, and poor balance    Vitals  Temperature: 36.6 °C (97.8 °F)  Temp src: Oral  Pulse: 83  Respiration: 17  Blood Pressure : 100/50  Blood Pressure MAP (Calculated): 67 MM HG  BP Location: Right, Upper Arm  Patient BP Position: Supine     Oxygen  Pulse Oximetry: 95 %  O2 (LPM): 0.5  O2 Delivery Device: Silicone Nasal Cannula    Bowel and Bladder  Last Bowel Movement  05/29/24  Stool Type     Bowel Device   (no BM since admit)  Continent  Bladder: Continent void   Bowel: Continent movement  Bladder Function  Urine Void (mL): 200 ml  Number of Times Voided: 1  Urine Color: Yellow  Genitourinary Assessment   Bladder Assessment (WDL):  WDL Except  Urine Color: Yellow  Bladder Device:  (no void since admit)  Time Void: Yes  Bladder Scan: Post Void  $ Bladder Scan Results (mL): 308    Skin  Milton Score   15  Sensory Interventions   Bed Types: Standard/Trauma Mattress  Skin Preventative Measures: Waffle Overlay  Moisture Interventions         Pain  Pain Rating Scale  0 - No Pain  Pain Location  Hip  Pain Location Orientation  Left  Pain Interventions   Medication  (see MAR)    ADLs    Bathing      Linen Change      Personal Hygiene     Chlorhexidine Bath      Oral Care     Teeth/Dentures     Shave     Nutrition Percentage Eaten     Environmental Precautions     Patient Turns/Positioning  Patient Turns Self from Side to Side  Patient Turns Assistance/Tolerance     Bed Positions  Bed Controls On  Head of Bed Elevated  Self regulated      Psychosocial/Neurologic Assessment  Psychosocial Assessment  Psychosocial (WDL):  Within Defined Limits  Neurologic Assessment  Neuro (WDL): Exceptions to WDL  Level of Consciousness: Alert  Orientation Level: Oriented X4  Cognition: Follows commands  Speech: Clear  Pupil Assesment: No  Muscle Strength Left Leg: Fair Strength against Gravity but No Resistance  EENT (WDL):  WDL Except    Cardio/Pulmonary Assessment  Edema   RLE Edema: 2+, 3+, Pitting  LLE Edema: 2+, 3+, Pitting  Respiratory Breath Sounds  RUL Breath Sounds: Clear  RML Breath Sounds: Clear  RLL Breath Sounds: Clear  NO Breath Sounds: Clear  LLL Breath Sounds: Clear  Cardiac Assessment   Cardiac (WDL):  WDL Except (hx chf, sinus tach, right bbb, htn)

## 2024-05-03 ENCOUNTER — APPOINTMENT (OUTPATIENT)
Dept: OCCUPATIONAL THERAPY | Facility: REHABILITATION | Age: 84
DRG: 560 | End: 2024-05-03
Attending: PHYSICAL MEDICINE & REHABILITATION
Payer: MEDICARE

## 2024-05-03 ENCOUNTER — APPOINTMENT (OUTPATIENT)
Dept: PHYSICAL THERAPY | Facility: REHABILITATION | Age: 84
DRG: 560 | End: 2024-05-03
Attending: PHYSICAL MEDICINE & REHABILITATION
Payer: MEDICARE

## 2024-05-03 PROCEDURE — 99232 SBSQ HOSP IP/OBS MODERATE 35: CPT | Performed by: PHYSICAL MEDICINE & REHABILITATION

## 2024-05-03 RX ADMIN — Medication 1000 UNITS: at 08:41

## 2024-05-03 RX ADMIN — LEVOTHYROXINE SODIUM 175 MCG: 0.07 TABLET ORAL at 05:10

## 2024-05-03 RX ADMIN — Medication 4.5 MG: at 20:20

## 2024-05-03 RX ADMIN — LOSARTAN POTASSIUM 25 MG: 25 TABLET, FILM COATED ORAL at 05:10

## 2024-05-03 RX ADMIN — OMEPRAZOLE 20 MG: 20 CAPSULE, DELAYED RELEASE ORAL at 08:41

## 2024-05-03 RX ADMIN — SENNOSIDES AND DOCUSATE SODIUM 2 TABLET: 50; 8.6 TABLET ORAL at 08:41

## 2024-05-03 RX ADMIN — ENOXAPARIN SODIUM 40 MG: 100 INJECTION SUBCUTANEOUS at 17:36

## 2024-05-03 RX ADMIN — OXYCODONE 5 MG: 5 TABLET ORAL at 08:44

## 2024-05-03 ASSESSMENT — GAIT ASSESSMENTS
ASSISTIVE DEVICE: PARALLEL BARS;FRONT WHEEL WALKER
GAIT LEVEL OF ASSIST: UNABLE TO PARTICIPATE
DISTANCE (FEET): 8
DEVIATION: STEP TO;ANTALGIC;DECREASED HEEL STRIKE;DECREASED TOE OFF
GAIT LEVEL OF ASSIST: MINIMAL ASSIST

## 2024-05-03 ASSESSMENT — ACTIVITIES OF DAILY LIVING (ADL)
TOILET_TRANSFER_DESCRIPTION: GRAB BAR;INCREASED TIME
TOILET_TRANSFER_DESCRIPTION: GRAB BAR;SUPERVISION FOR SAFETY;VERBAL CUEING;SET-UP OF EQUIPMENT
BED_CHAIR_WHEELCHAIR_TRANSFER_DESCRIPTION: SET-UP OF EQUIPMENT;SQUAT PIVOT TRANSFER TO WHEELCHAIR;SUPERVISION FOR SAFETY;VERBAL CUEING;INCREASED TIME;INITIAL PREPARATION FOR TASK
BED_CHAIR_WHEELCHAIR_TRANSFER_DESCRIPTION: VERBAL CUEING;SUPERVISION FOR SAFETY;ADAPTIVE EQUIPMENT;INCREASED TIME;INITIAL PREPARATION FOR TASK

## 2024-05-03 ASSESSMENT — PAIN DESCRIPTION - PAIN TYPE: TYPE: ACUTE PAIN;SURGICAL PAIN

## 2024-05-03 ASSESSMENT — PAIN SCALES - WONG BAKER: WONGBAKER_NUMERICALRESPONSE: HURTS A LITTLE MORE

## 2024-05-03 NOTE — THERAPY
"Occupational Therapy  Daily Treatment     Patient Name: Flavia Garrett  Age:  84 y.o., Sex:  female  Medical Record #: [id number]  Today's Date: 5/3/2024     Precautions  Precautions: (P) Fall Risk, Toe Touch Weight Bearing Left Lower Extremity    Safety   ADL Safety : Requires Physical Assist for Safety  Bathroom Safety: Requires Physical Assist for Safety    Subjective    \"  Can I use the bathroom too ?\"     Objective       05/03/24 1231   OT Charge Group   OT Self Care / ADL (Units) 1   OT Therapy Activity (Units) 1   OT Total Time Spent   OT Individual Total Time Spent (Mins) 30   Precautions   Precautions Fall Risk;Toe Touch Weight Bearing Left Lower Extremity   Functional Level of Assist   Lower Body Dressing Maximal Assist  (to don slip on shoes)   Toileting Moderate Assist  (fait to goot TTWB  standing during clothing mgmt)   Bed, Chair, Wheelchair Transfer Minimal Assist  (using FWW  performed  2 times   and  once  w/c to bed required mod cues for technique with fair carry over)   Toilet Transfers Minimal Assist  (fair carryover  TTWB  left  LE   using grab bar  would benefit better from using FWW)   Interdisciplinary Plan of Care Collaboration   Patient Position at End of Therapy In Bed;Call Light within Reach;Tray Table within Reach     Supine to sit using leg   incidental touching     Sit to supine  mod assist       Assessment    Limited by decreased strength/endurance    Difficulty  maintaining TTWB and mod cues for technique using FWW for transfers   Strengths: Able to follow instructions, Alert and oriented, Independent prior level of function, Motivated for self care and independence, Pleasant and cooperative, Willingly participates in therapeutic activities  Barriers: Decreased endurance, Fatigue, Generalized weakness, Impaired activity tolerance, Limited mobility, Pain    Plan     ADL  IADL , related mobility and transfer at FWW level   strength/endurance building  standing tolerance and balance " incorporating TTWB  left LE     DME  OT DME Recommendations  Bathroom Equipment:  (TBD)  Additional Equipment:  (TBD)    Occupational Therapy Goals (Active)       Problem: Bathing       Dates: Start:  05/02/24         Goal: STG-Within one week, patient will bathe w/ CGA and AE/DME as needed       Dates: Start:  05/02/24               Problem: Dressing       Dates: Start:  05/02/24         Goal: STG-Within one week, patient will dress LB w/ mod A and AE as needed       Dates: Start:  05/02/24               Problem: Functional Transfers       Dates: Start:  05/02/24         Goal: STG-Within one week, patient will transfer consistently w/ CGA and LRAD       Dates: Start:  05/02/24               Problem: IADL's       Dates: Start:  05/02/24         Goal: STG-Within one week, patient will prepare a meal w/ min A and LRAD       Dates: Start:  05/02/24               Problem: OT Long Term Goals       Dates: Start:  05/02/24         Goal: LTG-By discharge, patient will complete basic self care tasks w/ mod I and AE/DME as needed       Dates: Start:  05/02/24            Goal: LTG-By discharge, patient will complete basic home management w/ SPV and LRAD       Dates: Start:  05/02/24               Problem: Toileting       Dates: Start:  05/02/24         Goal: STG-Within one week, patient will complete toileting tasks w/ mod A and AE/DME as needed       Dates: Start:  05/02/24

## 2024-05-03 NOTE — PROGRESS NOTES
"  Physical Medicine & Rehabilitation Progress Note    Encounter Date: 5/3/2024    Chief Complaint: Weakness    Interval Events (Subjective):  Seen in bed. Blood pressure and light headedness improved today. Continues to have controlled pain.    Objective:  VITAL SIGNS: /78   Pulse 74   Temp 36.6 °C (97.8 °F) (Oral)   Resp 18   Ht 1.499 m (4' 11\")   Wt 80 kg (176 lb 5.9 oz)   SpO2 92%   BMI 35.62 kg/m²   Gen: No acute distress, well developed well nourished adult  HEENT: Normal Cephalic Atraumatic, Normal conjunctiva.   CV: warm extremities, well perfused, no edema  Resp: symmetric chest rise, breathing comfortably on room air  Abd: Soft, Non distended  Extremities: normal bulk, no atrophy  Skin: no visible rashes or lesions.   Neuro: alert, awake  Psych: Mood and affect appropriate and congruent    Laboratory Values:  Recent Results (from the past 72 hour(s))   CBC with Differential    Collection Time: 05/01/24  1:40 AM   Result Value Ref Range    WBC 6.9 4.8 - 10.8 K/uL    RBC 3.01 (L) 4.20 - 5.40 M/uL    Hemoglobin 8.5 (L) 12.0 - 16.0 g/dL    Hematocrit 27.2 (L) 37.0 - 47.0 %    MCV 90.4 81.4 - 97.8 fL    MCH 28.2 27.0 - 33.0 pg    MCHC 31.3 (L) 32.2 - 35.5 g/dL    RDW 51.1 (H) 35.9 - 50.0 fL    Platelet Count 207 164 - 446 K/uL    MPV 10.4 9.0 - 12.9 fL    Neutrophils-Polys 56.60 44.00 - 72.00 %    Lymphocytes 27.40 22.00 - 41.00 %    Monocytes 13.70 (H) 0.00 - 13.40 %    Eosinophils 1.60 0.00 - 6.90 %    Basophils 0.30 0.00 - 1.80 %    Immature Granulocytes 0.40 0.00 - 0.90 %    Nucleated RBC 0.00 0.00 - 0.20 /100 WBC    Neutrophils (Absolute) 3.93 1.82 - 7.42 K/uL    Lymphs (Absolute) 1.90 1.00 - 4.80 K/uL    Monos (Absolute) 0.95 (H) 0.00 - 0.85 K/uL    Eos (Absolute) 0.11 0.00 - 0.51 K/uL    Baso (Absolute) 0.02 0.00 - 0.12 K/uL    Immature Granulocytes (abs) 0.03 0.00 - 0.11 K/uL    NRBC (Absolute) 0.00 K/uL   Basic Metabolic Panel    Collection Time: 05/01/24  1:40 AM   Result Value Ref Range "    Sodium 135 135 - 145 mmol/L    Potassium 4.1 3.6 - 5.5 mmol/L    Chloride 102 96 - 112 mmol/L    Co2 21 20 - 33 mmol/L    Glucose 109 (H) 65 - 99 mg/dL    Bun 28 (H) 8 - 22 mg/dL    Creatinine 1.20 0.50 - 1.40 mg/dL    Calcium 8.3 (L) 8.5 - 10.5 mg/dL    Anion Gap 12.0 7.0 - 16.0   MAGNESIUM    Collection Time: 05/01/24  1:40 AM   Result Value Ref Range    Magnesium 2.0 1.5 - 2.5 mg/dL   PHOSPHORUS    Collection Time: 05/01/24  1:40 AM   Result Value Ref Range    Phosphorus 4.1 2.5 - 4.5 mg/dL   ESTIMATED GFR    Collection Time: 05/01/24  1:40 AM   Result Value Ref Range    GFR (CKD-EPI) 45 (A) >60 mL/min/1.73 m 2   FERRITIN    Collection Time: 05/01/24  1:40 AM   Result Value Ref Range    Ferritin 173.0 10.0 - 291.0 ng/mL   IRON/TOTAL IRON BIND    Collection Time: 05/01/24  1:40 AM   Result Value Ref Range    Iron 31 (L) 40 - 170 ug/dL    Total Iron Binding 249 (L) 250 - 450 ug/dL    Unsat Iron Binding 218 110 - 370 ug/dL    % Saturation 12 (L) 15 - 55 %   CBC with Differential    Collection Time: 05/02/24  5:49 AM   Result Value Ref Range    WBC 6.5 4.8 - 10.8 K/uL    RBC 2.82 (L) 4.20 - 5.40 M/uL    Hemoglobin 8.1 (L) 12.0 - 16.0 g/dL    Hematocrit 24.9 (L) 37.0 - 47.0 %    MCV 88.3 81.4 - 97.8 fL    MCH 28.7 27.0 - 33.0 pg    MCHC 32.5 32.2 - 35.5 g/dL    RDW 48.8 35.9 - 50.0 fL    Platelet Count 200 164 - 446 K/uL    MPV 11.0 9.0 - 12.9 fL    Neutrophils-Polys 63.70 44.00 - 72.00 %    Lymphocytes 22.60 22.00 - 41.00 %    Monocytes 11.30 0.00 - 13.40 %    Eosinophils 1.60 0.00 - 6.90 %    Basophils 0.30 0.00 - 1.80 %    Immature Granulocytes 0.50 0.00 - 0.90 %    Nucleated RBC 0.00 0.00 - 0.20 /100 WBC    Neutrophils (Absolute) 4.11 1.82 - 7.42 K/uL    Lymphs (Absolute) 1.46 1.00 - 4.80 K/uL    Monos (Absolute) 0.73 0.00 - 0.85 K/uL    Eos (Absolute) 0.10 0.00 - 0.51 K/uL    Baso (Absolute) 0.02 0.00 - 0.12 K/uL    Immature Granulocytes (abs) 0.03 0.00 - 0.11 K/uL    NRBC (Absolute) 0.00 K/uL   Comp Metabolic  Panel (CMP)    Collection Time: 05/02/24  5:49 AM   Result Value Ref Range    Sodium 134 (L) 135 - 145 mmol/L    Potassium 4.5 3.6 - 5.5 mmol/L    Chloride 102 96 - 112 mmol/L    Co2 22 20 - 33 mmol/L    Anion Gap 10.0 7.0 - 16.0    Glucose 100 (H) 65 - 99 mg/dL    Bun 25 (H) 8 - 22 mg/dL    Creatinine 0.92 0.50 - 1.40 mg/dL    Calcium 8.3 (L) 8.5 - 10.5 mg/dL    Correct Calcium 8.9 8.5 - 10.5 mg/dL    AST(SGOT) 25 12 - 45 U/L    ALT(SGPT) 5 2 - 50 U/L    Alkaline Phosphatase 53 30 - 99 U/L    Total Bilirubin 0.4 0.1 - 1.5 mg/dL    Albumin 3.2 3.2 - 4.9 g/dL    Total Protein 5.4 (L) 6.0 - 8.2 g/dL    Globulin 2.2 1.9 - 3.5 g/dL    A-G Ratio 1.5 g/dL   Magnesium    Collection Time: 05/02/24  5:49 AM   Result Value Ref Range    Magnesium 2.0 1.5 - 2.5 mg/dL   Vitamin D, 25-hydroxy (blood)    Collection Time: 05/02/24  5:49 AM   Result Value Ref Range    25-Hydroxy   Vitamin D 25 14 (L) 30 - 100 ng/mL   ESTIMATED GFR    Collection Time: 05/02/24  5:49 AM   Result Value Ref Range    GFR (CKD-EPI) 62 >60 mL/min/1.73 m 2       Medications:  Scheduled Medications   Medication Dose Frequency    vitamin D3  1,000 Units DAILY    Sotagliflozin  200 mg DAILY    enoxaparin (LOVENOX) injection  40 mg DAILY AT 1800    ivabradine  5 mg BID WITH MEALS    levothyroxine  150 mcg QAM TU,TH,SA,FALL    levothyroxine  175 mcg MO, WE + FR    losartan  25 mg DAILY    Pharmacy Consult Request  1 Each PHARMACY TO DOSE    senna-docusate  2 Tablet BID    omeprazole  20 mg DAILY    melatonin  4.5 mg QHS     PRN medications: acetaminophen, magnesium hydroxide, levalbuterol, melatonin, Respiratory Therapy Consult, hydrALAZINE, senna-docusate **AND** polyethylene glycol/lytes, ondansetron **OR** ondansetron, sodium chloride, oxyCODONE immediate-release **OR** oxyCODONE immediate-release **OR** [DISCONTINUED] HYDROmorphone    Diet:  Current Diet Order   Procedures    Diet Order Diet: Regular       Medical Decision Making and Plan:  Closed comminuted  supracondylar fracture of femur, left, initial encounter  s/p Open reduction internal fixation left intra-articular distal femur fracture on 4/29 by Dr Tolbert  Weightbearing status: Touchdown weightbearing left lower extremity for 6 weeks then transition to weightbearing as tolerated, range of motion as tolerated left knee  DVT prophylaxis: SCDs and lovenox until mobilizing well, then aspirin 81mg BID for 4 weeks  Outpatient plan: The patient will follow up in clinic in 2 weeks for wound check, suture/staple removal (if applicable), and xrays.  If the patient is at a facility at that time, wound check and suture/staple removal may be performed by nursing care and the patient should instead follow up at the 6 week post operative darline for repeat clinical check and xrays.        Primary hypertension  As per history.  Blood pressure goal less than 130/80.  Continue home losartan 25 mg daily     Chronic heart failure with preserved ejection fraction   As per history with last echocardiogram in September 2023 EF of 60%.  Appears to be euvolemic.  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      Acute hypoxic Respiratory Distress:  -2/2 surgery + opioid use     Urinary Retension  - Timed voids with PVR q4H x3. If PVR > 400mL or if patient is unable to void, straight cath patient.     Constipation  -  Colace, Senna BID on admission  - Goal of 1BM/day.  -    Circadian Rhythm disorder:   Recommend lights on during the day/off at night, minimize nighttime interruptions as able.     Mood  - at risk of adjustment disorder, depression, and anxiety due to functional decline     ID:  - at risk for Urinary tract infection     Skin/Wounds:  - Pressure relief q2h while in bed. Close monitoring for signs of breakdown     Pain:  - Neuroceptic - continue tylenol and oxycodone  prn     DVT prophylaxis:  continue lovenox     GI prophylaxis:  On Prilosec 20mg daily         -Follow-up Ortho       ____________________________________    Miguel Goins MD  Physical Medicine & Rehabilitation   Brain Injury Medicine   ____________________________________

## 2024-05-03 NOTE — PROGRESS NOTES
NURSING DAILY NOTE    Name: Flavia Garrett   Date of Admission: 5/1/2024   Admitting Diagnosis: Closed comminuted supracondylar fracture of femur, left, initial encounter ([facility])  Attending Physician: Miguel Goins M.d.  Allergies: Atorvastatin, Hydrochlorothiazide, and Lisinopril    Safety  Patient Assist  mod-max  Patient Precautions  Fall Risk, Toe Touch Weight Bearing Left Lower Extremity  Precaution Comments     Bed Transfer Status  Moderate Assist  Toilet Transfer Status   Minimal Assist  Assistive Devices  Rails, Wheelchair  Oxygen  None - Room Air  Diet/Therapeutic Dining  Current Diet Order   Procedures    Diet Order Diet: Regular     Pill Administration  whole  Agitated Behavioral Scale     ABS Level of Severity       Fall Risk  Has the patient had a fall this admission?   No  Kelli Min Fall Risk Scoring  15, HIGH RISK  Fall Risk Safety Measures  Bed alarm, chair alarm.  Vitals  Temperature: 36.6 °C (97.8 °F)  Temp src: Oral  Pulse: 86  Respiration: 18  Blood Pressure : 102/62  Blood Pressure MAP (Calculated): 75 MM HG  BP Location: Left, Upper Arm  Patient BP Position: Supine     Oxygen  Pulse Oximetry: 93 %  O2 (LPM): 0  O2 Delivery Device: None - Room Air    Bowel and Bladder  Last Bowel Movement  04/29/24  Stool Type     Bowel Device   (no BM since admit)  Continent  Bladder: Continent void   Bowel: Continent movement  Bladder Function  Urine Void (mL): 200 ml  Number of Times Voided: 1  Urine Color: Yellow  Genitourinary Assessment   Bladder Assessment (WDL):  WDL Except  Urine Color: Yellow  Bladder Device: Bathroom  Time Void: Yes  Bladder Scan: Post Void  $ Bladder Scan Results (mL): 292    Skin  Milton Score   16  Sensory Interventions   Bed Types: Standard/Trauma Mattress  Skin Preventative Measures: Pillows in Use for Support / Positioning  Moisture Interventions  Moisturizers/Barriers: Barrier Wipes      Pain  Pain Rating Scale  0 - No  Pain  Pain Location  Back  Pain Location Orientation  Lower  Pain Interventions   Medication (see MAR)    ADLs    Bathing    (shower with ot)  Linen Change      Personal Hygiene     Chlorhexidine Bath      Oral Care     Teeth/Dentures     Shave     Nutrition Percentage Eaten  Between 25-50% Consumed  Environmental Precautions     Patient Turns/Positioning  Patient Turns Self from Side to Side  Patient Turns Assistance/Tolerance     Bed Positions  Bed Controls On  Head of Bed Elevated  Self regulated      Psychosocial/Neurologic Assessment  Psychosocial Assessment  Psychosocial (WDL):  Within Defined Limits  Neurologic Assessment  Neuro (WDL): Exceptions to WDL  Level of Consciousness: Alert  Orientation Level: Oriented X4  Cognition: Follows commands  Speech: Clear  Pupil Assesment: No  Muscle Strength Left Leg: Fair Strength against Gravity but No Resistance  EENT (WDL):  WDL Except    Cardio/Pulmonary Assessment  Edema   RLE Edema: 2+, 3+, Pitting  LLE Edema: 2+, 3+, Pitting  Respiratory Breath Sounds  RUL Breath Sounds: Clear  RML Breath Sounds: Clear  RLL Breath Sounds: Clear  NO Breath Sounds: Clear  LLL Breath Sounds: Clear  Cardiac Assessment   Cardiac (WDL):  WDL Except (hx chf, sinus tach, right bbb, htn)

## 2024-05-03 NOTE — CARE PLAN
The patient is Stable - Low risk of patient condition declining or worsening         Progress made toward(s) clinical / shift goals:    Problem: Knowledge Deficit - Standard  Goal: Patient and family/care givers will demonstrate understanding of plan of care, disease process/condition, diagnostic tests and medications  Outcome: Progressing     Problem: Skin Integrity  Goal: Skin integrity is maintained or improved  Outcome: Progressing     Problem: Fall Risk - Rehab  Goal: Patient will remain free from falls  Outcome: Progressing

## 2024-05-03 NOTE — CARE PLAN
The patient is Stable - Low risk of patient condition declining or worsening    Problem: Knowledge Deficit - Standard  Goal: Patient and family/care givers will demonstrate understanding of plan of care, disease process/condition, diagnostic tests and medications  Outcome: Progressing. Reviewed POC, all questions answered.        Problem: Fall Risk - Rehab  Goal: Patient will remain free from falls  Outcome: Progressing. Call light within reach, pt educated to use for assistance for safe transferring.      Shift Goals  Clinical Goals: Safety  Patient Goals: participate in therapy

## 2024-05-03 NOTE — THERAPY
"Occupational Therapy  Daily Treatment     Patient Name: Flavia Garrett  Age:  84 y.o., Sex:  female  Medical Record #: [id number]  Today's Date: 5/3/2024     Precautions  Precautions: Fall Risk, Toe Touch Weight Bearing Left Lower Extremity    Safety   ADL Safety : Requires Physical Assist for Safety  Bathroom Safety: Requires Physical Assist for Safety    Subjective    \"This dress is just easier with the urinal.\"  (Patient encouraged to discharge use of the urinal during daytime hours)      Objective       05/03/24 0701   OT Charge Group   Charges Yes   OT Self Care / ADL (Units) 2   OT Therapy Activity (Units) 2   OT Total Time Spent   OT Individual Total Time Spent (Mins) 60   Precautions   Precautions Fall Risk;Toe Touch Weight Bearing Left Lower Extremity   Safety    ADL Safety  Requires Physical Assist for Safety   Bathroom Safety Requires Physical Assist for Safety   Pain   Pain Scales Patten Baker Scale   Pain 0 - 10 Group   Location Leg   Location Orientation Left   Description Aching   Therapist Pain Assessment Prior to Activity   Non Verbal Descriptors   Non Verbal Scale  Calm   Pain- Patten Baker Scale Group   Patten-Gunter Scale  4    Cognition    Cognition / Consciousness WDL   Level of Consciousness Alert   Sleep/Wake Cycle   Sleep & Rest Awake   Functional Level of Assist   Grooming Seated;Modified Independent   Grooming Description Initial preparation for task;Seated in wheelchair at sink   Lower Body Dressing Maximal Assist   Lower Body Dressing Description Increased time;Assist with threading into pant leg;Grab bar  (LLE threading assist, assist donning over hips in standing while maintaining weightbearing precautions)   Toileting Moderate Assist   Toileting Description Grab bar;Assist for hygiene   Bed, Chair, Wheelchair Transfer Minimal Assist   Bed Chair Wheelchair Transfer Description Increased time;Verbal cueing;Supervision for safety;Squat pivot transfer to wheelchair   Toilet Transfers Minimal Assist "   Toilet Transfer Description Grab bar;Increased time   Balance   Sitting Balance (Static) Fair +   Sitting Balance (Dynamic) Fair +   Standing Balance (Static) Fair -   Standing Balance (Dynamic) Poor   Weight Shift Sitting Fair   Weight Shift Standing Poor   Skilled Intervention Compensatory Strategies   Bed Mobility    Supine to Sit Minimal Assist   Scooting Minimal Assist         Assessment    Patient demonstrating improved TTWB LLE with stand pivot transfers and w/c mobilty.  Based on current presentation and anticipated restrictions on LLE weightbearing, patient will likely be w/c bound for next 6 weeks.  Standing LB dressing continues to require increased assistance in standing due to BUE demands to maintain balance with weightbearing precautions. W/C mobility education provided with fair overall carryover.     Strengths: Able to follow instructions, Alert and oriented, Independent prior level of function, Motivated for self care and independence, Pleasant and cooperative, Willingly participates in therapeutic activities  Barriers: Decreased endurance, Fatigue, Generalized weakness, Impaired activity tolerance, Limited mobility, Pain    Plan    Continue BUE strengthening, endurance building, ADL retraining, functional bathroom and bedroom transfers    DME  OT DME Recommendations  Bathroom Equipment:  (TBD)  Additional Equipment:  (TBD)    Passport items to be completed:  Perform bathroom transfers, complete dressing, complete feeding, get ready for the day, prepare a simple meal, participate in household tasks, adapt home for safety needs, demonstrate home exercise program, complete caregiver training     Occupational Therapy Goals (Active)       Problem: Bathing       Dates: Start:  05/02/24         Goal: STG-Within one week, patient will bathe w/ CGA and AE/DME as needed       Dates: Start:  05/02/24               Problem: Dressing       Dates: Start:  05/02/24         Goal: STG-Within one week, patient  will dress LB w/ mod A and AE as needed       Dates: Start:  05/02/24               Problem: Functional Transfers       Dates: Start:  05/02/24         Goal: STG-Within one week, patient will transfer consistently w/ CGA and LRAD       Dates: Start:  05/02/24               Problem: IADL's       Dates: Start:  05/02/24         Goal: STG-Within one week, patient will prepare a meal w/ min A and LRAD       Dates: Start:  05/02/24               Problem: OT Long Term Goals       Dates: Start:  05/02/24         Goal: LTG-By discharge, patient will complete basic self care tasks w/ mod I and AE/DME as needed       Dates: Start:  05/02/24            Goal: LTG-By discharge, patient will complete basic home management w/ SPV and LRAD       Dates: Start:  05/02/24               Problem: Toileting       Dates: Start:  05/02/24         Goal: STG-Within one week, patient will complete toileting tasks w/ mod A and AE/DME as needed       Dates: Start:  05/02/24

## 2024-05-03 NOTE — THERAPY
Physical Therapy   Daily Treatment     Patient Name: Flavia Garrett  Age:  84 y.o., Sex:  female  Medical Record #: [id number]  Today's Date: 5/3/2024     Precautions  Precautions: Fall Risk, Toe Touch Weight Bearing Left Lower Extremity  Comments: pt is very anxious    Subjective    Pt is in her bed and agreeable to participate in therapy.     Objective       05/03/24 1401   PT Charge Group   PT Gait Training (Units) 2   PT Therapeutic Exercise (Units) 1   PT Therapeutic Activities (Units) 1   PT Total Time Spent   PT Individual Total Time Spent (Mins) 60   Precautions   Precautions Fall Risk;Toe Touch Weight Bearing Left Lower Extremity   Comments pt is very anxious   Vitals   Pulse 91   Patient BP Position Sitting   Blood Pressure  107/58   Respiration 20   Pulse Oximetry 96 %   O2 (LPM) 0   O2 Delivery Device None - Room Air   Pain   Intervention Medication (see MAR)   Pain 0 - 10 Group   Location Leg;Knee   Location Orientation Left   Pain Rating Scale (NPRS) 4   Description Aching   Comfort Goal Comfort with Movement   Therapist Pain Assessment Prior to Activity;During Activity   Gait Functional Level of Assist    Gait Level Of Assist Minimal Assist   Assistive Device Parallel Bars;Front Wheel Walker   Distance (Feet) 8  (10' using FWW on 4th bout)   # of Times Distance was Traveled 3   Deviation Step To;Antalgic;Decreased Heel Strike;Decreased Toe Off  (TTWB L LE)   Transfer Functional Level of Assist   Bed, Chair, Wheelchair Transfer Minimal Assist   Bed Chair Wheelchair Transfer Description Verbal cueing;Supervision for safety;Adaptive equipment;Increased time;Initial preparation for task  (stand pivot transfer)   Supine Lower Body Exercise   Heel Slide 1 set of 10;Left   Quadriceps Isometrics 1 set of 10;Bilateral   Interdisciplinary Plan of Care Collaboration   IDT Collaboration with  Therapy Tech;Certified Nursing Assistant   Patient Position at End of Therapy Seated;Edge of Bed;Call Light within Reach;Tray  Table within Reach   Collaboration Comments assist in tx; handing off care to cna         Assessment    Performed supine thera ex in bed before transfer to loosen up L knee joint. Pt instructed in stand pivot transfer bed<>wc using FWW, TTWB L LE, Min A with cueing wb compliance and sequencing. Gait training in // bars x 8' x 3 sets, CGA-Min A. PT demonstrated and provided cueing for sequencing, TTWB L and positioning. Transition gait training using FWW x 10', Min A with cueing for sequencing and walker management. Pt was anxious during the session and occasionally  hyperventilating. Her VS are WNL.     Strengths: Able to follow instructions, Alert and oriented, Effective communication skills, Independent prior level of function, Pleasant and cooperative  Barriers: Decreased endurance, Fatigue, Generalized weakness, Impaired activity tolerance, Impaired balance, Pain    Plan    AAROM L knee to tolerance  L LE therex  Stand pivot transfers using FWW  Pre gait // bars L LE TTWB  Gait training using FWW       DME  PT DME Recommendations  Additional Equipment:  (TBD)    Passport items to be completed:  Get in/out of bed safely, in/out of a vehicle, safely use mobility device, walk or wheel around home/community, navigate up and down stairs, show how to get up/down from the ground, ensure home is accessible, demonstrate HEP, complete caregiver training    Physical Therapy Problems (Active)       Problem: Mobility       Dates: Start:  05/02/24         Goal: STG-Within one week, patient will propel wheelchair household distances x 50 feet, SBA       Dates: Start:  05/02/24            Goal: STG-Within one week, patient will ambulate household distance x 50 feet using FWW, CGA maintaining TTWB on L LE       Dates: Start:  05/02/24               Problem: Mobility Transfers       Dates: Start:  05/02/24         Goal: STG-Within one week, patient will perform bed mobility, CGA       Dates: Start:  05/02/24            Goal:  STG-Within one week, patient will transfer bed to chair, CGA       Dates: Start:  05/02/24               Problem: PT-Long Term Goals       Dates: Start:  05/02/24         Goal: LTG-By discharge, patient will ambulate x 150 feet using FWW, SUP       Dates: Start:  05/02/24            Goal: LTG-By discharge, patient will transfer one surface to another, SUP using FWW       Dates: Start:  05/02/24            Goal: LTG-By discharge, patient will ambulate up/down a curb using FWW, SUP       Dates: Start:  05/02/24            Goal: LTG-By discharge, patient will transfer in/out of a car, SUP       Dates: Start:  05/02/24

## 2024-05-03 NOTE — THERAPY
"Physical Therapy   Daily Treatment     Patient Name: Flavia Garrett  Age:  84 y.o., Sex:  female  Medical Record #: [id number]  Today's Date: 5/3/2024     Precautions  Precautions: Fall Risk, Toe Touch Weight Bearing Left Lower Extremity    Subjective    \"How long is this going to hurt this bad?\" \"How I am supposed to be able to walk?\"     Objective       05/03/24 0930   PT Charge Group   PT Therapeutic Exercise (Units) 1   PT Therapeutic Activities (Units) 1   PT Total Time Spent   PT Individual Total Time Spent (Mins) 30   Pain 0 - 10 Group   Location Leg;Knee   Location Orientation Left   Pain Rating Scale (NPRS) 4   Description Aching   Therapist Pain Assessment Prior to Activity   Gait Functional Level of Assist    Gait Level Of Assist Unable to Participate   Transfer Functional Level of Assist   Bed, Chair, Wheelchair Transfer Minimal Assist   Bed Chair Wheelchair Transfer Description Set-up of equipment;Squat pivot transfer to wheelchair;Supervision for safety;Verbal cueing;Increased time;Initial preparation for task   Toilet Transfers Minimal Assist   Toilet Transfer Description Grab bar;Supervision for safety;Verbal cueing;Set-up of equipment   Standing Lower Body Exercises   Hip Flexion 1 set of 10;Left   Hip Abduction 1 set of 10;Left   Bed Mobility    Supine to Sit Contact Guard Assist   Sit to Stand Minimal Assist  (pull to stand)   Neuro-Muscular Treatments   Comments pre gait parallel bars with emphasis on maintaining L LE TTWB, unable to progress R LE   Interdisciplinary Plan of Care Collaboration   IDT Collaboration with  Certified Nursing Assistant   Patient Position at End of Therapy Seated;Chair Alarm On;Self Releasing Lap Belt Applied;Call Light within Reach;Tray Table within Reach;Phone within Reach   Collaboration Comments handoff of care     Completed L knee flexion PROM to tolerance (approximately 60deg) prior to mobilizing OOB. Educated pt in performing heel slides periodically to maintain " flexibility  Educated pt on rehab timeline and anticipated level of function by DC    Assessment    Pt was less limited by pain than on initial evaluation. She is unable to maintain L LE TTWB to progress R Le with pregait in the parallel bars at this time, but has fair potential to ambulate short household distances prior to DC.  Strengths: Able to follow instructions, Alert and oriented, Effective communication skills, Independent prior level of function, Pleasant and cooperative  Barriers: Decreased endurance, Fatigue, Generalized weakness, Impaired activity tolerance, Impaired balance, Pain    Plan    PROM L knee to tolerance  L LE therex  Stand pivot transfers progressing to FWW as appropriate  Pre gait // bars L LE TTWB    DME  PT DME Recommendations  Additional Equipment:  (TBD)    Passport items to be completed:  Get in/out of bed safely, in/out of a vehicle, safely use mobility device, walk or wheel around home/community, navigate up and down stairs, show how to get up/down from the ground, ensure home is accessible, demonstrate HEP, complete caregiver training    Physical Therapy Problems (Active)       Problem: Mobility       Dates: Start:  05/02/24         Goal: STG-Within one week, patient will propel wheelchair household distances x 50 feet, SBA       Dates: Start:  05/02/24            Goal: STG-Within one week, patient will ambulate household distance x 50 feet using FWW, CGA maintaining TTWB on L LE       Dates: Start:  05/02/24               Problem: Mobility Transfers       Dates: Start:  05/02/24         Goal: STG-Within one week, patient will perform bed mobility, CGA       Dates: Start:  05/02/24            Goal: STG-Within one week, patient will transfer bed to chair, CGA       Dates: Start:  05/02/24               Problem: PT-Long Term Goals       Dates: Start:  05/02/24         Goal: LTG-By discharge, patient will ambulate x 150 feet using FWW, SUP       Dates: Start:  05/02/24             Goal: LTG-By discharge, patient will transfer one surface to another, SUP using FWW       Dates: Start:  05/02/24            Goal: LTG-By discharge, patient will ambulate up/down a curb using FWW, SUP       Dates: Start:  05/02/24            Goal: LTG-By discharge, patient will transfer in/out of a car, SUP       Dates: Start:  05/02/24

## 2024-05-03 NOTE — PROGRESS NOTES
NURSING DAILY NOTE    Name: Flavia Garrett   Date of Admission: 5/1/2024   Admitting Diagnosis: Closed comminuted supracondylar fracture of femur, left, initial encounter ([facility])  Attending Physician: Miguel Goins M.d.  Allergies: Atorvastatin, Hydrochlorothiazide, and Lisinopril    Safety  Patient Assist  mod-max  Patient Precautions  Fall Risk, Toe Touch Weight Bearing Left Lower Extremity  Precaution Comments     Bed Transfer Status  Moderate Assist  Toilet Transfer Status   Minimal Assist  Assistive Devices  Rails, Wheelchair  Oxygen  None - Room Air  Diet/Therapeutic Dining  Current Diet Order   Procedures    Diet Order Diet: Regular     Pill Administration  whole  Agitated Behavioral Scale     ABS Level of Severity       Fall Risk  Has the patient had a fall this admission?   No  Kelli Min Fall Risk Scoring  15, HIGH RISK  Fall Risk Safety Measures  bed alarm and chair alarm    Vitals  Temperature: 36.6 °C (97.8 °F)  Temp src: Oral  Pulse: 86  Respiration: 18  Blood Pressure : 102/62  Blood Pressure MAP (Calculated): 75 MM HG  BP Location: Left, Upper Arm  Patient BP Position: Supine     Oxygen  Pulse Oximetry: 93 %  O2 (LPM): 0  O2 Delivery Device: None - Room Air    Bowel and Bladder  Last Bowel Movement  04/29/24  Stool Type     Bowel Device   (no BM since admit)  Continent  Bladder: Continent void   Bowel: Continent movement  Bladder Function  Urine Void (mL): 150 ml  Number of Times Voided: 1  Urine Color: Yellow  Genitourinary Assessment   Bladder Assessment (WDL):  WDL Except  Urine Color: Yellow  Bladder Device: Bathroom  Time Void: Yes  Bladder Scan: Post Void  $ Bladder Scan Results (mL): 200    Skin  Milton Score   16  Sensory Interventions   Bed Types: Standard/Trauma Mattress  Skin Preventative Measures: Pillows in Use for Support / Positioning, Waffle Overlay  Moisture Interventions  Moisturizers/Barriers: Barrier Wipes      Pain  Pain  Rating Scale  2 - Notice Pain, does not interfere with activities  Pain Location  Back  Pain Location Orientation  Lower  Pain Interventions   Medication (see MAR)    ADLs    Bathing    (shower with ot)  Linen Change      Personal Hygiene     Chlorhexidine Bath      Oral Care     Teeth/Dentures     Shave     Nutrition Percentage Eaten  Between 25-50% Consumed  Environmental Precautions     Patient Turns/Positioning  Patient Turns Self from Side to Side  Patient Turns Assistance/Tolerance     Bed Positions  Bed Controls On  Head of Bed Elevated  Self regulated      Psychosocial/Neurologic Assessment  Psychosocial Assessment  Psychosocial (WDL):  Within Defined Limits  Neurologic Assessment  Neuro (WDL): Exceptions to WDL  Level of Consciousness: Alert  Orientation Level: Oriented X4  Cognition: Follows commands  Speech: Clear  Pupil Assesment: No  Muscle Strength Left Leg: Fair Strength against Gravity but No Resistance  EENT (WDL):  WDL Except    Cardio/Pulmonary Assessment  Edema   RLE Edema: 2+, 3+, Pitting  LLE Edema: 2+, 3+, Pitting  Respiratory Breath Sounds  RUL Breath Sounds: Clear  RML Breath Sounds: Clear  RLL Breath Sounds: Clear  NO Breath Sounds: Clear  LLL Breath Sounds: Clear  Cardiac Assessment   Cardiac (WDL):  WDL Except (hx chf, sinus tach, right bbb, htn)

## 2024-05-04 ENCOUNTER — APPOINTMENT (OUTPATIENT)
Dept: OCCUPATIONAL THERAPY | Facility: REHABILITATION | Age: 84
DRG: 560 | End: 2024-05-04
Attending: PHYSICAL MEDICINE & REHABILITATION
Payer: MEDICARE

## 2024-05-04 ENCOUNTER — APPOINTMENT (OUTPATIENT)
Dept: PHYSICAL THERAPY | Facility: REHABILITATION | Age: 84
DRG: 560 | End: 2024-05-04
Attending: PHYSICAL MEDICINE & REHABILITATION
Payer: MEDICARE

## 2024-05-04 RX ADMIN — Medication 1000 UNITS: at 08:22

## 2024-05-04 RX ADMIN — SENNOSIDES AND DOCUSATE SODIUM 2 TABLET: 50; 8.6 TABLET ORAL at 08:22

## 2024-05-04 RX ADMIN — LOSARTAN POTASSIUM 25 MG: 25 TABLET, FILM COATED ORAL at 05:32

## 2024-05-04 RX ADMIN — Medication 4.5 MG: at 20:26

## 2024-05-04 RX ADMIN — OMEPRAZOLE 20 MG: 20 CAPSULE, DELAYED RELEASE ORAL at 08:21

## 2024-05-04 RX ADMIN — LEVOTHYROXINE SODIUM 150 MCG: 0.07 TABLET ORAL at 05:33

## 2024-05-04 RX ADMIN — ACETAMINOPHEN 650 MG: 325 TABLET ORAL at 13:11

## 2024-05-04 RX ADMIN — ENOXAPARIN SODIUM 40 MG: 100 INJECTION SUBCUTANEOUS at 17:26

## 2024-05-04 ASSESSMENT — ACTIVITIES OF DAILY LIVING (ADL)
TOILET_TRANSFER_DESCRIPTION: GRAB BAR;INCREASED TIME;INITIAL PREPARATION FOR TASK;SET-UP OF EQUIPMENT;SUPERVISION FOR SAFETY;VERBAL CUEING
BED_CHAIR_WHEELCHAIR_TRANSFER_DESCRIPTION: ADAPTIVE EQUIPMENT;INCREASED TIME;INITIAL PREPARATION FOR TASK;SET-UP OF EQUIPMENT;SUPERVISION FOR SAFETY

## 2024-05-04 ASSESSMENT — GAIT ASSESSMENTS
ASSISTIVE DEVICE: FRONT WHEEL WALKER
DISTANCE (FEET): 12
GAIT LEVEL OF ASSIST: TOTAL ASSIST

## 2024-05-04 NOTE — THERAPY
"Occupational Therapy  Daily Treatment     Patient Name: Flavia Garrett  Age:  84 y.o., Sex:  female  Medical Record #: [id number]  Today's Date: 5/4/2024     Precautions  Precautions: Fall Risk, Toe Touch Weight Bearing Left Lower Extremity  Comments: pt is very anxious    Safety   ADL Safety : Requires Physical Assist for Safety  Bathroom Safety: Requires Physical Assist for Safety    Subjective    \"Is it better for me to use the w/c or the walker?\" Pt reported about getting to the bathroom- discussed pt's difficulty maintaining TTWB status for long distances and using w/c for now to make sure she does not break precautions until she is a little stronger.      Objective       05/04/24 0931   OT Charge Group   OT Self Care / ADL (Units) 2   OT Total Time Spent   OT Individual Total Time Spent (Mins) 30   Pain   Intervention Distraction;Emotional Support   Pain 0 - 10 Group   Location Leg;Knee   Location Orientation Left   Pain Rating Scale (NPRS) 4   Description Aching   Comfort Goal Comfort with Movement;Perform Activity;Sleep Comfortably   Therapist Pain Assessment During Activity   Cognition    Level of Consciousness Alert   Functional Level of Assist   Grooming Modified Independent;Seated  (brush teeth, wash hands)   Grooming Description Increased time;Seated in wheelchair at sink   Upper Body Dressing Supervision   Upper Body Dressing Description Increased time;Initial preparation for task  (doff/don pull over shirt)   Lower Body Dressing Maximal Assist   Lower Body Dressing Description Grab bar;Increased time;Initial preparation for task;Set-up of equipment;Supervision for safety;Verbal cueing  (doff/don brief/socks, don pants in sitting/standing with w/c and GB)   Toileting Moderate Assist   Toileting Description Grab bar;Assist to pull pants up   Bed, Chair, Wheelchair Transfer Minimal Assist   Bed Chair Wheelchair Transfer Description Adaptive equipment;Increased time;Initial preparation for task;Set-up of " equipment;Supervision for safety  (FWW)   Toilet Transfers Minimal Assist   Toilet Transfer Description Grab bar;Increased time;Initial preparation for task;Set-up of equipment;Supervision for safety;Verbal cueing   Bed Mobility    Supine to Sit Contact Guard Assist  (leg )   Scooting Contact Guard Assist   Interdisciplinary Plan of Care Collaboration   IDT Collaboration with  Family / Caregiver   Patient Position at End of Therapy Seated;Call Light within Reach;Tray Table within Reach;Phone within Reach;Chair Alarm On;Family / Friend in Room   Collaboration Comments brother present throughout session         Assessment    Pt tolerated session well. Pt completed dressing/toileting in bathroom. Pt would benefit from use of AE next session to assist with LBD due to difficulty leaning down to doff/don pants/socks while seated in w/c. Pt required cues for sitting vs standing during dressing tasks for safety. Pt limited by pain and TTWB status.     Strengths: Able to follow instructions, Alert and oriented, Independent prior level of function, Motivated for self care and independence, Pleasant and cooperative, Willingly participates in therapeutic activities  Barriers: Decreased endurance, Fatigue, Generalized weakness, Impaired activity tolerance, Limited mobility, Pain    Plan     ADL  IADL , related mobility and transfer at FWW level   strength/endurance building  standing tolerance and balance incorporating TTWB  left LE     Occupational Therapy Goals (Active)       Problem: Bathing       Dates: Start:  05/02/24         Goal: STG-Within one week, patient will bathe w/ CGA and AE/DME as needed       Dates: Start:  05/02/24               Problem: Dressing       Dates: Start:  05/02/24         Goal: STG-Within one week, patient will dress LB w/ mod A and AE as needed       Dates: Start:  05/02/24               Problem: Functional Transfers       Dates: Start:  05/02/24         Goal: STG-Within one week, patient  will transfer consistently w/ CGA and LRAD       Dates: Start:  05/02/24               Problem: IADL's       Dates: Start:  05/02/24         Goal: STG-Within one week, patient will prepare a meal w/ min A and LRAD       Dates: Start:  05/02/24               Problem: OT Long Term Goals       Dates: Start:  05/02/24         Goal: LTG-By discharge, patient will complete basic self care tasks w/ mod I and AE/DME as needed       Dates: Start:  05/02/24            Goal: LTG-By discharge, patient will complete basic home management w/ SPV and LRAD       Dates: Start:  05/02/24               Problem: Toileting       Dates: Start:  05/02/24         Goal: STG-Within one week, patient will complete toileting tasks w/ mod A and AE/DME as needed       Dates: Start:  05/02/24

## 2024-05-04 NOTE — PROGRESS NOTES
NURSING DAILY NOTE    Name: Flavia Garrett   Date of Admission: 5/1/2024   Admitting Diagnosis: Closed comminuted supracondylar fracture of femur, left, initial encounter ([facility])  Attending Physician: Miguel Goins M.d.  Allergies: Atorvastatin, Hydrochlorothiazide, and Lisinopril    Safety  Patient Assist  mod-max  Patient Precautions  Fall Risk, Toe Touch Weight Bearing Left Lower Extremity  Precaution Comments  pt is very anxious  Bed Transfer Status  Minimal Assist  Toilet Transfer Status   Minimal Assist (fair carryover  TTWB  left  LE   using grab bar  would benefit better from using FWW)  Assistive Devices  Rails, Wheelchair  Oxygen  None - Room Air  Diet/Therapeutic Dining  Current Diet Order   Procedures    Diet Order Diet: Regular     Pill Administration  whole  Agitated Behavioral Scale     ABS Level of Severity       Fall Risk  Has the patient had a fall this admission?   No  Kelli Min Fall Risk Scoring  15, HIGH RISK  Fall Risk Safety Measures  bed alarm, poor balance, and low vision/ hearing    Vitals  Temperature: 36.6 °C (97.9 °F)  Temp src: Oral  Pulse: 98  Respiration: 20  Blood Pressure : 120/89  Blood Pressure MAP (Calculated): 99 MM HG  BP Location: Left, Upper Arm  Patient BP Position: Supine     Oxygen  Pulse Oximetry: 95 %  O2 (LPM): 0  O2 Delivery Device: None - Room Air    Bowel and Bladder  Last Bowel Movement  04/29/24  Stool Type     Bowel Device   (no BM since admit)  Continent  Bladder: Continent void   Bowel: Continent movement  Bladder Function  Urine Void (mL): 400 ml  Number of Times Voided: 1  Urine Color: Yellow  Genitourinary Assessment   Bladder Assessment (WDL):  Within Defined Limits  Urine Color: Yellow  Bladder Device: Bathroom  Time Void: Yes  Bladder Scan: Post Void  $ Bladder Scan Results (mL): 42    Skin  Milton Score   16  Sensory Interventions   Bed Types: Standard/Trauma Mattress  Skin Preventative Measures:  Pillows in Use for Support / Positioning  Moisture Interventions  Moisturizers/Barriers: Barrier Wipes      Pain  Pain Rating Scale  4 - Distracts me, can do usual activities  Pain Location  Leg, Knee  Pain Location Orientation  Left  Pain Interventions   Medication (see MAR)    ADLs    Bathing   Shower, Staff (shower by ot)  Linen Change      Personal Hygiene     Chlorhexidine Bath      Oral Care     Teeth/Dentures     Shave     Nutrition Percentage Eaten  *  * Meal *  *, Breakfast, Between % Consumed  Environmental Precautions     Patient Turns/Positioning  Patient Turns Self from Side to Side  Patient Turns Assistance/Tolerance     Bed Positions  Bed Controls On  Head of Bed Elevated  Self regulated      Psychosocial/Neurologic Assessment  Psychosocial Assessment  Psychosocial (WDL):  Within Defined Limits  Neurologic Assessment  Neuro (WDL): Exceptions to WDL  Level of Consciousness: Alert  Orientation Level: Oriented X4  Cognition: Follows commands  Speech: Clear  Pupil Assesment: No  Muscle Strength Left Leg: Fair Strength against Gravity but No Resistance  EENT (WDL):  WDL Except    Cardio/Pulmonary Assessment  Edema   RLE Edema: 2+, 3+, Pitting  LLE Edema: 2+, 3+, Pitting  Respiratory Breath Sounds  RUL Breath Sounds: Clear  RML Breath Sounds: Clear  RLL Breath Sounds: Clear  NO Breath Sounds: Clear  LLL Breath Sounds: Clear  Cardiac Assessment   Cardiac (WDL):  WDL Except (hx chf, sinus tach, right bbb, htn)

## 2024-05-04 NOTE — THERAPY
Physical Therapy   Daily Treatment     Patient Name: Flavia Garrett  Age:  84 y.o., Sex:  female  Medical Record #: [id number]  Today's Date: 5/4/2024     Precautions  Precautions: Fall Risk, Toe Touch Weight Bearing Left Lower Extremity  Comments: pt is very anxious    Subjective    Pt found in bed, ready and agreeable to therapy.  I have trouble lifting my leg.  I want to work on that.     Objective       05/04/24 1401   PT Charge Group   PT Gait Training (Units) 1   PT Therapeutic Exercise (Units) 2   PT Therapeutic Activities (Units) 1   PT Total Time Spent   PT Individual Total Time Spent (Mins) 60   Vitals   Pulse 88   Patient BP Position Sitting   Blood Pressure  115/47   Pulse Oximetry 91 %   O2 (LPM) 0   Vitals Comments Seated post amb, c/o lightheadedness/dizziness, took >5min to retrieve BP cuff and automatic to take a reading.   Pain 0 - 10 Group   Therapist Pain Assessment Prior to Activity;During Activity;0;3  (had taken tylenol this am)   Gait Functional Level of Assist    Gait Level Of Assist Total Assist  (CGa/SBA with FWW, w/c follow)   Assistive Device Front Wheel Walker   Distance (Feet) 12   # of Times Distance was Traveled 2   Deviation Antalgic;Decreased Base Of Support;Bradykinetic;Other (Comment);Step To  (TTWBing L LE)   Transfer Functional Level of Assist   Bed, Chair, Wheelchair Transfer Standby Assist  (bed>w/c SPT wiht FWW cues for hand placement.  W/c>bed SPT with bed rail SBA.  sit<>Sup wiht leg  SBA, inc time elev HOB)   Bed Chair Wheelchair Transfer Description Increased time;Adaptive equipment;Initial preparation for task;Set-up of equipment;Supervision for safety;Verbal cueing   Supine Lower Body Exercise   Heel Slide   (x5 with leg )   Ankle Pumps   (x5)   Quadriceps Isometrics 2 sets of 10  (w/ towel roll)   Other Exercises Heel cord stretch with leg  30sec x 3   Comments HO given for HEP in room   Bed Mobility    Supine to Sit Standby Assist  (setup with leg  )   Sit to Supine Standby Assist  (setup with leg )   Sit to Stand Contact Guard Assist  (cues for hand placement to walker)   Interdisciplinary Plan of Care Collaboration   IDT Collaboration with  Nursing   Patient Position at End of Therapy In Bed;Call Light within Reach;Tray Table within Reach;Phone within Reach   Collaboration Comments re: LE swelling         Assessment    Demo great motivation wit HEP in bed, able to progress ROM self with leg lift  and TKE's.    Did have dizziness with repeated gait training, BP's demo potential orthostatic.  TEDs not donned d/t needing at least XXXL.  XL attempted in supine, but deferred d/t size.     Strengths: Able to follow instructions, Alert and oriented, Effective communication skills, Independent prior level of function, Pleasant and cooperative  Barriers: Decreased endurance, Fatigue, Generalized weakness, Impaired activity tolerance, Impaired balance, Pain    Plan    AAROM L knee to tolerance  L LE therex  Stand pivot transfers using FWW  Pre gait // bars L LE TTWB  Gait training using FWW        DME  PT DME Recommendations  Additional Equipment:  (TBD)     Passport items to be completed:  Get in/out of bed safely, in/out of a vehicle, safely use mobility device, walk or wheel around home/community, navigate up and down stairs, show how to get up/down from the ground, ensure home is accessible, demonstrate HEP, complete caregiver training     Physical Therapy Problems (Active)          Physical Therapy Problems (Active)       Problem: Mobility       Dates: Start:  05/02/24         Goal: STG-Within one week, patient will propel wheelchair household distances x 50 feet, SBA       Dates: Start:  05/02/24            Goal: STG-Within one week, patient will ambulate household distance x 50 feet using FWW, CGA maintaining TTWB on L LE       Dates: Start:  05/02/24               Problem: Mobility Transfers       Dates: Start:  05/02/24         Goal:  STG-Within one week, patient will perform bed mobility, CGA       Dates: Start:  05/02/24            Goal: STG-Within one week, patient will transfer bed to chair, CGA       Dates: Start:  05/02/24               Problem: PT-Long Term Goals       Dates: Start:  05/02/24         Goal: LTG-By discharge, patient will ambulate x 150 feet using FWW, SUP       Dates: Start:  05/02/24            Goal: LTG-By discharge, patient will transfer one surface to another, SUP using FWW       Dates: Start:  05/02/24            Goal: LTG-By discharge, patient will ambulate up/down a curb using FWW, SUP       Dates: Start:  05/02/24            Goal: LTG-By discharge, patient will transfer in/out of a car, SUP       Dates: Start:  05/02/24

## 2024-05-04 NOTE — FLOWSHEET NOTE
05/04/24 0724   Events/Summary/Plan   Events/Summary/Plan RA pulse ox check   Vital Signs   Pulse 83   Respiration 16   Pulse Oximetry 95 %   $ Pulse Oximetry (Spot Check) Yes   Respiratory Assessment   Level of Consciousness Alert   Chest Exam   Work Of Breathing / Effort Within Normal Limits   Oxygen   O2 Delivery Device Room air w/o2 available

## 2024-05-05 ENCOUNTER — APPOINTMENT (OUTPATIENT)
Dept: PHYSICAL THERAPY | Facility: REHABILITATION | Age: 84
DRG: 560 | End: 2024-05-05
Attending: PHYSICAL MEDICINE & REHABILITATION
Payer: MEDICARE

## 2024-05-05 ENCOUNTER — APPOINTMENT (OUTPATIENT)
Dept: OCCUPATIONAL THERAPY | Facility: REHABILITATION | Age: 84
DRG: 560 | End: 2024-05-05
Attending: PHYSICAL MEDICINE & REHABILITATION
Payer: MEDICARE

## 2024-05-05 RX ADMIN — Medication 1000 UNITS: at 07:56

## 2024-05-05 RX ADMIN — Medication 4.5 MG: at 20:04

## 2024-05-05 RX ADMIN — SENNOSIDES AND DOCUSATE SODIUM 2 TABLET: 50; 8.6 TABLET ORAL at 07:56

## 2024-05-05 RX ADMIN — SENNOSIDES AND DOCUSATE SODIUM 2 TABLET: 50; 8.6 TABLET ORAL at 20:03

## 2024-05-05 RX ADMIN — OMEPRAZOLE 20 MG: 20 CAPSULE, DELAYED RELEASE ORAL at 07:56

## 2024-05-05 RX ADMIN — LOSARTAN POTASSIUM 25 MG: 25 TABLET, FILM COATED ORAL at 05:49

## 2024-05-05 RX ADMIN — LEVOTHYROXINE SODIUM 150 MCG: 0.07 TABLET ORAL at 05:49

## 2024-05-05 RX ADMIN — ENOXAPARIN SODIUM 40 MG: 100 INJECTION SUBCUTANEOUS at 17:33

## 2024-05-05 RX ADMIN — ACETAMINOPHEN 650 MG: 325 TABLET ORAL at 14:02

## 2024-05-05 ASSESSMENT — GAIT ASSESSMENTS
DEVIATION: ANTALGIC
DISTANCE (FEET): 10
ASSISTIVE DEVICE: FRONT WHEEL WALKER
GAIT LEVEL OF ASSIST: CONTACT GUARD ASSIST

## 2024-05-05 ASSESSMENT — PAIN DESCRIPTION - PAIN TYPE: TYPE: ACUTE PAIN;SURGICAL PAIN

## 2024-05-05 NOTE — CARE PLAN
"  Problem: Fall Risk - Rehab  Goal: Patient will remain free from falls  Outcome: Progressing   Kelli Min Fall risk Assessment Score: 15    High fall risk Interventions   - Alarming seatbelt  - Wander guard  - Bed and strip alarm   - Yellow sign by the door   - Yellow wrist band \"Fall risk\"  - Room near to the nurse station  - Do not leave patient unattended in the bathroom  - Fall risk education provided      Problem: Bowel Elimination  Goal: Patient will participate in bowel management program  Outcome: Not Progressing   Patient refused PM senna; last BM was yesterday 5/3/24.   "

## 2024-05-05 NOTE — SENIOR ADMIT NOTE
NURSING DAILY NOTE    Name: Flavia Garrett   Date of Admission: 5/1/2024   Admitting Diagnosis: Closed comminuted supracondylar fracture of femur, left, initial encounter ([facility])  Attending Physician: Miguel Goins M.d.  Allergies: Atorvastatin, Hydrochlorothiazide, and Lisinopril    Safety  Patient Assist  min to mod  Patient Precautions  Fall Risk, Toe Touch Weight Bearing Left Lower Extremity  Precaution Comments  pt is very anxious  Bed Transfer Status  Standby Assist (bed>w/c SPT wiht FWW cues for hand placement.  W/c>bed SPT with bed rail SBA.  sit<>Sup wiht leg  SBA, inc time elev HOB)  Toilet Transfer Status   Minimal Assist  Assistive Devices  Rails, Wheelchair  Oxygen  None - Room Air  Diet/Therapeutic Dining  Current Diet Order   Procedures    Diet Order Diet: Regular     Pill Administration  whole  Agitated Behavioral Scale     ABS Level of Severity       Fall Risk  Has the patient had a fall this admission?   No  Kelli Min Fall Risk Scoring  15, HIGH RISK  Fall Risk Safety Measures  bed alarm and chair alarm    Vitals  Temperature: 36.7 °C (98.1 °F)  Temp src: Temporal  Pulse: 77  Respiration: 18  Blood Pressure : (!) 146/66  Blood Pressure MAP (Calculated): 93 MM HG  BP Location: Right, Upper Arm  Patient BP Position: Sitting     Oxygen  Pulse Oximetry: 96 %  O2 (LPM): 0  O2 Delivery Device: None - Room Air    Bowel and Bladder  Last Bowel Movement  05/03/24 (Refuses pm senna.)  Stool Type  Type 5: Soft blob with clear cut edges (passed easily)  Bowel Device   (no BM since admit)  Continent  Bladder: Continent void   Bowel: Continent movement  Bladder Function  Urine Void (mL):  (moderate)  Number of Times Voided: 1  Urine Color: Yellow  Genitourinary Assessment   Bladder Assessment (WDL):  Within Defined Limits  Urine Color: Yellow  Bladder Device: Bathroom  Time Void: Yes  Bladder Scan: Post Void  $ Bladder Scan Results (mL):  42    Skin  Milton Score   16  Sensory Interventions   Bed Types: Standard/Trauma Mattress  Skin Preventative Measures: Waffle Overlay, Pillows in Use for Support / Positioning  Moisture Interventions  Moisturizers/Barriers: Barrier Wipes      Pain  Pain Rating Scale  0 - No Pain  Pain Location  Leg, Knee  Pain Location Orientation  Left  Pain Interventions   Distraction, Emotional Support    ADLs    Bathing   Shower, Staff (shower by ot)  Linen Change      Personal Hygiene  Moist Erin Wipes, Perineal Care  Chlorhexidine Bath      Oral Care     Teeth/Dentures     Shave     Nutrition Percentage Eaten  Lunch, Between % Consumed  Environmental Precautions  Bed in Low Position, Treaded Slipper Socks on Patient  Patient Turns/Positioning  Patient Turns Self from Side to Side  Patient Turns Assistance/Tolerance     Bed Positions  Bed Controls On  Head of Bed Elevated  Self regulated      Psychosocial/Neurologic Assessment  Psychosocial Assessment  Psychosocial (WDL):  Within Defined Limits  Neurologic Assessment  Neuro (WDL): Exceptions to WDL  Level of Consciousness: Alert  Orientation Level: Oriented X4  Cognition: Follows commands  Speech: Clear  Pupil Assesment: No  Muscle Strength Left Leg: Fair Strength against Gravity but No Resistance  EENT (WDL):  WDL Except    Cardio/Pulmonary Assessment  Edema   RLE Edema: 2+, 3+, Pitting  LLE Edema: 2+, 3+, Pitting  Respiratory Breath Sounds  RUL Breath Sounds: Clear  RML Breath Sounds: Clear  RLL Breath Sounds: Clear  NO Breath Sounds: Clear  LLL Breath Sounds: Clear  Cardiac Assessment   Cardiac (WDL):  WDL Except (hx chf, sinus tach, right bbb, htn.)

## 2024-05-05 NOTE — THERAPY
Physical Therapy   Daily Treatment     Patient Name: Flavia Garrett  Age:  84 y.o., Sex:  female  Medical Record #: [id number]  Today's Date: 5/5/2024     Precautions  Precautions: Fall Risk, Toe Touch Weight Bearing Left Lower Extremity  Comments: pt is very anxious    Subjective    Patient agreeable to practice walking     Objective       05/05/24 1501   PT Charge Group   PT Therapeutic Exercise (Units) 1   PT Therapeutic Activities (Units) 1   PT Total Time Spent   PT Individual Total Time Spent (Mins) 30   Precautions   Precautions Fall Risk;Toe Touch Weight Bearing Left Lower Extremity   Vitals   Blood Pressure    (supine after treatment=132/63)   Gait Functional Level of Assist    Gait Level Of Assist Contact Guard Assist   Assistive Device Front Wheel Walker   Distance (Feet) 10   # of Times Distance was Traveled 1   Deviation Antalgic  (self-limits distance based on dizziness (BE=838/52))   Transfer Functional Level of Assist   Bed, Chair, Wheelchair Transfer Standby Assist   Sitting Lower Body Exercises   Sitting Lower Body Exercises   (used as warm-up and cool-down)   Ankle Pumps 1 set of 10;Bilateral   Hip Abduction 1 set of 10;Bilateral   Hip Adduction 1 set of 10;Bilateral   Long Arc Quad 1 set of 10;Bilateral   Marching Reciprocal;1 set of 10   Hamstring Curl 1 set of 10;Bilateral;Medium Resistance Theraband         Assessment    Self-limits ambulation distance based on dizziness (SP=149/52); dizziness continued through seated there-ex with BP=98/51); dizziness subsides and BP returned to 132/63 in supine     Strengths: Able to follow instructions, Alert and oriented, Effective communication skills, Independent prior level of function, Pleasant and cooperative  Barriers: Decreased endurance, Fatigue, Generalized weakness, Impaired activity tolerance, Impaired balance, Pain    Plan    AAROM L knee to tolerance  L LE therex  Stand pivot transfers using FWW  Pre gait // bars L LE TTWB  Gait training using  FWW        DME  PT DME Recommendations  Additional Equipment:  (TBD)     Passport items to be completed:  Get in/out of bed safely, in/out of a vehicle, safely use mobility device, walk or wheel around home/community, navigate up and down stairs, show how to get up/down from the ground, ensure home is accessible, demonstrate HEP, complete caregiver training    Physical Therapy Problems (Active)       Problem: Mobility       Dates: Start:  05/02/24         Goal: STG-Within one week, patient will propel wheelchair household distances x 50 feet, SBA       Dates: Start:  05/02/24            Goal: STG-Within one week, patient will ambulate household distance x 50 feet using FWW, CGA maintaining TTWB on L LE       Dates: Start:  05/02/24               Problem: Mobility Transfers       Dates: Start:  05/02/24         Goal: STG-Within one week, patient will perform bed mobility, CGA       Dates: Start:  05/02/24            Goal: STG-Within one week, patient will transfer bed to chair, CGA       Dates: Start:  05/02/24               Problem: PT-Long Term Goals       Dates: Start:  05/02/24         Goal: LTG-By discharge, patient will ambulate x 150 feet using FWW, SUP       Dates: Start:  05/02/24            Goal: LTG-By discharge, patient will transfer one surface to another, SUP using FWW       Dates: Start:  05/02/24            Goal: LTG-By discharge, patient will ambulate up/down a curb using FWW, SUP       Dates: Start:  05/02/24            Goal: LTG-By discharge, patient will transfer in/out of a car, SUP       Dates: Start:  05/02/24

## 2024-05-05 NOTE — PROGRESS NOTES
NURSING DAILY NOTE    Name: Flavia Garrett   Date of Admission: 5/1/2024   Admitting Diagnosis: Closed comminuted supracondylar fracture of femur, left, initial encounter ([facility])  Attending Physician: Miguel Goins M.d.  Allergies: Atorvastatin, Hydrochlorothiazide, and Lisinopril    Safety  Patient Assist  min to mod  Patient Precautions  Fall Risk, Toe Touch Weight Bearing Left Lower Extremity  Precaution Comments  pt is very anxious  Bed Transfer Status  Standby Assist (bed>w/c SPT wiht FWW cues for hand placement.  W/c>bed SPT with bed rail SBA.  sit<>Sup wiht leg  SBA, inc time elev HOB)  Toilet Transfer Status   Minimal Assist  Assistive Devices  Walker - front wheel  Oxygen  None - Room Air  Diet/Therapeutic Dining  Current Diet Order   Procedures    Diet Order Diet: Regular     Pill Administration  whole  Agitated Behavioral Scale     ABS Level of Severity       Fall Risk  Has the patient had a fall this admission?   No  Kelli Min Fall Risk Scoring  15, HIGH RISK  Fall Risk Safety Measures  bed alarm, poor balance, and low vision/ hearing    Vitals  Temperature: 36.9 °C (98.5 °F)  Temp src: Oral  Pulse: 76  Respiration: 17  Blood Pressure : 106/48  Blood Pressure MAP (Calculated): 67 MM HG  BP Location: Right, Upper Arm  Patient BP Position: Supine     Oxygen  Pulse Oximetry: 94 %  O2 (LPM): 0  O2 Delivery Device: None - Room Air    Bowel and Bladder  Last Bowel Movement  05/03/24  Stool Type  Type 5: Soft blob with clear cut edges (passed easily)  Bowel Device   (no BM since admit)  Continent  Bladder: Continent void   Bowel: Continent movement  Bladder Function  Urine Void (mL):  (moderate)  Number of Times Voided: 1  Urine Color: Unable To Evaluate  Genitourinary Assessment   Bladder Assessment (WDL):  Within Defined Limits  Urine Color: Unable To Evaluate  Bladder Device: Bathroom  Time Void: Yes  Bladder Scan: Post Void  $ Bladder Scan  Results (mL): 42    Skin  Milton Score   16  Sensory Interventions   Bed Types: Standard/Trauma Mattress  Skin Preventative Measures: Pillows in Use for Support / Positioning, Waffle Overlay  Moisture Interventions  Moisturizers/Barriers: Barrier Wipes      Pain  Pain Rating Scale  4 - Distracts me, can do usual activities  Pain Location  Leg, Knee  Pain Location Orientation  Left  Pain Interventions   Distraction, Emotional Support    ADLs    Bathing   Shower, Staff (shower by ot)  Linen Change      Personal Hygiene  Moist Erin Wipes, Perineal Care  Chlorhexidine Bath      Oral Care     Teeth/Dentures     Shave     Nutrition Percentage Eaten  Lunch, Between % Consumed  Environmental Precautions  Treaded Slipper Socks on Patient, Bed in Low Position  Patient Turns/Positioning  Patient Turns Self from Side to Side  Patient Turns Assistance/Tolerance     Bed Positions  Bed Controls On  Head of Bed Elevated  Self regulated      Psychosocial/Neurologic Assessment  Psychosocial Assessment  Psychosocial (WDL):  Within Defined Limits  Neurologic Assessment  Neuro (WDL): Exceptions to WDL  Level of Consciousness: Alert  Orientation Level: Oriented X4  Cognition: Follows commands  Speech: Clear  Pupil Assesment: No  Muscle Strength Left Leg: Fair Strength against Gravity but No Resistance  EENT (WDL):  WDL Except    Cardio/Pulmonary Assessment  Edema   RLE Edema: 2+, 3+, Pitting  LLE Edema: 2+, 3+, Pitting  Respiratory Breath Sounds  RUL Breath Sounds: Clear  RML Breath Sounds: Clear  RLL Breath Sounds: Clear  NO Breath Sounds: Clear  LLL Breath Sounds: Clear  Cardiac Assessment   Cardiac (WDL):  WDL Except (hx chf, sinus tach, right bbb, htn)

## 2024-05-05 NOTE — THERAPY
Occupational Therapy  Daily Treatment     Patient Name: Flavia Garrett  Age:  84 y.o., Sex:  female  Medical Record #: [id number]  Today's Date: 5/5/2024     Precautions  Precautions: Fall Risk, Toe Touch Weight Bearing Left Lower Extremity  Comments: pt is very anxious    Safety   ADL Safety : Requires Physical Assist for Safety  Bathroom Safety: Requires Physical Assist for Safety    Subjective    Pt seated in w/c upon arrival, pleasant and cooperative, agreeable to therapy       Objective       05/05/24 0931   OT Charge Group   OT Self Care / ADL (Units) 2   OT Therapeutic Exercise (Units) 2   OT Total Time Spent   OT Individual Total Time Spent (Mins) 60   Functional Level of Assist   Grooming Supervision;Seated   Toileting Minimal Assist   Toilet Transfers Contact Guard Assist  (Stand pivot w/c<>toilet with GB)   Sitting Upper Body Exercises   Upper Extremity Bike Level 3 Resistance  (BUE motomed 5 min, 0 RB, 1.19 mile)   Other Exercise seated overhead claps and punches 2 sets x 20 each   Interdisciplinary Plan of Care Collaboration   Patient Position at End of Therapy Call Light within Reach;Tray Table within Reach;In Bed;Bed Alarm On     Edu provided to pt on hip kit AE to assist with LB dressing. In addition on how to purchase online individual pieces or the bundle set. Demo and hands-on practice completed. Pt demo good understanding and able to demo action back with Min A, will reinforce in next ADL dressing routine. Pt reports she does not wear socks at home, but agreeable to using sock aid for hospital socks while in-house.     Assessment    Pt reports R shoulder pain since being transported to hospital from [facility]. Pt with no c/o of shoulder pain when completing AROM in all planes and none during UB therex. Pt with good understanding of hip kit edu to assist with LB dressing.     Strengths: Able to follow instructions, Alert and oriented, Independent prior level of function, Motivated for self care and  independence, Pleasant and cooperative, Willingly participates in therapeutic activities  Barriers: Decreased endurance, Fatigue, Generalized weakness, Impaired activity tolerance, Limited mobility, Pain    Plan    Cont overall strength/endurance and standing tolerance/balance to improve ADL's and functional mobility     Pt lives alone [place] - 1 story, 1 TAMY (1 from garage and a threshold in front door), WIS, FWW, SPC, shower seat, GB. DIL will stay with her at d/c. Does not wear socks at home.    DME    TBD    Occupational Therapy Goals (Active)       Problem: Bathing       Dates: Start:  05/02/24         Goal: STG-Within one week, patient will bathe w/ CGA and AE/DME as needed       Dates: Start:  05/02/24               Problem: Dressing       Dates: Start:  05/02/24         Goal: STG-Within one week, patient will dress LB w/ mod A and AE as needed       Dates: Start:  05/02/24               Problem: Functional Transfers       Dates: Start:  05/02/24         Goal: STG-Within one week, patient will transfer consistently w/ CGA and LRAD       Dates: Start:  05/02/24               Problem: IADL's       Dates: Start:  05/02/24         Goal: STG-Within one week, patient will prepare a meal w/ min A and LRAD       Dates: Start:  05/02/24               Problem: OT Long Term Goals       Dates: Start:  05/02/24         Goal: LTG-By discharge, patient will complete basic self care tasks w/ mod I and AE/DME as needed       Dates: Start:  05/02/24            Goal: LTG-By discharge, patient will complete basic home management w/ SPV and LRAD       Dates: Start:  05/02/24               Problem: Toileting       Dates: Start:  05/02/24         Goal: STG-Within one week, patient will complete toileting tasks w/ mod A and AE/DME as needed       Dates: Start:  05/02/24

## 2024-05-06 ENCOUNTER — APPOINTMENT (OUTPATIENT)
Dept: PHYSICAL THERAPY | Facility: REHABILITATION | Age: 84
DRG: 560 | End: 2024-05-06
Attending: PHYSICAL MEDICINE & REHABILITATION
Payer: MEDICARE

## 2024-05-06 ENCOUNTER — TELEPHONE (OUTPATIENT)
Dept: CARDIOLOGY | Facility: MEDICAL CENTER | Age: 84
End: 2024-05-06
Payer: MEDICARE

## 2024-05-06 ENCOUNTER — APPOINTMENT (OUTPATIENT)
Dept: OCCUPATIONAL THERAPY | Facility: REHABILITATION | Age: 84
DRG: 560 | End: 2024-05-06
Attending: PHYSICAL MEDICINE & REHABILITATION
Payer: MEDICARE

## 2024-05-06 PROCEDURE — 99232 SBSQ HOSP IP/OBS MODERATE 35: CPT | Performed by: PHYSICAL MEDICINE & REHABILITATION

## 2024-05-06 RX ORDER — LIDOCAINE 4 G/G
1 PATCH TOPICAL EVERY 24 HOURS
Status: DISCONTINUED | OUTPATIENT
Start: 2024-05-06 | End: 2024-05-14 | Stop reason: HOSPADM

## 2024-05-06 RX ADMIN — ENOXAPARIN SODIUM 40 MG: 100 INJECTION SUBCUTANEOUS at 17:07

## 2024-05-06 RX ADMIN — LEVOTHYROXINE SODIUM 175 MCG: 0.07 TABLET ORAL at 05:32

## 2024-05-06 RX ADMIN — OXYCODONE 5 MG: 5 TABLET ORAL at 17:12

## 2024-05-06 RX ADMIN — Medication 1000 UNITS: at 07:59

## 2024-05-06 RX ADMIN — SENNOSIDES AND DOCUSATE SODIUM 2 TABLET: 50; 8.6 TABLET ORAL at 07:56

## 2024-05-06 RX ADMIN — OMEPRAZOLE 20 MG: 20 CAPSULE, DELAYED RELEASE ORAL at 07:57

## 2024-05-06 RX ADMIN — LIDOCAINE 1 PATCH: 4 PATCH TOPICAL at 21:07

## 2024-05-06 RX ADMIN — ACETAMINOPHEN 650 MG: 325 TABLET ORAL at 08:06

## 2024-05-06 RX ADMIN — LOSARTAN POTASSIUM 25 MG: 25 TABLET, FILM COATED ORAL at 05:33

## 2024-05-06 RX ADMIN — OXYCODONE 5 MG: 5 TABLET ORAL at 21:05

## 2024-05-06 RX ADMIN — Medication 4.5 MG: at 21:05

## 2024-05-06 ASSESSMENT — ACTIVITIES OF DAILY LIVING (ADL)
TOILETING_LEVEL_OF_ASSIST_DESCRIPTION: GRAB BAR;INCREASED TIME;SUPERVISION FOR SAFETY
TUB_SHOWER_TRANSFER_DESCRIPTION: GRAB BAR;SHOWER BENCH;SUPERVISION FOR SAFETY
BED_CHAIR_WHEELCHAIR_TRANSFER_DESCRIPTION: ADAPTIVE EQUIPMENT;INCREASED TIME;SUPERVISION FOR SAFETY;VERBAL CUEING;SET-UP OF EQUIPMENT
TOILET_TRANSFER_DESCRIPTION: GRAB BAR;INCREASED TIME;INITIAL PREPARATION FOR TASK;SUPERVISION FOR SAFETY
BED_CHAIR_WHEELCHAIR_TRANSFER_DESCRIPTION: INCREASED TIME;INITIAL PREPARATION FOR TASK;SET-UP OF EQUIPMENT

## 2024-05-06 ASSESSMENT — GAIT ASSESSMENTS
DISTANCE (FEET): 10
DEVIATION: ANTALGIC
GAIT LEVEL OF ASSIST: CONTACT GUARD ASSIST
ASSISTIVE DEVICE: PARALLEL BARS

## 2024-05-06 ASSESSMENT — PAIN DESCRIPTION - PAIN TYPE: TYPE: ACUTE PAIN;SURGICAL PAIN

## 2024-05-06 NOTE — CARE PLAN
"  Problem: Fall Risk - Rehab  Goal: Patient will remain free from falls  Note: Kelli Min Fall risk Assessment Score:15     High fall risk Interventions   - Alarming seatbelt  - Wander guard  - Bed and strip alarm   - Yellow sign by the door   - Yellow wrist band \"Fall risk\"  - Room near to the nurse station  - Do not leave patient unattended in the bathroom  - Fall risk education provided          The patient is Watcher - Medium risk of patient condition declining or worsening    Shift Goals  Clinical Goals: Safety  Patient Goals: Participate in therapy        "

## 2024-05-06 NOTE — THERAPY
"Physical Therapy   Daily Treatment     Patient Name: Flavia Garrett  Age:  84 y.o., Sex:  female  Medical Record #: [id number]  Today's Date: 5/6/2024     Precautions  Precautions: Toe Touch Weight Bearing Left Lower Extremity  Comments: pt is very anxious    Subjective    Resting in bed    \"I was into week 12 of my 36 week cardiac rehab when this happened\"    \"My kids are always telling me to slow down\"    \"I've had so many falls in the last 4 or 5 months\"   Objective       05/06/24 1231   PT Charge Group   PT Gait Training (Units) 1   PT Therapeutic Activities (Units) 1   Supervising Physical Therapist Yossi Knox   PT Total Time Spent   PT Individual Total Time Spent (Mins) 30   Vitals   Pulse 89   Patient BP Position Sitting   Blood Pressure  (!) 83/50   Pulse Oximetry 96 %   O2 (LPM) 0   O2 Delivery Device None - Room Air   Vitals Comments taken in seated after 20' amb in //-pt with symptomatic low bp   Gait Functional Level of Assist    Gait Level Of Assist Contact Guard Assist   Assistive Device Parallel Bars   Distance (Feet) 10   # of Times Distance was Traveled 3   Deviation Antalgic  (vc for TTWB technique L LE)   Transfer Functional Level of Assist   Bed, Chair, Wheelchair Transfer Contact Guard Assist   Bed Chair Wheelchair Transfer Description Adaptive equipment;Increased time;Supervision for safety;Verbal cueing;Set-up of equipment   Bed Mobility    Supine to Sit Standby Assist   Sit to Stand Contact Guard Assist   Interdisciplinary Plan of Care Collaboration   IDT Collaboration with  Nursing;Physician;Physical Therapist   Patient Position at End of Therapy Seated;Other (Comments)  (handoff to care aid to assist with toilet transfer)   Collaboration Comments low BP         Assessment    Pt limited by symptomatic orthostatic bp, pt had good carryover of learning with TTWB education in //   Strengths: Able to follow instructions, Alert and oriented, Effective communication skills, Independent prior level " of function, Pleasant and cooperative  Barriers: Decreased endurance, Fatigue, Generalized weakness, Impaired activity tolerance, Impaired balance, Pain    Plan    AAROM L knee to tolerance  L LE therex  Stand pivot transfers using FWW  Pre gait // bars L LE TTWB  Gait training using FWW    DME  PT DME Recommendations  Additional Equipment:  (TBD)    Passport items to be completed:  Get in/out of bed safely, in/out of a vehicle, safely use mobility device, walk or wheel around home/community, navigate up and down stairs, show how to get up/down from the ground, ensure home is accessible, demonstrate HEP, complete caregiver training    Physical Therapy Problems (Active)       Problem: Mobility       Dates: Start:  05/02/24         Goal: STG-Within one week, patient will propel wheelchair household distances x 50 feet, SBA       Dates: Start:  05/02/24            Goal: STG-Within one week, patient will ambulate household distance x 50 feet using FWW, CGA maintaining TTWB on L LE       Dates: Start:  05/02/24               Problem: Mobility Transfers       Dates: Start:  05/02/24         Goal: STG-Within one week, patient will perform bed mobility, CGA       Dates: Start:  05/02/24            Goal: STG-Within one week, patient will transfer bed to chair, CGA       Dates: Start:  05/02/24               Problem: PT-Long Term Goals       Dates: Start:  05/02/24         Goal: LTG-By discharge, patient will ambulate x 150 feet using FWW, SUP       Dates: Start:  05/02/24            Goal: LTG-By discharge, patient will transfer one surface to another, SUP using FWW       Dates: Start:  05/02/24            Goal: LTG-By discharge, patient will ambulate up/down a curb using FWW, SUP       Dates: Start:  05/02/24            Goal: LTG-By discharge, patient will transfer in/out of a car, SUP       Dates: Start:  05/02/24

## 2024-05-06 NOTE — THERAPY
Occupational Therapy  Daily Treatment     Patient Name: Flavia Garrett  Age:  84 y.o., Sex:  female  Medical Record #: [id number]  Today's Date: 5/6/2024     Precautions  Precautions: Toe Touch Weight Bearing Left Lower Extremity  Comments: pt is very anxious    Safety   ADL Safety : Requires Physical Assist for Safety  Bathroom Safety: Requires Physical Assist for Safety    Subjective    Pt willing to work with OT, excited to have a shower. Reports feeling tired after the shower and getting dressed.      Objective       05/06/24 0931   OT Charge Group   OT Self Care / ADL (Units) 4   OT Total Time Spent   OT Individual Total Time Spent (Mins) 60   Precautions   Precautions Toe Touch Weight Bearing Left Lower Extremity   Safety    ADL Safety  Requires Physical Assist for Safety   Bathroom Safety Requires Physical Assist for Safety   ABS (Agitated Behavior Scale)   Agitated Behavior Scale Performed No   Sleep/Wake Cycle   Sleep & Rest Awake   Functional Level of Assist   Bathing Standby Assist   Bathing Description Grab bar;Adaptive equipment;Hand held shower;Long handled bath tool;Tub bench;Initial preparation for task;Set-up of equipment;Set up for wound protection;Supervision for safety   Upper Body Dressing Supervision   Upper Body Dressing Description Increased time;Set-up of equipment;Supervision for safety   Lower Body Dressing Minimal Assist   Lower Body Dressing Description Reacher;Sock aid;Increased time;Initial preparation for task;Set-up of equipment  (pt needed assist with correctly placing and holding the reacher when donning underwear)   Toileting Standby Assist   Toileting Description Grab bar;Increased time;Supervision for safety   Bed, Chair, Wheelchair Transfer Standby Assist   Bed Chair Wheelchair Transfer Description Increased time;Initial preparation for task;Set-up of equipment  (HOB raised to seated position )   Toilet Transfers Contact Guard Assist   Toilet Transfer Description Grab  bar;Increased time;Initial preparation for task;Supervision for safety   Tub / Shower Transfers Standby Assist   Tub Shower Transfer Description Grab bar;Shower bench;Supervision for safety   Bed Mobility    Supine to Sit Standby Assist   Sit to Supine Standby Assist  (increased time, uses bed rail and leg lifting device)   Scooting Standby Assist     Pt did not want to use reacher at first to don underwear but willing to with some assistance.     Discussed passport items with the patient.     Assessment    Pt progressed to SBA for showering and min A for LB dressing during today's session, with the help of adaptive equipment. Pt reported feeling very tired after shower.   Strengths: Able to follow instructions, Alert and oriented, Independent prior level of function, Motivated for self care and independence, Pleasant and cooperative, Willingly participates in therapeutic activities  Barriers: Decreased endurance, Fatigue, Generalized weakness, Impaired activity tolerance, Limited mobility, Pain    Plan    Cont overall strength/endurance and standing tolerance/balance to improve ADL's and functional mobility      Pt lives alone [place] - 1 story, 1 TAMY (1 from garage and a threshold in front door), WIS, FWW, SPC, shower seat, GB. DIL will stay with her at d/c. Does not wear socks at home    DME  OT DME Recommendations  Bathroom Equipment:  (TBD)  Additional Equipment:  (TBD)    Passport items to be completed:  Perform bathroom transfers, complete dressing, complete feeding, get ready for the day, prepare a simple meal, participate in household tasks, adapt home for safety needs, demonstrate home exercise program, complete caregiver training     Occupational Therapy Goals (Active)       Problem: Bathing       Dates: Start:  05/02/24         Goal: STG-Within one week, patient will bathe w/ CGA and AE/DME as needed       Dates: Start:  05/02/24               Problem: Dressing       Dates: Start:  05/02/24         Goal:  STG-Within one week, patient will dress LB w/ mod A and AE as needed       Dates: Start:  05/02/24               Problem: Functional Transfers       Dates: Start:  05/02/24         Goal: STG-Within one week, patient will transfer consistently w/ CGA and LRAD       Dates: Start:  05/02/24               Problem: IADL's       Dates: Start:  05/02/24         Goal: STG-Within one week, patient will prepare a meal w/ min A and LRAD       Dates: Start:  05/02/24               Problem: OT Long Term Goals       Dates: Start:  05/02/24         Goal: LTG-By discharge, patient will complete basic self care tasks w/ mod I and AE/DME as needed       Dates: Start:  05/02/24            Goal: LTG-By discharge, patient will complete basic home management w/ SPV and LRAD       Dates: Start:  05/02/24               Problem: Toileting       Dates: Start:  05/02/24         Goal: STG-Within one week, patient will complete toileting tasks w/ mod A and AE/DME as needed       Dates: Start:  05/02/24

## 2024-05-06 NOTE — PROGRESS NOTES
NURSING DAILY NOTE    Name: Flavia Garrett   Date of Admission: 5/1/2024   Admitting Diagnosis: Closed comminuted supracondylar fracture of femur, left, initial encounter ([facility])  Attending Physician: Miguel Goins M.d.  Allergies: Atorvastatin, Hydrochlorothiazide, and Lisinopril    Safety  Patient Assist  min to mod  Patient Precautions  Fall Risk, Toe Touch Weight Bearing Left Lower Extremity  Precaution Comments  pt is very anxious  Bed Transfer Status  Standby Assist  Toilet Transfer Status   Contact Guard Assist (Stand pivot w/c<>toilet with GB)  Assistive Devices  Rails, Walker - front wheel, Wheelchair  Oxygen  None - Room Air  Diet/Therapeutic Dining  Current Diet Order   Procedures    Diet Order Diet: Regular     Pill Administration  whole  Agitated Behavioral Scale     ABS Level of Severity       Fall Risk  Has the patient had a fall this admission?   No  Kelli Min Fall Risk Scoring  15, HIGH RISK  Fall Risk Safety Measures  bed alarm, poor balance, and low vision/ hearing    Vitals  Temperature: 36.9 °C (98.5 °F)  Temp src: Temporal  Pulse: 81  Respiration: 18  Blood Pressure : 122/60  Blood Pressure MAP (Calculated): 81 MM HG  BP Location: Right, Upper Arm  Patient BP Position: Supine     Oxygen  Pulse Oximetry: 96 %  O2 (LPM): 0  O2 Delivery Device: None - Room Air    Bowel and Bladder  Last Bowel Movement  05/05/24  Stool Type  Type 4: Like a sausage or snake, smooth and soft  Bowel Device   (no BM since admit)  Continent  Bladder: Continent void   Bowel: Continent movement  Bladder Function  Urine Void (mL):  (moderate)  Number of Times Voided: 1  Urine Color: Yellow  Genitourinary Assessment   Bladder Assessment (WDL):  Within Defined Limits  Urine Color: Yellow  Bladder Device: Bathroom  Time Void: Yes  Bladder Scan: Post Void  $ Bladder Scan Results (mL): 42    Skin  Milton Score   16  Sensory Interventions   Bed Types: Standard/Trauma  Mattress  Skin Preventative Measures: Waffle Overlay, Pillows in Use for Support / Positioning  Moisture Interventions  Moisturizers/Barriers: Barrier Wipes      Pain  Pain Rating Scale  0 - No Pain  Pain Location  Leg, Knee  Pain Location Orientation  Left  Pain Interventions   Declines    ADLs    Bathing   Shower, Staff (shower by ot)  Linen Change      Personal Hygiene  Hair Brushed  Chlorhexidine Bath      Oral Care  Brushed Teeth  Teeth/Dentures     Shave     Nutrition Percentage Eaten  Lunch, Between % Consumed  Environmental Precautions  Treaded Slipper Socks on Patient, Bed in Low Position  Patient Turns/Positioning  Patient Turns Self from Side to Side  Patient Turns Assistance/Tolerance     Bed Positions  Bed Controls On  Head of Bed Elevated  Self regulated      Psychosocial/Neurologic Assessment  Psychosocial Assessment  Psychosocial (WDL):  Within Defined Limits  Neurologic Assessment  Neuro (WDL): Exceptions to WDL  Level of Consciousness: Alert  Orientation Level: Oriented X4  Cognition: Follows commands  Speech: Clear  Pupil Assesment: No  Muscle Strength Left Leg: Fair Strength against Gravity but No Resistance  EENT (WDL):  WDL Except    Cardio/Pulmonary Assessment  Edema   RLE Edema: 2+, 3+, Pitting  LLE Edema: 2+, 3+, Pitting  Respiratory Breath Sounds  RUL Breath Sounds: Clear  RML Breath Sounds: Clear  RLL Breath Sounds: Clear  NO Breath Sounds: Clear  LLL Breath Sounds: Clear  Cardiac Assessment   Cardiac (WDL):  WDL Except (hx chf, sinus tach, right bbb, htn)

## 2024-05-06 NOTE — PROGRESS NOTES
"  Physical Medicine & Rehabilitation Progress Note    Encounter Date: 5/6/2024    Chief Complaint: Weakness    Interval Events (Subjective):  Seen in bed. Tolerating therapy. Sleeping well. Dizziness with orthostatics this am    Objective:  VITAL SIGNS: /60   Pulse 81   Temp 36.9 °C (98.5 °F) (Temporal)   Resp 18   Ht 1.499 m (4' 11\")   Wt 80 kg (176 lb 5.9 oz)   SpO2 96%   BMI 35.62 kg/m²   Gen: No acute distress, well developed well nourished adult  HEENT: Normal Cephalic Atraumatic, Normal conjunctiva.   CV: warm extremities, well perfused, no edema  Resp: symmetric chest rise, breathing comfortably on room air  Abd: Soft, Non distended  Extremities: normal bulk, no atrophy  Skin: no visible rashes or lesions.   Neuro: alert, awake  Psych: Mood and affect appropriate and congruent    Laboratory Values:  No results found for this or any previous visit (from the past 72 hour(s)).    Medications:  Scheduled Medications   Medication Dose Frequency    vitamin D3  1,000 Units DAILY    Sotagliflozin  200 mg DAILY    enoxaparin (LOVENOX) injection  40 mg DAILY AT 1800    ivabradine  5 mg BID WITH MEALS    levothyroxine  150 mcg QAM TU,TH,SA,FALL    levothyroxine  175 mcg MO, WE + FR    losartan  25 mg DAILY    Pharmacy Consult Request  1 Each PHARMACY TO DOSE    senna-docusate  2 Tablet BID    omeprazole  20 mg DAILY    melatonin  4.5 mg QHS     PRN medications: acetaminophen, magnesium hydroxide, levalbuterol, melatonin, Respiratory Therapy Consult, hydrALAZINE, senna-docusate **AND** polyethylene glycol/lytes, ondansetron **OR** ondansetron, sodium chloride, oxyCODONE immediate-release **OR** oxyCODONE immediate-release **OR** [DISCONTINUED] HYDROmorphone    Diet:  Current Diet Order   Procedures    Diet Order Diet: Regular       Medical Decision Making and Plan:  Closed comminuted supracondylar fracture of femur, left, initial encounter  s/p Open reduction internal fixation left intra-articular distal femur " fracture on 4/29 by Dr Tolbert  Weightbearing status: Touchdown weightbearing left lower extremity for 6 weeks then transition to weightbearing as tolerated, range of motion as tolerated left knee  DVT prophylaxis: SCDs and lovenox until mobilizing well, then aspirin 81mg BID for 4 weeks  Outpatient plan: The patient will follow up in clinic in 2 weeks for wound check, suture/staple removal (if applicable), and xrays.  If the patient is at a facility at that time, wound check and suture/staple removal may be performed by nursing care and the patient should instead follow up at the 6 week post operative darline for repeat clinical check and xrays.        Primary hypertension  As per history.  Blood pressure goal less than 130/80.  Continue home losartan 25 mg daily  DC losartan due to hypotension on 5/6     Chronic heart failure with preserved ejection fraction   As per history with last echocardiogram in September 2023 EF of 60%.  Appears to be euvolemic.  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      Acute hypoxic Respiratory Distress:  -2/2 surgery + opioid use     Urinary Retension  - Timed voids with PVR q4H x3. If PVR > 400mL or if patient is unable to void, straight cath patient.     Constipation  -  Colace, Senna BID on admission  - Goal of 1BM/day.  -    Circadian Rhythm disorder:   Recommend lights on during the day/off at night, minimize nighttime interruptions as able.     Mood  - at risk of adjustment disorder, depression, and anxiety due to functional decline     ID:  - at risk for Urinary tract infection     Skin/Wounds:  - Pressure relief q2h while in bed. Close monitoring for signs of breakdown     Pain:  - Neuroceptic - continue tylenol continue oxycodone prn     DVT prophylaxis: continue lovenox     GI prophylaxis:  On Prilosec 20mg  daily         -Follow-up Ortho    ____________________________________    [facility]  Physical Medicine & Rehabilitation   Brain Injury Medicine   ____________________________________

## 2024-05-06 NOTE — THERAPY
"Recreational Therapy   Initial Evaluation     Patient Name: Flavia Garrett  Age:  84 y.o., Sex:  female  Medical Record #: [id number]  Today's Date: 5/6/2024     Subjective    \"My brain is 20 years old and my body is 82.\"    Objective       05/06/24 0901   Procedural Tracking   Procedural Tracking Community Re-Integration;Leisure Skills Awareness   Treatment Time   Total Time Spent (mins) 30   Leisure History   Leisure Interests Card;Other (Comments);Television  (brain games, ipad games)   Leisure Comments Would enjoy learning new games   Pre-Morbid Leisure Lifestyle Individual;Group;Active   Prior Living Arrangements   Lives with - Patient's Self Care Capacity Alone and Able to Care For Self   Steps Into Home 1   Ambulation Independent   Assistive Devices Used None   Driving / Transportation Unable To Determine At This Time   Functional Ability Status - Physical   Endurance Good    Right  Strong   Left  Strong   Right Arm Strong   Left Arm Strong   Right Leg Strong   Left Leg Weak   Upper Extremity Gross Motor Uses Both Arms / Hands   Lower Extremity Gross Motor Uses Both Legs   Fine Motor Manipulates Small Objects   Functional Ability Status - Cognitive   Attention Span Remains on Task   Comprehension Follows Three Step Commands   Judgment Able to Make Independent Decisions   Functional Ability Status - Emotional    Affect Appropriate;Bright   Mood Appropriate   Behavior Appropriate   Leisure Competence Measure   Leisure Awareness Independent   Leisure Attitude Independent   Leisure Skills Minimal Assist   Cultural / Social Behaviors Independent   Interpersonal Skills Independent   Community Integration Skills Minimal Assist   Social Contact Independent   Community Participation Minimal Assist   Clinical Impression   Clinical Impression Impaired Gross Motor Leisure Functioning   Current Discharge Plan   Current Discharge Plan Return to Prior Living Situation   Benefit    Benefit Patient would benefit from " inpatient Recreational Therapy interventions with a therapy tech under the direction of the Certified Therapeutic Recreation Therapist   Interdisciplinary Plan of Care Collaboration   Patient Position at End of Therapy In Bed;Phone within Reach;Tray Table within Reach;Call Light within Reach         Assessment  Patient is 84 y.o. female with a diagnosis of Status Post Unilateral Hip Fracture .  Additional factors influencing patient status / progress (ie: cognitive factors, co-morbidities, social support, etc): 84 female with past medical history of HFrEF aortic valve sclerosis, microvascular angina, inappropriate sinus tachycardia, right bundle branch block hypertension, osteoporosis, breast cancer status post lumpectomy and radiation therapy presented after ground-level fall.  Imaging noted with distal femur fracture.s/p Open reduction internal fixation left intra-articular distal femur fracture on 4/29.     s/p Open reduction internal fixation left intra-articular distal femur fracture on 4/29 by Dr Tolbert.      Pt enjoys dancing and enjoys remaining active in her leisure. She will fill her free time by watching TV and playing games in her tablet. She enjoys learning new games and specifically things that challenge her brain.       Plan      Patient will be placed in Recreation Therapy group B with a Therapy Tech under the direction of the Certified Therapeutic Recreation Therapist 1-2 times a week for 2 weeks.     Goals:  Long term and short term goals have been discussed with patient and they are in agreement.    Recreation Therapy Problems (Active)       Problem: Recreation Therapy       Dates: Start:  05/06/24         Goal: STG-Within one week, patient will participate in a leisure activity solo or in a group setting focused on participation and socialzation.        Dates: Start:  05/06/24            Goal: LTG-By discharge, patient will participate in a leisure activity solo or in a group setting focused on  participation and socialization and will teach Recreation Therapy a new game or a group member and new game.        Dates: Start:  05/06/24

## 2024-05-06 NOTE — THERAPY
Occupational Therapy  Daily Treatment     Patient Name: Flavia Garrett  Age:  84 y.o., Sex:  female  Medical Record #: [id number]  Today's Date: 5/6/2024     Precautions  Precautions: Toe Touch Weight Bearing Left Lower Extremity  Comments:     Safety   ADL Safety : Requires Physical Assist for Safety  Bathroom Safety: Requires Physical Assist for Safety    Subjective    Pt seated in w/c upon arrival, pleasant and cooperative, agreeable to therapy      Objective       05/06/24 1301   OT Charge Group   OT Self Care / ADL (Units) 2   OT Total Time Spent   OT Individual Total Time Spent (Mins) 30   Vitals   Patient BP Position Sitting   Blood Pressure  (!) 79/57  (post standing)   Interdisciplinary Plan of Care Collaboration   Patient Position at End of Therapy Call Light within Reach;Tray Table within Reach;In Bed     Edu and problem solving on best way to safely transfer to shower while maintaining TTWB. Pt will keep shower chair close to shower threshold in order to sit first on seat, then pivot legs into shower. Can stand up to GB's, once in shower to adjust shower chair if needed with family assist.     Edu on toilet transfer safety - recommend commode over toilet, however pt decline and thinks she will manage with pushing off of toilet seat up to FWW on her own    Assessment    Pt reports dizziness with standing, vitals indicate orthostatic hypotension. Pt returned to bed. CNA made aware. Session focused on bathroom transfer safety edu, pt demo good understanding, hands-on training not yet completed d/t c/o orthostatic hypotension.     Strengths: Able to follow instructions, Alert and oriented, Independent prior level of function, Motivated for self care and independence, Pleasant and cooperative, Willingly participates in therapeutic activities  Barriers: Decreased endurance, Fatigue, Generalized weakness, Impaired activity tolerance, Limited mobility, Pain    Plan    Cont overall strength/endurance and standing  tolerance/balance to improve ADL's and functional mobility      Pt lives alone [place] - 1 story, 1 TAMY (1 from garage and at threshold in front door), WIS, FWW, SPC, shower seat, GB's at shower. DIL will stay with her at d/c. Has reacher already. Does not wear socks at home - not interested in sock aid. Not interested in purchasing toilet riser/commode for toilet.     DME     None     Occupational Therapy Goals (Active)       Problem: Bathing       Dates: Start:  05/02/24         Goal: STG-Within one week, patient will bathe w/ CGA and AE/DME as needed       Dates: Start:  05/02/24               Problem: Dressing       Dates: Start:  05/02/24         Goal: STG-Within one week, patient will dress LB w/ mod A and AE as needed       Dates: Start:  05/02/24               Problem: Functional Transfers       Dates: Start:  05/02/24         Goal: STG-Within one week, patient will transfer consistently w/ CGA and LRAD       Dates: Start:  05/02/24               Problem: IADL's       Dates: Start:  05/02/24         Goal: STG-Within one week, patient will prepare a meal w/ min A and LRAD       Dates: Start:  05/02/24               Problem: OT Long Term Goals       Dates: Start:  05/02/24         Goal: LTG-By discharge, patient will complete basic self care tasks w/ mod I and AE/DME as needed       Dates: Start:  05/02/24            Goal: LTG-By discharge, patient will complete basic home management w/ SPV and LRAD       Dates: Start:  05/02/24               Problem: Toileting       Dates: Start:  05/02/24         Goal: STG-Within one week, patient will complete toileting tasks w/ mod A and AE/DME as needed       Dates: Start:  05/02/24
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
## 2024-05-06 NOTE — THERAPY
Physical Therapy   Daily Treatment     Patient Name: Flavia Garrett  Age:  84 y.o., Sex:  female  Medical Record #: [id number]  Today's Date: 5/6/2024     Precautions  Precautions: (P) Fall Risk, Toe Touch Weight Bearing Left Lower Extremity  Comments: pt is very anxious    Subjective    Pt is in her bed and says that she couldn't make it to the gym due to fatigue and dizziness. Pt is agreeable to participate in supine thera ex. Pt says that she will call her cardiologist to dc losartan. She also says that her hemoglobin is low.      Objective       05/06/24 1501   PT Charge Group   PT Therapeutic Exercise (Units) 4   PT Total Time Spent   PT Individual Total Time Spent (Mins) 60   Precautions   Precautions Fall Risk;Toe Touch Weight Bearing Left Lower Extremity   Vitals   Pulse 77   Patient BP Position Supine   Blood Pressure  125/50   Respiration 20   Pulse Oximetry 95 %   O2 (LPM) 0   O2 Delivery Device None - Room Air   Supine Lower Body Exercise   Hip Abduction 3 sets of 10;Bilateral   Straight Leg Raises Front;3 sets of 10;Bilateral  (AAROM on L LE)   Heel Slide 3 sets of 10;Bilateral  (AAROM on L LE)   Ankle Pumps 3 sets of 10;Bilateral   Gluteal Isometrics 3 sets of 10;Bilateral   Quadriceps Isometrics 3 sets of 10;Bilateral   Interdisciplinary Plan of Care Collaboration   IDT Collaboration with  Physical Therapist Assistant (PTA)   Patient Position at End of Therapy In Bed;Bed Alarm On;Call Light within Reach;Tray Table within Reach;Phone within Reach   Collaboration Comments re low BP         Assessment    Pt completed supine thera ex as above with rest breaks in between sets due to fatigue. She had difficulty performing ROM ex on L hip due to pain and weakness.     Strengths: Able to follow instructions, Alert and oriented, Effective communication skills, Independent prior level of function, Pleasant and cooperative  Barriers: Decreased endurance, Fatigue, Generalized weakness, Impaired activity tolerance,  Impaired balance, Pain    Plan    AAROM L knee to tolerance  L LE therex  Stand pivot transfers using FWW  Pre gait // bars L LE TTWB  Gait training using FWW       DME  PT DME Recommendations  Additional Equipment:  (TBD)    Passport items to be completed:  Get in/out of bed safely, in/out of a vehicle, safely use mobility device, walk or wheel around home/community, navigate up and down stairs, show how to get up/down from the ground, ensure home is accessible, demonstrate HEP, complete caregiver training    Physical Therapy Problems (Active)       Problem: Mobility       Dates: Start:  05/02/24         Goal: STG-Within one week, patient will propel wheelchair household distances x 50 feet, SBA       Dates: Start:  05/02/24            Goal: STG-Within one week, patient will ambulate household distance x 50 feet using FWW, CGA maintaining TTWB on L LE       Dates: Start:  05/02/24               Problem: Mobility Transfers       Dates: Start:  05/02/24         Goal: STG-Within one week, patient will perform bed mobility, CGA       Dates: Start:  05/02/24            Goal: STG-Within one week, patient will transfer bed to chair, CGA       Dates: Start:  05/02/24               Problem: PT-Long Term Goals       Dates: Start:  05/02/24         Goal: LTG-By discharge, patient will ambulate x 150 feet using FWW, SUP       Dates: Start:  05/02/24            Goal: LTG-By discharge, patient will transfer one surface to another, SUP using FWW       Dates: Start:  05/02/24            Goal: LTG-By discharge, patient will ambulate up/down a curb using FWW, SUP       Dates: Start:  05/02/24            Goal: LTG-By discharge, patient will transfer in/out of a car, SUP       Dates: Start:  05/02/24

## 2024-05-07 ENCOUNTER — APPOINTMENT (OUTPATIENT)
Dept: OCCUPATIONAL THERAPY | Facility: REHABILITATION | Age: 84
DRG: 560 | End: 2024-05-07
Attending: PHYSICAL MEDICINE & REHABILITATION
Payer: MEDICARE

## 2024-05-07 ENCOUNTER — APPOINTMENT (OUTPATIENT)
Dept: PHYSICAL THERAPY | Facility: REHABILITATION | Age: 84
DRG: 560 | End: 2024-05-07
Attending: PHYSICAL MEDICINE & REHABILITATION
Payer: MEDICARE

## 2024-05-07 LAB
ALBUMIN SERPL BCP-MCNC: 3 G/DL (ref 3.2–4.9)
ALBUMIN/GLOB SERPL: 1.3 G/DL
ALP SERPL-CCNC: 61 U/L (ref 30–99)
ALT SERPL-CCNC: 7 U/L (ref 2–50)
ANION GAP SERPL CALC-SCNC: 11 MMOL/L (ref 7–16)
AST SERPL-CCNC: 16 U/L (ref 12–45)
BILIRUB SERPL-MCNC: 0.4 MG/DL (ref 0.1–1.5)
BUN SERPL-MCNC: 21 MG/DL (ref 8–22)
CALCIUM ALBUM COR SERPL-MCNC: 9.2 MG/DL (ref 8.5–10.5)
CALCIUM SERPL-MCNC: 8.4 MG/DL (ref 8.5–10.5)
CHLORIDE SERPL-SCNC: 103 MMOL/L (ref 96–112)
CO2 SERPL-SCNC: 22 MMOL/L (ref 20–33)
CREAT SERPL-MCNC: 0.76 MG/DL (ref 0.5–1.4)
ERYTHROCYTE [DISTWIDTH] IN BLOOD BY AUTOMATED COUNT: 49.6 FL (ref 35.9–50)
GFR SERPLBLD CREATININE-BSD FMLA CKD-EPI: 78 ML/MIN/1.73 M 2
GLOBULIN SER CALC-MCNC: 2.4 G/DL (ref 1.9–3.5)
GLUCOSE SERPL-MCNC: 95 MG/DL (ref 65–99)
HCT VFR BLD AUTO: 25.3 % (ref 37–47)
HGB BLD-MCNC: 7.8 G/DL (ref 12–16)
MCH RBC QN AUTO: 27.6 PG (ref 27–33)
MCHC RBC AUTO-ENTMCNC: 30.8 G/DL (ref 32.2–35.5)
MCV RBC AUTO: 89.4 FL (ref 81.4–97.8)
PLATELET # BLD AUTO: 361 K/UL (ref 164–446)
PMV BLD AUTO: 10.4 FL (ref 9–12.9)
POTASSIUM SERPL-SCNC: 4.1 MMOL/L (ref 3.6–5.5)
PROT SERPL-MCNC: 5.4 G/DL (ref 6–8.2)
RBC # BLD AUTO: 2.83 M/UL (ref 4.2–5.4)
SODIUM SERPL-SCNC: 136 MMOL/L (ref 135–145)
WBC # BLD AUTO: 7.2 K/UL (ref 4.8–10.8)

## 2024-05-07 PROCEDURE — 99233 SBSQ HOSP IP/OBS HIGH 50: CPT | Performed by: PHYSICAL MEDICINE & REHABILITATION

## 2024-05-07 RX ADMIN — LIDOCAINE 1 PATCH: 4 PATCH TOPICAL at 20:34

## 2024-05-07 RX ADMIN — OMEPRAZOLE 20 MG: 20 CAPSULE, DELAYED RELEASE ORAL at 07:59

## 2024-05-07 RX ADMIN — LEVOTHYROXINE SODIUM 150 MCG: 0.07 TABLET ORAL at 05:23

## 2024-05-07 RX ADMIN — Medication 1000 UNITS: at 07:59

## 2024-05-07 RX ADMIN — ACETAMINOPHEN 650 MG: 325 TABLET ORAL at 08:04

## 2024-05-07 RX ADMIN — Medication 4.5 MG: at 20:37

## 2024-05-07 RX ADMIN — ENOXAPARIN SODIUM 40 MG: 100 INJECTION SUBCUTANEOUS at 17:34

## 2024-05-07 ASSESSMENT — PAIN DESCRIPTION - PAIN TYPE: TYPE: ACUTE PAIN;SURGICAL PAIN

## 2024-05-07 ASSESSMENT — ACTIVITIES OF DAILY LIVING (ADL): BED_CHAIR_WHEELCHAIR_TRANSFER_DESCRIPTION: ADAPTIVE EQUIPMENT;SET-UP OF EQUIPMENT;SUPERVISION FOR SAFETY;VERBAL CUEING

## 2024-05-07 NOTE — CARE PLAN
Problem: Bathing  Goal: STG-Within one week, patient will bathe w/ CGA and AE/DME as needed  Outcome: Met     Problem: Dressing  Goal: STG-Within one week, patient will dress LB w/ mod A and AE as needed  Outcome: Met     Problem: Toileting  Goal: STG-Within one week, patient will complete toileting tasks w/ mod A and AE/DME as needed  Outcome: Met     Problem: Functional Transfers  Goal: STG-Within one week, patient will transfer consistently w/ CGA and LRAD  Outcome: Met     Problem: IADL's  Goal: STG-Within one week, patient will prepare a meal w/ min A and LRAD  Outcome: Not Met  Note: Not yet addressed

## 2024-05-07 NOTE — PROGRESS NOTES
NURSING DAILY NOTE    Name: Flavia Garrett   Date of Admission: 5/1/2024   Admitting Diagnosis: Closed comminuted supracondylar fracture of femur, left, initial encounter ([facility])  Attending Physician: Miguel Goins M.d.  Allergies: Atorvastatin, Hydrochlorothiazide, and Lisinopril    Safety  Patient Assist  min  Patient Precautions  Fall Risk, Toe Touch Weight Bearing Left Lower Extremity  Precaution Comments  pt is very anxious  Bed Transfer Status  Contact Guard Assist  Toilet Transfer Status   Contact Guard Assist  Assistive Devices  Rails, Wheelchair  Oxygen  None - Room Air  Diet/Therapeutic Dining  Current Diet Order   Procedures    Diet Order Diet: Regular     Pill Administration  whole  Agitated Behavioral Scale     ABS Level of Severity       Fall Risk  Has the patient had a fall this admission?   No  Kelli Min Fall Risk Scoring  12, MODERATE RISK  Fall Risk Safety Measures  bed alarm and chair alarm    Vitals  Temperature: 36.5 °C (97.7 °F)  Temp src: Oral  Pulse: 83  Respiration: 18  Blood Pressure : 126/70  Blood Pressure MAP (Calculated): 89 MM HG  BP Location: Left, Upper Arm  Patient BP Position: Supine     Oxygen  Pulse Oximetry: 94 %  O2 (LPM): 0  O2 Delivery Device: None - Room Air    Bowel and Bladder  Last Bowel Movement  05/06/24  Stool Type  Type 6: Fluffy pieces with ragged edges, a mushy stool  Bowel Device  Bathroom  Continent  Bladder: Continent void   Bowel: Continent movement  Bladder Function  Urine Void (mL):  (moderate)  Number of Times Voided: 1  Urine Color: Unable To Evaluate  Genitourinary Assessment   Bladder Assessment (WDL):  WDL Except  Urine Color: Unable To Evaluate  Bladder Device: Bathroom  Time Void: Yes  Bladder Scan: Post Void  $ Bladder Scan Results (mL): 42    Skin  Milton Score   17  Sensory Interventions   Bed Types: Standard/Trauma Mattress  Skin Preventative Measures: Pillows in Use for Support /  Positioning, Waffle Overlay  Moisture Interventions  Moisturizers/Barriers: Barrier Wipes      Pain  Pain Rating Scale  1 - Hardly Notice Pain  Pain Location  Knee, Leg  Pain Location Orientation  Left  Pain Interventions   Medication (see MAR)    ADLs    Bathing   Shower, Staff (shower by ot)  Linen Change      Personal Hygiene  Hair Brushed  Chlorhexidine Bath      Oral Care  Brushed Teeth  Teeth/Dentures     Shave     Nutrition Percentage Eaten  Between 50-75% Consumed  Environmental Precautions  Treaded Slipper Socks on Patient, Personal Belongings, Wastebasket, Call Bell etc. in Easy Reach, Transferred to Stronger Side, Report Given to Other Health Care Providers Regarding Fall Risk, Bed in Low Position  Patient Turns/Positioning  Patient Turns Self from Side to Side  Patient Turns Assistance/Tolerance     Bed Positions  Bed Controls On  Head of Bed Elevated  Self regulated      Psychosocial/Neurologic Assessment  Psychosocial Assessment  Psychosocial (WDL):  Within Defined Limits  Neurologic Assessment  Neuro (WDL): Exceptions to WDL  Level of Consciousness: Alert  Orientation Level: Oriented X4  Cognition: Follows commands  Speech: Clear  Pupil Assesment: No  Muscle Strength Left Leg: Fair Strength against Gravity but No Resistance  EENT (WDL):  WDL Except    Cardio/Pulmonary Assessment  Edema   RLE Edema: 2+, 3+, Pitting  LLE Edema: 2+, 3+, Pitting  Respiratory Breath Sounds  RUL Breath Sounds: Clear  RML Breath Sounds: Clear  RLL Breath Sounds: Clear  NO Breath Sounds: Clear  LLL Breath Sounds: Clear  Cardiac Assessment   Cardiac (WDL):  WDL Except (hx chf, sinus tach, right bbb, htn)

## 2024-05-07 NOTE — CARE PLAN
"  Problem: Fall Risk - Rehab  Goal: Patient will remain free from falls  Outcome: Progressing  Note: Kelli Min Fall risk Assessment Score: 12    Moderate fall risk Interventions  - Bed and strip alarm   - Yellow sign by the door   - Yellow wrist band \"Fall risk\"  - Room near to the nurse station  - Do not leave patient unattended in the bathroom  - Fall risk education provided     Problem: Pain - Standard  Goal: Alleviation of pain or a reduction in pain to the patient’s comfort goal  Outcome: Progressing  Note: Assessed for pain and discomfort , medicated with percocet for left hip pain with relief. Needs anticipated and attended.   The patient is Stable - Low risk of patient condition declining or worsening    Shift Goals  Clinical Goals: Safety  Patient Goals: Participate in therapy    Progress made toward(s) clinical / shift goals:  Pt free from fall and injury .    "

## 2024-05-07 NOTE — TELEPHONE ENCOUNTER
Spoke with Perlita. She states she will bring samples by the office tomorrow morning.    Called Ray and let him know that samples would be available to them if they need them.

## 2024-05-07 NOTE — TELEPHONE ENCOUNTER
Phone Number Called: [phone]     Call outcome: Did not leave a detailed message. Requested patient to call back.

## 2024-05-07 NOTE — TELEPHONE ENCOUNTER
Thank you Josefina, agree with losartan plan for now.    Re Inpefa: can you touch base with Perlita (rep) for samples for her meanwhile ? Her cell is [phone]

## 2024-05-07 NOTE — PROGRESS NOTES
NURSING DAILY NOTE    Name: Flavia Garrett   Date of Admission: 5/1/2024   Admitting Diagnosis: Closed comminuted supracondylar fracture of femur, left, initial encounter (formerly [facility])  Attending Physician: Miguel Goins M.d.  Allergies: Atorvastatin, Hydrochlorothiazide, and Lisinopril    Safety  Patient Assist  min to mod  Patient Precautions  Fall Risk, Toe Touch Weight Bearing Left Lower Extremity  Precaution Comments  pt is very anxious  Bed Transfer Status  Contact Guard Assist  Toilet Transfer Status   Contact Guard Assist  Assistive Devices  Rails, Wheelchair  Oxygen  None - Room Air  Diet/Therapeutic Dining  Current Diet Order   Procedures    Diet Order Diet: Regular     Pill Administration  whole  Agitated Behavioral Scale     ABS Level of Severity       Fall Risk  Has the patient had a fall this admission?   No  Kelli Min Fall Risk Scoring  12, MODERATE RISK  Fall Risk Safety Measures  bed alarm, chair alarm, poor balance, and low vision/ hearing    Vitals  Temperature: 36.6 °C (97.8 °F)  Temp src: Oral  Pulse: 87  Respiration: 18  Blood Pressure : 138/78  Blood Pressure MAP (Calculated): 98 MM HG  BP Location: Left, Upper Arm  Patient BP Position: Supine     Oxygen  Pulse Oximetry: 96 %  O2 (LPM): 0  O2 Delivery Device: None - Room Air    Bowel and Bladder  Last Bowel Movement  05/06/24  Stool Type  Type 6: Fluffy pieces with ragged edges, a mushy stool  Bowel Device  Bathroom  Continent  Bladder: Continent void   Bowel: Continent movement  Bladder Function  Urine Void (mL):  (moderate)  Number of Times Voided: 1  Urine Color: Unable To Evaluate  Genitourinary Assessment   Bladder Assessment (WDL):  WDL Except  Urine Color: Unable To Evaluate  Bladder Device: Bathroom  Time Void: Yes  Bladder Scan: Post Void  $ Bladder Scan Results (mL): 42    Skin  Milton Score   17  Sensory Interventions   Bed Types: Standard/Trauma Mattress  Skin Preventative  Measures: Pillows in Use for Support / Positioning  Moisture Interventions  Moisturizers/Barriers: Barrier Wipes      Pain  Pain Rating Scale  0 - No Pain  Pain Location  Knee, Leg  Pain Location Orientation  Left  Pain Interventions   [place]    ADLs    Bathing   Shower, Staff (shower by ot)  Linen Change      Personal Hygiene  Hair Brushed  Chlorhexidine Bath      Oral Care  Brushed Teeth  Teeth/Dentures     Shave     Nutrition Percentage Eaten  Between 50-75% Consumed  Environmental Precautions  Bed in Low Position, Treaded Slipper Socks on Patient  Patient Turns/Positioning  Patient Turns Self from Side to Side  Patient Turns Assistance/Tolerance     Bed Positions  Bed Controls On  Head of Bed Elevated  Self regulated      Psychosocial/Neurologic Assessment  Psychosocial Assessment  Psychosocial (WDL):  Within Defined Limits  Neurologic Assessment  Neuro (WDL): Exceptions to WDL  Level of Consciousness: Alert  Orientation Level: Oriented X4  Cognition: Follows commands  Speech: Clear  Pupil Assesment: No  Muscle Strength Left Leg: Fair Strength against Gravity but No Resistance  EENT (WDL):  WDL Except    Cardio/Pulmonary Assessment  Edema   RLE Edema: 2+, 3+, Pitting  LLE Edema: 2+, 3+, Pitting  Respiratory Breath Sounds  RUL Breath Sounds: Clear  RML Breath Sounds: Clear  RLL Breath Sounds: Clear  NO Breath Sounds: Clear  LLL Breath Sounds: Clear  Cardiac Assessment   Cardiac (WDL):  WDL Except (hx chf, sinus tach, right bbb, htn)

## 2024-05-07 NOTE — PROGRESS NOTES
"  Physical Medicine & Rehabilitation Progress Note  _____________________________________  Interdisciplinary Team Conference   Most recent IDT on 5/7/2024    IMiguel M.D., was present and led the interdisciplinary team conference on 5/7/2024.  I led the IDT conference and agree with the IDT conference documentation and plan of care as noted below.     Nursing:  Diet Current Diet Order   Procedures    Diet Order Diet: Regular       Eating ADL Supervision  Increased time, Set-up of equipment or meal/tube feeding, Supervision for safety   % of Last Meal  Oral Nutrition: Between 50-75% Consumed   Sleep    Bowel Last BM: 05/06/24   Bladder    Barriers to Discharge Home:weakness, orthostatics      Physical Therapy:  Bed Mobility    Transfers Contact Guard Assist  Adaptive equipment, Increased time, Supervision for safety, Verbal cueing, Set-up of equipment   Mobility Contact Guard Assist   Stairs    Barriers to Discharge Home:weakness      Occupational Therapy:  Grooming Supervision, Seated   Bathing Standby Assist   UB Dressing Supervision   LB Dressing Minimal Assist   Toileting Standby Assist   Shower & Transfer    Barriers to Discharge Home: weakness      Respiratory Therapy:  O2 (LPM): 0  O2 Delivery Device: None - Room Air    Case Management:  Continues to work on disposition and DME needs.      Discharge Date/Disposition:  5/14/24  _____________________________________   Encounter Date: 5/7/2024    Chief Complaint: Weakness    Interval Events (Subjective):  Seen in bed. No new concerns. Tolerating off losartan. Pain controlled. Sleeping well    Objective:  VITAL SIGNS: /62   Pulse 83   Temp 36.8 °C (98.3 °F) (Temporal)   Resp 18   Ht 1.499 m (4' 11\")   Wt 80 kg (176 lb 5.9 oz)   SpO2 95%   BMI 35.62 kg/m²   Gen: No acute distress, well developed well nourished adult  HEENT: Normal Cephalic Atraumatic, Normal conjunctiva.   CV: warm extremities, well perfused, no edema  Resp: symmetric chest " rise, breathing comfortably on room air  Abd: Soft, Non distended  Extremities: normal bulk, no atrophy  Skin: no visible rashes or lesions.   Neuro: alert, awake  Psych: Mood and affect appropriate and congruent    Laboratory Values:  Recent Results (from the past 72 hour(s))   CBC WITHOUT DIFFERENTIAL    Collection Time: 05/07/24  5:42 AM   Result Value Ref Range    WBC 7.2 4.8 - 10.8 K/uL    RBC 2.83 (L) 4.20 - 5.40 M/uL    Hemoglobin 7.8 (L) 12.0 - 16.0 g/dL    Hematocrit 25.3 (L) 37.0 - 47.0 %    MCV 89.4 81.4 - 97.8 fL    MCH 27.6 27.0 - 33.0 pg    MCHC 30.8 (L) 32.2 - 35.5 g/dL    RDW 49.6 35.9 - 50.0 fL    Platelet Count 361 164 - 446 K/uL    MPV 10.4 9.0 - 12.9 fL   Comp Metabolic Panel    Collection Time: 05/07/24  5:42 AM   Result Value Ref Range    Sodium 136 135 - 145 mmol/L    Potassium 4.1 3.6 - 5.5 mmol/L    Chloride 103 96 - 112 mmol/L    Co2 22 20 - 33 mmol/L    Anion Gap 11.0 7.0 - 16.0    Glucose 95 65 - 99 mg/dL    Bun 21 8 - 22 mg/dL    Creatinine 0.76 0.50 - 1.40 mg/dL    Calcium 8.4 (L) 8.5 - 10.5 mg/dL    Correct Calcium 9.2 8.5 - 10.5 mg/dL    AST(SGOT) 16 12 - 45 U/L    ALT(SGPT) 7 2 - 50 U/L    Alkaline Phosphatase 61 30 - 99 U/L    Total Bilirubin 0.4 0.1 - 1.5 mg/dL    Albumin 3.0 (L) 3.2 - 4.9 g/dL    Total Protein 5.4 (L) 6.0 - 8.2 g/dL    Globulin 2.4 1.9 - 3.5 g/dL    A-G Ratio 1.3 g/dL   ESTIMATED GFR    Collection Time: 05/07/24  5:42 AM   Result Value Ref Range    GFR (CKD-EPI) 78 >60 mL/min/1.73 m 2       Medications:  Scheduled Medications   Medication Dose Frequency    lidocaine  1 Patch Q24HR    vitamin D3  1,000 Units DAILY    Sotagliflozin  200 mg DAILY    enoxaparin (LOVENOX) injection  40 mg DAILY AT 1800    ivabradine  5 mg BID WITH MEALS    levothyroxine  150 mcg QAM TU,TH,SA,FALL    levothyroxine  175 mcg MO, WE + FR    Pharmacy Consult Request  1 Each PHARMACY TO DOSE    omeprazole  20 mg DAILY    melatonin  4.5 mg QHS     PRN medications: diclofenac sodium,  acetaminophen, magnesium hydroxide, levalbuterol, melatonin, Respiratory Therapy Consult, hydrALAZINE, [DISCONTINUED] senna-docusate **AND** polyethylene glycol/lytes, ondansetron **OR** ondansetron, sodium chloride, oxyCODONE immediate-release **OR** oxyCODONE immediate-release **OR** [DISCONTINUED] HYDROmorphone    Diet:  Current Diet Order   Procedures    Diet Order Diet: Regular       Medical Decision Making and Plan:  Closed comminuted supracondylar fracture of femur, left, initial encounter  s/p Open reduction internal fixation left intra-articular distal femur fracture on 4/29 by Dr Tolbert  Weightbearing status: Touchdown weightbearing left lower extremity for 6 weeks then transition to weightbearing as tolerated, range of motion as tolerated left knee  DVT prophylaxis: SCDs and lovenox until mobilizing well, then aspirin 81mg BID for 4 weeks  Outpatient plan: The patient will follow up in clinic in 2 weeks for wound check, suture/staple removal (if applicable), and xrays.  If the patient is at a facility at that time, wound check and suture/staple removal may be performed by nursing care and the patient should instead follow up at the 6 week post operative darline for repeat clinical check and xrays.     Orthostatic Hypotension  Primary hypertension  As per history.  Blood pressure goal less than 130/80.  DC losartan due to hypotension on 5/6  -continue off losartan       Chronic heart failure with preserved ejection fraction   As per history with last echocardiogram in September 2023 EF of 60%.  Appears to be euvolemic.  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      Acute hypoxic Respiratory Distress:  -2/2 surgery + opioid use     Urinary Retension  - Timed voids with PVR q4H x3. If PVR > 400mL or if patient is unable to void, straight cath patient.      Constipation  -  Colace, Senna BID on admission  - Goal of 1BM/day.  -    Circadian Rhythm disorder:   Recommend lights on during the day/off at night, minimize nighttime interruptions as able.     Mood  - at risk of adjustment disorder, depression, and anxiety due to functional decline     ID:  - at risk for Urinary tract infection     Skin/Wounds:  - Pressure relief q2h while in bed. Close monitoring for signs of breakdown     Pain:  - Neuroceptic - continue tylenol, continue oxycodone     DVT prophylaxis: continue lovenox     GI prophylaxis:  On Prilosec 20mg daily         -Follow-up Ortho    ____________________________________    Miguel Goins MD  Physical Medicine & Rehabilitation   Brain Injury Medicine   ____________________________________    Total time:  60 minutes. Time spent included pre-rounding, review of vitals and tests, unit/floor time, face-to-face time with the patient including physical examination, care coordination, counseling of patient and/or family, ordering medications/procedures/tests, discussion with CM, PT, OT, SLP and/or other healthcare providers, and documentation in the electronic medical record. Topics discussed included  dispostion.

## 2024-05-07 NOTE — CARE PLAN
Problem: Mobility  Goal: Patient's capacity to carry out activities will improve  Outcome: Progressing  Note: Patient able to perform regular activities this shift.  Pain controlled this shift.  Pain management includes PRN pain meds as well as non-pharmacological measures such as emotional support, rest, and repositioning.  Will continue to monitor.   The patient is Stable - Low risk of patient condition declining or worsening    Shift Goals  Clinical Goals: Safety  Patient Goals: Participate in therapy    Progress made toward(s) clinical / shift goals:  pt participates with therapy and is cooperative with nursing    Patient is not progressing towards the following goals:

## 2024-05-07 NOTE — TELEPHONE ENCOUNTER
Spoke to Ray regarding Flavia. Ray states that Flavia has been in hospital and now rehab for femur fracture. Due to inactivity and deconditioning, she is experiencing orthostatic hypotension and has been taken off losartan. They were advised to communicate this with AL.     Pt is scheduled with RT for post hospitalization appt on 5/14.    Ray states she is almost out of Inpe and will contact [facility] about expediting order.  Called [facility] pharmacy for they do not have sample supply to provide at this time.     To ADITYA LOU. Thank you!

## 2024-05-07 NOTE — DISCHARGE PLANNING
Case Management/IDT follow up.   IDT continues to recommend IRF level of care as patient continue to make progress with all therapies.    dc date set for 514       DC needs:  home health for PT/OT/SLP - [facility]  Wc/cushion - referral sent to [facility]  Follow up with pcp/ ortho  Family training w/ dtr in law     Met with patient  providing update from IDT and discussed plan of care.    Plan: dc 5/14

## 2024-05-07 NOTE — THERAPY
"Occupational Therapy  Daily Treatment     Patient Name: Flavia Garrett  Age:  84 y.o., Sex:  female  Medical Record #: [id number]  Today's Date: 5/7/2024     Precautions  Precautions: (P) Fall Risk, Toe Touch Weight Bearing Left Lower Extremity  Comments: pt is very anxious    Safety   ADL Safety : Requires Physical Assist for Safety  Bathroom Safety: Requires Physical Assist for Safety    Subjective    \" Last Time I did that bike  I am sure it made this hurt. \" Referring to  left Upper  trapezius   \" The nurse put a Lidocain patch on it and it felt great!.\"  Objective       05/07/24 1401   OT Charge Group   OT Therapeutic Exercise (Units) 2   OT Total Time Spent   OT Individual Total Time Spent (Mins) 30   Precautions   Precautions Fall Risk;Toe Touch Weight Bearing Left Lower Extremity   Sitting Upper Body Exercises   Upper Extremity Bike Level 3 Resistance  (x 5  minutes  motomed  .98 miles  instructed to slow pace as compared to last time she performed the task as she felt it inflamed and caused pain in left upper trap)   Other Exercise Biceps./ Triceps strengthening  [facility]   x 10 reps x 3  facing and backed in  with 20lbs   Interdisciplinary Plan of Care Collaboration   Patient Position at End of Therapy Seated  (in gym  hand off to  OT for continued  tx activity)      Changed left regular foot rest to elevating foot rest to assist with comfort and  increase sitting up in w/c  tolerance to improve  endurance.       Assessment     Completed tx no complaints.   Reported no issues with  left upper trap after performing  the  motomed  with UEs     Strengths: Able to follow instructions, Alert and oriented, Independent prior level of function, Motivated for self care and independence, Pleasant and cooperative, Willingly participates in therapeutic activities  Barriers: Decreased endurance, Fatigue, Generalized weakness, Impaired activity tolerance, Limited mobility, Pain    Plan  Cont overall strength/endurance and " standing tolerance/balance to improve ADL's and functional mobility      Pt lives alone [place] - 1 story, 1 TAMY (1 from garage and at threshold in front door), WIS, FWW, SPC, shower seat, GB's at shower. DIL will stay with her at d/c. Has reacher already. Does not wear socks at home - not interested in sock aid. Not interested in purchasing toilet riser/commode for toilet.      DME  OT DME Recommendations  Bathroom Equipment:  (TBD)  Additional Equipment:  (TBD)      Occupational Therapy Goals (Active)       Problem: Dressing       Dates: Start:  05/07/24         Goal: STG-Within one week, patient will dress LB with supervision using DME/AE as needed       Dates: Start:  05/07/24               Problem: Functional Transfers       Dates: Start:  05/07/24         Goal: STG-Within one week, patient will transfer to step in shower with CGA using DME/AE as needed       Dates: Start:  05/07/24            Goal: STG-Within one week, patient will transfer to toilet with FWW at CGA        Dates: Start:  05/07/24               Problem: IADL's       Dates: Start:  05/02/24         Goal: STG-Within one week, patient will prepare a meal w/ min A and LRAD       Dates: Start:  05/02/24         Goal Note filed on 05/07/24 0829 by Abiola Ott MS,OTR/L       Not yet addressed                 Problem: OT Long Term Goals       Dates: Start:  05/02/24         Goal: LTG-By discharge, patient will complete basic self care tasks w/ mod I and AE/DME as needed       Dates: Start:  05/02/24            Goal: LTG-By discharge, patient will complete basic home management w/ SPV and LRAD       Dates: Start:  05/02/24

## 2024-05-07 NOTE — THERAPY
Occupational Therapy  Daily Treatment     Patient Name: Flavia Garrett  Age:  84 y.o., Sex:  female  Medical Record #: [id number]  Today's Date: 5/7/2024     Precautions  Precautions: Fall Risk, Toe Touch Weight Bearing Left Lower Extremity  Comments: pt is very anxious    Safety   ADL Safety : Requires Physical Assist for Safety  Bathroom Safety: Requires Physical Assist for Safety    Subjective    Pt seated in gym upon arrival, pleasant and cooperative, agreeable to therapy, however reports significant fatigue from long day of multiple therapy sessions     Objective       05/07/24 1431   OT Charge Group   OT Self Care / ADL (Units) 1   OT Therapy Activity (Units) 1   OT Therapeutic Exercise (Units) 2   OT Total Time Spent   OT Individual Total Time Spent (Mins) 60   Functional Level of Assist   Grooming Supervision;Seated   Toileting Standby Assist   Toilet Transfers Standby Assist  (stand pivot w/c<>toilet with GB)   Bed Mobility    Sit to Supine Minimal Assist   Interdisciplinary Plan of Care Collaboration   Patient Position at End of Therapy Call Light within Reach;Tray Table within Reach;In Bed     Standing in // LE therex, SBA overall:  - L foot taps 3 sets x30  - sit<>stands x 5     Assessment    Pt with significant fatigue today, but participated to the best of her abilities, exercises were modified. No LOB with standing tasks and transfers    Strengths: Able to follow instructions, Alert and oriented, Independent prior level of function, Motivated for self care and independence, Pleasant and cooperative, Willingly participates in therapeutic activities  Barriers: Decreased endurance, Fatigue, Generalized weakness, Impaired activity tolerance, Limited mobility, Pain    Plan    Cont overall strength/endurance and standing tolerance/balance to improve ADL's and functional mobility      Pt lives alone [place] - 1 story, 1 TAMY (1 from garage and at threshold in front door), WIS, FWW, SPC, shower seat, GB's at shower.  DIL will stay with her at d/c. Has reacher already. Does not wear socks at home - not interested in sock aid. Not interested in purchasing toilet riser/commode for toilet.     DME     None     Occupational Therapy Goals (Active)       Problem: Dressing       Dates: Start:  05/07/24         Goal: STG-Within one week, patient will dress LB with supervision using DME/AE as needed       Dates: Start:  05/07/24               Problem: Functional Transfers       Dates: Start:  05/07/24         Goal: STG-Within one week, patient will transfer to step in shower with CGA using DME/AE as needed       Dates: Start:  05/07/24            Goal: STG-Within one week, patient will transfer to toilet with FWW at CGA        Dates: Start:  05/07/24               Problem: IADL's       Dates: Start:  05/02/24         Goal: STG-Within one week, patient will prepare a meal w/ min A and LRAD       Dates: Start:  05/02/24         Goal Note filed on 05/07/24 0829 by Abiola Ott, MS,OTR/L       Not yet addressed                 Problem: OT Long Term Goals       Dates: Start:  05/02/24         Goal: LTG-By discharge, patient will complete basic self care tasks w/ mod I and AE/DME as needed       Dates: Start:  05/02/24            Goal: LTG-By discharge, patient will complete basic home management w/ SPV and LRAD       Dates: Start:  05/02/24

## 2024-05-07 NOTE — THERAPY
Physical Therapy   Daily Treatment     Patient Name: Flavia Garrett  Age:  84 y.o., Sex:  female  Medical Record #: [id number]  Today's Date: 5/7/2024     Precautions  Precautions: Fall Risk, Toe Touch Weight Bearing Left Lower Extremity  Comments: pt is very anxious    Subjective    Willing to review supine therex per PTA plan of care     Objective       05/07/24 1545   PT Charge Group   PT Therapeutic Exercise (Units) 1   PT Total Time Spent   PT Individual Total Time Spent (Mins) 15   Supine Lower Body Exercise   Other Exercises review supine therex handout, patient demonstrates 3/4 exercises without cues from handout. Was able to demonstrate all exercises using handout as reminder.   Interdisciplinary Plan of Care Collaboration   IDT Collaboration with  Physical Therapist Assistant (PTA)   Patient Position at End of Therapy In Bed;Call Light within Reach;Tray Table within Reach;Phone within Reach   Collaboration Comments plan of care         Assessment    Patient is able to accurately recall 3 of 4 supine exercises from handout independently; is able to use handout to recall fourth exercise. Demonstrates understanding of correct performance with minimal cuing.     Strengths: Able to follow instructions, Alert and oriented, Effective communication skills, Independent prior level of function, Pleasant and cooperative  Barriers: Decreased endurance, Fatigue, Generalized weakness, Impaired activity tolerance, Impaired balance, Pain    Plan    AAROM L knee to tolerance  L LE therex  Stand pivot transfers using FWW  Pre gait // bars L LE TTWB  Gait training using FWW    DME  PT DME Recommendations  Additional Equipment:  (TBD)    Passport items to be completed:  Get in/out of bed safely, in/out of a vehicle, safely use mobility device, walk or wheel around home/community, navigate up and down stairs, show how to get up/down from the ground, ensure home is accessible, demonstrate HEP, complete caregiver  training    Physical Therapy Problems (Active)       Problem: Mobility       Dates: Start:  05/02/24         Goal: STG-Within one week, patient will propel wheelchair household distances x 50 feet, SBA       Dates: Start:  05/02/24            Goal: STG-Within one week, patient will ambulate household distance x 50 feet using FWW, CGA maintaining TTWB on L LE       Dates: Start:  05/02/24         Goal Note filed on 05/07/24 0833 by Yossi Knox, PT       Limited by fatigue and dizziness. Pt has low BP                 Problem: PT-Long Term Goals       Dates: Start:  05/02/24         Goal: LTG-By discharge, patient will ambulate x 150 feet using FWW, SUP       Dates: Start:  05/02/24            Goal: LTG-By discharge, patient will transfer one surface to another, SUP using FWW       Dates: Start:  05/02/24            Goal: LTG-By discharge, patient will ambulate up/down a curb using FWW, SUP       Dates: Start:  05/02/24            Goal: LTG-By discharge, patient will transfer in/out of a car, SUP       Dates: Start:  05/02/24

## 2024-05-07 NOTE — CARE PLAN
Problem: Mobility  Goal: STG-Within one week, patient will propel wheelchair household distances x 50 feet, SBA  Outcome: Not Met  Goal: STG-Within one week, patient will ambulate household distance x 50 feet using FWW, CGA maintaining TTWB on L LE  Outcome: Not Met  Note: Limited by fatigue and dizziness. Pt has low BP     Problem: Mobility Transfers  Goal: STG-Within one week, patient will perform bed mobility, CGA  Outcome: Met  Goal: STG-Within one week, patient will transfer bed to chair, CGA  Outcome: Met

## 2024-05-08 ENCOUNTER — APPOINTMENT (OUTPATIENT)
Dept: OCCUPATIONAL THERAPY | Facility: REHABILITATION | Age: 84
DRG: 560 | End: 2024-05-08
Attending: PHYSICAL MEDICINE & REHABILITATION
Payer: MEDICARE

## 2024-05-08 ENCOUNTER — APPOINTMENT (OUTPATIENT)
Dept: PHYSICAL THERAPY | Facility: REHABILITATION | Age: 84
DRG: 560 | End: 2024-05-08
Attending: PHYSICAL MEDICINE & REHABILITATION
Payer: MEDICARE

## 2024-05-08 RX ORDER — METHOCARBAMOL 500 MG/1
500 TABLET, FILM COATED ORAL 4 TIMES DAILY PRN
Status: DISCONTINUED | OUTPATIENT
Start: 2024-05-08 | End: 2024-05-14 | Stop reason: HOSPADM

## 2024-05-08 RX ADMIN — Medication 1000 UNITS: at 08:18

## 2024-05-08 RX ADMIN — OMEPRAZOLE 20 MG: 20 CAPSULE, DELAYED RELEASE ORAL at 08:18

## 2024-05-08 RX ADMIN — LIDOCAINE 1 PATCH: 4 PATCH TOPICAL at 20:28

## 2024-05-08 RX ADMIN — ENOXAPARIN SODIUM 40 MG: 100 INJECTION SUBCUTANEOUS at 17:20

## 2024-05-08 RX ADMIN — ACETAMINOPHEN 650 MG: 325 TABLET ORAL at 13:17

## 2024-05-08 RX ADMIN — LEVOTHYROXINE SODIUM 175 MCG: 0.07 TABLET ORAL at 05:11

## 2024-05-08 RX ADMIN — POLYETHYLENE GLYCOL 3350 1 PACKET: 17 POWDER, FOR SOLUTION ORAL at 08:22

## 2024-05-08 RX ADMIN — ACETAMINOPHEN 650 MG: 325 TABLET ORAL at 21:00

## 2024-05-08 ASSESSMENT — GAIT ASSESSMENTS
GAIT LEVEL OF ASSIST: CONTACT GUARD ASSIST
ASSISTIVE DEVICE: FRONT WHEEL WALKER
DISTANCE (FEET): 10
DEVIATION: ANTALGIC;STEP TO;DECREASED BASE OF SUPPORT;BRADYKINETIC;DECREASED HEEL STRIKE;DECREASED TOE OFF
ASSISTIVE DEVICE: FRONT WHEEL WALKER
DISTANCE (FEET): 30
GAIT LEVEL OF ASSIST: CONTACT GUARD ASSIST

## 2024-05-08 ASSESSMENT — ACTIVITIES OF DAILY LIVING (ADL): BED_CHAIR_WHEELCHAIR_TRANSFER_DESCRIPTION: ADAPTIVE EQUIPMENT

## 2024-05-08 ASSESSMENT — PAIN DESCRIPTION - PAIN TYPE: TYPE: ACUTE PAIN;SURGICAL PAIN

## 2024-05-08 NOTE — FLOWSHEET NOTE
05/08/24 0729   Events/Summary/Plan   Events/Summary/Plan RA pulse ox check   Vital Signs   Pulse 87   Respiration 16   Pulse Oximetry 93 %   $ Pulse Oximetry (Spot Check) Yes   Respiratory Assessment   Level of Consciousness Alert   Chest Exam   Work Of Breathing / Effort Within Normal Limits   Oxygen   O2 Delivery Device Room air w/o2 available

## 2024-05-08 NOTE — THERAPY
Physical Therapy   Daily Treatment     Patient Name: Flavia Garrett  Age:  84 y.o., Sex:  female  Medical Record #: [id number]  Today's Date: 5/8/2024     Precautions  Precautions: Fall Risk, Toe Touch Weight Bearing Left Lower Extremity  Comments: pt is very anxious    Subjective    Pt is in her bed and agreeable to therapy. His friends are present during session. She reports that feels better and denies dizziness.      Objective       05/08/24 1401   PT Charge Group   PT Gait Training (Units) 2   PT Therapeutic Exercise (Units) 1   PT Therapeutic Activities (Units) 1   PT Total Time Spent   PT Individual Total Time Spent (Mins) 60   Precautions   Precautions Fall Risk;Toe Touch Weight Bearing Left Lower Extremity   Vitals   Pulse 89   Patient BP Position Sitting   Blood Pressure  135/65   Respiration 18   Pulse Oximetry 97 %   O2 Delivery Device None - Room Air   Gait Functional Level of Assist    Gait Level Of Assist Contact Guard Assist   Assistive Device Front Wheel Walker   Distance (Feet) 30  (2nd bout: 20 feet)   # of Times Distance was Traveled 2   Transfer Functional Level of Assist   Bed, Chair, Wheelchair Transfer Standby Assist   Bed Chair Wheelchair Transfer Description Adaptive equipment   Sitting Lower Body Exercises   Ankle Pumps 1 set of 10   Hip Flexion 2 sets of 10;Bilateral  (3.5lbs on R LE; AAROM on L LE)   Hip Abduction 2 sets of 10;Bilateral;Light Resistance Theraband   Hip Adduction 2 sets of 10;Bilateral   Long Arc Quad 2 sets of 10;Bilateral;Weight (See Comments for lbs)  (3.5lbs on R LE)   Hamstring Curl 2 sets of 10;Bilateral;Light Resistance Theraband   Bed Mobility    Supine to Sit Standby Assist   Sit to Supine Standby Assist   Sit to Stand Standby Assist   Scooting Standby Assist   Rolling Standby Assist   Interdisciplinary Plan of Care Collaboration   IDT Collaboration with  Physical Therapist Assistant (PTA)   Patient Position at End of Therapy In Bed;Bed Alarm On;Call Light within  "Reach;Tray Table within Reach;Phone within Reach;Family / Friend in Room   Collaboration Comments CLOF; friends are present during session.         Assessment    Pt demonstrated progress in ambulation distance performance today using FWW reinforcing TTWB L LE. She had 1 seated rest break in between gait bouts. Her VS were WNL during gait training and denies being dizzing. She was still anxious as evident by occasional hyperventilating. Instructed in pursed lip breathing and relaxation techniques through visualization during gait training.     Strengths: Able to follow instructions, Alert and oriented, Effective communication skills, Independent prior level of function, Pleasant and cooperative  Barriers: Decreased endurance, Fatigue, Generalized weakness, Impaired activity tolerance, Impaired balance, Pain    Plan    AAROM L knee to tolerance  L LE therex  Stand pivot transfers using FWW/ squat pivot transfer using bed cane on a regualr bed.  Pre gait // bars L LE TTWB  Gait training using FWW    DME  PT DME Recommendations  Wheelchair: 20\" Width, Elevating Leg Rests  Cushion: Standard  Assistive Device: Front Wheeled Walker (Jr height)  Additional Equipment:  (bed rail?)    Passport items to be completed:  Get in/out of bed safely, in/out of a vehicle, safely use mobility device, walk or wheel around home/community, navigate up and down stairs, show how to get up/down from the ground, ensure home is accessible, demonstrate HEP, complete caregiver training    Physical Therapy Problems (Active)       Problem: Mobility       Dates: Start:  05/02/24         Goal: STG-Within one week, patient will propel wheelchair household distances x 50 feet, SBA       Dates: Start:  05/02/24            Goal: STG-Within one week, patient will ambulate household distance x 50 feet using FWW, CGA maintaining TTWB on L LE       Dates: Start:  05/02/24         Goal Note filed on 05/07/24 0833 by Yossi Knox, PT       Limited by fatigue " and dizziness. Pt has low BP                 Problem: PT-Long Term Goals       Dates: Start:  05/02/24         Goal: LTG-By discharge, patient will ambulate x 150 feet using FWW, SUP       Dates: Start:  05/02/24            Goal: LTG-By discharge, patient will transfer one surface to another, SUP using FWW       Dates: Start:  05/02/24            Goal: LTG-By discharge, patient will ambulate up/down a curb using FWW, SUP       Dates: Start:  05/02/24            Goal: LTG-By discharge, patient will transfer in/out of a car, SUP       Dates: Start:  05/02/24

## 2024-05-08 NOTE — PROGRESS NOTES
"  Physical Medicine & Rehabilitation Progress Note    Encounter Date: 5/8/2024    Chief Complaint: Weakness    Interval Events (Subjective):  Seen in bed. Tolerating therapy. Pain controlled. Fatigue present    Objective:  VITAL SIGNS: /63   Pulse 87   Temp 36.7 °C (98 °F) (Temporal)   Resp 16   Ht 1.499 m (4' 11\")   Wt 80 kg (176 lb 5.9 oz)   SpO2 93%   BMI 35.62 kg/m²   Gen: No acute distress, well developed well nourished adult  HEENT: Normal Cephalic Atraumatic, Normal conjunctiva.   CV: warm extremities, well perfused, no edema  Resp: symmetric chest rise, breathing comfortably on room air  Abd: Soft, Non distended  Extremities: normal bulk, no atrophy  Skin: no visible rashes or lesions.   Neuro: alert, awake  Psych: Mood and affect appropriate and congruent    Laboratory Values:  Recent Results (from the past 72 hour(s))   CBC WITHOUT DIFFERENTIAL    Collection Time: 05/07/24  5:42 AM   Result Value Ref Range    WBC 7.2 4.8 - 10.8 K/uL    RBC 2.83 (L) 4.20 - 5.40 M/uL    Hemoglobin 7.8 (L) 12.0 - 16.0 g/dL    Hematocrit 25.3 (L) 37.0 - 47.0 %    MCV 89.4 81.4 - 97.8 fL    MCH 27.6 27.0 - 33.0 pg    MCHC 30.8 (L) 32.2 - 35.5 g/dL    RDW 49.6 35.9 - 50.0 fL    Platelet Count 361 164 - 446 K/uL    MPV 10.4 9.0 - 12.9 fL   Comp Metabolic Panel    Collection Time: 05/07/24  5:42 AM   Result Value Ref Range    Sodium 136 135 - 145 mmol/L    Potassium 4.1 3.6 - 5.5 mmol/L    Chloride 103 96 - 112 mmol/L    Co2 22 20 - 33 mmol/L    Anion Gap 11.0 7.0 - 16.0    Glucose 95 65 - 99 mg/dL    Bun 21 8 - 22 mg/dL    Creatinine 0.76 0.50 - 1.40 mg/dL    Calcium 8.4 (L) 8.5 - 10.5 mg/dL    Correct Calcium 9.2 8.5 - 10.5 mg/dL    AST(SGOT) 16 12 - 45 U/L    ALT(SGPT) 7 2 - 50 U/L    Alkaline Phosphatase 61 30 - 99 U/L    Total Bilirubin 0.4 0.1 - 1.5 mg/dL    Albumin 3.0 (L) 3.2 - 4.9 g/dL    Total Protein 5.4 (L) 6.0 - 8.2 g/dL    Globulin 2.4 1.9 - 3.5 g/dL    A-G Ratio 1.3 g/dL   ESTIMATED GFR    Collection " Time: 05/07/24  5:42 AM   Result Value Ref Range    GFR (CKD-EPI) 78 >60 mL/min/1.73 m 2       Medications:  Scheduled Medications   Medication Dose Frequency    lidocaine  1 Patch Q24HR    vitamin D3  1,000 Units DAILY    Sotagliflozin  200 mg DAILY    enoxaparin (LOVENOX) injection  40 mg DAILY AT 1800    ivabradine  5 mg BID WITH MEALS    levothyroxine  150 mcg QAM TU,TH,SA,FALL    levothyroxine  175 mcg MO, WE + FR    Pharmacy Consult Request  1 Each PHARMACY TO DOSE    omeprazole  20 mg DAILY    melatonin  4.5 mg QHS     PRN medications: diclofenac sodium, acetaminophen, magnesium hydroxide, levalbuterol, melatonin, Respiratory Therapy Consult, hydrALAZINE, [DISCONTINUED] senna-docusate **AND** polyethylene glycol/lytes, ondansetron **OR** ondansetron, sodium chloride, oxyCODONE immediate-release **OR** oxyCODONE immediate-release **OR** [DISCONTINUED] HYDROmorphone    Diet:  Current Diet Order   Procedures    Diet Order Diet: Regular       Medical Decision Making and Plan:  Closed comminuted supracondylar fracture of femur, left, initial encounter  s/p Open reduction internal fixation left intra-articular distal femur fracture on 4/29 by Dr Tolbert  Weightbearing status: Touchdown weightbearing left lower extremity for 6 weeks then transition to weightbearing as tolerated, range of motion as tolerated left knee  DVT prophylaxis: SCDs and lovenox until mobilizing well, then aspirin 81mg BID for 4 weeks  Outpatient plan: The patient will follow up in clinic in 2 weeks for wound check, suture/staple removal (if applicable), and xrays.  If the patient is at a facility at that time, wound check and suture/staple removal may be performed by nursing care and the patient should instead follow up at the 6 week post operative darline for repeat clinical check and xrays.     Orthostatic Hypotension  Primary hypertension  As per history.  Blood pressure goal less than 130/80.  DC losartan due to hypotension on 5/6  -continue  off losartan        Chronic heart failure with preserved ejection fraction   As per history with last echocardiogram in September 2023 EF of 60%.  Appears to be euvolemic.  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      Acute hypoxic Respiratory Distress:  -2/2 surgery + opioid use     Urinary Retension  - Timed voids with PVR q4H x3. If PVR > 400mL or if patient is unable to void, straight cath patient.     Constipation  -  Colace, Senna BID on admission  - Goal of 1BM/day.  -    Circadian Rhythm disorder:   Recommend lights on during the day/off at night, minimize nighttime interruptions as able.     Mood  - at risk of adjustment disorder, depression, and anxiety due to functional decline     ID:  - at risk for Urinary tract infection     Skin/Wounds:  - Pressure relief q2h while in bed. Close monitoring for signs of breakdown     Pain:  - Neuroceptic - continue tylenol, continue oxycodone     DVT prophylaxis: continue lovenox     GI prophylaxis:  On Prilosec 20mg daily         -Follow-up Ortho    ____________________________________    Miguel Goins MD  Physical Medicine & Rehabilitation   Brain Injury Medicine   ____________________________________

## 2024-05-08 NOTE — THERAPY
"Physical Therapy   Daily Treatment     Patient Name: Flavia Garrett  Age:  84 y.o., Sex:  female  Medical Record #: [id number]  Today's Date: 5/8/2024     Precautions  Precautions: Fall Risk, Toe Touch Weight Bearing Left Lower Extremity  Comments: pt is very anxious    Subjective    Pt resting in bed, reports she does not want to take anything for pain.     '\" I wan tot wait and see how I do with out it, I dont want to get nauseas again\"     Objective       05/08/24 0931   PT Charge Group   PT Gait Training (Units) 1   PT Therapeutic Exercise (Units) 2   PT Therapeutic Activities (Units) 1   Supervising Physical Therapist Yossi Knox   PT Total Time Spent   PT Individual Total Time Spent (Mins) 60   Pain   Intervention Cold Pack   Pain 0 - 10 Group   Location Leg   Location Orientation Left;Lateral   Therapist Pain Assessment Post Activity Pain Same as Prior to Activity   Gait Functional Level of Assist    Gait Level Of Assist Contact Guard Assist   Assistive Device Front Wheel Walker   Distance (Feet) 10   # of Times Distance was Traveled 1   Deviation Antalgic;Step To;Decreased Base Of Support;Bradykinetic;Decreased Heel Strike;Decreased Toe Off  (difficulty with TTWB L LE)   Stairs Functional Level of Assist   Level of Assist with Stairs Minimal Assist   # of Stairs Climbed 1  (2\" small threshold in //)   Stairs Description Verbal cueing;Requires incidental assist;Extra time;Hand rails;Limited by fatigue;Requires assist to advance foot  (in //)   Transfer Functional Level of Assist   Bed, Chair, Wheelchair Transfer Standby Assist   Bed Chair Wheelchair Transfer Description Adaptive equipment;Supervision for safety;Verbal cueing;Increased time;Set-up of equipment   Supine Lower Body Exercise   Supine Lower Body Exercises Yes   Hip Abduction 2 sets of 10;Left  (powder board and AA)   Hip Adduction  2 sets of 10  (powder board and AA)   Short Arc Quad 2 sets of 10;Left   Heel Slide 2 sets of 10;Other Resistance (See " "Comments)  (AA + powderboard)   Ankle Pumps 2 sets of 10   Quadriceps Isometrics 1 set of 10;Left   Sitting Lower Body Exercises   Other Exercises AAROM L LE seated EOM on skatebaord x 60\" x 2 sets, genlte over pressure at endrange. seated up and overs with L LE for hip flexion strengthening   Bed Mobility    Supine to Sit Standby Assist   Sit to Supine Standby Assist   Sit to Stand Standby Assist   Scooting Standby Assist   Neuro-Muscular Treatments   Neuro-Muscular Treatments Tapping   Interdisciplinary Plan of Care Collaboration   IDT Collaboration with  Physical Therapist   Patient Position at End of Therapy Seated;Chair Alarm On;Call Light within Reach   Collaboration Comments POC/CLOF   PT DME Recommendations   Wheelchair 20\" Width;Elevating Leg Rests   Cushion Standard   Assistive Device Front Wheeled Walker  (Jr height)   Additional Equipment   (bed rail?)       2\" platform step in // to mimic home entry/environment Alexa      Assessment    L LE ROM is limited by significant edema and pt has Poor L LE quad recruitment, may benefit from NMES. Pt tolerated session fairly well considering she did not take anything for pain prior to session, reported pain 7/10  Strengths: Able to follow instructions, Alert and oriented, Effective communication skills, Independent prior level of function, Pleasant and cooperative  Barriers: Decreased endurance, Fatigue, Generalized weakness, Impaired activity tolerance, Impaired balance, Pain    Plan    AAROM L knee to tolerance  L LE therex  Stand pivot transfers using FWW  Pre gait // bars L LE TTWB  Gait training using FWW    DME  PT DME Recommendations  Wheelchair: (P) 20\" Width, Elevating Leg Rests  Cushion: (P) Standard  Assistive Device: (P) Front Wheeled Walker (Jr height)  Additional Equipment: (P)  (bed rail?)    Passport items to be completed:  Get in/out of bed safely, in/out of a vehicle, safely use mobility device, walk or wheel around home/community, navigate up and " down stairs, show how to get up/down from the ground, ensure home is accessible, demonstrate HEP, complete caregiver training    Physical Therapy Problems (Active)       Problem: Mobility       Dates: Start:  05/02/24         Goal: STG-Within one week, patient will propel wheelchair household distances x 50 feet, SBA       Dates: Start:  05/02/24            Goal: STG-Within one week, patient will ambulate household distance x 50 feet using FWW, CGA maintaining TTWB on L LE       Dates: Start:  05/02/24         Goal Note filed on 05/07/24 0833 by Yossi Knox, PT       Limited by fatigue and dizziness. Pt has low BP                 Problem: PT-Long Term Goals       Dates: Start:  05/02/24         Goal: LTG-By discharge, patient will ambulate x 150 feet using FWW, SUP       Dates: Start:  05/02/24            Goal: LTG-By discharge, patient will transfer one surface to another, SUP using FWW       Dates: Start:  05/02/24            Goal: LTG-By discharge, patient will ambulate up/down a curb using FWW, SUP       Dates: Start:  05/02/24            Goal: LTG-By discharge, patient will transfer in/out of a car, SUP       Dates: Start:  05/02/24

## 2024-05-08 NOTE — THERAPY
Occupational Therapy  Daily Treatment     Patient Name: Flavia Garrett  Age:  84 y.o., Sex:  female  Medical Record #: [id number]  Today's Date: 5/8/2024     Precautions  Precautions: Fall Risk, Toe Touch Weight Bearing Left Lower Extremity  Comments:     Safety   ADL Safety : Requires Physical Assist for Safety  Bathroom Safety: Requires Physical Assist for Safety    Subjective    Pt supine in bed upon arrival, pleasant and cooperative, agreeable to therapy. Son initially present.     Objective       05/08/24 0831   OT Charge Group   OT Self Care / ADL (Units) 2   OT Total Time Spent   OT Individual Total Time Spent (Mins) 30   Precautions   Precautions Fall Risk;Toe Touch Weight Bearing Left Lower Extremity   Functional Level of Assist   Grooming Modified Independent;Seated   Toileting Standby Assist   Toilet Transfers Standby Assist  (with FWW)   Bed Mobility    Supine to Sit Standby Assist   Sit to Supine Standby Assist   Interdisciplinary Plan of Care Collaboration   Patient Position at End of Therapy Call Light within Reach;Tray Table within Reach;In Bed     Functional mobility at FWW level: SBA room<>bathroom      05/08/24 1101   OT Charge Group   OT Self Care / ADL (Units) 2   OT Total Time Spent   OT Individual Total Time Spent (Mins) 30   Precautions   Precautions Fall Risk;Toe Touch Weight Bearing Left Lower Extremity   Functional Level of Assist   Grooming Standby Assist;Standing  (wash hands)   Toileting Standby Assist   Toilet Transfers Contact Guard Assist  (with FWW)   Bed Mobility    Sit to Supine Contact Guard Assist   Interdisciplinary Plan of Care Collaboration   Patient Position at End of Therapy In Bed;Call Light within Reach;Tray Table within Reach     Functional mobility at FWW level: CGA room<>bathroom     Assessment    Pt with varying physical assistance level depending on fatigue level. 2nd session able to complete ambulation and ADL's in bathroom, despite pt stating that she wasn't going to  make it - she was having increase nausea and hyperventilating. But with encouragement and cues for pursed lip breathing, slowing pace, taking standing RB's - pt was able to complete tasks at SBA - CGA overall     Strengths: Able to follow instructions, Alert and oriented, Independent prior level of function, Motivated for self care and independence, Pleasant and cooperative, Willingly participates in therapeutic activities  Barriers: Decreased endurance, Fatigue, Generalized weakness, Impaired activity tolerance, Limited mobility, Pain    Plan    Cont overall strength/endurance and standing tolerance/balance to improve ADL's and functional mobility      Pt lives alone [place] - 1 story, 1 TAMY (1 from garage and at threshold in front door), WIS, FWW, SPC, shower seat, GB's at shower. DIL will stay with her at d/c. Has reacher already. Does not wear socks at home - not interested in sock aid. Not interested in purchasing toilet riser/commode for toilet.     DME     None     Occupational Therapy Goals (Active)       Problem: Dressing       Dates: Start:  05/07/24         Goal: STG-Within one week, patient will dress LB with supervision using DME/AE as needed       Dates: Start:  05/07/24               Problem: Functional Transfers       Dates: Start:  05/07/24         Goal: STG-Within one week, patient will transfer to step in shower with CGA using DME/AE as needed       Dates: Start:  05/07/24            Goal: STG-Within one week, patient will transfer to toilet with FWW at CGA        Dates: Start:  05/07/24               Problem: IADL's       Dates: Start:  05/02/24         Goal: STG-Within one week, patient will prepare a meal w/ min A and LRAD       Dates: Start:  05/02/24         Goal Note filed on 05/07/24 0829 by Abiola Ott, MS,OTR/L       Not yet addressed                 Problem: OT Long Term Goals       Dates: Start:  05/02/24         Goal: LTG-By discharge, patient will complete basic self care tasks w/  mod I and AE/DME as needed       Dates: Start:  05/02/24            Goal: LTG-By discharge, patient will complete basic home management w/ SPV and LRAD       Dates: Start:  05/02/24

## 2024-05-08 NOTE — CARE PLAN
Problem: VTE Prevention  Goal: Patient will remain free from venous thromboembolism (VTE)  Outcome: Progressing  Note: Pt is up and walking, participates in therapies, and up to table for all meals.   The patient is Stable - Low risk of patient condition declining or worsening    Shift Goals  Clinical Goals: Safety  Patient Goals: Participate in therapy    Progress made toward(s) clinical / shift goals:  pt participates with therapy and is cooperative with nursing    Patient is not progressing towards the following goals:

## 2024-05-08 NOTE — THERAPY
"Physical Therapy   Daily Treatment     Patient Name: Flavia Garrett  Age:  84 y.o., Sex:  female  Medical Record #: [id number]  Today's Date: 5/7/2024     Precautions  Precautions: Fall Risk, Toe Touch Weight Bearing Left Lower Extremity  Comments: pt is very anxious    Subjective    Pt seen for 2 separate sessions     1230: pt fond eating lunch in bed stated that her tray just arrived at 12:25, provided pt with additional 15 minutes to finish eating.    1600: pt resting in bed \"feel my hands, they are so cold\" pt with several blankets on, repots she is freezing and does not want to get OOB, supine exercises completed for this session     Objective       05/07/24 1231 05/07/24 1601   Therapy Missed   Missed Therapy (Minutes) 15  (m/u with Jessica PT at 345)  --    Reason For Missed Therapy Non-Medical - Other (Please Comment)  (pt's lunch tray arrived late)  --    PT Charge Group   PT Therapeutic Exercise (Units)  --  2   PT Therapeutic Activities (Units) 3  --    Supervising Physical Therapist Yossi Knox   PT Total Time Spent   PT Individual Total Time Spent (Mins) 45 30   Pain   Intervention  --  Nurse Notified   Pain 0 - 10 Group   Location  --  Leg   Location Orientation  --  Lateral;Left   Comfort Goal  --  Perform Activity;Comfort with Movement   Therapist Pain Assessment  --  Nurse Notified   Non Verbal Descriptors   Non Verbal Scale   --  Grimacing;Tense Body Language   Wheelchair Functional Level of Assist   Wheelchair Assist Stand by Assist  --    Distance Wheelchair (Feet or Distance) 40x3  --    Wheelchair Description Extra time;Assistance with steering;Leg rest management;Limited by fatigue;Supervision for safety;Verbal cueing  (through doorways, around corners and obstacles, using B UE and R LE only)  --    Transfer Functional Level of Assist   Bed, Chair, Wheelchair Transfer Contact Guard Assist  --    Bed Chair Wheelchair Transfer Description Adaptive equipment;Set-up of " "equipment;Supervision for safety;Verbal cueing  (Stand step transfer with FWW)  --    Supine Lower Body Exercise   Supine Lower Body Exercises  --  Yes   Hip Adduction   --  2 sets of 10;Bilateral   Straight Leg Raises  --  Front;2 sets of 10;Left  (PROM)   Knee to Chest  --  2 sets of 10;Left  (AAROM)   Short Arc Quad  --  2 sets of 10;Bilateral  (AAROM L LE)   Ankle Pumps  --  2 sets of 10   Gluteal Isometrics  --  1 set of 10;Bilateral   Quadriceps Isometrics  --  1 set of 10;Bilateral   Bed Mobility    Supine to Sit Standby Assist  --    Sit to Supine Standby Assist  --    Sit to Stand Contact Guard Assist  --    Neuro-Muscular Treatments   Neuro-Muscular Treatments  --  Tapping;Tactile Cuing   Comments  --  with SAQ   Interdisciplinary Plan of Care Collaboration   IDT Collaboration with  Physical Therapist Nursing   Patient Position at End of Therapy Seated;Chair Alarm On;Call Light within Reach;Tray Table within Reach In Bed;Call Light within Reach;Tray Table within Reach   Collaboration Comments 20\" W wc, POC, CLOF pain, pt reported nausea from tylenol this am     Edu pt on meal times in relation to possible therapy times and the importance of being up in wc for meals due to chocking hazard as well as being able to be ready for participation in therapy.     Instructed pt to initiate staff assistance to get dressed and into the wc, attempting to eat meals in dining room and requesting appropriate pain medications to allow for participation in therapy    Requested to to obtain measurements of doorways, bed height and front step height    Reviewed home entry options for small threshold 1: hopping up with FWW or having family assist with wc bump if needed    Demo bed mobility with and with out portable bed rail and with and with out leg     Assessment    Pt tolerated 1230 session well, she completed SPT from wc <> std [facility] with CGA and used the bed rail with instructions on use of momentum to swing " "LE's up into the bed.  Pt also able to maneuver 20\" w wc through doorways and around corners with SBA using B UE and R LE for steering, she requires vc for technique and obstacle negotiation.     1600: pt agreeable to supine exercises due to being fatigued and having pain. Pt required PROM/AAROM with L LE exercises as noted above in flow sheet. Provided edu on deep breathing, body scans for relaxation as well as positive reinforcement with pt's progress thus far.   Strengths: Able to follow instructions, Alert and oriented, Effective communication skills, Independent prior level of function, Pleasant and cooperative  Barriers: Decreased endurance, Fatigue, Generalized weakness, Impaired activity tolerance, Impaired balance, Pain    Plan    AAROM L knee to tolerance  L LE therex  Stand pivot transfers using FWW  Pre gait // bars L LE TTWB  Gait training using FWW    DME  PT DME Recommendations  Additional Equipment:  (TBD)    Passport items to be completed:  Get in/out of bed safely, in/out of a vehicle, safely use mobility device, walk or wheel around home/community, navigate up and down stairs, show how to get up/down from the ground, ensure home is accessible, demonstrate HEP, complete caregiver training    Physical Therapy Problems (Active)       Problem: Mobility       Dates: Start:  05/02/24         Goal: STG-Within one week, patient will propel wheelchair household distances x 50 feet, SBA       Dates: Start:  05/02/24            Goal: STG-Within one week, patient will ambulate household distance x 50 feet using FWW, CGA maintaining TTWB on L LE       Dates: Start:  05/02/24         Goal Note filed on 05/07/24 0833 by Yossi Knox, PT       Limited by fatigue and dizziness. Pt has low BP                 Problem: PT-Long Term Goals       Dates: Start:  05/02/24         Goal: LTG-By discharge, patient will ambulate x 150 feet using FWW, SUP       Dates: Start:  05/02/24            Goal: LTG-By discharge, " patient will transfer one surface to another, SUP using FWW       Dates: Start:  05/02/24            Goal: LTG-By discharge, patient will ambulate up/down a curb using FWW, SUP       Dates: Start:  05/02/24            Goal: LTG-By discharge, patient will transfer in/out of a car, SUP       Dates: Start:  05/02/24

## 2024-05-08 NOTE — CARE PLAN
"  Problem: Fall Risk - Rehab  Goal: Patient will remain free from falls  Outcome: Progressing  Note: Kelli Min Fall risk Assessment Score: 12    Moderate fall risk Interventions  - Bed and strip alarm   - Yellow sign by the door   - Yellow wrist band \"Fall risk\"  - Room near to the nurse station  - Do not leave patient unattended in the bathroom  - Fall risk education provided      Problem: Pain - Standard  Goal: Alleviation of pain or a reduction in pain to the patient’s comfort goal  Outcome: Progressing  Note: Assessed for pain and discomfort, left hip thigh incisions line with staples well approximated , dressing intact, medicated for left hip pain with relief. Needs anticipated and attended.   The patient is Stable - Low risk of patient condition declining or worsening    Shift Goals  Clinical Goals: Safety  Patient Goals: Participate in therapy    Progress made toward(s) clinical / shift goals:  Pt free from fall and injury.    "

## 2024-05-08 NOTE — PROGRESS NOTES
NURSING DAILY NOTE    Name: Flavia Garrett   Date of Admission: 5/1/2024   Admitting Diagnosis: Closed comminuted supracondylar fracture of femur, left, initial encounter ([facility])  Attending Physician: Miguel Goins M.d.  Allergies: Atorvastatin, Hydrochlorothiazide, and Lisinopril    Safety  Patient Assist  cga  Patient Precautions  Fall Risk, Toe Touch Weight Bearing Left Lower Extremity  Precaution Comments  pt is very anxious  Bed Transfer Status  Contact Guard Assist  Toilet Transfer Status   Standby Assist (with FWW)  Assistive Devices  Rails, Wheelchair  Oxygen  Room air w/o2 available  Diet/Therapeutic Dining  Current Diet Order   Procedures    Diet Order Diet: Regular     Pill Administration  whole  Agitated Behavioral Scale     ABS Level of Severity       Fall Risk  Has the patient had a fall this admission?   No  Kelli Min Fall Risk Scoring  12, MODERATE RISK  Fall Risk Safety Measures  bed alarm and chair alarm    Vitals  Temperature: 36.7 °C (98 °F)  Temp src: Temporal  Pulse: 87  Respiration: 16  Blood Pressure : 136/63  Blood Pressure MAP (Calculated): 87 MM HG  BP Location: Right, Upper Arm  Patient BP Position: Supine     Oxygen  Pulse Oximetry: 93 %  O2 (LPM): 0  O2 Delivery Device: Room air w/o2 available    Bowel and Bladder  Last Bowel Movement  05/06/24  Stool Type  Type 6: Fluffy pieces with ragged edges, a mushy stool  Bowel Device  Bathroom  Continent  Bladder: Continent void   Bowel: Continent movement  Bladder Function  Urine Void (mL):  (moderate)  Number of Times Voided: 1  Urine Color: Yellow  Genitourinary Assessment   Bladder Assessment (WDL):  Within Defined Limits  Urine Color: Yellow  Bladder Device: Bathroom  Time Void: Yes  Bladder Scan: Post Void  $ Bladder Scan Results (mL): 42    Skin  Milton Score   17  Sensory Interventions   Bed Types: Standard/Trauma Mattress  Skin Preventative Measures: Waffle Overlay, Pillows in  Use for Support / Positioning  Moisture Interventions  Moisturizers/Barriers: Barrier Wipes      Pain  Pain Rating Scale  0 - No Pain  Pain Location  Leg  Pain Location Orientation  Lateral, Left  Pain Interventions   Declines    ADLs    Bathing   Shower, Staff (shower by ot)  Linen Change      Personal Hygiene  Hair Brushed  Chlorhexidine Bath      Oral Care  Brushed Teeth  Teeth/Dentures     Shave     Nutrition Percentage Eaten  Between 50-75% Consumed  Environmental Precautions  Treaded Slipper Socks on Patient, Personal Belongings, Wastebasket, Call Bell etc. in Easy Reach, Bed in Low Position  Patient Turns/Positioning  Patient Turns Self from Side to Side  Patient Turns Assistance/Tolerance     Bed Positions  Bed Controls On  Head of Bed Elevated  Self regulated      Psychosocial/Neurologic Assessment  Psychosocial Assessment  Psychosocial (WDL):  Within Defined Limits  Neurologic Assessment  Neuro (WDL): Exceptions to WDL  Level of Consciousness: Alert  Orientation Level: Oriented X4  Cognition: Follows commands  Speech: Clear  Pupil Assesment: No  Muscle Strength Left Leg: Fair Strength against Gravity but No Resistance  EENT (WDL):  WDL Except    Cardio/Pulmonary Assessment  Edema   RLE Edema: 2+, 3+, Pitting  LLE Edema: 2+, 3+, Pitting  Respiratory Breath Sounds  RUL Breath Sounds: Clear  RML Breath Sounds: Clear  RLL Breath Sounds: Clear  NO Breath Sounds: Clear  LLL Breath Sounds: Clear  Cardiac Assessment   Cardiac (WDL):  WDL Except (hx chf, sinus tach, right bbb, htn)

## 2024-05-08 NOTE — PROGRESS NOTES
NURSING DAILY NOTE    Name: Flavia Garrett   Date of Admission: 5/1/2024   Admitting Diagnosis: Closed comminuted supracondylar fracture of femur, left, initial encounter ([facility])  Attending Physician: Miguel Goins M.d.  Allergies: Atorvastatin, Hydrochlorothiazide, and Lisinopril    Safety  Patient Assist  cga  Patient Precautions  Fall Risk, Toe Touch Weight Bearing Left Lower Extremity  Precaution Comments  pt is very anxious  Bed Transfer Status  Contact Guard Assist  Toilet Transfer Status   Standby Assist (stand pivot w/c<>toilet with GB)  Assistive Devices  Rails, Wheelchair  Oxygen  None - Room Air  Diet/Therapeutic Dining  Current Diet Order   Procedures    Diet Order Diet: Regular     Pill Administration  whole and one at a time   Agitated Behavioral Scale     ABS Level of Severity       Fall Risk  Has the patient had a fall this admission?   No  Kelli Min Fall Risk Scoring  12, MODERATE RISK  Fall Risk Safety Measures  bed alarm, chair alarm, poor balance, and low vision/ hearing    Vitals  Temperature: 36.7 °C (98 °F)  Temp src: Temporal  Pulse: 90  Respiration: 18  Blood Pressure : 136/63  Blood Pressure MAP (Calculated): 87 MM HG  BP Location: Right, Upper Arm  Patient BP Position: Supine     Oxygen  Pulse Oximetry: 92 %  O2 (LPM): 0  O2 Delivery Device: None - Room Air    Bowel and Bladder  Last Bowel Movement  05/06/24  Stool Type  Type 6: Fluffy pieces with ragged edges, a mushy stool  Bowel Device  Bathroom  Continent  Bladder: Continent void   Bowel: Continent movement  Bladder Function  Urine Void (mL):  (moderate)  Number of Times Voided: 1  Urine Color: Yellow  Genitourinary Assessment   Bladder Assessment (WDL):  Within Defined Limits  Urine Color: Yellow  Bladder Device: Bathroom  Time Void: Yes  Bladder Scan: Post Void  $ Bladder Scan Results (mL): 42    Skin  Milton Score   17  Sensory Interventions   Bed Types: Standard/Trauma  Mattress  Skin Preventative Measures: Waffle Overlay, Pillows in Use for Support / Positioning  Moisture Interventions  Moisturizers/Barriers: Barrier Wipes      Pain  Pain Rating Scale  0 - No Pain  Pain Location  Leg  Pain Location Orientation  Lateral, Left  Pain Interventions   Declines    ADLs    Bathing   Shower, Staff (shower by ot)  Linen Change      Personal Hygiene  Hair Brushed  Chlorhexidine Bath      Oral Care  Brushed Teeth  Teeth/Dentures     Shave     Nutrition Percentage Eaten  Between 50-75% Consumed  Environmental Precautions  Treaded Slipper Socks on Patient, Personal Belongings, Wastebasket, Call Bell etc. in Easy Reach, Bed in Low Position  Patient Turns/Positioning  Patient Turns Self from Side to Side  Patient Turns Assistance/Tolerance     Bed Positions  Bed Controls On  Head of Bed Elevated  Self regulated      Psychosocial/Neurologic Assessment  Psychosocial Assessment  Psychosocial (WDL):  Within Defined Limits  Neurologic Assessment  Neuro (WDL): Exceptions to WDL  Level of Consciousness: Alert  Orientation Level: Oriented X4  Cognition: Follows commands  Speech: Clear  Pupil Assesment: No  Muscle Strength Left Leg: Fair Strength against Gravity but No Resistance  EENT (WDL):  WDL Except    Cardio/Pulmonary Assessment  Edema   RLE Edema: 2+, 3+, Pitting  LLE Edema: 2+, 3+, Pitting  Respiratory Breath Sounds  RUL Breath Sounds: Clear  RML Breath Sounds: Clear  RLL Breath Sounds: Clear  NO Breath Sounds: Clear  LLL Breath Sounds: Clear  Cardiac Assessment   Cardiac (WDL):  WDL Except (hx chf, sinus tach, right bbb, htn)

## 2024-05-09 ENCOUNTER — APPOINTMENT (OUTPATIENT)
Dept: OCCUPATIONAL THERAPY | Facility: REHABILITATION | Age: 84
DRG: 560 | End: 2024-05-09
Attending: PHYSICAL MEDICINE & REHABILITATION
Payer: MEDICARE

## 2024-05-09 ENCOUNTER — APPOINTMENT (OUTPATIENT)
Dept: PHYSICAL THERAPY | Facility: REHABILITATION | Age: 84
DRG: 560 | End: 2024-05-09
Attending: PHYSICAL MEDICINE & REHABILITATION
Payer: MEDICARE

## 2024-05-09 RX ADMIN — ACETAMINOPHEN 650 MG: 325 TABLET ORAL at 20:59

## 2024-05-09 RX ADMIN — ENOXAPARIN SODIUM 40 MG: 100 INJECTION SUBCUTANEOUS at 17:25

## 2024-05-09 RX ADMIN — ACETAMINOPHEN 650 MG: 325 TABLET ORAL at 08:39

## 2024-05-09 RX ADMIN — Medication 1000 UNITS: at 08:36

## 2024-05-09 RX ADMIN — LIDOCAINE 1 PATCH: 4 PATCH TOPICAL at 20:52

## 2024-05-09 RX ADMIN — OMEPRAZOLE 20 MG: 20 CAPSULE, DELAYED RELEASE ORAL at 08:37

## 2024-05-09 RX ADMIN — LEVOTHYROXINE SODIUM 150 MCG: 0.07 TABLET ORAL at 05:13

## 2024-05-09 ASSESSMENT — GAIT ASSESSMENTS
ASSISTIVE DEVICE: PARALLEL BARS
DISTANCE (FEET): 30
GAIT LEVEL OF ASSIST: CONTACT GUARD ASSIST
DEVIATION: ANTALGIC;STEP TO;DECREASED HEEL STRIKE
DEVIATION: ANTALGIC;STEP TO;DECREASED BASE OF SUPPORT;BRADYKINETIC;DECREASED HEEL STRIKE;DECREASED TOE OFF
GAIT LEVEL OF ASSIST: STANDBY ASSIST
DISTANCE (FEET): 20
ASSISTIVE DEVICE: FRONT WHEEL WALKER

## 2024-05-09 ASSESSMENT — ACTIVITIES OF DAILY LIVING (ADL)
BED_CHAIR_WHEELCHAIR_TRANSFER_DESCRIPTION: INCREASED TIME;SUPERVISION FOR SAFETY;VERBAL CUEING
BED_CHAIR_WHEELCHAIR_TRANSFER_DESCRIPTION: VERBAL CUEING;SUPERVISION FOR SAFETY
TOILET_TRANSFER_DESCRIPTION: GRAB BAR;INCREASED TIME;VERBAL CUEING;SUPERVISION FOR SAFETY

## 2024-05-09 ASSESSMENT — PAIN DESCRIPTION - PAIN TYPE: TYPE: ACUTE PAIN;SURGICAL PAIN

## 2024-05-09 NOTE — CARE PLAN
"  Problem: Fall Risk - Rehab  Goal: Patient will remain free from falls  Outcome: Progressing  Note: [place] Min Fall risk Assessment Score: 12    Moderate fall risk Interventions  - Bed and strip alarm   - Yellow sign by the door   - Yellow wrist band \"Fall risk\"  - Room near to the nurse station  - Do not leave patient unattended in the bathroom  - Fall risk education provided      Problem: Pain - Standard  Goal: Alleviation of pain or a reduction in pain to the patient’s comfort goal  Outcome: Progressing  Note: Assessed for pain and discomfort ,tylenol 650 mg for left leg pain with relief  [ see mar] .Left hip incision line with staples , Island dressing intact . Needs anticipated and attended.   The patient is Stable - Low risk of patient condition declining or worsening    Shift Goals  Clinical Goals: Safety  Patient Goals: Participate in therapy    Progress made toward(s) clinical / shift goals:  Pt free from fall and injury,    "

## 2024-05-09 NOTE — THERAPY
Physical Therapy   Daily Treatment     Patient Name: Flavia Garrett  Age:  84 y.o., Sex:  female  Medical Record #: [id number]  Today's Date: 5/9/2024     Precautions  Precautions: (P) Fall Risk, Toe Touch Weight Bearing Left Lower Extremity  Comments: anxiety    Subjective    Pt is in her wc and agreeable to participate in therapy. Her DIL is present in the first 30 mins of session.      Objective       05/09/24 1501   PT Charge Group   PT Gait Training (Units) 1   PT Neuromuscular Re-Education / Balance (Units) 1   PT Therapeutic Activities (Units) 2   PT Total Time Spent   PT Individual Total Time Spent (Mins) 60   Precautions   Precautions Fall Risk;Toe Touch Weight Bearing Left Lower Extremity   Gait Functional Level of Assist    Gait Level Of Assist Standby Assist   Assistive Device Front Wheel Walker  (wc follows)   Distance (Feet) 30   # of Times Distance was Traveled 1   Deviation Antalgic;Step To;Decreased Heel Strike  (TTWB L LE)   Transfer Functional Level of Assist   Bed, Chair, Wheelchair Transfer Supervised   Bed Chair Wheelchair Transfer Description Verbal cueing;Supervision for safety  (using bed cane maintaining TTWB)   Bed Mobility    Supine to Sit Supervised   Sit to Supine Supervised   Sit to Stand Supervised   Scooting Supervised   Rolling Supervised   Neuro-Muscular Treatments   Neuro-Muscular Treatments Anterior weight shift;Sequencing;Tactile Cuing;Verbal Cuing   Comments performed car transfer in [facility] using 4 inches step and FWW, CGA   Interdisciplinary Plan of Care Collaboration   IDT Collaboration with  Family / Caregiver   Patient Position at End of Therapy In Bed;Bed Alarm On;Call Light within Reach;Tray Table within Reach;Phone within Reach   Collaboration Comments DIL is present in the first 30 mins of session and during car transfer         Assessment    Pt performed car transfer,CGA with cueing and demo for sequencing and techniques. Pt was exhausted after 1 set of transfer.  "Instructed pt in gait training using FWW, SBA with WC follows. Pt denied increase of L leg pain. PT reviewed and instructed pt in squat pivot transfer wc<>queen size bed, SUP using a bed cane with cueing for TTWB compliance.     Strengths: Able to follow instructions, Alert and oriented, Effective communication skills, Independent prior level of function, Pleasant and cooperative  Barriers: Decreased endurance, Fatigue, Generalized weakness, Impaired activity tolerance, Impaired balance, Pain    Plan    L LE therex  Stand pivot transfers using FWW/ squat pivot transfer using bed cane on a regualr bed.  Gait training using FWW  Family training for transfers and negotiating threshold using WC.       DME  PT DME Recommendations  Wheelchair: 20\" Width, Elevating Leg Rests  Cushion: Standard  Assistive Device: Front Wheeled Walker (Jr height)  Additional Equipment:  (bed rail?)    Passport items to be completed:  Get in/out of bed safely, in/out of a vehicle, safely use mobility device, walk or wheel around home/community, navigate up and down stairs, show how to get up/down from the ground, ensure home is accessible, demonstrate HEP, complete caregiver training    Physical Therapy Problems (Active)       Problem: Mobility       Dates: Start:  05/02/24         Goal: STG-Within one week, patient will propel wheelchair household distances x 50 feet, SBA       Dates: Start:  05/02/24            Goal: STG-Within one week, patient will ambulate household distance x 50 feet using FWW, CGA maintaining TTWB on L LE       Dates: Start:  05/02/24         Goal Note filed on 05/07/24 0833 by Yossi Knox, PT       Limited by fatigue and dizziness. Pt has low BP                 Problem: PT-Long Term Goals       Dates: Start:  05/02/24         Goal: LTG-By discharge, patient will ambulate x 150 feet using FWW, SUP       Dates: Start:  05/02/24            Goal: LTG-By discharge, patient will transfer one surface to another, SUP using " FWW       Dates: Start:  05/02/24            Goal: LTG-By discharge, patient will ambulate up/down a curb using FWW, SUP       Dates: Start:  05/02/24            Goal: LTG-By discharge, patient will transfer in/out of a car, SUP       Dates: Start:  05/02/24

## 2024-05-09 NOTE — CARE PLAN
"The patient is Stable - Low risk of patient condition declining or worsening    Shift Goals  Clinical Goals: safety  Patient Goals: Participate in therapy    Progress made toward(s) clinical / shift goals:    Problem: Pain - Standard  Goal: Alleviation of pain or a reduction in pain to the patient’s comfort goal  Outcome: Progressing   Pain managed well with PRN medication at this time.  Patient uses pharmacological and non pharmacological pain-relief strategies. Patient displays improvement in mood, coping.      Problem: Fall Risk - Rehab  Goal: Patient will remain free from falls  Outcome: Progressing   High fall risk Interventions   - Bed and strip alarm   - Yellow sign by the door   - Yellow wrist band \"Fall risk\"  - Room near to the nurse station  - Do not leave patient unattended in the bathroom  - Fall risk education provided        Patient is not progressing towards the following goals:      "

## 2024-05-09 NOTE — PROGRESS NOTES
NURSING DAILY NOTE    Name: Flavia Garrett   Date of Admission: 5/1/2024   Admitting Diagnosis: Closed comminuted supracondylar fracture of femur, left, initial encounter ([facility])  Attending Physician: Miguel Goins M.d.  Allergies: Atorvastatin, Hydrochlorothiazide, and Lisinopril    Safety  Patient Assist  cga-min  Patient Precautions  Fall Risk, Toe Touch Weight Bearing Left Lower Extremity  Precaution Comments  pt is very anxious  Bed Transfer Status  Standby Assist  Toilet Transfer Status   Contact Guard Assist (with FWW)  Assistive Devices  Rails, Wheelchair  Oxygen  None - Room Air  Diet/Therapeutic Dining  Current Diet Order   Procedures    Diet Order Diet: Regular     Pill Administration  whole  Agitated Behavioral Scale     ABS Level of Severity       Fall Risk  Has the patient had a fall this admission?   No  Kelli Min Fall Risk Scoring  12, MODERATE RISK  Fall Risk Safety Measures  bed alarm and chair alarm    Vitals  Temperature: 36.4 °C (97.6 °F)  Temp src: Oral  Pulse: 82  Respiration: 20  Blood Pressure : 114/72  Blood Pressure MAP (Calculated): 86 MM HG  BP Location: Left, Upper Arm  Patient BP Position: Supine     Oxygen  Pulse Oximetry: 96 %  O2 (LPM): 0  O2 Delivery Device: None - Room Air    Bowel and Bladder  Last Bowel Movement  05/06/24  Stool Type  Type 6: Fluffy pieces with ragged edges, a mushy stool  Bowel Device  Bathroom  Continent  Bladder: Continent void   Bowel: Continent movement  Bladder Function  Urine Void (mL):  (moderate)  Number of Times Voided: 1  Urine Color: Unable To Evaluate  Genitourinary Assessment   Bladder Assessment (WDL):  WDL Except  Urine Color: Unable To Evaluate  Bladder Device: Bathroom  Time Void: Yes  Bladder Scan: Post Void  $ Bladder Scan Results (mL): 42    Skin  Milton Score   17  Sensory Interventions   Bed Types: Standard/Trauma Mattress  Skin Preventative Measures: Pillows in Use for Support /  Positioning, Waffle Overlay  Moisture Interventions  Moisturizers/Barriers: Barrier Wipes      Pain  Pain Rating Scale  1 - Hardly Notice Pain  Pain Location  Leg  Pain Location Orientation  Left, Lateral  Pain Interventions   Cold Pack    ADLs    Bathing   Shower, Staff (shower by ot)  Linen Change      Personal Hygiene  Hair Brushed  Chlorhexidine Bath      Oral Care  Brushed Teeth  Teeth/Dentures     Shave     Nutrition Percentage Eaten  Between 50-75% Consumed  Environmental Precautions  Personal Belongings, Wastebasket, Call Bell etc. in Easy Reach, Treaded Slipper Socks on Patient, Transferred to Stronger Side, Report Given to Other Health Care Providers Regarding Fall Risk, Bed in Low Position  Patient Turns/Positioning  Patient Turns Self from Side to Side  Patient Turns Assistance/Tolerance     Bed Positions  Bed Controls On  Head of Bed Elevated  Self regulated      Psychosocial/Neurologic Assessment  Psychosocial Assessment  Psychosocial (WDL):  Within Defined Limits  Neurologic Assessment  Neuro (WDL): Exceptions to WDL  Level of Consciousness: Alert  Orientation Level: Oriented X4  Cognition: Follows commands  Speech: Clear  Pupil Assesment: No  Muscle Strength Left Leg: Fair Strength against Gravity but No Resistance  EENT (WDL):  WDL Except    Cardio/Pulmonary Assessment  Edema   RLE Edema: 2+, 3+, Pitting  LLE Edema: 2+, 3+, Pitting  Respiratory Breath Sounds  RUL Breath Sounds: Clear  RML Breath Sounds: Clear  RLL Breath Sounds: Clear  NO Breath Sounds: Clear  LLL Breath Sounds: Clear  Cardiac Assessment   Cardiac (WDL):  WDL Except (hx chf, sinus tach, right bbb, htn)

## 2024-05-09 NOTE — PROGRESS NOTES
NURSING DAILY NOTE    Name: Flavia Garrett   Date of Admission: 5/1/2024   Admitting Diagnosis: Closed comminuted supracondylar fracture of femur, left, initial encounter ([facility])  Attending Physician: Miguel Goins M.d.  Allergies: Atorvastatin, Hydrochlorothiazide, and Lisinopril    Safety  Patient Assist  moderate assist  Patient Precautions  Fall Risk, Toe Touch Weight Bearing Left Lower Extremity  Precaution Comments  pt is very anxious  Bed Transfer Status  Standby Assist  Toilet Transfer Status   Contact Guard Assist (with FWW)  Assistive Devices  Rails, Wheelchair  Oxygen  None - Room Air  Diet/Therapeutic Dining  Current Diet Order   Procedures    Diet Order Diet: Regular     Pill Administration  whole  Agitated Behavioral Scale     ABS Level of Severity       Fall Risk  Has the patient had a fall this admission?   No  Kelli Min Fall Risk Scoring  12, MODERATE RISK  Fall Risk Safety Measures  bed alarm, chair alarm, poor balance, and low vision/ hearing    Vitals  Temperature: 36.4 °C (97.6 °F)  Temp src: Oral  Pulse: 82  Respiration: 20  Blood Pressure : 114/72  Blood Pressure MAP (Calculated): 86 MM HG  BP Location: Left, Upper Arm  Patient BP Position: Supine     Oxygen  Pulse Oximetry: 96 %  O2 (LPM): 0  O2 Delivery Device: None - Room Air    Bowel and Bladder  Last Bowel Movement  05/06/24  Stool Type  Type 6: Fluffy pieces with ragged edges, a mushy stool  Bowel Device  Bathroom  Continent  Bladder: Continent void   Bowel: Continent movement  Bladder Function  Urine Void (mL):  (moderate)  Number of Times Voided: 1  Urine Color: Yellow  Genitourinary Assessment   Bladder Assessment (WDL):  WDL Except  Urine Color: Yellow  Bladder Device: Bathroom  Time Void: Yes  Bladder Scan: Post Void  $ Bladder Scan Results (mL): 42    Skin  Milton Score   17  Sensory Interventions   Bed Types: Standard/Trauma Mattress  Skin Preventative Measures: Pillows  in Use for Support / Positioning  Moisture Interventions  Moisturizers/Barriers: Barrier Wipes      Pain  Pain Rating Scale  3 - Sometimes distracts me  Pain Location  Leg  Pain Location Orientation  Left, Lateral  Pain Interventions   [place]    ADLs    Bathing   Shower, Staff (shower by ot)  Linen Change      Personal Hygiene  Change Erin Pads, Moist Erin Wipes  Chlorhexidine Bath      Oral Care  Brushed Teeth  Teeth/Dentures     Shave     Nutrition Percentage Eaten  Between 50-75% Consumed  Environmental Precautions  Treaded Slipper Socks on Patient, Personal Belongings, Wastebasket, Call Bell etc. in Easy Reach, Bed in Low Position  Patient Turns/Positioning  Patient Turns Self from Side to Side  Patient Turns Assistance/Tolerance     Bed Positions  Bed Controls On, Bed Locked  Head of Bed Elevated  Self regulated      Psychosocial/Neurologic Assessment  Psychosocial Assessment  Psychosocial (WDL):  Within Defined Limits  Neurologic Assessment  Neuro (WDL): Exceptions to WDL  Level of Consciousness: Alert  Orientation Level: Oriented X4  Cognition: Follows commands  Speech: Clear  Pupil Assesment: No  Muscle Strength Left Leg: Fair Strength against Gravity but No Resistance  EENT (WDL):  WDL Except    Cardio/Pulmonary Assessment  Edema   RLE Edema: 2+, 3+, Pitting  LLE Edema: 2+, 3+, Pitting  Respiratory Breath Sounds  RUL Breath Sounds: Clear  RML Breath Sounds: Clear  RLL Breath Sounds: Clear  NO Breath Sounds: Clear  LLL Breath Sounds: Clear  Cardiac Assessment   Cardiac (WDL):  WDL Except (hx chf, sinus tach, right bbb, htn)

## 2024-05-09 NOTE — THERAPY
Occupational Therapy  Daily Treatment     Patient Name: Flavia Garrett  Age:  84 y.o., Sex:  female  Medical Record #: [id number]  Today's Date: 5/9/2024     Precautions  Precautions: Fall Risk, Toe Touch Weight Bearing Left Lower Extremity  Comments: pt is very anxious    Safety   ADL Safety : Requires Physical Assist for Safety  Bathroom Safety: Requires Physical Assist for Safety    Subjective    Pt seated in w/c upon arrival, pleasant and cooperative, agreeable to therapy       Objective       05/09/24 0831   OT Charge Group   OT Self Care / ADL (Units) 4   OT Total Time Spent   OT Individual Total Time Spent (Mins) 60   Functional Level of Assist   Grooming Modified Independent;Seated   Bathing Supervision   Upper Body Dressing Modified Independent   Lower Body Dressing Standby Assist   Lower Body Dressing Description Reacher   Toileting Supervision   Toilet Transfers Standby Assist  (stand pivot w/c<>toilet with CG)   Tub / Shower Transfers Standby Assist  (stand pivot w/c<>fold down bench with GB)   Interdisciplinary Plan of Care Collaboration   Patient Position at End of Therapy Seated;Call Light within Reach;Tray Table within Reach;Chair Alarm On;Self Releasing Lap Belt Applied     Functional mobility at w/c level: Mod A room<>bathroom     Assessment    Pt completed shower routine at supervision - SBA for ADL's and SBA for bathroom transfers overall using w/c, shower bench, hand-held shower head, GB's, hip kit AE (reacher). Pt's functional performance was impacted by anxiety, impaired strength and standing balance     Strengths: Able to follow instructions, Alert and oriented, Independent prior level of function, Motivated for self care and independence, Pleasant and cooperative, Willingly participates in therapeutic activities  Barriers: Decreased endurance, Fatigue, Generalized weakness, Impaired activity tolerance, Limited mobility, Pain    Plan    Cont overall strength/endurance and standing  tolerance/balance to improve ADL's and functional mobility     FT:   - update on CLOF: SBA with ADL's (currently has to use reacher to assist with LB dressing)  - update on CLOF: SBA with w/c transfer and CGA for FWW transfers.   - Currently working on increasing comfort of walking bed<>bathroom distance at FWW level CGA - however sometimes anxiety will kick in, she begins to hyperventilate, requiring increase time to complete.   - Review bathroom DME set-up  - Complete dry step-in shower transfer:   We problem solved that the best way to safely transfer to shower while maintaining TTWB is to keep shower chair close to shower threshold. Sit on chair, then pivot legs into shower, rather than trying to take a step over the threshold. Once she's in the shower, she can stand up to the GB and adjust shower chair if needed.  ** see Edinson's note re: step-in shower transfer practice. Tub transfer bench over shower threshold, with glass doors remove and curtains installed instead, may actually be the best option for safety     Pt lives alone [place] - 1 story, 1 TAMY (1 from garage and at threshold in front door), WIS, FWW, SPC, shower seat, GB's at shower. DIL will stay with her at d/c. Has reacher already. Does not wear socks at home - not interested in sock aid. Not interested in purchasing toilet riser/commode for toilet.     DME     None     Occupational Therapy Goals (Active)       Problem: Dressing       Dates: Start:  05/07/24         Goal: STG-Within one week, patient will dress LB with supervision using DME/AE as needed       Dates: Start:  05/07/24               Problem: Functional Transfers       Dates: Start:  05/07/24         Goal: STG-Within one week, patient will transfer to step in shower with CGA using DME/AE as needed       Dates: Start:  05/07/24            Goal: STG-Within one week, patient will transfer to toilet with FWW at CGA        Dates: Start:  05/07/24               Problem: IADL's       Dates:  Start:  05/02/24         Goal: STG-Within one week, patient will prepare a meal w/ min A and LRAD       Dates: Start:  05/02/24         Goal Note filed on 05/07/24 0829 by Abiola Ott MS,OTR/L       Not yet addressed                 Problem: OT Long Term Goals       Dates: Start:  05/02/24         Goal: LTG-By discharge, patient will complete basic self care tasks w/ mod I and AE/DME as needed       Dates: Start:  05/02/24            Goal: LTG-By discharge, patient will complete basic home management w/ SPV and LRAD       Dates: Start:  05/02/24

## 2024-05-09 NOTE — PROGRESS NOTES
"  Physical Medicine & Rehabilitation Progress Note    Encounter Date: 5/9/2024    Chief Complaint: Weakness    Interval Events (Subjective):  Tolerating therapy well. Orthostatics improved off losartan. Is fatigued but working well with therapy. Sleeping well    Objective:  VITAL SIGNS: /58   Pulse 74   Temp 36.7 °C (98 °F) (Oral)   Resp 18   Ht 1.499 m (4' 11\")   Wt 80 kg (176 lb 5.9 oz)   SpO2 96%   BMI 35.62 kg/m²   Gen: No acute distress, well developed well nourished adult  HEENT: Normal Cephalic Atraumatic, Normal conjunctiva.   CV: warm extremities, well perfused, no edema  Resp: symmetric chest rise, breathing comfortably on room air  Abd: Soft, Non distended  Extremities: normal bulk, no atrophy  Skin: no visible rashes or lesions.   Neuro: alert, awake  Psych: Mood and affect appropriate and congruent    Laboratory Values:  Recent Results (from the past 72 hour(s))   CBC WITHOUT DIFFERENTIAL    Collection Time: 05/07/24  5:42 AM   Result Value Ref Range    WBC 7.2 4.8 - 10.8 K/uL    RBC 2.83 (L) 4.20 - 5.40 M/uL    Hemoglobin 7.8 (L) 12.0 - 16.0 g/dL    Hematocrit 25.3 (L) 37.0 - 47.0 %    MCV 89.4 81.4 - 97.8 fL    MCH 27.6 27.0 - 33.0 pg    MCHC 30.8 (L) 32.2 - 35.5 g/dL    RDW 49.6 35.9 - 50.0 fL    Platelet Count 361 164 - 446 K/uL    MPV 10.4 9.0 - 12.9 fL   Comp Metabolic Panel    Collection Time: 05/07/24  5:42 AM   Result Value Ref Range    Sodium 136 135 - 145 mmol/L    Potassium 4.1 3.6 - 5.5 mmol/L    Chloride 103 96 - 112 mmol/L    Co2 22 20 - 33 mmol/L    Anion Gap 11.0 7.0 - 16.0    Glucose 95 65 - 99 mg/dL    Bun 21 8 - 22 mg/dL    Creatinine 0.76 0.50 - 1.40 mg/dL    Calcium 8.4 (L) 8.5 - 10.5 mg/dL    Correct Calcium 9.2 8.5 - 10.5 mg/dL    AST(SGOT) 16 12 - 45 U/L    ALT(SGPT) 7 2 - 50 U/L    Alkaline Phosphatase 61 30 - 99 U/L    Total Bilirubin 0.4 0.1 - 1.5 mg/dL    Albumin 3.0 (L) 3.2 - 4.9 g/dL    Total Protein 5.4 (L) 6.0 - 8.2 g/dL    Globulin 2.4 1.9 - 3.5 g/dL    A-G " Ratio 1.3 g/dL   ESTIMATED GFR    Collection Time: 05/07/24  5:42 AM   Result Value Ref Range    GFR (CKD-EPI) 78 >60 mL/min/1.73 m 2       Medications:  Scheduled Medications   Medication Dose Frequency    lidocaine  1 Patch Q24HR    vitamin D3  1,000 Units DAILY    Sotagliflozin  200 mg DAILY    enoxaparin (LOVENOX) injection  40 mg DAILY AT 1800    ivabradine  5 mg BID WITH MEALS    levothyroxine  150 mcg QAM TU,TH,SA,FALL    levothyroxine  175 mcg MO, WE + FR    Pharmacy Consult Request  1 Each PHARMACY TO DOSE    omeprazole  20 mg DAILY    melatonin  4.5 mg QHS     PRN medications: methocarbamol, diclofenac sodium, acetaminophen, magnesium hydroxide, levalbuterol, melatonin, Respiratory Therapy Consult, hydrALAZINE, [DISCONTINUED] senna-docusate **AND** polyethylene glycol/lytes, ondansetron **OR** ondansetron, sodium chloride, oxyCODONE immediate-release **OR** oxyCODONE immediate-release **OR** [DISCONTINUED] HYDROmorphone    Diet:  Current Diet Order   Procedures    Diet Order Diet: Regular       Medical Decision Making and Plan:  Closed comminuted supracondylar fracture of femur, left, initial encounter  s/p Open reduction internal fixation left intra-articular distal femur fracture on 4/29 by Dr Tolbert  Weightbearing status: Touchdown weightbearing left lower extremity for 6 weeks then transition to weightbearing as tolerated, range of motion as tolerated left knee  DVT prophylaxis: SCDs and lovenox until mobilizing well, then aspirin 81mg BID for 4 weeks  Outpatient plan: The patient will follow up in clinic in 2 weeks for wound check, suture/staple removal (if applicable), and xrays.  If the patient is at a facility at that time, wound check and suture/staple removal may be performed by nursing care and the patient should instead follow up at the 6 week post operative darline for repeat clinical check and xrays.     Orthostatic Hypotension  Primary hypertension  As per history.  Blood pressure goal less  than 130/80.  DC losartan due to hypotension on 5/6  -continue off losartan    Acute blood loss anemia  fatigue  Hb 7.8, repeat tomorrow        Chronic heart failure with preserved ejection fraction   As per history with last echocardiogram in September 2023 EF of 60%.  Appears to be euvolemic.  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      Acute hypoxic Respiratory Distress:  -2/2 surgery + opioid use     Urinary Retension  - Timed voids with PVR q4H x3. If PVR > 400mL or if patient is unable to void, straight cath patient.     Constipation  -  Colace, Senna BID on admission  - Goal of 1BM/day.  -    Circadian Rhythm disorder:   Recommend lights on during the day/off at night, minimize nighttime interruptions as able.     Mood  - at risk of adjustment disorder, depression, and anxiety due to functional decline     ID:  - at risk for Urinary tract infection     Skin/Wounds:  - Pressure relief q2h while in bed. Close monitoring for signs of breakdown     Pain:  - Neuroceptic - continue tylenol, continue oxycodone     DVT prophylaxis: continue lovenox     GI prophylaxis:  On Prilosec 20mg daily         -Follow-up Ortho       ____________________________________    Miguel Goins MD  Physical Medicine & Rehabilitation   Brain Injury Medicine   ____________________________________

## 2024-05-09 NOTE — THERAPY
"Occupational Therapy  Daily Treatment     Patient Name: Flavia Garrett  Age:  84 y.o., Sex:  female  Medical Record #: [id number]  Today's Date: 5/9/2024     Precautions  Precautions: (P) Fall Risk, Toe Touch Weight Bearing Left Lower Extremity  Comments: (P) anxiety    Safety   ADL Safety : Requires Physical Assist for Safety  Bathroom Safety: Requires Physical Assist for Safety    Subjective    \" I don't  need the walker for that .\"  Transfer w/c to bed     \" The boys have been working on my bathroom . \"   Educated she should ask them what they have done and relay to her primary therapist        Objective/Assessment       05/09/24 1231   OT Charge Group   OT Therapy Activity (Units) 2   OT Total Time Spent   OT Individual Total Time Spent (Mins) 30   Precautions   Precautions Fall Risk;Toe Touch Weight Bearing Left Lower Extremity   Comments anxiety   Functional Level of Assist   Bed, Chair, Wheelchair Transfer Standby Assist  (declined to use walker  brought  w/c too close to head of bed very little room for body . trying to unlock w/c brake while standing   increasing weight through left LE therapist reinforced she should FWW for all transfers to maintatin TTWB)   Tub / Shower Transfers Minimal Assist  (see note for details)   Bed Mobility    Sit to Supine Supervised   Interdisciplinary Plan of Care Collaboration   Patient Position at End of Therapy In Bed;Call Light within Reach;Tray Table within Reach        Step in shower transfer   dry step in shower transfer to and from shower chair seat of chair facing entry turn back up and sit on chair bring legs into shower CGA fair adherence TTWB   she turned and faced  wall where water would come out.    Required min assist and mod cues for getting up from shower chair   difficulty due to feet being over the ledge . Increased anxiety noted.    May benefit from  keeping  shower door open putting in a curtain and obtaining and using a tub bench as an alternative "           Strengths: Able to follow instructions, Alert and oriented, Independent prior level of function, Motivated for self care and independence, Pleasant and cooperative, Willingly participates in therapeutic activities  Barriers: Decreased endurance, Fatigue, Generalized weakness, Impaired activity tolerance, Limited mobility, Pain    Plan    Cont overall strength/endurance and standing tolerance/balance to improve ADL's and functional mobility      FT:   - update on CLOF: SBA with ADL's (currently has to use reacher to assist with LB dressing)  - update on CLOF: SBA with w/c transfer and CGA for FWW transfers.   - Currently working on increasing comfort of walking bed<>bathroom distance at FWW level CGA - however sometimes anxiety will kick in, she begins to hyperventilate, requiring increase time to complete.   - Review bathroom DME set-up  - Complete dry step-in shower transfer: We problem solved that the best way to safely transfer to shower while maintaining TTWB is to keep shower chair close to shower threshold. Sit on chair, then pivot legs into shower, rather than trying to take a step over the threshold. Once she's in the shower, she can stand up to the GB and adjust shower chair if needed.     Pt lives alone [place] - 1 story, 1 TAMY (1 from garage and at threshold in front door), WIS, FWW, SPC, shower seat, GB's at shower. DIL will stay with her at d/c. Has reacher already. Does not wear socks at home - not interested in sock aid. Not interested in purchasing toilet riser/commode for toilet.       DME  OT DME Recommendations  Bathroom Equipment:  (TBD)  Additional Equipment:  (bed rail?)        Occupational Therapy Goals (Active)       Problem: Dressing       Dates: Start:  05/07/24         Goal: STG-Within one week, patient will dress LB with supervision using DME/AE as needed       Dates: Start:  05/07/24               Problem: Functional Transfers       Dates: Start:  05/07/24         Goal: STG-Within  one week, patient will transfer to step in shower with CGA using DME/AE as needed       Dates: Start:  05/07/24            Goal: STG-Within one week, patient will transfer to toilet with FWW at CGA        Dates: Start:  05/07/24               Problem: IADL's       Dates: Start:  05/02/24         Goal: STG-Within one week, patient will prepare a meal w/ min A and LRAD       Dates: Start:  05/02/24         Goal Note filed on 05/07/24 0829 by Abiola Ott MS,OTR/L       Not yet addressed                 Problem: OT Long Term Goals       Dates: Start:  05/02/24         Goal: LTG-By discharge, patient will complete basic self care tasks w/ mod I and AE/DME as needed       Dates: Start:  05/02/24            Goal: LTG-By discharge, patient will complete basic home management w/ SPV and LRAD       Dates: Start:  05/02/24

## 2024-05-09 NOTE — THERAPY
Physical Therapy   Daily Treatment     Patient Name: Flavia Garrett  Age:  84 y.o., Sex:  female  Medical Record #: [id number]  Today's Date: 5/9/2024     Precautions  Precautions: Fall Risk, Toe Touch Weight Bearing Left Lower Extremity  Comments: pt is very anxious    Subjective    Found in bed, is agreeable to participate. Requests to lay back down after session is over despite encouragement to stay out of bed.      Objective       05/09/24 0945   PT Charge Group   PT Gait Training (Units) 1   PT Therapeutic Exercise (Units) 1   PT Total Time Spent   PT Individual Total Time Spent (Mins) 30   Gait Functional Level of Assist    Gait Level Of Assist Contact Guard Assist   Assistive Device Parallel Bars   Distance (Feet) 20   # of Times Distance was Traveled 2   Deviation Antalgic;Step To;Decreased Base Of Support;Bradykinetic;Decreased Heel Strike;Decreased Toe Off  (TTWB LLE)   Stairs Functional Level of Assist   Level of Assist with Stairs Minimal Assist   # of Stairs Climbed 1  (2 inch high step 2 sets of 3 reps)   Stairs Description Extra time;Limited by fatigue;Safety concerns;Supervision for safety;Verbal cueing   Transfer Functional Level of Assist   Toilet Transfers Standby Assist   Toilet Transfer Description Grab bar;Increased time;Verbal cueing;Supervision for safety   Sitting Lower Body Exercises   Ankle Pumps 2 sets of 10;Bilateral   Long Arc Quad 2 sets of 10;Bilateral   Standing Lower Body Exercises   Hip Flexion 1 set of 10;Left   Hip Abduction 2 sets of 10;Left   Bed Mobility    Supine to Sit Standby Assist   Sit to Supine Standby Assist         Assessment    Patient is self limiting, requires encouragement to stay out of bed after therapy session. Demonstrates poor activity tolerance, requires multiple seated rest breaks. Caught her R toe on 2 inch block, requires encouragement to offload LLE with BUEs to improve R foot clearance and maintain TTWB.     Strengths: Able to follow instructions, Alert  "and oriented, Effective communication skills, Independent prior level of function, Pleasant and cooperative  Barriers: Decreased endurance, Fatigue, Generalized weakness, Impaired activity tolerance, Impaired balance, Pain    Plan    AAROM L knee to tolerance  L LE therex  Stand pivot transfers using FWW/ squat pivot transfer using bed cane on a regualr bed.  Pre gait // bars L LE TTWB  Gait training using FWW    DME  PT DME Recommendations  Wheelchair: 20\" Width, Elevating Leg Rests  Cushion: Standard  Assistive Device: Front Wheeled Walker (Jr height)  Additional Equipment:  (bed rail?)    Passport items to be completed:  Get in/out of bed safely, in/out of a vehicle, safely use mobility device, walk or wheel around home/community, navigate up and down stairs, show how to get up/down from the ground, ensure home is accessible, demonstrate HEP, complete caregiver training    Physical Therapy Problems (Active)       Problem: Mobility       Dates: Start:  05/02/24         Goal: STG-Within one week, patient will propel wheelchair household distances x 50 feet, SBA       Dates: Start:  05/02/24            Goal: STG-Within one week, patient will ambulate household distance x 50 feet using FWW, CGA maintaining TTWB on L LE       Dates: Start:  05/02/24         Goal Note filed on 05/07/24 0833 by Yossi Knox, PT       Limited by fatigue and dizziness. Pt has low BP                 Problem: PT-Long Term Goals       Dates: Start:  05/02/24         Goal: LTG-By discharge, patient will ambulate x 150 feet using FWW, SUP       Dates: Start:  05/02/24            Goal: LTG-By discharge, patient will transfer one surface to another, SUP using FWW       Dates: Start:  05/02/24            Goal: LTG-By discharge, patient will ambulate up/down a curb using FWW, SUP       Dates: Start:  05/02/24            Goal: LTG-By discharge, patient will transfer in/out of a car, SUP       Dates: Start:  05/02/24              "

## 2024-05-10 ENCOUNTER — APPOINTMENT (OUTPATIENT)
Dept: OCCUPATIONAL THERAPY | Facility: REHABILITATION | Age: 84
DRG: 560 | End: 2024-05-10
Attending: PHYSICAL MEDICINE & REHABILITATION
Payer: MEDICARE

## 2024-05-10 ENCOUNTER — APPOINTMENT (OUTPATIENT)
Dept: PHYSICAL THERAPY | Facility: REHABILITATION | Age: 84
DRG: 560 | End: 2024-05-10
Attending: PHYSICAL MEDICINE & REHABILITATION
Payer: MEDICARE

## 2024-05-10 LAB
ERYTHROCYTE [DISTWIDTH] IN BLOOD BY AUTOMATED COUNT: 49.3 FL (ref 35.9–50)
HCT VFR BLD AUTO: 25.6 % (ref 37–47)
HGB BLD-MCNC: 7.9 G/DL (ref 12–16)
MCH RBC QN AUTO: 27.2 PG (ref 27–33)
MCHC RBC AUTO-ENTMCNC: 30.9 G/DL (ref 32.2–35.5)
MCV RBC AUTO: 88.3 FL (ref 81.4–97.8)
PLATELET # BLD AUTO: 415 K/UL (ref 164–446)
PMV BLD AUTO: 10.1 FL (ref 9–12.9)
RBC # BLD AUTO: 2.9 M/UL (ref 4.2–5.4)
WBC # BLD AUTO: 6.7 K/UL (ref 4.8–10.8)

## 2024-05-10 RX ADMIN — ACETAMINOPHEN 650 MG: 325 TABLET ORAL at 07:19

## 2024-05-10 RX ADMIN — LEVOTHYROXINE SODIUM 175 MCG: 0.07 TABLET ORAL at 05:19

## 2024-05-10 RX ADMIN — Medication 4.5 MG: at 20:43

## 2024-05-10 RX ADMIN — LIDOCAINE 1 PATCH: 4 PATCH TOPICAL at 20:44

## 2024-05-10 RX ADMIN — OMEPRAZOLE 20 MG: 20 CAPSULE, DELAYED RELEASE ORAL at 07:20

## 2024-05-10 RX ADMIN — ENOXAPARIN SODIUM 40 MG: 100 INJECTION SUBCUTANEOUS at 18:00

## 2024-05-10 RX ADMIN — ACETAMINOPHEN 650 MG: 325 TABLET ORAL at 20:43

## 2024-05-10 RX ADMIN — Medication 1000 UNITS: at 07:20

## 2024-05-10 ASSESSMENT — GAIT ASSESSMENTS
DEVIATION: ANTALGIC;STEP TO;DECREASED HEEL STRIKE
GAIT LEVEL OF ASSIST: STANDBY ASSIST
DISTANCE (FEET): 20
ASSISTIVE DEVICE: FRONT WHEEL WALKER
DEVIATION: ANTALGIC;STEP TO;DECREASED HEEL STRIKE
GAIT LEVEL OF ASSIST: STANDBY ASSIST
DISTANCE (FEET): 20
ASSISTIVE DEVICE: FRONT WHEEL WALKER

## 2024-05-10 ASSESSMENT — ACTIVITIES OF DAILY LIVING (ADL)
TOILET_TRANSFER_DESCRIPTION: GRAB BAR;INCREASED TIME;SUPERVISION FOR SAFETY
BED_CHAIR_WHEELCHAIR_TRANSFER_DESCRIPTION: SUPERVISION FOR SAFETY
BED_CHAIR_WHEELCHAIR_TRANSFER_DESCRIPTION: INCREASED TIME;ADAPTIVE EQUIPMENT;SUPERVISION FOR SAFETY
TUB_SHOWER_TRANSFER_DESCRIPTION: GRAB BAR;SHOWER BENCH

## 2024-05-10 ASSESSMENT — PAIN DESCRIPTION - PAIN TYPE: TYPE: ACUTE PAIN;SURGICAL PAIN

## 2024-05-10 NOTE — CARE PLAN
"The patient is Stable - Low risk of patient condition declining or worsening    Shift Goals  Clinical Goals: safety  Patient Goals: Participate in therapy    Progress made toward(s) clinical / shift goals:    Problem: Skin Integrity  Goal: Skin integrity is maintained or improved  Pressure relieving mattress in use, turned q 2 hrs, barrier cream in use. Outcome: Progressing      Problem: Fall Risk - Rehab  Goal: Patient will remain free from falls  Outcome: Progressing   Moderate fall risk Interventions  - Bed and strip alarm   - Yellow sign by the door   - Yellow wrist band \"Fall risk\"  - Room near to the nurse station  - Do not leave patient unattended in the bathroom  - Fall risk education provided        Patient is not progressing towards the following goals:      "

## 2024-05-10 NOTE — PROGRESS NOTES
"  Physical Medicine & Rehabilitation Progress Note    Encounter Date: 5/10/2024    Chief Complaint: Weakness    Interval Events (Subjective):  Seen in bed. Fatigued but not presyncopal. Continues to tolerate therapy. Pain controlled    Objective:  VITAL SIGNS: BP (!) 147/78   Pulse 75   Temp 36.8 °C (98.2 °F) (Oral)   Resp 18   Ht 1.499 m (4' 11\")   Wt 80 kg (176 lb 5.9 oz)   SpO2 97%   BMI 35.62 kg/m²   Gen: No acute distress, well developed well nourished adult  HEENT: Normal Cephalic Atraumatic, Normal conjunctiva.   CV: warm extremities, well perfused, no edema  Resp: symmetric chest rise, breathing comfortably on room air  Abd: Soft, Non distended  Extremities: normal bulk, no atrophy  Skin: no visible rashes or lesions.   Neuro: alert, awake  Psych: Mood and affect appropriate and congruent    Laboratory Values:  Recent Results (from the past 72 hour(s))   CBC WITHOUT DIFFERENTIAL    Collection Time: 05/10/24  5:42 AM   Result Value Ref Range    WBC 6.7 4.8 - 10.8 K/uL    RBC 2.90 (L) 4.20 - 5.40 M/uL    Hemoglobin 7.9 (L) 12.0 - 16.0 g/dL    Hematocrit 25.6 (L) 37.0 - 47.0 %    MCV 88.3 81.4 - 97.8 fL    MCH 27.2 27.0 - 33.0 pg    MCHC 30.9 (L) 32.2 - 35.5 g/dL    RDW 49.3 35.9 - 50.0 fL    Platelet Count 415 164 - 446 K/uL    MPV 10.1 9.0 - 12.9 fL       Medications:  Scheduled Medications   Medication Dose Frequency    lidocaine  1 Patch Q24HR    vitamin D3  1,000 Units DAILY    Sotagliflozin  200 mg DAILY    enoxaparin (LOVENOX) injection  40 mg DAILY AT 1800    ivabradine  5 mg BID WITH MEALS    levothyroxine  150 mcg QAM TU,TH,SA,FALL    levothyroxine  175 mcg MO, WE + FR    Pharmacy Consult Request  1 Each PHARMACY TO DOSE    omeprazole  20 mg DAILY    melatonin  4.5 mg QHS     PRN medications: methocarbamol, diclofenac sodium, acetaminophen, magnesium hydroxide, levalbuterol, melatonin, Respiratory Therapy Consult, hydrALAZINE, [DISCONTINUED] senna-docusate **AND** polyethylene glycol/lytes, " ondansetron **OR** ondansetron, sodium chloride, oxyCODONE immediate-release **OR** oxyCODONE immediate-release **OR** [DISCONTINUED] HYDROmorphone    Diet:  Current Diet Order   Procedures    Diet Order Diet: Regular       Medical Decision Making and Plan:  Closed comminuted supracondylar fracture of femur, left, initial encounter  s/p Open reduction internal fixation left intra-articular distal femur fracture on 4/29 by Dr Tolbert  Weightbearing status: Touchdown weightbearing left lower extremity for 6 weeks then transition to weightbearing as tolerated, range of motion as tolerated left knee  DVT prophylaxis: SCDs and lovenox until mobilizing well, then aspirin 81mg BID for 4 weeks  Outpatient plan: The patient will follow up in clinic in 2 weeks for wound check, suture/staple removal (if applicable), and xrays.  If the patient is at a facility at that time, wound check and suture/staple removal may be performed by nursing care and the patient should instead follow up at the 6 week post operative darline for repeat clinical check and xrays.     Orthostatic Hypotension  Primary hypertension  As per history.  Blood pressure goal less than 130/80.  DC losartan due to hypotension on 5/6  -continue off losartan     Acute blood loss anemia  fatigue  5/8- Hb 7.8,   5/10- Hb 7.9        Chronic heart failure with preserved ejection fraction   As per history with last echocardiogram in September 2023 EF of 60%.  Appears to be euvolemic.  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      Acute hypoxic Respiratory Distress:  -2/2 surgery + opioid use     Urinary Retension  - Timed voids with PVR q4H x3. If PVR > 400mL or if patient is unable to void, straight cath patient.     Constipation  -  Colace, Senna BID on admission  - Goal of 1BM/day.  -    Circadian Rhythm disorder:   Recommend  lights on during the day/off at night, minimize nighttime interruptions as able.     Mood  - at risk of adjustment disorder, depression, and anxiety due to functional decline     ID:  - at risk for Urinary tract infection     Skin/Wounds:  - Pressure relief q2h while in bed. Close monitoring for signs of breakdown     Pain:  - Neuroceptic - continue tylenol, continue oxycodone  DVT prophylaxis: continue lovenox     GI prophylaxis:  On Prilosec 20mg daily         -Follow-up Ortho    ____________________________________    Miguel Goins MD  Physical Medicine & Rehabilitation   Brain Injury Medicine   ____________________________________

## 2024-05-10 NOTE — THERAPY
"Occupational Therapy  Daily Treatment     Patient Name: Flavia Garrett  Age:  84 y.o., Sex:  female  Medical Record #: [id number]  Today's Date: 5/10/2024     Precautions  Precautions: Fall Risk, Toe Touch Weight Bearing Left Lower Extremity  Comments: anxiety    Safety   ADL Safety : Requires Physical Assist for Safety  Bathroom Safety: Requires Physical Assist for Safety    Subjective    \"I feel much better about the shower transfer now.\"      Objective       05/10/24 1401   OT Charge Group   Charges Yes   OT Therapy Activity (Units) 4   OT Total Time Spent   OT Individual Total Time Spent (Mins) 60   Precautions   Precautions Fall Risk;Toe Touch Weight Bearing Left Lower Extremity   Functional Level of Assist   Bed, Chair, Wheelchair Transfer Supervised   Bed Chair Wheelchair Transfer Description Supervision for safety   Tub / Shower Transfers Minimal Assist   Tub Shower Transfer Description Grab bar;Shower bench   Neuro-Muscular Treatments   Neuro-Muscular Treatments Anterior weight shift;Sequencing;Tactile Cuing;Verbal Cuing;Weight Shift Right;Weight Shift Left   Comments Family training with 2 son's and DIL re: shower transfers and grab  bar placement.   Balance   Sitting Balance (Static) Fair +   Sitting Balance (Dynamic) Fair +   Standing Balance (Static) Fair   Standing Balance (Dynamic) Poor   Weight Shift Sitting Fair   Weight Shift Standing Poor   Skilled Intervention Compensatory Strategies   Bed Mobility    Supine to Sit Modified Independent   Sit to Supine Modified Independent  (using leg )   Scooting Modified Independent   Rolling Modified Independent   Interdisciplinary Plan of Care Collaboration   IDT Collaboration with  Family / Caregiver   Patient Position at End of Therapy Seated         Assessment    Patient able to negotiate step in shower with SPT to shower  <> w/c with good overall safety awareness and decreased expressed anxiety re: stand step transfer with FWW.  Family training provided " re: bathroom transfers and grab bar/DME positioning.     Strengths: Able to follow instructions, Alert and oriented, Independent prior level of function, Motivated for self care and independence, Pleasant and cooperative, Willingly participates in therapeutic activities  Barriers: Decreased endurance, Fatigue, Generalized weakness, Impaired activity tolerance, Limited mobility, Pain    Plan  Continue OT POC.       DME  OT DME Recommendations  Bathroom Equipment:  (TBD)  Additional Equipment:  (bed rail?)    Passport items to be completed:  Perform bathroom transfers, complete dressing, complete feeding, get ready for the day, prepare a simple meal, participate in household tasks, adapt home for safety needs, demonstrate home exercise program, complete caregiver training     Occupational Therapy Goals (Active)       Problem: Dressing       Dates: Start:  05/07/24         Goal: STG-Within one week, patient will dress LB with supervision using DME/AE as needed       Dates: Start:  05/07/24               Problem: Functional Transfers       Dates: Start:  05/07/24         Goal: STG-Within one week, patient will transfer to step in shower with CGA using DME/AE as needed       Dates: Start:  05/07/24            Goal: STG-Within one week, patient will transfer to toilet with FWW at CGA        Dates: Start:  05/07/24               Problem: IADL's       Dates: Start:  05/02/24         Goal: STG-Within one week, patient will prepare a meal w/ min A and LRAD       Dates: Start:  05/02/24         Goal Note filed on 05/07/24 0829 by Abiola Ott, MS,OTR/L       Not yet addressed                 Problem: OT Long Term Goals       Dates: Start:  05/02/24         Goal: LTG-By discharge, patient will complete basic self care tasks w/ mod I and AE/DME as needed       Dates: Start:  05/02/24            Goal: LTG-By discharge, patient will complete basic home management w/ SPV and LRAD       Dates: Start:  05/02/24

## 2024-05-10 NOTE — CARE PLAN
"  Problem: Fall Risk - Rehab  Goal: Patient will remain free from falls  Outcome: Progressing  Note: Kelli Min Fall risk Assessment Score: 12    Moderate fall risk Interventions  - Bed and strip alarm   - Yellow sign by the door   - Yellow wrist band \"Fall risk\"  - Room near to the nurse station  - Do not leave patient unattended in the bathroom  - Fall risk education provided      Problem: Pain - Standard  Goal: Alleviation of pain or a reduction in pain to the patient’s comfort goal  Outcome: Progressing  Note: Assessed for pain and discomfort , left hip and thigh incision lines with staple , well approximated .. Medicated with tylenol for left leg pain with relief .   The patient is Stable - Low risk of patient condition declining or worsening    Shift Goals  Clinical Goals: safety  Patient Goals: Participate in therapy    Progress made toward(s) clinical / shift goals:  Pt free from fall and injury.      "

## 2024-05-10 NOTE — THERAPY
Physical Therapy   Daily Treatment     Patient Name: Flavia Garrett  Age:  84 y.o., Sex:  female  Medical Record #: [id number]  Today's Date: 5/10/2024     Precautions  Precautions: (P) Fall Risk, Toe Touch Weight Bearing Left Lower Extremity  Comments: anxiety    Subjective    Pt is in her wc with family. She is ready for family training.     Objective       05/10/24 1501   PT Charge Group   PT Gait Training (Units) 1   PT Neuromuscular Re-Education / Balance (Units) 1   PT Therapeutic Activities (Units) 2   PT Total Time Spent   PT Individual Total Time Spent (Mins) 60   Precautions   Precautions Fall Risk;Toe Touch Weight Bearing Left Lower Extremity   Gait Functional Level of Assist    Gait Level Of Assist Standby Assist   Assistive Device Front Wheel Walker   Distance (Feet) 20   # of Times Distance was Traveled 1   Deviation Antalgic;Step To;Decreased Heel Strike  (TTWB on L LE)   Transfer Functional Level of Assist   Bed, Chair, Wheelchair Transfer Supervised   Bed Chair Wheelchair Transfer Description Supervision for safety;Adaptive equipment;Squat pivot transfer to wheelchair   Bed Mobility    Supine to Sit Modified Independent   Sit to Supine Modified Independent  (using leg )   Sit to Stand Supervised   Scooting Modified Independent   Rolling Modified Independent   Neuro-Muscular Treatments   Neuro-Muscular Treatments Anterior weight shift;Sequencing;Tactile Cuing;Verbal Cuing;Weight Shift Right;Weight Shift Left   Comments Family training with 2 son's and DIL in car transfer and 6 inches WC negotiations with pt on it.   Interdisciplinary Plan of Care Collaboration   IDT Collaboration with  Family / Caregiver   Patient Position at End of Therapy In Bed;Bed Alarm On;Call Light within Reach;Tray Table within Reach;Phone within Reach;Family / Friend in Room   Collaboration Comments family training for transfers and wc mobility         Assessment    PT performed family training with his 2 son's and MAIKEL re  "car transfer using FWW in their subaru outback suv and 6 inches curb negotiation with pt on her wc. PT demonstrated and provided education in sequencing and techniques. Pt is SBA in car transfer. Her son return demonstrated car transfer and wc negotiations on curb with good techniques and recall of sequencing. Pt performed Wc<>bed transfer using SPT with FWW and squat pivot transfer with bed cane maintaining TTWB, SUP. Her family has no concerns and questions has been answered. PT set up her own wc in her room that she will use at home.     Strengths: Able to follow instructions, Alert and oriented, Effective communication skills, Independent prior level of function, Pleasant and cooperative  Barriers: Decreased endurance, Fatigue, Generalized weakness, Impaired activity tolerance, Impaired balance, Pain    Plan    Complete IRF-SCOTT  Dc on Tues 5/14 at home    DME  PT DME Recommendations  Wheelchair: 20\" Width, Elevating Leg Rests  Cushion: Standard  Assistive Device: Front Wheeled Walker (Jr height)  Additional Equipment:  (bed rail?)    Passport items completed.    Physical Therapy Problems (Active)       Problem: Mobility       Dates: Start:  05/02/24         Goal: STG-Within one week, patient will propel wheelchair household distances x 50 feet, SBA       Dates: Start:  05/02/24            Goal: STG-Within one week, patient will ambulate household distance x 50 feet using FWW, CGA maintaining TTWB on L LE       Dates: Start:  05/02/24         Goal Note filed on 05/07/24 0833 by Yossi Knox, PT       Limited by fatigue and dizziness. Pt has low BP                 Problem: PT-Long Term Goals       Dates: Start:  05/02/24         Goal: LTG-By discharge, patient will ambulate x 150 feet using FWW, SUP       Dates: Start:  05/02/24            Goal: LTG-By discharge, patient will transfer one surface to another, SUP using FWW       Dates: Start:  05/02/24            Goal: LTG-By discharge, patient will ambulate " up/down a curb using FWW, SUP       Dates: Start:  05/02/24            Goal: LTG-By discharge, patient will transfer in/out of a car, SUP       Dates: Start:  05/02/24

## 2024-05-10 NOTE — PROGRESS NOTES
NURSING DAILY NOTE    Name: Flavia Garrett   Date of Admission: 5/1/2024   Admitting Diagnosis: Closed comminuted supracondylar fracture of femur, left, initial encounter ([facility])  Attending Physician: Miguel Goins M.d.  Allergies: Atorvastatin, Hydrochlorothiazide, and Lisinopril    Safety  Patient Assist  Min A  Patient Precautions  Fall Risk, Toe Touch Weight Bearing Left Lower Extremity  Precaution Comments  anxiety  Bed Transfer Status  Supervised  Toilet Transfer Status   Standby Assist  Assistive Devices  Rails, Hand held assist, Wheelchair  Oxygen  None - Room Air  Diet/Therapeutic Dining  Current Diet Order   Procedures    Diet Order Diet: Regular     Pill Administration  whole  Agitated Behavioral Scale     ABS Level of Severity       Fall Risk  Has the patient had a fall this admission?   No  Kelli Min Fall Risk Scoring  12, MODERATE RISK  Fall Risk Safety Measures  bed alarm, chair alarm, poor balance, and low vision/ hearing    Vitals  Temperature: 36.4 °C (97.6 °F)  Temp src: Oral  Pulse: 82  Respiration: 20  Blood Pressure : 136/54  Blood Pressure MAP (Calculated): 81 MM HG  BP Location: Left, Upper Arm  Patient BP Position: Supine     Oxygen  Pulse Oximetry: 95 %  O2 (LPM): 0  O2 Delivery Device: None - Room Air    Bowel and Bladder  Last Bowel Movement  05/09/24  Stool Type  Type 6: Fluffy pieces with ragged edges, a mushy stool  Bowel Device  Bathroom  Continent  Bladder: Continent void   Bowel: Continent movement  Bladder Function  Urine Void (mL):  (moderate)  Number of Times Voided: 1  Urine Color: Yellow  Genitourinary Assessment   Bladder Assessment (WDL):  WDL Except  Urine Color: Yellow  Bladder Device: Bathroom  Time Void: Yes  Bladder Scan: Post Void  $ Bladder Scan Results (mL): 42    Skin  Milton Score   19  Sensory Interventions   Bed Types: Standard/Trauma Mattress  Skin Preventative Measures: Waffle Overlay  Moisture  Interventions  Moisturizers/Barriers: Barrier Wipes      Pain  Pain Rating Scale  3 - Sometimes distracts me  Pain Location  Leg  Pain Location Orientation  Left  Pain Interventions   Medication (see MAR) (tylenol)    ADLs    Bathing   Shower, Staff (shower by ot)  Linen Change      Personal Hygiene  Change Erin Pads, Moist Erin Wipes  Chlorhexidine Bath      Oral Care  Brushed Teeth  Teeth/Dentures     Shave     Nutrition Percentage Eaten  Between 50-75% Consumed  Environmental Precautions  Treaded Slipper Socks on Patient, Personal Belongings, Wastebasket, Call Bell etc. in Easy Reach, Bed in Low Position  Patient Turns/Positioning  Patient Turns Self from Side to Side  Patient Turns Assistance/Tolerance     Bed Positions  Bed Controls On, Bed Locked  Head of Bed Elevated  Self regulated      Psychosocial/Neurologic Assessment  Psychosocial Assessment  Psychosocial (WDL):  Within Defined Limits  Neurologic Assessment  Neuro (WDL): Exceptions to WDL  Level of Consciousness: Alert  Orientation Level: Oriented X4  Cognition: Follows commands  Speech: Clear  Pupil Assesment: No  Muscle Strength Left Leg: Fair Strength against Gravity but No Resistance  EENT (WDL):  WDL Except    Cardio/Pulmonary Assessment  Edema   RLE Edema: 2+, Pitting  LLE Edema: 2+, Pitting  Respiratory Breath Sounds  RUL Breath Sounds: Clear  RML Breath Sounds: Clear  RLL Breath Sounds: Clear  NO Breath Sounds: Clear  LLL Breath Sounds: Clear  Cardiac Assessment   Cardiac (WDL):  WDL Except (hx chf, sinus tach, right bbb, htn)

## 2024-05-10 NOTE — THERAPY
"Physical Therapy   Daily Treatment     Patient Name: Flavia Garrett  Age:  84 y.o., Sex:  female  Medical Record #: [id number]  Today's Date: 5/10/2024     Precautions  Precautions: Fall Risk, Toe Touch Weight Bearing Left Lower Extremity  Comments: anxiety    Subjective    \"I had not idea that everything was going to take so much energy\"    \"It's been 2 weeks, look where I am today!\"     Objective       05/10/24 0701   PT Charge Group   PT Gait Training (Units) 1   PT Neuromuscular Re-Education / Balance (Units) 1   PT Therapeutic Activities (Units) 2   Supervising Physical Therapist Yossi Knox   PT Total Time Spent   PT Individual Total Time Spent (Mins) 60   Gait Functional Level of Assist    Gait Level Of Assist Standby Assist   Assistive Device Front Wheel Walker   Distance (Feet) 20   # of Times Distance was Traveled 2   Deviation Antalgic;Step To;Decreased Heel Strike  (TTWB L LE)   Wheelchair Functional Level of Assist   Wheelchair Assist Supervised   Distance Wheelchair (Feet or Distance) 50   Wheelchair Description Extra time;Supervision for safety;Limited by fatigue   Transfer Functional Level of Assist   Bed, Chair, Wheelchair Transfer Supervised   Bed Chair Wheelchair Transfer Description Increased time;Adaptive equipment;Supervision for safety   Toilet Transfers Supervised   Toilet Transfer Description Grab bar;Increased time;Supervision for safety  (FWW)   Bed Mobility    Supine to Sit Supervised   Sit to Supine Supervised   Sit to Stand Supervised   Scooting Supervised   Rolling Supervised   Neuro-Muscular Treatments   Neuro-Muscular Treatments Anterior weight shift;Sequencing   Comments instructed pt on correct foot placement with STS transitions, slightly forward and to keep ball of the foot on floor during amb to keep adherence to TTWB. instructed pt on management of elevating leg rest on wc and compelted blocked practice to remove and replace the leg rest. also edu pt on aligning FWW with wc or " "bed when returning to sit, keeping the walker close   Interdisciplinary Plan of Care Collaboration   IDT Collaboration with  Nursing   Patient Position at End of Therapy Seated;Chair Alarm On;Self Releasing Lap Belt Applied  (dining room)   Collaboration Comments meds     Kinesio taping to L LE, lateral aspect using spider web method to assist dec edema and improve circulation    Wc mobility training as noted above    Assessment    Pt is pleased with her effort and progress thus far. She has demonstrated consistent progress and decreased assistance with bed mobility, transfers and amb with FWW. Good carryover of learning with scap depression and \"toe on the floor\" to adhere to TTWB. Reinforced to pace activity, not rush and think things through to increased level of safety.  Strengths: Able to follow instructions, Alert and oriented, Effective communication skills, Independent prior level of function, Pleasant and cooperative  Barriers: Decreased endurance, Fatigue, Generalized weakness, Impaired activity tolerance, Impaired balance, Pain    Plan    L LE therex  Stand pivot transfers using FWW/ squat pivot transfer using bed cane on a regualr bed.  Gait training using FWW  Family training for transfers and negotiating threshold using WC.    DME  PT DME Recommendations  Wheelchair: 20\" Width, Elevating Leg Rests  Cushion: Standard  Assistive Device: Front Wheeled Walker (Jr height)  Additional Equipment:  (bed rail?)    Passport items to be completed:  Get in/out of bed safely, in/out of a vehicle, safely use mobility device, walk or wheel around home/community, navigate up and down stairs, show how to get up/down from the ground, ensure home is accessible, demonstrate HEP, complete caregiver training    Physical Therapy Problems (Active)       Problem: Mobility       Dates: Start:  05/02/24         Goal: STG-Within one week, patient will propel wheelchair household distances x 50 feet, SBA       Dates: Start:  " 05/02/24            Goal: STG-Within one week, patient will ambulate household distance x 50 feet using FWW, CGA maintaining TTWB on L LE       Dates: Start:  05/02/24         Goal Note filed on 05/07/24 0833 by Yossi Knox, PT       Limited by fatigue and dizziness. Pt has low BP                 Problem: PT-Long Term Goals       Dates: Start:  05/02/24         Goal: LTG-By discharge, patient will ambulate x 150 feet using FWW, SUP       Dates: Start:  05/02/24            Goal: LTG-By discharge, patient will transfer one surface to another, SUP using FWW       Dates: Start:  05/02/24            Goal: LTG-By discharge, patient will ambulate up/down a curb using FWW, SUP       Dates: Start:  05/02/24            Goal: LTG-By discharge, patient will transfer in/out of a car, SUP       Dates: Start:  05/02/24

## 2024-05-11 ENCOUNTER — APPOINTMENT (OUTPATIENT)
Dept: PHYSICAL THERAPY | Facility: REHABILITATION | Age: 84
DRG: 560 | End: 2024-05-11
Attending: PHYSICAL MEDICINE & REHABILITATION
Payer: MEDICARE

## 2024-05-11 PROCEDURE — 99232 SBSQ HOSP IP/OBS MODERATE 35: CPT | Performed by: PHYSICAL MEDICINE & REHABILITATION

## 2024-05-11 RX ADMIN — LEVOTHYROXINE SODIUM 150 MCG: 0.07 TABLET ORAL at 05:14

## 2024-05-11 RX ADMIN — LIDOCAINE 1 PATCH: 4 PATCH TOPICAL at 20:28

## 2024-05-11 RX ADMIN — Medication 1000 UNITS: at 08:29

## 2024-05-11 RX ADMIN — OMEPRAZOLE 20 MG: 20 CAPSULE, DELAYED RELEASE ORAL at 08:29

## 2024-05-11 RX ADMIN — Medication 4.5 MG: at 20:28

## 2024-05-11 RX ADMIN — ENOXAPARIN SODIUM 40 MG: 100 INJECTION SUBCUTANEOUS at 17:07

## 2024-05-11 ASSESSMENT — PAIN DESCRIPTION - PAIN TYPE
TYPE: ACUTE PAIN
TYPE: ACUTE PAIN

## 2024-05-11 NOTE — PROGRESS NOTES
NURSING DAILY NOTE    Name: Flavia Garrett   Date of Admission: 5/1/2024   Admitting Diagnosis: Closed comminuted supracondylar fracture of femur, left, initial encounter ([facility])  Attending Physician: Miguel Goins M.d.  Allergies: Atorvastatin, Hydrochlorothiazide, and Lisinopril    Safety  Patient Assist  Min A  Patient Precautions  Fall Risk, Toe Touch Weight Bearing Left Lower Extremity  Precaution Comments  anxiety  Bed Transfer Status  Supervised  Toilet Transfer Status   Supervised  Assistive Devices  Rails, Wheelchair  Oxygen  None - Room Air  Diet/Therapeutic Dining  Current Diet Order   Procedures    Diet Order Diet: Regular     Pill Administration  whole  Agitated Behavioral Scale     ABS Level of Severity       Fall Risk  Has the patient had a fall this admission?   No  Kelli Min Fall Risk Scoring  14, MODERATE RISK  Fall Risk Safety Measures  bed alarm, chair alarm, and poor balance    Vitals  Temperature: 36.7 °C (98 °F)  Temp src: Oral  Pulse: 73  Respiration: 18  Blood Pressure : 126/60  Blood Pressure MAP (Calculated): 82 MM HG  BP Location: Left, Upper Arm  Patient BP Position: Supine     Oxygen  Pulse Oximetry: 95 %  O2 (LPM): 0  O2 Delivery Device: None - Room Air    Bowel and Bladder  Last Bowel Movement  05/10/24  Stool Type  Type 4: Like a sausage or snake, smooth and soft  Bowel Device  Bathroom  Continent  Bladder: Continent void   Bowel: Continent movement  Bladder Function  Urine Void (mL):  (moderate)  Number of Times Voided: 1  Urine Color: Yellow  Genitourinary Assessment   Bladder Assessment (WDL):  WDL Except  Urine Color: Yellow  Bladder Device: Bathroom  Time Void: Yes  Bladder Scan: Post Void  $ Bladder Scan Results (mL): 42    Skin  Milton Score   16  Sensory Interventions   Bed Types: Standard/Trauma Mattress  Skin Preventative Measures: Waffle Overlay  Moisture Interventions  Moisturizers/Barriers: Barrier  Wipes      Pain  Pain Rating Scale  3 - Sometimes distracts me  Pain Location  Leg  Pain Location Orientation  Left  Pain Interventions   Medication (see MAR)    ADLs    Bathing   Shower, Staff (shower by ot)  Linen Change      Personal Hygiene  Change Erin Pads, Moist Erin Wipes  Chlorhexidine Bath      Oral Care  Brushed Teeth  Teeth/Dentures     Shave     Nutrition Percentage Eaten  Breakfast  Environmental Precautions  Treaded Slipper Socks on Patient, Personal Belongings, Wastebasket, Call Bell etc. in Easy Reach, Bed in Low Position  Patient Turns/Positioning  Patient Turns Self from Side to Side  Patient Turns Assistance/Tolerance     Bed Positions  Bed Controls On, Bed Locked  Head of Bed Elevated  Self regulated      Psychosocial/Neurologic Assessment  Psychosocial Assessment  Psychosocial (WDL):  Within Defined Limits  Neurologic Assessment  Neuro (WDL): Exceptions to WDL  Level of Consciousness: Alert  Orientation Level: Oriented X4  Cognition: Follows commands  Speech: Clear  Pupil Assesment: No  Muscle Strength Left Leg: Fair Strength against Gravity but No Resistance  EENT (WDL):  WDL Except    Cardio/Pulmonary Assessment  Edema   RLE Edema: 2+, Pitting  LLE Edema: 2+, Pitting  Respiratory Breath Sounds  RUL Breath Sounds: Clear  RML Breath Sounds: Clear  RLL Breath Sounds: Clear  NO Breath Sounds: Clear  LLL Breath Sounds: Clear  Cardiac Assessment   Cardiac (WDL):  WDL Except (hx chf, sinus tach, right bbb, htn)

## 2024-05-11 NOTE — PROGRESS NOTES
"  Physical Medicine & Rehabilitation Progress Note    Encounter Date: 5/11/2024    Chief Complaint:  s/p hip fracture     Interval Events (Subjective):  Vitals Reviewed : WNL   Labs reviewed: 5/10 Anemia improving     Patient seen and examined at bedside, patient reports she feels well. Denies pain or discomfort. Reports she slept well. Only complaint today is that her feet feel cold,requesting extra socks. Does not report HA, lightheadedness, SOB, CP, abdominal pain, or changes in numbness/tingling/weakness.      Objective:  Physical Exam:  Vitals: /60   Pulse 73   Temp 36.7 °C (98 °F) (Oral)   Resp 18   Ht 1.499 m (4' 11\")   Wt 80 kg (176 lb 5.9 oz)   SpO2 95%   Gen: NAD, laying comfortably in bed   Head:  NC/AT  Eyes/ Nose/ Mouth: PERRLA, moist mucous membranes  Cardio: RRR, good distal perfusion, warm extremities  Pulm: normal respiratory effort, no cyanosis, on RA   Abd: Soft NTND, negative borborygmi   Ext: No peripheral edema. No calf tenderness. No clubbing. B/l feet and LE do not feel cool to touch     Mental status:  A&Ox4 (person, place, date, situation) answers questions appropriately follows commands  Speech: fluent, no aphasia or dysarthria      Laboratory Values:  Recent Results (from the past 72 hour(s))   CBC WITHOUT DIFFERENTIAL    Collection Time: 05/10/24  5:42 AM   Result Value Ref Range    WBC 6.7 4.8 - 10.8 K/uL    RBC 2.90 (L) 4.20 - 5.40 M/uL    Hemoglobin 7.9 (L) 12.0 - 16.0 g/dL    Hematocrit 25.6 (L) 37.0 - 47.0 %    MCV 88.3 81.4 - 97.8 fL    MCH 27.2 27.0 - 33.0 pg    MCHC 30.9 (L) 32.2 - 35.5 g/dL    RDW 49.3 35.9 - 50.0 fL    Platelet Count 415 164 - 446 K/uL    MPV 10.1 9.0 - 12.9 fL       Medications:  Scheduled Medications   Medication Dose Frequency    lidocaine  1 Patch Q24HR    vitamin D3  1,000 Units DAILY    Sotagliflozin  200 mg DAILY    enoxaparin (LOVENOX) injection  40 mg DAILY AT 1800    ivabradine  5 mg BID WITH MEALS    levothyroxine  150 mcg ELSI TU,TH,,FALL "    levothyroxine  175 mcg MO, WE + FR    Pharmacy Consult Request  1 Each PHARMACY TO DOSE    omeprazole  20 mg DAILY    melatonin  4.5 mg QHS     PRN medications: methocarbamol, diclofenac sodium, acetaminophen, magnesium hydroxide, levalbuterol, melatonin, Respiratory Therapy Consult, hydrALAZINE, [DISCONTINUED] senna-docusate **AND** polyethylene glycol/lytes, ondansetron **OR** ondansetron, sodium chloride, oxyCODONE immediate-release **OR** oxyCODONE immediate-release **OR** [DISCONTINUED] HYDROmorphone    Diet:  Current Diet Order   Procedures    Diet Order Diet: Regular       Medical Decision Making and Plan:  Adapted from Dr. Goins's 5/10 progress note:  Closed comminuted supracondylar fracture of femur, left, initial encounter  s/p Open reduction internal fixation left intra-articular distal femur fracture on 4/29 by Dr Tolbert  Weightbearing status: Touchdown weightbearing left lower extremity for 6 weeks then transition to weightbearing as tolerated, range of motion as tolerated left knee  DVT prophylaxis: SCDs and lovenox until mobilizing well, then aspirin 81mg BID for 4 weeks  Outpatient plan: The patient will follow up in clinic in 2 weeks for wound check, suture/staple removal (if applicable), and xrays.  If the patient is at a facility at that time, wound check and suture/staple removal may be performed by nursing care and the patient should instead follow up at the 6 week post operative darline for repeat clinical check and xrays.     Orthostatic Hypotension  Primary hypertension  As per history.  Blood pressure goal less than 130/80.  DC losartan due to hypotension on 5/6  -continue off losartan  -5/11 BP well controlled      Acute blood loss anemia  fatigue  5/8- Hb 7.8,   5/10- Hb 7.9        Chronic heart failure with preserved ejection fraction   As per history with last echocardiogram in September 2023 EF of 60%.  Appears to be euvolemic.  Continue home Inpefa and Corlanor  - 5/11 stable on RA      Acute hypoxic Respiratory Distress:  -2/2 surgery + opioid use  - resolved, on RA 5/11         Constipation  -  Colace, Senna BID on admission  - Goal of 1BM/day.  - Last BM  5/10    GI prophylaxis:    -  Prilosec 20mg daily      Pain:  - Neuroceptic - continue tylenol, continue oxycodone    DVT prophylaxis: continue lovenox       Patient was seen for 35 minutes on unit/floor of which > 50% of time was spent on counseling and coordination of care regarding the above, including prognosis, risk reduction, benefits of treatment, and options for next stage of care.    ____________________________________    Katie Carter D.O.    ____________________________________

## 2024-05-11 NOTE — CARE PLAN
The patient is Stable - Low risk of patient condition declining or worsening    Shift Goals  Clinical Goals: saftey,  Patient Goals: rest, safety, therapy  Family Goals: rob    Progress made toward(s) clinical / shift goals:        Problem: Skin Integrity  Goal: Skin integrity is maintained or improved  Outcome: Progressing     Problem: Fall Risk - Rehab  Goal: Patient will remain free from falls  Outcome: Progressing     Problem: Pain - Standard  Goal: Alleviation of pain or a reduction in pain to the patient’s comfort goal  Outcome: Progressing     Problem: Bladder / Voiding  Goal: Patient will establish and maintain regular urinary output  Outcome: Progressing     Pt. Actively participating in care. Pt. Actively voiding appropriately. Pt. Calls appropriately.

## 2024-05-11 NOTE — CARE PLAN
"The patient is Stable - Low risk of patient condition declining or worsening    Shift Goals  Clinical Goals: safety  Patient Goals: safety, sleep    Problem: Fall Risk - Rehab  Goal: Patient will remain free from falls  Outcome: Progressing  Note: Kelli Min Fall risk Assessment Score: 14    Moderate fall risk Interventions  - Bed and strip alarm   - Yellow sign by the door   - Yellow wrist band \"Fall risk\"  - Room near to the nurse station  - Do not leave patient unattended in the bathroom  - Fall risk education provided     Problem: Skin Integrity  Goal: Skin integrity is maintained or improved  Outcome: Progressing     Problem: Pain - Standard  Goal: Alleviation of pain or a reduction in pain to the patient’s comfort goal  Outcome: Progressing     "

## 2024-05-12 ENCOUNTER — APPOINTMENT (OUTPATIENT)
Dept: OCCUPATIONAL THERAPY | Facility: REHABILITATION | Age: 84
DRG: 560 | End: 2024-05-12
Attending: PHYSICAL MEDICINE & REHABILITATION
Payer: MEDICARE

## 2024-05-12 ENCOUNTER — APPOINTMENT (OUTPATIENT)
Dept: PHYSICAL THERAPY | Facility: REHABILITATION | Age: 84
DRG: 560 | End: 2024-05-12
Attending: PHYSICAL MEDICINE & REHABILITATION
Payer: MEDICARE

## 2024-05-12 RX ADMIN — OMEPRAZOLE 20 MG: 20 CAPSULE, DELAYED RELEASE ORAL at 08:48

## 2024-05-12 RX ADMIN — Medication 1000 UNITS: at 08:48

## 2024-05-12 RX ADMIN — Medication 4.5 MG: at 19:57

## 2024-05-12 RX ADMIN — ACETAMINOPHEN 650 MG: 325 TABLET ORAL at 01:08

## 2024-05-12 RX ADMIN — ENOXAPARIN SODIUM 40 MG: 100 INJECTION SUBCUTANEOUS at 17:47

## 2024-05-12 RX ADMIN — ACETAMINOPHEN 650 MG: 325 TABLET ORAL at 08:57

## 2024-05-12 RX ADMIN — LIDOCAINE 1 PATCH: 4 PATCH TOPICAL at 20:01

## 2024-05-12 RX ADMIN — LEVOTHYROXINE SODIUM 150 MCG: 0.07 TABLET ORAL at 05:34

## 2024-05-12 RX ADMIN — ACETAMINOPHEN 650 MG: 325 TABLET ORAL at 19:57

## 2024-05-12 ASSESSMENT — GAIT ASSESSMENTS
GAIT LEVEL OF ASSIST: STANDBY ASSIST
ASSISTIVE DEVICE: FRONT WHEEL WALKER
DISTANCE (FEET): 20

## 2024-05-12 ASSESSMENT — PATIENT HEALTH QUESTIONNAIRE - PHQ9
2. FEELING DOWN, DEPRESSED, IRRITABLE, OR HOPELESS: NOT AT ALL
SUM OF ALL RESPONSES TO PHQ9 QUESTIONS 1 AND 2: 0
1. LITTLE INTEREST OR PLEASURE IN DOING THINGS: NOT AT ALL

## 2024-05-12 ASSESSMENT — PAIN DESCRIPTION - PAIN TYPE: TYPE: ACUTE PAIN

## 2024-05-12 NOTE — THERAPY
Occupational Therapy  Daily Treatment     Patient Name: Flavia Garrett  Age:  84 y.o., Sex:  female  Medical Record #: [id number]  Today's Date: 5/12/2024     Precautions  Precautions: Fall Risk, Toe Touch Weight Bearing Left Lower Extremity  Comments: anxiety    Safety   ADL Safety : Requires Physical Assist for Safety  Bathroom Safety: Requires Physical Assist for Safety    Subjective    Pt supine in bed upon arrival, pleasant and cooperative, agreeable to therapy       Objective       05/12/24 1031   OT Charge Group   OT Self Care / ADL (Units) 2   OT Therapy Activity (Units) 2   OT Therapeutic Exercise (Units) 2   OT Total Time Spent   OT Individual Total Time Spent (Mins) 90   Functional Level of Assist   Grooming Standby Assist;Standing  (wash hands at sink with FWW)   Toileting Supervision   Toilet Transfers Standby Assist  (with FWW)   Sitting Upper Body Exercises   Other Exercise seated UB therex using orange theraband: chest pulls, diagonal pulls, tricep pulls 3 sets x 15   Bed Mobility    Supine to Sit Modified Independent   Sit to Supine Supervised   Interdisciplinary Plan of Care Collaboration   Patient Position at End of Therapy Seated;Call Light within Reach;Tray Table within Reach     Pt completed kitchen mobility at FWW level with CGA overall. pt able to retrieve items throughout in high/low shelving and in appliances with no LOB. Edu provided re: kitchen safety, work simplification, and energy conservation strategies with use of DME/AE     Functional mobility at FWW level: CGA room<>bathroom     Assessment    Pt completed kitchen and bathroom mobility at FWW level at CGA - pt demo continued anxiety, but activity tolerance is improving. Pt demo good understanding of home safety strategies edu    Strengths: Able to follow instructions, Alert and oriented, Independent prior level of function, Motivated for self care and independence, Pleasant and cooperative, Willingly participates in therapeutic  activities  Barriers: Decreased endurance, Fatigue, Generalized weakness, Impaired activity tolerance, Limited mobility, Pain    Plan      Occupational Therapy  Daily Treatment     Patient Name: Flavia Garrett  Age:  84 y.o., Sex:  female  Medical Record #: [id number]  Today's Date: 5/9/2024     Precautions  Precautions: Fall Risk, Toe Touch Weight Bearing Left Lower Extremity  Comments: pt is very anxious     Safety   ADL Safety : Requires Physical Assist for Safety  Bathroom Safety: Requires Physical Assist for Safety     Subjective     Pt seated in w/c upon arrival, pleasant and cooperative, agreeable to therapy       Objective          05/09/24 0831   OT Charge Group   OT Self Care / ADL (Units) 4   OT Total Time Spent   OT Individual Total Time Spent (Mins) 60   Functional Level of Assist   Grooming Modified Independent;Seated   Bathing Supervision   Upper Body Dressing Modified Independent   Lower Body Dressing Standby Assist   Lower Body Dressing Description Reacher   Toileting Supervision   Toilet Transfers Standby Assist  (stand pivot w/c<>toilet with CG)   Tub / Shower Transfers Standby Assist  (stand pivot w/c<>fold down bench with GB)   Interdisciplinary Plan of Care Collaboration   Patient Position at End of Therapy Seated;Call Light within Reach;Tray Table within Reach;Chair Alarm On;Self Releasing Lap Belt Applied      Functional mobility at w/c level: Mod A room<>bathroom      Assessment     Pt completed shower routine at supervision - SBA for ADL's and SBA for bathroom transfers overall using w/c, shower bench, hand-held shower head, GB's, hip kit AE (reacher). Pt's functional performance was impacted by anxiety, impaired strength and standing balance      Strengths: Able to follow instructions, Alert and oriented, Independent prior level of function, Motivated for self care and independence, Pleasant and cooperative, Willingly participates in therapeutic activities  Barriers: Decreased endurance,  Fatigue, Generalized weakness, Impaired activity tolerance, Limited mobility, Pain     Plan     Cont overall strength/endurance and standing tolerance/balance to improve ADL's and functional mobility        Occupational Therapy Goals (Active)       Problem: Dressing       Dates: Start:  05/07/24         Goal: STG-Within one week, patient will dress LB with supervision using DME/AE as needed       Dates: Start:  05/07/24               Problem: Functional Transfers       Dates: Start:  05/07/24         Goal: STG-Within one week, patient will transfer to step in shower with CGA using DME/AE as needed       Dates: Start:  05/07/24            Goal: STG-Within one week, patient will transfer to toilet with FWW at CGA        Dates: Start:  05/07/24               Problem: IADL's       Dates: Start:  05/02/24         Goal: STG-Within one week, patient will prepare a meal w/ min A and LRAD       Dates: Start:  05/02/24         Goal Note filed on 05/07/24 0829 by Abiola Ott, MS,OTR/L       Not yet addressed                 Problem: OT Long Term Goals       Dates: Start:  05/02/24         Goal: LTG-By discharge, patient will complete basic self care tasks w/ mod I and AE/DME as needed       Dates: Start:  05/02/24            Goal: LTG-By discharge, patient will complete basic home management w/ SPV and LRAD       Dates: Start:  05/02/24

## 2024-05-12 NOTE — PROGRESS NOTES
NURSING DAILY NOTE    Name: Flavia Garrett   Date of Admission: 5/1/2024   Admitting Diagnosis: Closed comminuted supracondylar fracture of femur, left, initial encounter ([facility])  Attending Physician: Miguel Goins M.d.  Allergies: Atorvastatin, Hydrochlorothiazide, and Lisinopril    Safety  Patient Assist  Min A  Patient Precautions  Fall Risk, Toe Touch Weight Bearing Left Lower Extremity  Precaution Comments  anxiety  Bed Transfer Status  Supervised  Toilet Transfer Status   Supervised  Assistive Devices  Rails, Wheelchair  Oxygen  None - Room Air  Diet/Therapeutic Dining  Current Diet Order   Procedures    Diet Order Diet: Regular     Pill Administration  whole  Agitated Behavioral Scale     ABS Level of Severity       Fall Risk  Has the patient had a fall this admission?   No  Kelli Min Fall Risk Scoring  14, MODERATE RISK  Fall Risk Safety Measures  bed alarm and chair alarm    Vitals  Temperature: 36.9 °C (98.4 °F)  Temp src: Oral  Pulse: 71  Respiration: 18  Blood Pressure : (!) 145/59  Blood Pressure MAP (Calculated): 88 MM HG  BP Location: Left, Upper Arm  Patient BP Position: Supine     Oxygen  Pulse Oximetry: 92 %  O2 (LPM): 0  O2 Delivery Device: None - Room Air    Bowel and Bladder  Last Bowel Movement  05/10/24  Stool Type  Type 4: Like a sausage or snake, smooth and soft  Bowel Device  Bathroom  Continent  Bladder: Continent void   Bowel: Continent movement  Bladder Function  Urine Void (mL):  (moderate)  Number of Times Voided: 1  Urine Color: Yellow  Genitourinary Assessment   Bladder Assessment (WDL):  WDL Except  Urine Color: Yellow  Bladder Device: Bathroom  Time Void: Yes  Bladder Scan: Post Void  $ Bladder Scan Results (mL): 42    Skin  Milton Score   16  Sensory Interventions   Bed Types: Standard/Trauma Mattress  Skin Preventative Measures: Waffle Overlay  Moisture Interventions  Moisturizers/Barriers: Barrier Wipes      Pain  Pain  Rating Scale  4 - Distracts me, can do usual activities  Pain Location  Leg  Pain Location Orientation  Left  Pain Interventions   Declines    ADLs    Bathing   Shower, Staff (shower by ot)  Linen Change      Personal Hygiene  Perineal Care  Chlorhexidine Bath      Oral Care  Brushed Teeth  Teeth/Dentures     Shave     Nutrition Percentage Eaten  Dinner, Between 50-75% Consumed  Environmental Precautions  Treaded Slipper Socks on Patient, Personal Belongings, Wastebasket, Call Bell etc. in Easy Reach, Transferred to Stronger Side, Report Given to Other Health Care Providers Regarding Fall Risk, Bed in Low Position, Communication Sign for Patients & Families, Mobility Assessed & Appropriate Sign Placed  Patient Turns/Positioning  Patient Turns Self from Side to Side  Patient Turns Assistance/Tolerance     Bed Positions  Bed Controls On, Bed Locked  Head of Bed Elevated  Self regulated      Psychosocial/Neurologic Assessment  Psychosocial Assessment  Psychosocial (WDL):  Within Defined Limits  Neurologic Assessment  Neuro (WDL): Exceptions to WDL  Level of Consciousness: Alert  Orientation Level: Oriented X4  Cognition: Follows commands  Speech: Clear  Pupil Assesment: No  Muscle Strength Left Leg: Fair Strength against Gravity but No Resistance  EENT (WDL):  WDL Except    Cardio/Pulmonary Assessment  Edema   RLE Edema: 2+  LLE Edema: 2+  Respiratory Breath Sounds  RUL Breath Sounds: Clear  RML Breath Sounds: Clear  RLL Breath Sounds: Clear  NO Breath Sounds: Clear  LLL Breath Sounds: Clear  Cardiac Assessment   Cardiac (WDL):  Within Defined Limits (hx chf, sinus tach, right bbb, htn)

## 2024-05-12 NOTE — CARE PLAN
"The patient is Stable - Low risk of patient condition declining or worsening    Shift Goals  Clinical Goals: safety  Patient Goals: safety, sleep  Family Goals: rob    Problem: Fall Risk - Rehab  Goal: Patient will remain free from falls  Outcome: Progressing  Note: Kelli Min Fall risk Assessment Score: 14    Moderate fall risk Interventions  - Bed and strip alarm   - Yellow sign by the door   - Yellow wrist band \"Fall risk\"  - Room near to the nurse station  - Do not leave patient unattended in the bathroom  - Fall risk education provided       Problem: Skin Integrity  Goal: Skin integrity is maintained or improved  Outcome: Progressing     Problem: Pain - Standard  Goal: Alleviation of pain or a reduction in pain to the patient’s comfort goal  Outcome: Progressing     "

## 2024-05-12 NOTE — THERAPY
Physical Therapy   Daily Treatment     Patient Name: Flavia Garrett  Age:  84 y.o., Sex:  female  Medical Record #: [id number]  Today's Date: 5/12/2024     Precautions  Precautions: (P) Fall Risk, Toe Touch Weight Bearing Left Lower Extremity  Comments: anxiety    Subjective    Minimal complaints of knee pain. Feeling more ready to DC home in 2 days      Objective       05/12/24 0915   PT Charge Group   PT Gait Training (Units) 1   PT Therapeutic Exercise (Units) 1   Supervising Physical Therapist Aravind Costa   PT Total Time Spent   PT Individual Total Time Spent (Mins) 30   Precautions   Precautions Fall Risk;Toe Touch Weight Bearing Left Lower Extremity   Gait Functional Level of Assist    Gait Level Of Assist Standby Assist   Assistive Device Front Wheel Walker   Distance (Feet) 20   # of Times Distance was Traveled 1   Transfer Functional Level of Assist   Bed, Chair, Wheelchair Transfer Supervised   Supine Lower Body Exercise   Straight Leg Raises Front;2 sets of 10  (AAROM)   Short Arc Quad 2 sets of 10   Heel Slide 2 sets of 10  (on slide board to reduce friction)   Ankle Pumps 2 sets of 10   Sitting Lower Body Exercises   Long Arc Quad 2 sets of 10   Bed Mobility    Supine to Sit Modified Independent   Sit to Supine Modified Independent   Sit to Stand Supervised   Scooting Modified Independent   Interdisciplinary Plan of Care Collaboration   IDT Collaboration with  Family / Caregiver   Patient Position at End of Therapy In Bed;Bed Alarm On   Collaboration Comments Son Jez present to witness assist levels required for gait and bed mobility, HEP         Assessment    Patient is safe with short distance ambulation with TTWB left LE. Lacks upper body strength to travel much more than 20-30 feet at a time, Requires reinforcement on importance of achieving full knee extension mobility     Strengths: Able to follow instructions, Alert and oriented, Effective communication skills, Independent prior level of function,  "Pleasant and cooperative  Barriers: Decreased endurance, Fatigue, Generalized weakness, Impaired activity tolerance, Impaired balance, Pain    Plan    Final education regarding knee care, weight bearing status, HEP    DME  PT DME Recommendations  Wheelchair: 20\" Width, Elevating Leg Rests  Cushion: Standard  Assistive Device: Front Wheeled Walker (Jr height)  Additional Equipment:  (bed rail?)    Passport items to be completed:  Get in/out of bed safely, in/out of a vehicle, safely use mobility device, walk or wheel around home/community, navigate up and down stairs, show how to get up/down from the ground, ensure home is accessible, demonstrate HEP, complete caregiver training    Physical Therapy Problems (Active)       Problem: Mobility       Dates: Start:  05/02/24         Goal: STG-Within one week, patient will propel wheelchair household distances x 50 feet, SBA       Dates: Start:  05/02/24            Goal: STG-Within one week, patient will ambulate household distance x 50 feet using FWW, CGA maintaining TTWB on L LE       Dates: Start:  05/02/24         Goal Note filed on 05/07/24 0833 by Yossi Knox, PT       Limited by fatigue and dizziness. Pt has low BP                 Problem: PT-Long Term Goals       Dates: Start:  05/02/24         Goal: LTG-By discharge, patient will ambulate x 150 feet using FWW, SUP       Dates: Start:  05/02/24            Goal: LTG-By discharge, patient will transfer one surface to another, SUP using FWW       Dates: Start:  05/02/24            Goal: LTG-By discharge, patient will ambulate up/down a curb using FWW, SUP       Dates: Start:  05/02/24            Goal: LTG-By discharge, patient will transfer in/out of a car, SUP       Dates: Start:  05/02/24              "

## 2024-05-12 NOTE — PROGRESS NOTES
NURSING DAILY NOTE    Name: Flavia Garrett   Date of Admission: 5/1/2024   Admitting Diagnosis: Closed comminuted supracondylar fracture of femur, left, initial encounter ([facility])  Attending Physician: Miguel Goins M.d.  Allergies: Atorvastatin, Hydrochlorothiazide, and Lisinopril    Safety  Patient Assist  Min A  Patient Precautions  Fall Risk, Toe Touch Weight Bearing Left Lower Extremity  Precaution Comments  anxiety  Bed Transfer Status  Supervised  Toilet Transfer Status   Standby Assist (with FWW)  Assistive Devices  Rails, Wheelchair  Oxygen  None - Room Air  Diet/Therapeutic Dining  Current Diet Order   Procedures    Diet Order Diet: Regular     Pill Administration  whole  Agitated Behavioral Scale     ABS Level of Severity       Fall Risk  Has the patient had a fall this admission?   No  Kelli Min Fall Risk Scoring  14, MODERATE RISK  Fall Risk Safety Measures  bed alarm and chair alarm    Vitals  Temperature: 37.2 °C (99 °F)  Temp src: Oral  Pulse: 79  Respiration: 18  Blood Pressure : 134/60  Blood Pressure MAP (Calculated): 85 MM HG  BP Location: Left, Upper Arm  Patient BP Position: Supine     Oxygen  Pulse Oximetry: 95 %  O2 (LPM): 0  O2 Delivery Device: None - Room Air    Bowel and Bladder  Last Bowel Movement  05/10/24  Stool Type  Type 4: Like a sausage or snake, smooth and soft  Bowel Device  Bathroom  Continent  Bladder: Continent void   Bowel: Continent movement  Bladder Function  Urine Void (mL):  (moderate)  Number of Times Voided: 1  Urine Color: Yellow  Genitourinary Assessment   Bladder Assessment (WDL):  WDL Except  Urine Color: Yellow  Bladder Device: Bathroom  Time Void: Yes  Bladder Scan: Post Void  $ Bladder Scan Results (mL): 42    Skin  Milton Score   16  Sensory Interventions   Bed Types: Standard/Trauma Mattress with Overlay  Skin Preventative Measures: Waffle Overlay  Moisture Interventions  Moisturizers/Barriers: Barrier  Wipes      Pain  Pain Rating Scale  2 - Notice Pain, does not interfere with activities  Pain Location  Leg  Pain Location Orientation  Left  Pain Interventions   Medication (see MAR)    ADLs    Bathing   Shower, Staff (shower by ot)  Linen Change      Personal Hygiene  Perineal Care  Chlorhexidine Bath      Oral Care  Brushed Teeth  Teeth/Dentures     Shave     Nutrition Percentage Eaten  Lunch, Between % Consumed  Environmental Precautions  Treaded Slipper Socks on Patient, Personal Belongings, Wastebasket, Call Bell etc. in Easy Reach, Transferred to Stronger Side, Report Given to Other Health Care Providers Regarding Fall Risk, Bed in Low Position, Communication Sign for Patients & Families, Mobility Assessed & Appropriate Sign Placed  Patient Turns/Positioning  Patient Turns Self from Side to Side  Patient Turns Assistance/Tolerance     Bed Positions  Bed Controls On, Bed Locked  Head of Bed Elevated  Self regulated      Psychosocial/Neurologic Assessment  Psychosocial Assessment  Psychosocial (WDL):  Within Defined Limits  Neurologic Assessment  Neuro (WDL): Exceptions to WDL  Level of Consciousness: Alert  Orientation Level: Oriented X4  Cognition: Follows commands  Speech: Clear  Pupil Assesment: No  Muscle Strength Left Leg: Fair Strength against Gravity but No Resistance  EENT (WDL):  WDL Except    Cardio/Pulmonary Assessment  Edema   RLE Edema: 2+  LLE Edema: 2+  Respiratory Breath Sounds  RUL Breath Sounds: Clear  RML Breath Sounds: Clear  RLL Breath Sounds: Clear  NO Breath Sounds: Clear  LLL Breath Sounds: Clear  Cardiac Assessment   Cardiac (WDL):  WDL Except

## 2024-05-12 NOTE — CARE PLAN
The patient is Stable - Low risk of patient condition declining or worsening    Shift Goals  Clinical Goals: safety  Patient Goals: safety, sleep  Family Goals: rob    Progress made toward(s) clinical / shift goals:      Problem: Knowledge Deficit - Standard  Goal: Patient and family/care givers will demonstrate understanding of plan of care, disease process/condition, diagnostic tests and medications  Outcome: Progressing     Problem: Fall Risk - Rehab  Goal: Patient will remain free from falls  Outcome: Progressing     Problem: Pain - Standard  Goal: Alleviation of pain or a reduction in pain to the patient’s comfort goal  Outcome: Progressing       Patient is not progressing towards the following goals:

## 2024-05-13 ENCOUNTER — APPOINTMENT (OUTPATIENT)
Dept: PHYSICAL THERAPY | Facility: REHABILITATION | Age: 84
DRG: 560 | End: 2024-05-13
Attending: PHYSICAL MEDICINE & REHABILITATION
Payer: MEDICARE

## 2024-05-13 ENCOUNTER — APPOINTMENT (OUTPATIENT)
Dept: OCCUPATIONAL THERAPY | Facility: REHABILITATION | Age: 84
DRG: 560 | End: 2024-05-13
Attending: PHYSICAL MEDICINE & REHABILITATION
Payer: MEDICARE

## 2024-05-13 ENCOUNTER — PHARMACY VISIT (OUTPATIENT)
Dept: PHARMACY | Facility: MEDICAL CENTER | Age: 84
End: 2024-05-13
Payer: MEDICARE

## 2024-05-13 PROCEDURE — RXMED WILLOW AMBULATORY MEDICATION CHARGE: Performed by: PHYSICAL MEDICINE & REHABILITATION

## 2024-05-13 RX ORDER — OXYCODONE HYDROCHLORIDE 5 MG/1
2.5 TABLET ORAL EVERY 6 HOURS PRN
Qty: 14 TABLET | Refills: 0 | Status: SHIPPED | OUTPATIENT
Start: 2024-05-13 | End: 2024-05-20

## 2024-05-13 RX ORDER — ASPIRIN 81 MG/1
81 TABLET ORAL 2 TIMES DAILY
Qty: 26 TABLET | Refills: 0 | Status: SHIPPED | OUTPATIENT
Start: 2024-05-14 | End: 2024-05-23

## 2024-05-13 RX ORDER — LIDOCAINE HYDROCHLORIDE 20 MG/ML
JELLY TOPICAL
Status: COMPLETED | OUTPATIENT
Start: 2024-05-13 | End: 2024-05-13

## 2024-05-13 RX ADMIN — LIDOCAINE 1 PATCH: 4 PATCH TOPICAL at 20:15

## 2024-05-13 RX ADMIN — LIDOCAINE HYDROCHLORIDE 2 APPLICATION: 20 JELLY TOPICAL at 15:44

## 2024-05-13 RX ADMIN — ENOXAPARIN SODIUM 40 MG: 100 INJECTION SUBCUTANEOUS at 17:47

## 2024-05-13 RX ADMIN — LEVOTHYROXINE SODIUM 175 MCG: 0.07 TABLET ORAL at 05:25

## 2024-05-13 RX ADMIN — ACETAMINOPHEN 650 MG: 325 TABLET ORAL at 18:25

## 2024-05-13 RX ADMIN — OXYCODONE 5 MG: 5 TABLET ORAL at 03:03

## 2024-05-13 RX ADMIN — OMEPRAZOLE 20 MG: 20 CAPSULE, DELAYED RELEASE ORAL at 07:54

## 2024-05-13 RX ADMIN — ACETAMINOPHEN 650 MG: 325 TABLET ORAL at 07:54

## 2024-05-13 RX ADMIN — Medication 1000 UNITS: at 07:54

## 2024-05-13 RX ADMIN — Medication 4.5 MG: at 20:15

## 2024-05-13 RX ADMIN — ACETAMINOPHEN 650 MG: 325 TABLET ORAL at 20:15

## 2024-05-13 ASSESSMENT — BRIEF INTERVIEW FOR MENTAL STATUS (BIMS)
ASKED TO RECALL BED: YES, NO CUE REQUIRED
INITIAL REPETITION OF BED BLUE SOCK - FIRST ATTEMPT: 3
WHAT DAY OF THE WEEK IS IT: CORRECT
ASKED TO RECALL BLUE: YES, NO CUE REQUIRED
ASKED TO RECALL SOCK: YES, NO CUE REQUIRED
BIMS SUMMARY SCORE: 15
WHAT MONTH IS IT: ACCURATE WITHIN 5 DAYS
WHAT YEAR IS IT: CORRECT

## 2024-05-13 ASSESSMENT — ACTIVITIES OF DAILY LIVING (ADL)
TOILET_TRANSFER_LEVEL_OF_ASSIST: REQUIRES SUPERVISION WITH TOILET TRANSFER
TOILETING_LEVEL_OF_ASSIST: ABLE TO COMPLETE TOILETING WITHOUT ASSIST
SHOWER_TRANSFER_LEVEL_OF_ASSIST: REQUIRES PHYSICAL ASSIST WITH SHOWER TRANSFER
BED_CHAIR_WHEELCHAIR_TRANSFER_DESCRIPTION: SUPERVISION FOR SAFETY

## 2024-05-13 ASSESSMENT — GAIT ASSESSMENTS
DEVIATION: ANTALGIC;STEP TO;BRADYKINETIC
GAIT LEVEL OF ASSIST: STANDBY ASSIST
DISTANCE (FEET): 25
ASSISTIVE DEVICE: FRONT WHEEL WALKER

## 2024-05-13 ASSESSMENT — PAIN DESCRIPTION - PAIN TYPE: TYPE: ACUTE PAIN

## 2024-05-13 ASSESSMENT — PATIENT HEALTH QUESTIONNAIRE - PHQ9
1. LITTLE INTEREST OR PLEASURE IN DOING THINGS: NOT AT ALL
SUM OF ALL RESPONSES TO PHQ9 QUESTIONS 1 AND 2: 0
2. FEELING DOWN, DEPRESSED, IRRITABLE, OR HOPELESS: NOT AT ALL

## 2024-05-13 NOTE — THERAPY
Physical Therapy   Discharge Summary     Patient Name: Flavia Garrett  Age:  84 y.o., Sex:  female  Medical Record #: [id number]  Today's Date: 5/13/2024     Precautions  Precautions: Fall Risk, Toe Touch Weight Bearing Left Lower Extremity  Comments: anxiety    Subjective    Pt is in her bed and agreeable to participate in therapy.      Objective       05/13/24 0831   PT Charge Group   PT Gait Training (Units) 1   PT Therapeutic Exercise (Units) 2   PT Therapeutic Activities (Units) 1   PT Total Time Spent   PT Individual Total Time Spent (Mins) 60   Precautions   Precautions Fall Risk;Toe Touch Weight Bearing Left Lower Extremity   Comments anxiety   Gait Functional Level of Assist    Gait Level Of Assist Standby Assist   Assistive Device Front Wheel Walker   Distance (Feet) 25   # of Times Distance was Traveled 1   Deviation Antalgic;Step To;Bradykinetic  (TTWB L)   Wheelchair Functional Level of Assist   Wheelchair Assist Modified Independent   Distance Wheelchair (Feet or Distance) 75   Wheelchair Description Extra time;Supervision for safety   Transfer Functional Level of Assist   Bed, Chair, Wheelchair Transfer Supervised   Bed Chair Wheelchair Transfer Description Supervision for safety   Sitting Lower Body Exercises   Ankle Pumps 2 sets of 15   Hip Flexion 2 sets of 15;Bilateral  (5lbs AW on R LE)   Hip Abduction 2 sets of 15;Bilateral;Light Resistance Theraband   Hip Adduction 2 sets of 15;Bilateral   Long Arc Quad 2 sets of 15;Bilateral  (5lbs AW on R LE)   Hamstring Curl 2 sets of 15;Bilateral;Light Resistance Theraband   Other Exercises seated chest press and shoulder press 2 sets of 10 for each.   Bed Mobility    Supine to Sit Modified Independent   Sit to Supine Modified Independent   Sit to Stand Supervised   Scooting Modified Independent   Rolling Modified Independent   Roll Left and Right   Assistance Needed Independent;Adaptive equipment   CARE Score - Roll Left and Right 6   Sit to Lying    Assistance Needed Independent;Adaptive equipment   CARE Score - Sit to Lying 6   Lying to Sitting on Side of Bed   Assistance Needed Independent;Adaptive equipment   CARE Score - Lying to Sitting on Side of Bed 6   Sit to Stand   Assistance Needed Supervision   CARE Score - Sit to Stand 4   Chair/Bed-to-Chair Transfer   Assistance Needed Supervision   CARE Score - Chair/Bed-to-Chair Transfer 4   Car Transfer   Assistance Needed Verbal cues;Supervision;Adaptive equipment   CARE Score - Car Transfer 4   Walk 10 Feet   Assistance Needed Supervision;Adaptive equipment   CARE Score - Walk 10 Feet 4   Walk 50 Feet with Two Turns   Assistance Needed Supervision;Adaptive equipment   CARE Score - Walk 50 Feet with Two Turns 4   Walk 150 Feet   Reason if not Attempted Medical concerns   CARE Score - Walk 150 Feet 88   Walking 10 Feet on Uneven Surfaces   Reason if not Attempted Medical concerns   CARE Score - Walking 10 Feet on Uneven Surfaces 88   1 Step (Curb)   Reason if not Attempted Medical concerns   CARE Score - 1 Step (Curb) 88   4 Steps   Reason if not Attempted Medical concerns   CARE Score - 4 Steps 88   12 Steps   Reason if not Attempted Activity not applicable   CARE Score - 12 Steps 9   Picking Up Object   Assistance Needed Independent;Adaptive equipment   CARE Score - Picking Up Object 6   Wheel 50 Feet with Two Turns   Assistance Needed Independent   CARE Score - Wheel 50 Feet with Two Turns 6   Type of Wheelchair/Scooter Manual   Wheel 150 Feet   Assistance Needed Independent   CARE Score - Wheel 150 Feet 6   Type of Wheelchair/Scooter Manual   P.T. Discharge Summary   Discharge Location Home   Patient Discharging with Assist of Family   Level of Supervision Required Upon Discharge Intermittent Supervision   Recommended Equipment for Discharge Front-Wheeled Walker;Manual Wheelchair   Recommeded Services Upon Discharge Home Health Physical Therapy   Long Term Goals Met 2   Long Term Goals Not Met 2    Reason(s) for Goals Not Met decrease activity tolerance and anxiety   Criteria for Termination of Services Maximum Function Achieved for Inpatient Rehabilitation   Discharge Instructions to Patient   Weight Bearing Status - Patient Should Bear Toe-Touch Weight Left Leg   Level of Assist Required for Ambulation Supervision on Flat Surfaces   Device Recommended for Ambulation Front-Wheeled Walker   Level of Assist Required to Propel Wheelchair Requires No Assist   Level of Assist Required for Transfers Requires No Assist   Device Recommended for Transfers Front-Wheeled Walker   Home Exercise Program Refer to Home Exercise Program Handout for Details   Prosthesis / Orthosis Recommendation / Location No Prosthesis  or Orthosis Recommended       Assessment    Patient demonstrates progress in functional mobility and strength since initial evaluation. She is Mod I in transfers and Wc mobility. Patients activity tolerance are limited by pain and anxiety. Completed DC IRF-SCOTT  Strengths: Able to follow instructions, Alert and oriented, Effective communication skills, Independent prior level of function, Pleasant and cooperative  Barriers: Decreased endurance, Fatigue, Generalized weakness, Impaired activity tolerance, Impaired balance, Pain    Plan    Dc home tomorrow.    Passport items completed    Physical Therapy Problems (Active)       There are no active problems.

## 2024-05-13 NOTE — THERAPY
Physical Therapy   Daily Treatment     Patient Name: Flavia Garrett  Age:  84 y.o., Sex:  female  Medical Record #: [id number]  Today's Date: 5/13/2024     Precautions  Precautions: (P) Fall Risk, Toe Touch Weight Bearing Left Lower Extremity  Comments: (P) anxiety    Subjective    Pt is in her bed and express being tired. She is agreeable to participate in therapy.     Objective       05/13/24 1031   PT Charge Group   PT Therapeutic Exercise (Units) 1   PT Therapeutic Activities (Units) 1   Precautions   Precautions Fall Risk;Toe Touch Weight Bearing Left Lower Extremity   Comments anxiety   Wheelchair Functional Level of Assist   Wheelchair Assist Modified Independent   Distance Wheelchair (Feet or Distance) 200   Wheelchair Description Extra time   Transfer Functional Level of Assist   Bed, Chair, Wheelchair Transfer Modified Independent   Bed Chair Wheelchair Transfer Description Increased time;Adaptive equipment;Squat pivot transfer to wheelchair   Supine Lower Body Exercise   Hip Abduction 1 set of 10;Bilateral   Straight Leg Raises 1 set of 10;Front;Bilateral   Heel Slide 1 set of 10;Bilateral   Ankle Pumps 1 set of 10;Bilateral   Gluteal Isometrics 1 set of 10;Bilateral   Quadriceps Isometrics 1 set of 10;Bilateral   Interdisciplinary Plan of Care Collaboration   IDT Collaboration with  Physician   Patient Position at End of Therapy In Bed;Call Light within Reach;Tray Table within Reach;Phone within Reach   Collaboration Comments handing off care to MD during rounds         Assessment    PT reviewed and instructed pt in supine HEP provided in written HEP.. She is independent in her HEP.  mobility training, Mod I x 200'. She demonstrated good navigation and propulsion techniques.  Strengths: Able to follow instructions, Alert and oriented, Effective communication skills, Independent prior level of function, Pleasant and cooperative  Barriers: Decreased endurance, Fatigue, Generalized weakness, Impaired  "activity tolerance, Impaired balance, Pain    Plan    Dc home tomorrow.    DME  PT DME Recommendations  Wheelchair: 20\" Width, Elevating Leg Rests  Cushion: Standard  Assistive Device: Front Wheeled Walker (Jr height)  Additional Equipment:  (bed rail?)    Passport items completed.     Physical Therapy Problems (Active)       There are no active problems.            "

## 2024-05-13 NOTE — CARE PLAN
Problem: IADL's  Goal: STG-Within one week, patient will prepare a meal w/ min A and LRAD  Outcome: Not Met  Note: Requires varying assist     Problem: OT Long Term Goals  Goal: LTG-By discharge, patient will complete basic self care tasks w/ mod I and AE/DME as needed  Outcome: Not Met  Note: Requires SBA  Goal: LTG-By discharge, patient will complete basic home management w/ SPV and LRAD  Outcome: Not Met  Note: Requires Min A     Problem: Dressing  Goal: STG-Within one week, patient will dress LB with supervision using DME/AE as needed  Outcome: Met     Problem: Functional Transfers  Goal: STG-Within one week, patient will transfer to step in shower with CGA using DME/AE as needed  Outcome: Met  Goal: STG-Within one week, patient will transfer to toilet with FWW at CGA   Outcome: Met

## 2024-05-13 NOTE — CARE PLAN
Patient participated in one Recreation Therapy B group this week and socialized with peers, while playing a game.

## 2024-05-13 NOTE — CARE PLAN
Problem: Mobility  Goal: STG-Within one week, patient will propel wheelchair household distances x 50 feet, SBA  Outcome: Met  Goal: STG-Within one week, patient will ambulate household distance x 50 feet using FWW, CGA maintaining TTWB on L LE  Outcome: Met     Problem: PT-Long Term Goals  Goal: LTG-By discharge, patient will ambulate x 150 feet using FWW, SUP  Outcome: Discharged - Not Met  Goal: LTG-By discharge, patient will transfer one surface to another, SUP using FWW  Outcome: Met  Goal: LTG-By discharge, patient will ambulate up/down a curb using FWW, SUP  Outcome: Discharged - Not Met  Goal: LTG-By discharge, patient will transfer in/out of a car, SUP  Outcome: Met

## 2024-05-13 NOTE — THERAPY
Occupational Therapy  Daily Treatment     Patient Name: Flavia Garrett  Age:  84 y.o., Sex:  female  Medical Record #: [id number]  Today's Date: 5/13/2024     Precautions  Precautions: Fall Risk, Toe Touch Weight Bearing Left Lower Extremity  Comments: anxiety    Safety   ADL Safety : Requires Physical Assist for Safety  Bathroom Safety: Requires Physical Assist for Safety    Subjective    Pt supine in bed upon arrival, pleasant and cooperative, agreeable to therapy and shower     Objective       05/13/24 1431   OT Charge Group   OT Self Care / ADL (Units) 6   OT Total Time Spent   OT Individual Total Time Spent (Mins) 90   Functional Level of Assist   Eating Independent   Grooming Independent;Seated   Bathing Modified Independent   Upper Body Dressing Independent   Lower Body Dressing Standby Assist   Toileting Modified Independent   Toilet Transfers Supervised  (With w/c, SBA with FWW)   Tub / Shower Transfers Supervised  (With w/c)   Interdisciplinary Plan of Care Collaboration   Patient Position at End of Therapy In Bed;Call Light within Reach;Tray Table within Reach   Eating   Assistance Needed Adaptive equipment   Physical Assistance Level No physical assistance   CARE Score - Eating 6   Oral Hygiene   Assistance Needed Independent   Physical Assistance Level No physical assistance   CARE Score - Oral Hygiene 6   Toileting Hygiene   Assistance Needed Independent   Physical Assistance Level No physical assistance   CARE Score - Toileting Hygiene 6   Shower/Bathe Self   Assistance Needed Independent;Adaptive equipment   Physical Assistance Level No physical assistance   CARE Score - Shower/Bathe Self 6   Upper Body Dressing   Assistance Needed Independent   Physical Assistance Level No physical assistance   CARE Score - Upper Body Dressing 6   Lower Body Dressing   Assistance Needed Independent   Physical Assistance Level No physical assistance   CARE Score - Lower Body Dressing 6   Putting On/Taking Off Footwear  "  Assistance Needed Independent   Physical Assistance Level No physical assistance   CARE Score - Putting On/Taking Off Footwear 6   Toilet Transfer   Assistance Needed Supervision   Physical Assistance Level No physical assistance   CARE Score - Toilet Transfer 4   Cognitive Pattern Assessment   Cognitive Pattern Assessment Used BIMS   Brief Interview for Mental Status (BIMS)   Repetition of Three Words (First Attempt) 3   Temporal Orientation: Year Correct   Temporal Orientation: Month Accurate within 5 days   Temporal Orientation: Day Correct   Recall: \"Sock\" Yes, no cue required   Recall: \"Blue\" Yes, no cue required   Recall: \"Bed\" Yes, no cue required   BIMS Summary Score 15   Confusion Assessment Method (CAM)   Is there evidence of an acute change in mental status from the patient's baseline? No   Inattention Behavior not present   Disorganized thinking Behavior not present   Altered level of consciousness Behavior not present   Discharge Summary    Discharge Location  Home   Patient Discharging with Assist of Family    Level of Supervision Required Intermittent Supervision   Recommended Equipment for Discharge Manual Wheelchair;Shower Chair;Grab Bars by Toilet;Grab Bars in Tub / Shower;Hand Held Shower Head;Reacher   Recommended Services Upon Discharge Home Health Occupational Therapy   Long Term Goals Met 0   Long Term Goals Not Met 2   Reason(s) for Goals Not Met pt requires varying assist for ADL's and basic mgmt tasks d/t impaired standing balance, anxiety   Criteria for Termination of Services Maximum Function Achieved for Inpatient Rehabilitation   Discharge Instructions to Patient   Level of Assist Required for Eating Able to Complete Eating without Assist   Level of Assist Required for Grooming Able to Complete Grooming without Assist   Level of Assist Required for Dressing Requires Supervision with Dressing   Level of Assist Required for Toileting Able to Complete Toileting without Assist   Level of " Assist Required for Toilet Transfer Requires Supervision with Toilet Transfer   Equipment for Toilet Transfer Grab Bars by Toilet   Level of Assist Required for Bathing Able to Complete Bathing without Assist   Equipment for Bathing Shower Chair;Grab Bars in Tub / Shower;Hand Held Shower Head   Level of Assist Required for Shower Transfer Requires Physical Assist with Shower Transfer   Equipment for Shower Transfer Grab Bars in Tub / Shower;Shower Chair   Level of Assist Required for Home Mgmt Requires Physical Assist with Home Management   Level of Assist Required for Meal Prep Requires Physical Assist with Meal Preparation   Driving May not Drive, Please Contact Physician for Further Information   Home Exercise Program Refer to Home Exercise Program Handout for Details     Functional mobility at FWW level: CGA room<>bathroom, w/c level supervision    Assessment    Pt completed shower routine at supervision for ADL's and bathroom transfers overall using FWW and w/c, shower bench, hand-held shower head, GB's, hip kit AE. Pt's functional performance was impacted by impaired standing balance and anxiety. Pt with no further questions/concerns re: d/c tomorrow and home safety      Strengths: Able to follow instructions, Alert and oriented, Independent prior level of function, Motivated for self care and independence, Pleasant and cooperative, Willingly participates in therapeutic activities  Barriers: Decreased endurance, Fatigue, Generalized weakness, Impaired activity tolerance, Limited mobility, Pain    Plan    D/c tomorrow     Occupational Therapy Goals (Active)       Problem: IADL's       Dates: Start:  05/02/24         Goal: STG-Within one week, patient will prepare a meal w/ min A and LRAD       Dates: Start:  05/02/24         Goal Note filed on 05/13/24 1526 by Abiola Ott MS,OTR/L       Requires varying assist                 Problem: OT Long Term Goals       Dates: Start:  05/02/24         Goal: LTG-By  discharge, patient will complete basic self care tasks w/ mod I and AE/DME as needed       Dates: Start:  05/02/24         Goal Note filed on 05/13/24 1526 by Abiola Ott MS,OTR/L       Requires SBA              Goal: LTG-By discharge, patient will complete basic home management w/ SPV and LRAD       Dates: Start:  05/02/24         Goal Note filed on 05/13/24 1526 by Abiola Ott MS,OTR/L       Requires Min A

## 2024-05-13 NOTE — CARE PLAN
"The patient is Stable - Low risk of patient condition declining or worsening    Shift Goals  Clinical Goals: safety  Patient Goals: safety, sleep, pain ,management  Family Goals: rob    Problem: Fall Risk - Rehab  Goal: Patient will remain free from falls  Outcome: Progressing  Note: Kelli Min Fall risk Assessment Score: 14    Moderate fall risk Interventions  - Bed and strip alarm   - Yellow sign by the door   - Yellow wrist band \"Fall risk\"  - Room near to the nurse station  - Do not leave patient unattended in the bathroom  - Fall risk education provided        Problem: Skin Integrity  Goal: Skin integrity is maintained or improved  Outcome: Progressing     Problem: Pain - Standard  Goal: Alleviation of pain or a reduction in pain to the patient’s comfort goal  Outcome: Progressing     "

## 2024-05-13 NOTE — PROGRESS NOTES
"  Physical Medicine & Rehabilitation Progress Note    Encounter Date: 5/13/2024    Chief Complaint: Weakness    Interval Events (Subjective):  Seen in bed. Ready to dc tomorrow. No concerns. Med rec complete.    Objective:  VITAL SIGNS: BP (!) 144/72   Pulse 78   Temp 36.9 °C (98.4 °F) (Oral)   Resp 18   Ht 1.499 m (4' 11\")   Wt 80 kg (176 lb 5.9 oz)   SpO2 95%   BMI 35.62 kg/m²   Gen: No acute distress, well developed well nourished adult  HEENT: Normal Cephalic Atraumatic, Normal conjunctiva.   CV: warm extremities, well perfused, no edema  Resp: symmetric chest rise, breathing comfortably on room air  Abd: Soft, Non distended  Extremities: normal bulk, no atrophy  Skin: no visible rashes or lesions.   Neuro: alert, awake  Psych: Mood and affect appropriate and congruent    Laboratory Values:  No results found for this or any previous visit (from the past 72 hour(s)).    Medications:  Scheduled Medications   Medication Dose Frequency    lidocaine  1 Patch Q24HR    vitamin D3  1,000 Units DAILY    Sotagliflozin  200 mg DAILY    enoxaparin (LOVENOX) injection  40 mg DAILY AT 1800    ivabradine  5 mg BID WITH MEALS    levothyroxine  150 mcg QAM TU,TH,SA,FALL    levothyroxine  175 mcg MO, WE + FR    Pharmacy Consult Request  1 Each PHARMACY TO DOSE    omeprazole  20 mg DAILY    melatonin  4.5 mg QHS     PRN medications: methocarbamol, diclofenac sodium, acetaminophen, magnesium hydroxide, levalbuterol, melatonin, Respiratory Therapy Consult, hydrALAZINE, [DISCONTINUED] senna-docusate **AND** polyethylene glycol/lytes, ondansetron **OR** ondansetron, sodium chloride, oxyCODONE immediate-release **OR** oxyCODONE immediate-release **OR** [DISCONTINUED] HYDROmorphone    Diet:  Current Diet Order   Procedures    Diet Order Diet: Regular       Medical Decision Making and Plan:  Closed comminuted supracondylar fracture of femur, left, initial encounter  s/p Open reduction internal fixation left intra-articular distal " femur fracture on 4/29 by Dr Tolbert  Weightbearing status: Touchdown weightbearing left lower extremity for 6 weeks then transition to weightbearing as tolerated, range of motion as tolerated left knee  DVT prophylaxis: SCDs and lovenox until mobilizing well, then aspirin 81mg BID for 4 weeks  Outpatient plan: The patient will follow up in clinic in 2 weeks for wound check, suture/staple removal (if applicable), and xrays.  If the patient is at a facility at that time, wound check and suture/staple removal may be performed by nursing care and the patient should instead follow up at the 6 week post operative darline for repeat clinical check and xrays.  -staples out today  Continue pt ot inpatient     Orthostatic Hypotension  Primary hypertension  As per history.  Blood pressure goal less than 130/80.  DC losartan due to hypotension on 5/6  -continue off losartan     Acute blood loss anemia  fatigue  5/8- Hb 7.8,   5/10- Hb 7.9        Chronic heart failure with preserved ejection fraction   As per history with last echocardiogram in September 2023 EF of 60%.  Appears to be euvolemic.  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      Acute hypoxic Respiratory Distress:  -2/2 surgery + opioid use     Urinary Retension  - Timed voids with PVR q4H x3. If PVR > 400mL or if patient is unable to void, straight cath patient.     Constipation  -  Colace, Senna BID on admission  - Goal of 1BM/day.  -    Circadian Rhythm disorder:   Recommend lights on during the day/off at night, minimize nighttime interruptions as able.     Mood  - at risk of adjustment disorder, depression, and anxiety due to functional decline     ID:  - at risk for Urinary tract infection     Skin/Wounds:  - Pressure relief q2h while in bed. Close monitoring for signs of breakdown     Pain:  - Neuroceptic - continue  tylenol, continue oxycodone  DVT prophylaxis:xontinue lovenox, switch to ASA on discharge     GI prophylaxis:  On Prilosec 20mg daily         -Follow-up Ortho    ____________________________________    Miguel Goins MD  Physical Medicine & Rehabilitation   Brain Injury Medicine   ____________________________________

## 2024-05-13 NOTE — CARE PLAN
The patient is Stable - Low risk of patient condition declining or worsening    Shift Goals  Clinical Goals: safety  Patient Goals: safety, sleep, pain ,management  Family Goals: rob    Progress made toward(s) clinical / shift goals:      Problem: Knowledge Deficit - Standard  Goal: Patient and family/care givers will demonstrate understanding of plan of care, disease process/condition, diagnostic tests and medications  Outcome: Progressing  Staples removed per order. Patient tolerated well. Pictures taken and uploaded.      Problem: Fall Risk - Rehab  Goal: Patient will remain free from falls  Outcome: Progressing       Patient is not progressing towards the following goals:

## 2024-05-13 NOTE — PROGRESS NOTES
NURSING DAILY NOTE    Name: Flavia Garrett   Date of Admission: 5/1/2024   Admitting Diagnosis: Closed comminuted supracondylar fracture of femur, left, initial encounter ([facility])  Attending Physician: Miguel Goins M.d.  Allergies: Atorvastatin, Hydrochlorothiazide, and Lisinopril    Safety  Patient Assist  Min assist  Patient Precautions  Fall Risk, Toe Touch Weight Bearing Left Lower Extremity  Precaution Comments  anxiety  Bed Transfer Status  Supervised  Toilet Transfer Status   Standby Assist (with FWW)  Assistive Devices  Rails, Wheelchair  Oxygen  None - Room Air  Diet/Therapeutic Dining  Current Diet Order   Procedures    Diet Order Diet: Regular     Pill Administration  whole  Agitated Behavioral Scale     ABS Level of Severity       Fall Risk  Has the patient had a fall this admission?   No  Kelli Min Fall Risk Scoring  14, MODERATE RISK  Fall Risk Safety Measures  bed alarm, chair alarm, and poor balance    Vitals  Temperature: 36.9 °C (98.4 °F)  Temp src: Oral  Pulse: 78  Respiration: 18  Blood Pressure : 136/62  Blood Pressure MAP (Calculated): 87 MM HG  BP Location: Left, Upper Arm  Patient BP Position: Supine     Oxygen  Pulse Oximetry: 95 %  O2 (LPM): 0  O2 Delivery Device: None - Room Air    Bowel and Bladder  Last Bowel Movement  05/11/24  Stool Type  Type 4: Like a sausage or snake, smooth and soft  Bowel Device  Bathroom  Continent  Bladder: Continent void   Bowel: Continent movement  Bladder Function  Urine Void (mL):  (large)  Number of Times Voided: 1  Urine Color: Unable To Evaluate  Genitourinary Assessment   Bladder Assessment (WDL):  WDL Except  Urine Color: Unable To Evaluate  Bladder Device: Bathroom  Time Void: Yes  Bladder Scan: Post Void  $ Bladder Scan Results (mL): 42    Skin  Milton Score   16  Sensory Interventions   Bed Types: Standard/Trauma Mattress  Skin Preventative Measures: Waffle Overlay  Moisture  Interventions  Moisturizers/Barriers: Barrier Wipes      Pain  Pain Rating Scale  7 - Focus of attention, prevents doing daily activities  Pain Location  Leg  Pain Location Orientation  Left  Pain Interventions   Medication (see MAR)    ADLs    Bathing   Shower, Staff (shower by ot)  Linen Change      Personal Hygiene  Perineal Care  Chlorhexidine Bath      Oral Care  Brushed Teeth  Teeth/Dentures     Shave     Nutrition Percentage Eaten  Dinner, Between 50-75% Consumed  Environmental Precautions  Treaded Slipper Socks on Patient, Personal Belongings, Wastebasket, Call Bell etc. in Easy Reach, Transferred to Stronger Side, Report Given to Other Health Care Providers Regarding Fall Risk, Bed in Low Position, Communication Sign for Patients & Families, Mobility Assessed & Appropriate Sign Placed  Patient Turns/Positioning  Patient Turns Self from Side to Side  Patient Turns Assistance/Tolerance     Bed Positions  Bed Controls On, Bed Locked  Head of Bed Elevated  Self regulated      Psychosocial/Neurologic Assessment  Psychosocial Assessment  Psychosocial (WDL):  Within Defined Limits  Neurologic Assessment  Neuro (WDL): Exceptions to WDL  Level of Consciousness: Alert  Orientation Level: Oriented X4  Cognition: Follows commands  Speech: Clear  Pupil Assesment: No  Muscle Strength Left Leg: Fair Strength against Gravity but No Resistance  EENT (WDL):  WDL Except    Cardio/Pulmonary Assessment  Edema   RLE Edema: 2+  LLE Edema: 2+  Respiratory Breath Sounds  RUL Breath Sounds: Clear  RML Breath Sounds: Clear  RLL Breath Sounds: Clear  NO Breath Sounds: Clear  LLL Breath Sounds: Clear  Cardiac Assessment   Cardiac (WDL):  WDL Except

## 2024-05-13 NOTE — PROGRESS NOTES
NURSING DAILY NOTE    Name: Flavia Garrett   Date of Admission: 5/1/2024   Admitting Diagnosis: Closed comminuted supracondylar fracture of femur, left, initial encounter ([facility])  Attending Physician: Miguel Goins M.d.  Allergies: Atorvastatin, Hydrochlorothiazide, and Lisinopril    Safety  Patient Assist  Min assist  Patient Precautions  Fall Risk, Toe Touch Weight Bearing Left Lower Extremity  Precaution Comments  anxiety  Bed Transfer Status  Modified Independent  Toilet Transfer Status   Supervised (With w/c, SBA with FWW)  Assistive Devices  Rails, Wheelchair  Oxygen  None - Room Air  Diet/Therapeutic Dining  Current Diet Order   Procedures    Diet Order Diet: Regular     Pill Administration  whole  Agitated Behavioral Scale     ABS Level of Severity       Fall Risk  Has the patient had a fall this admission?   No  Kelli Min Fall Risk Scoring  13, MODERATE RISK  Fall Risk Safety Measures  bed alarm and chair alarm    Vitals  Temperature: 36.3 °C (97.4 °F)  Temp src: Oral  Pulse: 89  Respiration: 18  Blood Pressure : (!) 140/75  Blood Pressure MAP (Calculated): 97 MM HG  BP Location: Left, Upper Arm  Patient BP Position: Sitting     Oxygen  Pulse Oximetry: 94 %  O2 (LPM): 0  O2 Delivery Device: None - Room Air    Bowel and Bladder  Last Bowel Movement  05/11/24  Stool Type  Type 4: Like a sausage or snake, smooth and soft  Bowel Device  Bathroom  Continent  Bladder: Continent void   Bowel: Continent movement  Bladder Function  Urine Void (mL):  (large)  Number of Times Voided: 1  Urine Color: Unable To Evaluate  Genitourinary Assessment   Bladder Assessment (WDL):  WDL Except  Urine Color: Unable To Evaluate  Bladder Device: Bathroom  Time Void: Yes  Bladder Scan: Post Void  $ Bladder Scan Results (mL): 42    Skin  Milton Score   16  Sensory Interventions   Bed Types: Standard/Trauma Mattress  Skin Preventative Measures: Waffle Overlay  Moisture  Interventions  Moisturizers/Barriers: Barrier Wipes      Pain  Pain Rating Scale  6 - Hard to ignore, avoid usual activities  Pain Location  Leg  Pain Location Orientation  Left  Pain Interventions   Medication (see MAR)    ADLs    Bathing   Staff  Linen Change      Personal Hygiene  Perineal Care  Chlorhexidine Bath      Oral Care  Brushed Teeth  Teeth/Dentures     Shave     Nutrition Percentage Eaten  Lunch, Between 50-75% Consumed  Environmental Precautions  Treaded Slipper Socks on Patient, Personal Belongings, Wastebasket, Call Bell etc. in Easy Reach, Transferred to Stronger Side, Report Given to Other Health Care Providers Regarding Fall Risk, Bed in Low Position, Communication Sign for Patients & Families, Mobility Assessed & Appropriate Sign Placed  Patient Turns/Positioning  Patient Turns Self from Side to Side  Patient Turns Assistance/Tolerance     Bed Positions  Bed Controls On, Bed Locked  Head of Bed Elevated  Self regulated      Psychosocial/Neurologic Assessment  Psychosocial Assessment  Psychosocial (WDL):  Within Defined Limits  Neurologic Assessment  Neuro (WDL): Exceptions to WDL  Level of Consciousness: Alert  Orientation Level: Oriented X4  Cognition: Follows commands  Speech: Clear  Pupil Assesment: No  Muscle Strength Left Leg: Fair Strength against Gravity but No Resistance  EENT (WDL):  WDL Except    Cardio/Pulmonary Assessment  Edema   RLE Edema: 2+  LLE Edema: 2+  Respiratory Breath Sounds  RUL Breath Sounds: Clear  RML Breath Sounds: Clear  RLL Breath Sounds: Clear  NO Breath Sounds: Clear  LLL Breath Sounds: Clear  Cardiac Assessment   Cardiac (WDL):  WDL Except

## 2024-05-14 ENCOUNTER — APPOINTMENT (OUTPATIENT)
Dept: MEDICAL GROUP | Facility: IMAGING CENTER | Age: 84
End: 2024-05-14
Payer: MEDICARE

## 2024-05-14 VITALS
SYSTOLIC BLOOD PRESSURE: 142 MMHG | RESPIRATION RATE: 18 BRPM | DIASTOLIC BLOOD PRESSURE: 62 MMHG | OXYGEN SATURATION: 95 % | HEIGHT: 59 IN | TEMPERATURE: 97.5 F | BODY MASS INDEX: 35.56 KG/M2 | WEIGHT: 176.37 LBS | HEART RATE: 90 BPM

## 2024-05-14 PROBLEM — N39.0 URINARY TRACT INFECTION: Status: ACTIVE | Noted: 2024-05-14

## 2024-05-14 PROBLEM — E03.9 HYPOTHYROID: Status: ACTIVE | Noted: 2024-05-14

## 2024-05-14 LAB
APPEARANCE UR: ABNORMAL
BACTERIA #/AREA URNS HPF: ABNORMAL /HPF
BILIRUB UR QL STRIP.AUTO: NEGATIVE
COLOR UR: YELLOW
EPI CELLS #/AREA URNS HPF: ABNORMAL /HPF
GLUCOSE UR STRIP.AUTO-MCNC: 500 MG/DL
HYALINE CASTS #/AREA URNS LPF: ABNORMAL /LPF
KETONES UR STRIP.AUTO-MCNC: NEGATIVE MG/DL
LEUKOCYTE ESTERASE UR QL STRIP.AUTO: ABNORMAL
MICRO URNS: ABNORMAL
NITRITE UR QL STRIP.AUTO: NEGATIVE
PH UR STRIP.AUTO: 5.5 [PH] (ref 5–8)
PROT UR QL STRIP: 30 MG/DL
RBC # URNS HPF: ABNORMAL /HPF
RBC UR QL AUTO: NEGATIVE
SP GR UR STRIP.AUTO: 1.02
UROBILINOGEN UR STRIP.AUTO-MCNC: 0.2 MG/DL
WBC #/AREA URNS HPF: ABNORMAL /HPF

## 2024-05-14 RX ORDER — CEFDINIR 300 MG/1
300 CAPSULE ORAL EVERY 12 HOURS
Status: DISCONTINUED | OUTPATIENT
Start: 2024-05-14 | End: 2024-05-14 | Stop reason: HOSPADM

## 2024-05-14 RX ORDER — LEVOTHYROXINE SODIUM 150 MCG
150 TABLET ORAL
Qty: 30 TABLET | Refills: 0 | Status: SHIPPED | OUTPATIENT
Start: 2024-05-14

## 2024-05-14 RX ORDER — CEFDINIR 300 MG/1
300 CAPSULE ORAL EVERY 12 HOURS
Qty: 10 CAPSULE | Refills: 0 | Status: ACTIVE | OUTPATIENT
Start: 2024-05-14 | End: 2024-05-23

## 2024-05-14 RX ORDER — LEVOTHYROXINE SODIUM 150 MCG
150 TABLET ORAL
Qty: 30 TABLET | Refills: 0 | Status: SHIPPED | OUTPATIENT
Start: 2024-05-14 | End: 2024-05-14

## 2024-05-14 RX ADMIN — OMEPRAZOLE 20 MG: 20 CAPSULE, DELAYED RELEASE ORAL at 07:37

## 2024-05-14 RX ADMIN — LEVOTHYROXINE SODIUM 150 MCG: 0.07 TABLET ORAL at 05:27

## 2024-05-14 RX ADMIN — OXYCODONE 2.5 MG: 5 TABLET ORAL at 00:16

## 2024-05-14 RX ADMIN — CEFDINIR 300 MG: 300 CAPSULE ORAL at 15:05

## 2024-05-14 RX ADMIN — Medication 1000 UNITS: at 07:37

## 2024-05-14 RX ADMIN — ACETAMINOPHEN 650 MG: 325 TABLET ORAL at 07:37

## 2024-05-14 ASSESSMENT — PATIENT HEALTH QUESTIONNAIRE - PHQ9
1. LITTLE INTEREST OR PLEASURE IN DOING THINGS: NOT AT ALL
2. FEELING DOWN, DEPRESSED, IRRITABLE, OR HOPELESS: NOT AT ALL
SUM OF ALL RESPONSES TO PHQ9 QUESTIONS 1 AND 2: 0

## 2024-05-14 NOTE — CARE PLAN
The patient is Stable - Low risk of patient condition declining or worsening    Shift Goals  Clinical Goals: safety  Patient Goals: safety, sleep  Family Goals: rob    Problem: Skin Integrity  Goal: Skin integrity is maintained or improved  Outcome: Progressing     Problem: Fall Risk - Rehab  Goal: Patient will remain free from falls  Outcome: Progressing     Problem: Pain - Standard  Goal: Alleviation of pain or a reduction in pain to the patient’s comfort goal  Outcome: Progressing     Problem: Mobility  Goal: Patient's capacity to carry out activities will improve  Outcome: Progressing

## 2024-05-14 NOTE — CARE PLAN
"The patient is Stable - Low risk of patient condition declining or worsening    Shift Goals  Clinical Goals: safety  Patient Goals: safety, sleep  Family Goals: rob    Problem: Fall Risk - Rehab  Goal: Patient will remain free from falls  5/14/2024 0252 by Carri De Guzman RJULIENNE  Outcome: Progressing  Note: Kelli Min Fall risk Assessment Score: 14    Moderate fall risk Interventions  - Bed and strip alarm   - Yellow sign by the door   - Yellow wrist band \"Fall risk\"  - Room near to the nurse station  - Do not leave patient unattended in the bathroom  - Fall risk education provided  Patient uses call light consistently and appropriately this shift.  Waits for assistance when needed and does not attempt self transfer.  Able to verbalize needs.  Episode of urgency and frequency but no dysuria complaint. continue to monitor.  5/13/2024 2156 by Carri De Guzman RJULIENNE  Outcome: Progressing     Problem: Skin Integrity  Goal: Skin integrity is maintained or improved  Outcome: Progressing     Problem: Pain - Standard  Goal: Alleviation of pain or a reduction in pain to the patient’s comfort goal  Outcome: Progressing     "

## 2024-05-14 NOTE — PROGRESS NOTES
Patient discharged to home per order.  Discharge instructions reviewed with patient; she verbalize understanding and signed copies placed in chart.  Patient has all belongings; signed copy of form in chart.  Patient left facility at 1529 via wheelchair accompanied by rehab staff and relative.

## 2024-05-14 NOTE — DISCHARGE INSTRUCTIONS
[facility] NURSING DISCHARGE INSTRUCTIONS    Blood Pressure : (!) 144/75  Weight: 80 kg (176 lb 5.9 oz)  Nursing recommendations for Flavia Garrett at time of discharge are as follows:  Client and Family Member verbalized understanding of all discharge instructions and prescriptions.     Review all your home medications and newly ordered medications with your doctor and/or pharmacist. Follow medication instructions as directed by your doctor and/or pharmacist.    Pain Management:   Discharge Pain Medication Instructions:  Comfort Goal: Comfort with Movement, Sleep Comfortably, 0  Intervention: Medication (see MAR), Cold Pack, Warm Pack  Notify your primary care provider if pain is unrelieved with these measures, if the pain is new, or increased in intensity.    Discharge Skin Characteristics: Warm, Dry  Discharge Skin Exam: Other (Comments) (surgical incison to [facility] with steri strip in place.)  Wound 04/29/24 Incision Leg;Knee Left (Active)   Wound Image   05/07/24 1700   Site Assessment Clean;Dry;Intact 05/13/24 2018   Periwound Assessment Clean;Dry 05/13/24 2018   Margins Attached edges 05/13/24 2018   Closure Closure strips 05/13/24 2018   Drainage Amount None 05/13/24 2018   Treatments Cleansed;Site care 05/13/24 1626   Wound Cleansing Approved Wound Cleanser 05/12/24 0400   Periwound Protectant Not Applicable 05/05/24 0500   Dressing Status Open to Air 05/13/24 2018   Dressing Changed Observed 05/13/24 2018   Dressing Options Open to Air 05/13/24 2018   Dressing Change/Treatment Frequency Daily, and As Needed 05/10/24 0715   NEXT Weekly Photo (Inpatient Only) 05/12/24 05/05/24 0500     Skin / Wound Care Instructions: Please contact your primary care physician for any change in skin integrity. Swelling, redness, drainage, pain  and fever.     If You Have Surgical Incisions / Wounds:  Monitor surgical site(s) for signs of increased swelling, redness or symptoms of drainage from the site or  fever as this could indicate signs and symptoms of infection. If these symptoms are noted, notifiy your primary care provider.      Discharge Safety Instructions:       Discharge Safety Concerns: Balance Problems (Dizziness, Light Headedness), History Of Falls, Unsteady Gait, Weakness  The interdisciplinary team has made recommendation that you should not be left alone  in the house due to balance problem, history of falls, weakness, and unsteady gait  Anti-embolic stockings are not required to increase circulation to the lower extremities.    Discharge Diet: Regular     Discharge Liquids: thin  Discharge Bowel Function: Continent  Please contact your primary care physician for any changes in bowel habits.  Discharge Bowel Program:    Discharge Bladder Function: Continent  Discharge Urinary Devices:        Nursing Discharge Plan:        Case Management Discharge Instructions:   Discharge Location: [place] with Home Health  Agency Name/Address/Phone: [facility]  Home Health: Registered Nurse, Occupational Therapist, Physical Therapist  Outpatient Services:    DME Provider/Phone: [facility]  Medical Equipment Ordered: Other   Prescription Faxed to:        Discharge Medication Instructions:  Below are the medications your physician expects you to take upon discharge:      Femoral Shaft Fracture Treated with ORIF    Open reduction with internal fixation (ORIF) of the femoral shaft is a surgery to repair a break (fracture) in the thigh bone. The thigh bone, also called the femur, is the longest and strongest bone in the body. The straight part of the femur that runs from below the hip to just above the knee is called the femoral shaft.  In this procedure, a surgeon makes an incision over the fracture and moves the bones to align them properly (open reduction). The surgeon may use plates and screws to hold the bones in place (internal fixation).  You may be able to use a walker or crutches to walk soon after  surgery. Complete healing may take 3 to 6 months. Physical therapy is an important part of recovery.  Tell your health care provider about:  Any allergies you have.  All medicines you are taking, including vitamins, herbs, eye drops, creams, and over-the-counter medicines.  Any problems you or family members have had with anesthetic medicines.  Any bleeding problems you have.  Any surgeries you have had.  Any medical conditions you have or have had.  Whether you are pregnant or may be pregnant.  What are the risks?  Your health care provider will talk with you about risks. These may include:  Infection.  Bleeding.  Allergic reactions to medicines or dyes.  Damage to other structures. These include nerves and blood vessels.  Poor healing position of the fracture (malalignment).  Failure of the bone to heal completely (nonunion).  A blood clot in the leg (deep vein thrombosis, or DVT).  A blood clot from inside the bone that breaks loose and travels to the lungs (pulmonary embolism).  A piece of fat that breaks loose from inside the bone and travels to the lungs (fat embolism).  What happens before the procedure?  When to stop eating and drinking  Follow instructions from your health care provider about what you may eat and drink. These may include:  8 hours before your procedure  Stop eating most foods. Do not eat meat, fried foods, or fatty foods.  Eat only light foods, such as toast or crackers.  All liquids are okay except energy drinks and alcohol.  6 hours before your procedure  Stop eating.  Drink only clear liquids, such as water, clear fruit juice, black coffee, plain tea, and sports drinks.  Do not drink energy drinks or alcohol.  2 hours before your procedure  Stop drinking all liquids.  You may be allowed to take medicines with small sips of water.  If you do not follow your health care provider's instructions, your procedure may be delayed or canceled.  Medicines  Ask your health care provider  about:  Changing or stopping your regular medicines. These include any diabetes medicines or blood thinners you take.  Taking medicines such as aspirin and ibuprofen. These medicines can thin your blood. Do not take them unless your health care provider tells you to.  Taking over-the-counter medicines, vitamins, herbs, and supplements.  Tests  You may have imaging studies. These may include:  X-rays.  CT scans.  Surgery safety  Ask your health care provider:  How your surgery site will be marked.  What steps will be taken to help prevent infection. These steps may include:  Removing hair at the surgery site.  Washing skin with a soap that kills germs.  Receiving antibiotics.  General instructions  Do not use any products that contain nicotine or tobacco for at least 4 weeks before the procedure. These products include cigarettes, chewing tobacco, and vaping devices, such as e-cigarettes. If you need help quitting, ask your health care provider.  Plan to have a responsible adult:  Take you home from the hospital. You will not be allowed to drive.  Care for you for the time you are told.  What happens during the procedure?    An IV will be inserted into one of your veins.  You may be given:  A sedative. This helps you relax.  Anesthesia. This will:  Numb certain areas of your body.  Make you fall asleep for surgery.  Your surgeon will make an incision to reach the fracture.  Your bones will be set in the correct position.  Plates and screws may be used to fix your fracture.  You may also have a bone graft to strengthen the fracture site.  The incision will be closed with staples or stitches (sutures).  The incision will be covered with a bandage (dressing).  The procedure may vary among health care providers and hospitals.  What happens after the procedure?  You will stay in the hospital for observation.  Your blood pressure, heart rate, breathing rate, and blood oxygen level will be monitored until you leave the  hospital.  You may be given medicines to:  Treat pain.  Thin blood to prevent blood clots.  Prevent infection.  You will meet with a physical therapist to talk about when to start walking with crutches or a walker. You will be taught exercises to keep your joints moving and your muscles strong.  Summary  Open reduction with internal fixation (ORIF) of the femoral shaft is a surgery to repair a fracture in the thigh bone.  Your surgeon may use plates and screws to hold your bones in place (internal fixation).  Follow instructions from your health care provider about eating and drinking before the procedure.  Complete healing may take 3 to 6 months. Physical therapy is an important part of recovery.  This information is not intended to replace advice given to you by your health care provider. Make sure you discuss any questions you have with your health care provider.  Document Revised: 04/10/2023 Document Reviewed: 04/10/2023  [employer] Patient Education © 2023 [employer]      Femoral Shaft Fracture Treated with ORIF, Care After  The following information offers guidance on how to care for yourself after your procedure. Your health care provider may also give you more specific instructions. If you have problems or questions, contact your health care provider.  What can I expect after the procedure?  After the procedure, it is common to have:  Pain.  Swelling.  Some redness or bruising around the incision.  A small amount of fluid or blood coming from your incision.  Stiffness in your leg.  Follow these instructions at home:  Medicines  Take over-the-counter and prescription medicines only as told by your health care provider. This includes medicines to prevent blood clots from forming (anticoagulants).  Ask your health care provider if the medicine prescribed to you:  Requires you to avoid driving or using machinery.  Can cause constipation. You may need to take these actions to prevent or treat  constipation:  Drink enough fluid to keep your urine pale yellow.  Take over-the-counter or prescription medicines.  Eat foods that are high in fiber, such as beans, whole grains, and fresh fruits and vegetables.  Limit foods that are high in fat and processed sugars, such as fried or sweet foods.  Bleeding precautions  If you are taking blood thinners:  Talk with your health care provider before you take any medicines that contain aspirin or NSAIDs, such as ibuprofen. These medicines increase your risk for dangerous bleeding.  Take your medicine exactly as told, at the same time every day.  Avoid activities that could cause injury or bruising, and follow instructions about how to prevent falls.  Wear a medical alert bracelet or carry a card that lists what medicines you take.  Bathing  Do not take baths, swim, or use a hot tub until your health care provider approves. Ask your health care provider if you may take showers. You may only be allowed to take sponge baths.  Keep your bandage (dressing) dry. Cover it with a watertight covering if you take a sponge bath or shower.  Incision care    Follow instructions from your health care provider about how to take care of your incision. Make sure you:  Wash your hands with soap and water for at least 20 seconds before and after you change your dressing. If soap and water are not available, use hand .  Change your dressing as told by your health care provider.  Leave stitches (sutures), staples, skin glue, or adhesive strips in place. These skin closures may need to stay in place for 2 weeks or longer. If adhesive strip edges start to loosen and curl up, you may trim the loose edges. Do not remove adhesive strips completely unless your health care provider tells you to do that.  Check your incision area every day for signs of infection. Check for:  More redness, swelling, or pain.  More fluid or blood.  Warmth.  Pus or a bad smell.  Managing pain, stiffness, and  swelling    If directed, put ice on your leg. To do this:  Put ice in a plastic bag.  Place a towel between your skin or dressing and the bag.  Leave the ice on for 20 minutes, 2-3 times a day.  If your skin turns bright red, remove the ice right away to prevent skin damage. The risk of skin damage is higher if you cannot feel pain, heat, or cold.  Move your toes often to reduce stiffness and swelling.  Raise (elevate) your leg above the level of your heart while you are sitting or lying down.  Activity  Do not use the injured limb to support your body weight until your health care provider says that you can. Use crutches or a walker as told by your health care provider.  Do exercises as told by your health care provider.  Ask your health care provider when it is safe to drive.  Return to your normal activities as told by your health care provider. Ask your health care provider what activities are safe for you.  General instructions  Do not use any products that contain nicotine or tobacco. These products include cigarettes, chewing tobacco, and vaping devices, such as e-cigarettes. These can delay bone healing. They can also increase your risk of an infection. If you need help quitting, ask your health care provider.  Keep all follow-up visits. This includes visits for physical therapy. Physical therapy is an important part of recovery. Complete healing may take 3 to 6 months.  Contact a health care provider if:  You have any of these signs of infection:  More redness, swelling, or pain around your incision.  More fluid or blood coming from your incision.  Warmth coming from your incision.  Pus or a bad smell coming from your incision.  A fever.  You have more bruising around your incision.  Get help right away if:  You develop warmth, redness, tenderness, and swelling in your calf or thigh.  You have chest pain or trouble breathing.  Your incision opens.  You have severe pain that does not get better with  medicine.  These symptoms may be an emergency. Get help right away. Call 911.  Do not wait to see if the symptoms will go away.  Do not drive yourself to the hospital.  Summary  After this procedure, it is common to have pain, swelling, and stiffness.  Take over-the-counter and prescription medicines only as told by your health care provider. This includes medicines to prevent blood clots from forming.  Follow instructions from your health care provider about how to take care of your incision. Check your incision area every day for signs of infection.  Keep all follow-up visits. This includes visits for physical therapy.  This information is not intended to replace advice given to you by your health care provider. Make sure you discuss any questions you have with your health care provider.  Document Revised: 04/04/2023 Document Reviewed: 04/04/2023  [employer] Patient Education © 2023 [employer]    Pain Management after Surgery, Injury or Illness    What should I expect if I have pain?  Some pain may be normal.  It is important to know that it may not be possible to completely eliminate your pain.  Our goal is to help you to manage your pain so that you can get back to your normal routine as soon as possible.  We will work together to create a plan to manage your pain and track the progress of the plan.  If you have questions about your care, please tell your nurse or provider.  How is my pain measured?  Your pain will be measured on a scale of 0 to 10.  The pain rating scale will help you to score your pain based on your ability to function with that pain.  For example, a score of:  0 = No pain  5 = Pain interrupts some activities  10 = Pain is as bad as it can be, nothing else matters  Remember, some pain is expected following illness, injury or surgery.  How will my pain be treated?  You may be offered medication to treat your pain.  You can also use non-medicine treatments to help manage your pain.  If you have  severe, uncontrolled pain, you may be prescribed narcotics, also known as opioids.  You have the right to learn about your options and work with your care team to find the best treatment to manage your pain.  Non-Opioid Medicines  Many effective medicines do not need a prescription. Examples include:  Acetaminophen, which you may know as Tylenol®  Ibuprofen, which you may know as Advil® or Motrin®  Aspirin  Other low risk medicines require a prescription and are helpful for treating pain. Examples include:  o Celecoxib, which you may know as Celebrex®  Alternative Therapies  Alternative therapies can be very helpful in managing pain. You can try:  Ice or heat packs as recommended by your care team.  Massage, relaxation techniques or meditation.  Changing positions in bed.  Watching TV or listening to music.  Opioids, also known as Narcotics  Opioids should only be used to treat severe pain that cannot be controlled by other methods.  Opioids have been shown to increase your risk of complications.  Opioids are highly addictive.  Opioids should only be used in the lowest effective dose, for a limited amount of time.  Opioids require a prescription. Some examples include:  Tramadol, known as Ultram®.  Hydrocodone with acetaminophen, known as Lortab®, Vicodin®, Norco®.  Oxycodone with acetaminophen, known as Percocet®, or Roxicet®.  Oxycodone, known as OxyContin®.  Morphine, known as MS Contin®.  What will happen after I go home from the hospital?  Your care team will discuss your ongoing care plan with you before you leave.  You will be given detailed instructions for any medicine and follow up care.  You may be given a prescription for medicines to take when you go home.  Be sure to tell your care team if you have concerns about caring for yourself at home. Common concerns may include stairs, or living alone.  How should I manage pain when I go home?  Always use non-opioid and non-medicine options first.  Take  non-opioid medicine regularly, as instructed by your care team.  Avoid doing things that make your pain worse like heavy lifting or straining.  Use opioids only to treat severe pain that is not controlled by other methods.  Always follow the instructions of your care team.  Always take the lowest effective dose.  Do not take more than prescribed.  Do not mix with sleeping pills or alcohol.  Stop taking opioids when the pain can be managed by other methods listed above.  You may also be referred to a pain specialist when needed.    IMPORTANT INFORMATION:  Side effects of Opiates may include:  Sleepiness  Dizziness  Nausea and/or vomiting  Constipation  Decreased breathing  Addiction  Overdose  Death    Opiate dependency and addiction risk:  The risk of dependency increases after 3 days of continuous use.  There are many local resources to help with dependency issues.  Opiate dependency can develop easily. Don’t feel ashamed.  Speak to your care team if you have concerns.  General medicine safety:  Keep all medicines in a safe place, out of sight so they cannot be taken by someone else.  Keep out of reach of children.  Never mix opioids with sleeping pills, over the counter sleep aids or alcohol.  Do not drive while taking opioids.  Be careful at home when cooking, bathing, showering or using stairs.  You may be more likely to hurt yourself or fall.  For anyone taking opioids, watch for excessive sleepiness or decreased breathing as a sign of overdose. Try to arouse the person if you are concerned. Call 911 if you are unable to awaken them OR if their breathing is shallow, slow, or irregular.  Proper medicine disposal:  Empty liquid medicine from containers, open capsules or crush tablets and mix with sylvia litter, dirt or old coffee grounds. Place the mixture into a sealed bag or container and throw it in the trash.  Take medicines to a local drug takeback center, for a list visit:  "https://apps.deadiversion.[employer].gov/pubdispsearch/spring/main?execution=e1s2  Use a home drug disposal pouch.  Contact your local pharmacy for help.    Prevent Falls in Your Home    \"Falling once doubles your chance of falling again\"        -Center for Disease Control and Prevention    Falls in the home can lead to serious injury (fractures, brain injuries), hospitalizations, increased medical costs, and could even be fatal.  The good news is, there are many precautions you can take to avoid falls in your home and help keep you safe:     If prescribed an assistive device (walker, crutches), use as instructed by the healthcare provider\"   Remove any tripping hazards from your home, including loose cords, throw rugs and clutter  Keep a nightlight on in dark (hallways, bathrooms, etc)   Get up slowly, to make sure you feel okay before getting up  Be aware of any side effects of your medications: some medications may make you dizzy  Place a non-skid rubber mat in your shower or tub-consider a shower bench or chair if unsteady on your feet  Wear supportive shoes or non-skid socks when moving around  Start an exercise program once approved by your provider.  If you are feeling weak following a hospital stay, talk to your doctor about home health or outpatient therapy programs designed to help rebuild your strength and endurance    Depression / Suicide Risk    As you are discharged from this [facility] facility, it is important to learn how to keep safe from harming yourself.    Recognize the warning signs:  Abrupt changes in personality, positive or negative- including increase in energy   Giving away possessions  Change in eating patterns- significant weight changes-  positive or negative  Change in sleeping patterns- unable to sleep or sleeping all the time   Unwillingness or inability to communicate  Depression  Unusual sadness, discouragement and loneliness  Talk of wanting to die  Neglect of personal " appearance   Rebelliousness- reckless behavior  Withdrawal from people/activities they love  Confusion- inability to concentrate     If you or a loved one observes any of these behaviors or has concerns about self-harm, here's what you can do:  Talk about it- your feelings and reasons for harming yourself  Remove any means that you might use to hurt yourself (examples: pills, rope, extension cords, firearm)  Get professional help from the community (Mental Health, Substance Abuse, psychological counseling)  Do not be alone:Call your Safe Contact- someone whom you trust who will be there for you.  Call your local [place] Crisis Hotline 430-2106 or 226-536-1008  Call your local Children's Mobile Crisis Response Team [place] (718) 160-5862 or www.[employer]  Call or text 031 to reach the National Suicide and Crisis Prevention Lifeline

## 2024-05-14 NOTE — DISCHARGE SUMMARY
Physical Medicine & Rehabilitation Discharge Summary    Admission Date: 5/1/2024    Discharge Date: 5/14/2024    Attending Provider: Miguel Goins MD    Admission Diagnosis:   Active Hospital Problems    Diagnosis     *Closed comminuted supracondylar fracture of femur, left, initial encounter ([facility])     Urinary tract infection     Hypothyroid     Primary hypertension        Discharge Diagnosis:  Active Hospital Problems    Diagnosis     *Closed comminuted supracondylar fracture of femur, left, initial encounter ([facility])     Urinary tract infection     Hypothyroid     Primary hypertension        HPI per Admission History & Physical:  84 female with past medical history of HFrEF aortic valve sclerosis, microvascular angina, inappropriate sinus tachycardia, right bundle branch block hypertension, osteoporosis, breast cancer status post lumpectomy and radiation therapy presented after ground-level fall.  Imaging noted with distal femur fracture.s/p Open reduction internal fixation left intra-articular distal femur fracture on 4/29.     s/p Open reduction internal fixation left intra-articular distal femur fracture on 4/29 by Dr Tolbert       Patient was admitted to [facility] on 5/1/2024.     Hospital Course by Problem List:  Closed comminuted supracondylar fracture of femur, left, initial encounter  s/p Open reduction internal fixation left intra-articular distal femur fracture on 4/29 by Dr Tolbert  Weightbearing status: Touchdown weightbearing left lower extremity for 6 weeks then transition to weightbearing as tolerated, range of motion as tolerated left knee  DVT prophylaxis: SCDs and lovenox until mobilizing well, then aspirin 81mg BID for 4 weeks  Outpatient plan: The patient will follow up in clinic in 2 weeks for wound check, suture/staple removal (if applicable), and xrays.  If the patient is at a facility at that time, wound check and suture/staple removal may be performed by nursing care  and the patient should instead follow up at the 6 week post operative darline for repeat clinical check and xrays.  -staples out today  Continue pt ot in home     Orthostatic Hypotension  Primary hypertension  As per history.  Blood pressure goal less than 130/80.  DC losartan due to hypotension on 5/6  -continue off losartan    UTI  Dysuria 5/15  UA postive, sent to culture  -omnicef 300mg BID x5 days     Acute blood loss anemia  fatigue  5/8- Hb 7.8,   5/10- Hb 7.9    Hypothyroidism  -continue home synthroid     Chronic heart failure with preserved ejection fraction   As per history with last echocardiogram in September 2023 EF of 60%.  Appears to be euvolemic.  Continue home Inpefa and Corlanor      Pain:  - Neuroceptic - continue tylenol, continue oxycodone    I discussed the risks and benefits of using opiate medications for pain control.  I discussed the risk of addiction, potential for overdose, and respiratory depression (and the potential need for opiate antagonist therapy if this occurs).  I encouraged the patient to take this medication sparingly with the expressed goal of weaning off the medication as soon as is clinically appropriate.  I informed the patient that we are only able to provide up to a 7 day supply of these medications at discharge and that they will be responsible for requesting any refills needed from their primary care provider or their surgeon.  We discussed the need to safely secure these medications to prevent theft, inadvertent ingestion, or misuse.  Any unused medication should be immediately disposed of through a sanctioned medication disposal program.  We discussed adjunctive pain medications and conservative therapies at length.I answered the patient's questions regarding this treatment, and the patient indicated understanding and willingness to proceed.     DVT prophylaxis:xontinue lovenox, switch to ASA on discharge for a total of 4 weeks from surgery           -Follow-up  Ortho    Functional Status at Discharge  Eating:  Independent  Eating Description:  Increased time, Set-up of equipment or meal/tube feeding, Supervision for safety  Grooming:  Independent, Seated  Grooming Description:  Increased time, Seated in wheelchair at sink  Bathing:  Modified Independent  Bathing Description:  Grab bar, Adaptive equipment, Hand held shower, Long handled bath tool, Tub bench, Initial preparation for task, Set-up of equipment, Set up for wound protection, Supervision for safety  Upper Body Dressing:  Independent  Upper Body Dressing Description:  Increased time, Set-up of equipment, Supervision for safety  Lower Body Dressing:  Standby Assist  Lower Body Dressing Description:  Reacher  Discharge Location : Home  Patient Discharging with Assist of: Family   Level of Supervision Required: Intermittent Supervision  Recommended Equipment for Discharge: Manual Wheelchair;Shower Chair;Grab Bars by Toilet;Grab Bars in Tub / Shower;Hand Held Shower Head;Reacher  Recommended Services Upon Discharge: Home Health Occupational Therapy  Long Term Goals Met: 0  Long Term Goals Not Met: 2  Reason(s) for Goals Not Met: pt requires varying assist for ADL's and basic mgmt tasks d/t impaired standing balance, anxiety  Criteria for Termination of Services: Maximum Function Achieved for Inpatient Rehabilitation  Walk:  Standby Assist  Distance Walked:  25  Number of Times Distance Was Traveled:  1  Assistive Device:  Front Wheel Walker  Gait Deviation:  Antalgic, Step To, Bradykinetic (TTWB L)  Wheelchair:  Modified Independent  Distance Propelled:  200   Wheelchair Description:  Extra time  Stairs Minimal Assist  Stairs Description Extra time, Limited by fatigue, Safety concerns, Supervision for safety, Verbal cueing  Discharge Location: Home  Patient Discharging with Assist of: Family  Level of Supervision Required Upon Discharge: Intermittent Supervision  Recommended Equipment for Discharge: Front-Wheeled  Walker;Manual Wheelchair  Recommeded Services Upon Discharge: Home Health Physical Therapy  Long Term Goals Met: 2  Long Term Goals Not Met: 2  Reason(s) for Goals Not Met: decrease activity tolerance and anxiety  Criteria for Termination of Services: Maximum Function Achieved for Inpatient Rehabilitation  Comprehension:  Independent  Comprehension Description:     Expression:  Independent  Expression Description:     Social Interaction:  Independent  Social Interaction Description:     Problem Solving:  Modified Independent  Problem Solving Description:  Increased time  Memory:  Moderate Assist (min-mod)  Memory Description:  Verbal cueing, Increased time, Bed/chair alarm       Miguel CA M.D., personally performed a complete drug regimen review and no potential clinically significant medication issues were identified.   Discharge Medication:     Medication List        START taking these medications        Instructions   aspirin 81 MG EC tablet  Next Dose Due: For 13 days only   Take 1 Tablet by mouth 2 times a day for 13 days.  Dose: 81 mg     cefdinir 300 MG Caps  Commonly known as: Omnicef   Take 1 Capsule by mouth every 12 hours.  Dose: 300 mg     oxyCODONE immediate-release 5 MG Tabs  Commonly known as: Roxicodone  Next Dose Due: Up to 7 days   Take 0.5 Tablets by mouth every 6 hours as needed for Severe Pain for up to 7 days.  Dose: 2.5 mg     vitamin D 1000 UNIT Tabs  Commonly known as: Cholecalciferol   Take 1 Tablet by mouth every day.  Dose: 1,000 Units            CHANGE how you take these medications        Instructions   * levothyroxine 175 MCG Tabs  What changed: Another medication with the same name was added. Make sure you understand how and when to take each.  Commonly known as: Synthroid   Take 175 mcg by mouth every Monday, Wednesday, and Friday. Brand name only  Dose: 175 mcg     * levothyroxine 150 MCG Tabs  What changed: You were already taking a medication with the same name, and this  prescription was added. Make sure you understand how and when to take each.  Commonly known as: Synthroid   Take 1 Tablet by mouth in the morning every Tue, Thurs, Sat, and Sun. Brand Name Only  Dose: 150 mcg           * This list has 2 medication(s) that are the same as other medications prescribed for you. Read the directions carefully, and ask your doctor or other care provider to review them with you.                CONTINUE taking these medications        Instructions   acetaminophen 500 MG Tabs  Commonly known as: Tylenol   Take 500 mg by mouth at bedtime as needed for Mild Pain.  Dose: 500 mg     Corlanor 5 MG Tabs tablet  Generic drug: ivabradine   Take 5 mg by mouth 2 times a day with meals.  Dose: 5 mg     loratadine 10 MG Tabs  Commonly known as: Claritin   Take 10 mg by mouth 1 time a day as needed.  Dose: 10 mg     Melatonin 10 MG Tabs  Next Dose Due: May repeat 1 time if ineffective.    Take 10 mg by mouth at bedtime as needed.  May repeat 1 time..  Dose: 10 mg     OMEGA-3 COMPLEX PO   Take 1 Dose by mouth every day. liquid  Dose: 1 Dose     Sotagliflozin 200 MG Tabs   Take 200 mg by mouth every day.  Dose: 200 mg     Xopenex HFA 45 MCG/ACT inhaler  Generic drug: levalbuterol   Inhale 2 Puffs every four hours as needed for Shortness of Breath.  Dose: 2 Puff            STOP taking these medications      enoxaparin 40 MG/0.4ML Sosy inj  Commonly known as: Lovenox     ESTRACE VAGINAL 0.1 MG/GM vaginal cream  Generic drug: estradiol     losartan 25 MG Tabs  Commonly known as: Cozaar              Discharge Diet:  Current Diet Order   Procedures    Diet Order Diet: Regular       Discharge Activity:  As tolerated     Disposition:  Patient to discharge home with family support and community resources.    Equipment:  Front wheeled walker    Follow-up & Discharge Instructions:  Follow up with your primary care provider (PCP) within 7-10 days of discharge to review your medications and take over your care.     If  you develop chest pain, fever, chills, change in neurologic function (weakness, sensation changes, vision changes), or other concerning sxs, seek immediate medical attention or call 911.      No future appointments.    Condition on Discharge:  Good    More than 35 minutes was spent on discharging this patient, including face-to-face time, prescription management, and the dictation of this note.    ____________________________________     Miguel Goins MD  Physical Medicine & Rehabilitation   Brain Injury Medicine   ____________________________________    Date of Service: 5/14/2024

## 2024-05-14 NOTE — DISCHARGE PLANNING
Case management   Pt to dc home today; dtr in law will be staying w/her. I have discussed below w/ pt and she is in agreement.     TAYO TinocoC.  [address] [place] [id number] [phone]  Please call to schedule follow up appt with your PCP     Jasiel Tolbert M.D.  [address] [place] [id number] [phone]  Please call to astonle follow up visit with ORTHO     [facility]  [address]  [facility] [address]  [phone]  A referral has been made for home health; they will call you to schedule follow up home visits with a RN/therapy.     [facility]  [address] [facility] [address]  [phone]  A wheelchair and cushion have been ordered.      Reviewed signed copy of IMM and answered all questions.    Dc date /disposition:5/14

## 2024-05-15 ENCOUNTER — PATIENT OUTREACH (OUTPATIENT)
Dept: MEDICAL GROUP | Facility: IMAGING CENTER | Age: 84
End: 2024-05-15
Payer: MEDICARE

## 2024-05-15 NOTE — PROGRESS NOTES
Transitional Care Management  TCM Outreach Date and Time: Filed (5/15/2024  9:20 AM)    Discharge Questions  Actual Discharge Date: 05/14/24  Now that you are home, how are you feeling?: Good  Did you receive any new prescriptions?: Yes  Were you able to get them filled?: Yes  Meds to Bed or Pharmacy filled?: Pharmacy ([facility])  Do you have any questions about your current medications or new medications (Review Med Rec)?: No  Did you have any durable medical equipment ordered?: No  Do you have a follow up appointment scheduled with your PCP?: No  Was an appointment scheduled for the patient?: No  Any issues or paperwork you wish to discuss with your PCP?: No  Are you (patient) able to get to the appointment?: Yes (pt songalo will bring to appt, they will call back to schedule)  Reason not scheduled?: Patient to Schedule  If Home Health was ordered, have they contacted you (Patient): Not Applicable  Did you have enough support after your last discharge?: Yes  Does this patient qualify for the CCM program?: No    Transitional Care  Number of attempts made to contact patient: 1  Current or previous attempts completed within two business days of discharge? : Yes  Provided education regarding treatment plan, medications, self-management, ADLs?: Yes  Has patient completed an Advanced Directive?: No  Has the Care Manager's phone number provided?: No  Is there anything else I can help you with?: No    Discharge Summary  Chief Complaint: GLF - Patient BIB [facility] ground unit #10  ([facility] also on scene) from home. 84F involved in mGLF. Pt reportedly lost balance and fell forward, - head strike, - LOC, - thinners. EMS reports possible open fx L distal femur; laceration noted to L lateral thigh with obvious deformity to femur. Patient arrives w/ *no spinal immobilizations* in place. Chief complaint of L lower extremity (thigh) discomfort. Medications administered en route: 100 mcg Fentanyl via 20g R hand established  PTA.  Admitting Diagnosis: trauma yellow  Discharge Diagnosis: Closed comminuted supracondylar fracture of femur

## 2024-05-23 ENCOUNTER — TELEPHONE (OUTPATIENT)
Dept: CARDIOLOGY | Facility: MEDICAL CENTER | Age: 84
End: 2024-05-23

## 2024-05-23 ENCOUNTER — OFFICE VISIT (OUTPATIENT)
Dept: CARDIOLOGY | Facility: MEDICAL CENTER | Age: 84
End: 2024-05-23
Attending: INTERNAL MEDICINE
Payer: MEDICARE

## 2024-05-23 VITALS
BODY MASS INDEX: 35.88 KG/M2 | OXYGEN SATURATION: 96 % | WEIGHT: 178 LBS | SYSTOLIC BLOOD PRESSURE: 110 MMHG | HEIGHT: 59 IN | HEART RATE: 96 BPM | DIASTOLIC BLOOD PRESSURE: 52 MMHG

## 2024-05-23 DIAGNOSIS — E78.2 MIXED HYPERLIPIDEMIA: ICD-10-CM

## 2024-05-23 DIAGNOSIS — I20.89 STABLE ANGINA (HCC): ICD-10-CM

## 2024-05-23 DIAGNOSIS — I47.11 INAPPROPRIATE SINUS TACHYCARDIA (HCC): ICD-10-CM

## 2024-05-23 DIAGNOSIS — T46.6X5A MYALGIA DUE TO STATIN: ICD-10-CM

## 2024-05-23 DIAGNOSIS — I45.10 RBBB: ICD-10-CM

## 2024-05-23 DIAGNOSIS — I35.8 AORTIC VALVE SCLEROSIS: ICD-10-CM

## 2024-05-23 DIAGNOSIS — E66.9 CLASS 2 OBESITY: ICD-10-CM

## 2024-05-23 DIAGNOSIS — I20.89 MICROVASCULAR ANGINA (HCC): ICD-10-CM

## 2024-05-23 DIAGNOSIS — D62 ACUTE BLOOD LOSS ANEMIA: ICD-10-CM

## 2024-05-23 DIAGNOSIS — I10 ESSENTIAL HYPERTENSION: ICD-10-CM

## 2024-05-23 DIAGNOSIS — I50.32 CHRONIC HEART FAILURE WITH PRESERVED EJECTION FRACTION (HFPEF) (HCC): Primary | ICD-10-CM

## 2024-05-23 DIAGNOSIS — M79.10 MYALGIA DUE TO STATIN: ICD-10-CM

## 2024-05-23 PROCEDURE — G2211 COMPLEX E/M VISIT ADD ON: HCPCS | Performed by: INTERNAL MEDICINE

## 2024-05-23 PROCEDURE — 3074F SYST BP LT 130 MM HG: CPT | Performed by: INTERNAL MEDICINE

## 2024-05-23 PROCEDURE — 3078F DIAST BP <80 MM HG: CPT | Performed by: INTERNAL MEDICINE

## 2024-05-23 PROCEDURE — 99214 OFFICE O/P EST MOD 30 MIN: CPT | Performed by: INTERNAL MEDICINE

## 2024-05-23 RX ORDER — LOSARTAN POTASSIUM 25 MG/1
25 TABLET ORAL 2 TIMES DAILY
Qty: 180 TABLET | Refills: 3 | Status: SHIPPED | OUTPATIENT
Start: 2024-05-23

## 2024-05-23 ASSESSMENT — FIBROSIS 4 INDEX: FIB4 SCORE: 1.19

## 2024-05-23 NOTE — PATIENT INSTRUCTIONS
Referral to outpatient infusion pharmacy team for iron  Stop the Inpefa and Ivabradine for one month  We will resume cardiac rehab once we get the clearance form your ortho team  Take losartan 25mg AM and PM

## 2024-05-23 NOTE — TELEPHONE ENCOUNTER
S/w Dr. Campoverde requesting instruction to order for iron infusion in epic.  Printed iron infusion order and called outpatient infusion clinic, [id number] to verify instructions.  S/w Paolo, pharmacist, advising to complete order and once received they will input order in epic.      Order completed and signed by Dr. Campoverde and faxed to [phone] completed status received.  Fax sent to scanning via [employer] for reference, completed status.

## 2024-05-23 NOTE — PROGRESS NOTES
CARDIOLOGY established PATIENT:    PCP: Almita Valenzuela P.A.-C.    1. Chronic heart failure with preserved ejection fraction (HFpEF) (HCC)    2. Stable angina (HCC)    3. Myalgia due to statin    4. Aortic valve sclerosis    5. RBBB    6. Microvascular angina (HCC)    7. Essential hypertension    8. Inappropriate sinus tachycardia (HCC)    9. Mixed hyperlipidemia    10. Class 2 obesity    11. Acute blood loss anemia        Flavia Garrett is here for follow-up for microvascular angina and chronic HFpEF    Chief Complaint   Patient presents with    Hyperlipidemia    Hypertension    Congestive Heart Failure     F/V Dx: Chronic heart failure with preserved ejection fraction (HFpEF) (HCC)       History:     Flavia Garrett is an 84 y.o. female with history of stage I breast cancer about in 2011 (partial right sided lumpectomy with LN dissection), gallbladder stones / sludge, RBBB, dyslipidemia, hypertension, arthritis, stable angina due to microvascular disease, HFpEF, and hypothyroidism was originally referred from the pulmonary medicine clinic for dyspnea on exertion concerns and tachycardia.  Is here for follow-up.     She met with Dr. Vela with the pulmonary medicine team 7/20/2023 and referred for evaluation for her dyspnea on exertion and tachycardia episodes. She finished 6 months of PT until 4/2023 (back pain).     Clinic 9/2023: Due to ongoing dyspnea on exertion, arrange for a TTE, cardiac PET, started metoprolol, offered semaglutide trial however deferred given gallbladder issues, and started atorvastatin 20 mg. TTE with normal LVEF, aortic valve sclerosis without stenosis, grade 1 LV diastolic dysfunction.     Change losartan-HCTZ to losartan 50 mg 11/2/23 due to feeling more tired and lightheaded.     Fell July 2023 due to tripping.     Clinic 12/2023: Started spironolactone 25 mg, decrease losartan to 25 mg, discussed R/LHC.     2/2024 after the R/LHC: Cardiac monitor, increase metoprolol,  started rosuvastatin 5 mg, referred to cardiac rehab     Clinic 3/2024: she stopped metoprolol, rosuvastatin and spironolactone (blurry eyes , fatigue, mind fog). 6 min walk test then: lowest sat 90%, peak  bpm. [place] SOB. On RA. Starting HR 115bpm.     ICR started 4/2024. She really enjoyed ICR and was feeling good while doing it with manageable RUIZ and no CP concerns.    4/29/24: mechanical fall with left femur fracture, s/p Open reduction internal fixation left intra-articular distal femur fracture on 4/29 with postacute PT placement. Discharged from rehab 5/14/24. Losartan stopped 5/6/24 due to hypotension. Then, treated for UTI with Omnicef. Had acute blood loss anemia post-op. Discharged on Aspirin for DVT ppx.    Last took Inpefa and ivabradine yesterday. She noticed being more tired lately and having to urinate more while taking the Inpefa and this is bothering her especially given her limitations while recovering from her recent fracture s/p surgical correction.    's in AM and 100's in PM.    Son with her today    Cardiac event monitor 2/2024: Personally reviewed  Rare PACs and PVCs   Rare brief SVT   Patient triggered events correlated predominantly sinus rhythm and rare PVCs   Reassuring cardiac event monitor findings      RHC and LHC: 2/5/24 personally performed  1.  Mildly elevated right heart filling pressures: CVP 10mmHg  2.  Mildly elevated left heart filling pressures: PCWP 15-18mmHg  3.  Mild postcapillary pulmonary hypertension: mean PA 25mmHg, PASP 35mmHg, PVR < 2WU  4.  Normal resting cardiac output  5.  Mildly elevated SVR ~ 1400  6.  Favorable , CPI and kael  7 . Mild slow coronary flow phenomena in all epicardial coronary arteries consistent with microvascular disease which was suspected on the recent cardiac PET scan     Cardiac PET 9/2023: Personally reviewed  No ischemia  TID 1.0  Normal estimated LVEF  Reduced CFR 1.8     TTE 9/22/23: Personally reviewed  Compared to the  "prior study on 3/18/21, no significant changes.  Normal LV size, thickness and systolic function with normal estimated   LVEF 60%  Grade I LV diastolic dysfunction with normal estimated LA pressure.  Mildly dilated LA.  Normal RV size and systolic function.  AV sclerosis without stenosis.  Normal IVC size  Unable to estimate RVSP  No pericardial effusion     Lipid panel 1/2023:  ,       Normal A1C 3/2022 5.2%.     Treadmill stress test 6/2021:  Baseline rhythm sinus with right bundle branch block.   Poor exercise capacity.   With stress ST changes noted due to bundle branch block, but not suggestive of ischemia   Overall negative stress electrocardiogram for ischemia.     TTE 3/2021: Personally reviewed  Normal LV systolic function, LVEF 60%  Grade 1 LV diastolic dysfunction  No significant valvular abnormalities  Normal IVC size  Unable to estimate RVSP         PE:  /52 (BP Location: Left arm, Patient Position: Sitting, BP Cuff Size: Adult)   Pulse 96   Ht 1.499 m (4' 11\")   Wt 80.7 kg (178 lb)   SpO2 96%   BMI 35.95 kg/m²     Gen: well  HEENT: Symmetric face.  NECK: No JVD.   CARDIAC: Regular, Normal S1, S2, No murmur  RESP: Clear to auscultation bilaterally   ABD: Soft, non-tender, non-distended  EXT: 1+ edema over left leg and trace over right leg.  SKIN: Warm and dry  NEURO: No gross deficits  PSYCH: Appropriate affect, participates in conversation    The ASCVD Risk score (Hartford DK, et al., 2019) failed to calculate.    Past Medical History:   Diagnosis Date    Acquired absence of both cervix and uterus 11/11/2014    Back pain     Breast cancer (HCC)     Cancer (HCC)     Chickenpox     Chronic left-sided low back pain without sciatica 03/17/2017    Constipation     Cough     Cystitis 06/12/2019    Diarrhea     Dyslipidemia 07/26/2016    Eczema     Fatigue     Frequent urination     Gout of foot 09/30/2016    H/O total hysterectomy 07/26/2016    High cholesterol     History of UTI " "01/31/2020    Hx of breast cancer 07/26/2016    Hypertension     Hypopituitarism (HCC) 06/23/2023    Hypothyroidism 07/26/2016    Idiopathic chronic gout of right ankle 09/30/2016    Influenza     Lichen sclerosus of female genitalia     Nasal drainage     Nocturnal oxygen desaturation 08/07/2020    Obesity     Osteopenia 07/26/2016    Osteoporosis     Osteoporosis 07/26/2016    Overactive bladder 02/09/2020    Overweight     Recurrent UTI 01/31/2020    Rhinitis     Ringing in ears     Shortness of breath     Sputum production     Status post right knee replacement 05/05/2016    Status post total right knee replacement 08/17/2016    Swelling of lower extremity     Thyroid activity decreased     Tonsillitis     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     Vision loss     Vitamin D deficiency 07/26/2016    Whooping cough      Past Surgical History:   Procedure Laterality Date    ORIF, FRACTURE, FEMUR Left 4/29/2024    Procedure: ORIF, FRACTURE, DISTAL FEMUR;  Surgeon: Jasiel Tolbert M.D.;  Location: SURGERY [facility];  Service: Trauma    IRRIGATION & DEBRIDEMENT GENERAL Left 4/29/2024    Procedure: IRRIGATION AND DEBRIDEMENT, LEFT FEMUR;  Surgeon: Jasiel Tolbert M.D.;  Location: SURGERY [facility];  Service: Trauma    LUMPECTOMY Right 2012    ARTHROSCOPY, KNEE      HYSTERECTOMY LAPAROSCOPY      KNEE ARTHROPLASTY TOTAL Right     OTHER      LIPOSUCTION THIGHS-LEG LIFT    IA REMV 2ND CATARACT,CORN-SCLER SECTN      VAGINAL HYSTERECTOMY TOTAL      Hysterectomy,Total Vaginal     Allergies   Allergen Reactions    Lisinopril      Cramps and upset stomach    Other Reaction(s): Not available    Atorvastatin Myalgia     Leg pain    Atorvastatin     Hydrochlorothiazide     Lisinopril     Other Misc      Hydroc hlorathiazide - rxn - \"too dehydrated\" per pt     Outpatient Encounter Medications as of 5/23/2024   Medication Sig Dispense Refill    losartan (COZAAR) 25 MG Tab Take 1 Tablet by " mouth 2 times a day. 180 Tablet 3    SYNTHROID 150 MCG Tab Take 1 Tablet by mouth in the morning every Tue, Thurs, Sat, and Sun. 30 Tablet 0    vitamin D (CHOLECALCIFEROL) 1000 UNIT Tab Take 1 Tablet by mouth every day. 60 Tablet 0    levothyroxine (SYNTHROID) 175 MCG Tab Take 175 mcg by mouth every Monday, Wednesday, and Friday. Brand name only      acetaminophen (TYLENOL) 500 MG Tab Take 500 mg by mouth at bedtime as needed for Mild Pain.      Melatonin 10 MG Tab Take 10 mg by mouth at bedtime as needed.  May repeat 1 time..      levalbuterol (XOPENEX HFA) 45 MCG/ACT inhaler Inhale 2 Puffs every four hours as needed for Shortness of Breath.      levalbuterol (XOPENEX HFA) 45 MCG/ACT inhaler Inhale 2 Puffs every four hours as needed for Shortness of Breath. 3 Each 3    Spacer/Aero-Holding Chambers Device 1 Device as needed (SOB). 1 Each 0    levothyroxine (SYNTHROID) 150 MCG Tab TAKE 1 TABLET 175 MCG ON MONDAY, WEDNESDAY, FRIDAY. TAKE 150MCG ON Sunday, TUESDAY, THURSDAY, and Saturday. 48 Tablet 3    silver sulfADIAZINE (SILVADENE) 1 % Cream Apply thin layer to affected area twice a day x 5 days 50 g 0    estradiol (ESTRACE) 0.1 MG/GM vaginal cream 1 time a day as needed.      valacyclovir (VALTREX) 1 GM Tab Take 2 Tablets by mouth every 12 hours. 2 g twice daily for one day. 20 Tablet 0    clobetasol (TEMOVATE) 0.05 % Cream CLOBETASOL PROPIONATE 0.05 % CREA 30 g 1    acetaminophen (TYLENOL) 500 MG Tab Take 500-1,000 mg by mouth every 6 hours as needed.      loratadine (CLARITIN) 10 MG Tab Take 10 mg by mouth every day.      [DISCONTINUED] cefdinir (OMNICEF) 300 MG Cap Take 1 Capsule by mouth every 12 hours. (Patient not taking: Reported on 5/23/2024) 10 Capsule 0    [DISCONTINUED] aspirin 81 MG EC tablet Take 1 Tablet by mouth 2 times a day for 13 days. (Patient not taking: Reported on 5/23/2024) 26 Tablet 0    [DISCONTINUED] Sotagliflozin 200 MG Tab Take 200 mg by mouth every day. (Patient not taking: Reported on  5/23/2024)      [DISCONTINUED] ivabradine (CORLANOR) 5 MG Tab tablet Take 5 mg by mouth 2 times a day with meals. (Patient not taking: Reported on 5/23/2024)      [DISCONTINUED] loratadine (CLARITIN) 10 MG Tab Take 10 mg by mouth 1 time a day as needed.      [DISCONTINUED] DHA-EPA-Vitamin E (OMEGA-3 COMPLEX PO) Take 1 Dose by mouth every day. liquid (Patient not taking: Reported on 5/23/2024)      ivabradine (CORLANOR) 5 MG Tab tablet Take 1 Tablet by mouth 2 times a day with meals. (Patient not taking: Reported on 5/23/2024) 180 Tablet 3    [DISCONTINUED] losartan (COZAAR) 25 MG Tab Take 1 Tablet by mouth every day. 90 Tablet 3    [DISCONTINUED] metoprolol SR (TOPROL XL) 25 MG TABLET SR 24 HR Take 1.5 Tablets by mouth every evening. (Patient not taking: Reported on 3/15/2024) 135 Tablet 35    Sotagliflozin 200 MG Tab Take 200 mg by mouth every day. (Patient not taking: Reported on 5/23/2024) 90 Tablet 3    [DISCONTINUED] mupirocin (BACTROBAN) 2 % Ointment Apply 1 Application topically 2 times a day. (Patient not taking: Reported on 5/23/2024) 22 g 0    [DISCONTINUED] levothyroxine (SYNTHROID) 175 MCG Tab TAKE 1 TABLET 175 MCG ON MONDAY, WEDNESDAY, FRIDAY. TAKE 150MCG ON Sunday, TUESDAY, THURSDAY, and Saturday. 36 Tablet 3    [DISCONTINUED] DHA-EPA-Vitamin E (OMEGA-3 COMPLEX PO) Take  by mouth. 1600 mg daily 1 tbsp (Patient not taking: Reported on 5/23/2024)      [DISCONTINUED] desoximetasone (TOPICORT) 0.25 % Cream Apply 1 Application topically 2 times a day as needed (rash). (Patient not taking: Reported on 5/23/2024) 60 g 0    [DISCONTINUED] cyclobenzaprine (FLEXERIL) 5 MG tablet 3 times a day as needed. (Patient not taking: Reported on 5/23/2024)       No facility-administered encounter medications on file as of 5/23/2024.     Social History     Socioeconomic History    Marital status:      Spouse name: Not on file    Number of children: Not on file    Years of education: Not on file    Highest education  level: Associate degree: academic program   Occupational History    Not on file   Tobacco Use    Smoking status: Never    Smokeless tobacco: Never   Vaping Use    Vaping status: Never Used   Substance and Sexual Activity    Alcohol use: Never     Comment: min    Drug use: No    Sexual activity: Yes     Partners: Male   Other Topics Concern    Not on file   Social History Narrative    ** Merged History Encounter **         From [place]      Social Determinants of Health     Financial Resource Strain: Low Risk  (7/13/2023)    Overall Financial Resource Strain (CARDIA)     Difficulty of Paying Living Expenses: Not hard at all   Food Insecurity: No Food Insecurity (7/13/2023)    Hunger Vital Sign     Worried About Running Out of Food in the Last Year: Never true     Ran Out of Food in the Last Year: Never true   Transportation Needs: No Transportation Needs (5/14/2024)    PRAPARE - Transportation     Lack of Transportation (Medical): No     Lack of Transportation (Non-Medical): No   Physical Activity: Sufficiently Active (10/2/2019)    Exercise Vital Sign     Days of Exercise per Week: 3 days     Minutes of Exercise per Session: 90 min   Stress: Stress Concern Present (7/13/2023)    [employer] [place] [employer] - Occupational Stress Questionnaire     Feeling of Stress : To some extent   Social Connections: Unknown (7/13/2023)    Social Connection and Isolation Panel [NHANES]     Frequency of Communication with Friends and Family: More than three times a week     Frequency of Social Gatherings with Friends and Family: More than three times a week     Attends Yarsani Services: Not on file     Active Member of Clubs or Organizations: Yes     Attends Club or Organization Meetings: More than 4 times per year     Marital Status: Not on file   Intimate Partner Violence: Not on file   Housing Stability: Unknown (7/13/2023)    Housing Stability Vital Sign     Unable to Pay for Housing in the Last Year: No      Number of Places Lived in the Last Year: Not on file     Unstable Housing in the Last Year: No     Family History   Problem Relation Age of Onset    Arthritis Mother     Other Son         CELIAC DISEASE-SEVERE    Diabetes Other         GF    Arthritis Other         GM    Heart Disease Neg Hx          Studies    Lab Results   Component Value Date/Time    TSHULTRASEN 2.300 06/28/2023 0738        Lab Results   Component Value Date/Time    FREET4 1.56 06/28/2023 0738      Lab Results   Component Value Date/Time    HBA1C 5.2 03/15/2022 12:00 AM     Lab Results   Component Value Date/Time    CHOLSTRLTOT 217 01/24/2023 12:00 AM     01/24/2023 12:00 AM    HDL 45 01/24/2023 12:00 AM    TRIGLYCERIDE 213 01/24/2023 12:00 AM       Lab Results   Component Value Date/Time    SODIUM 136 05/07/2024 05:42 AM    POTASSIUM 4.1 05/07/2024 05:42 AM    CHLORIDE 103 05/07/2024 05:42 AM    CO2 22 05/07/2024 05:42 AM    GLUCOSE 95 05/07/2024 05:42 AM    BUN 21 05/07/2024 05:42 AM    CREATININE 0.76 05/07/2024 05:42 AM     Lab Results   Component Value Date/Time    ALKPHOSPHAT 61 05/07/2024 05:42 AM    ASTSGOT 16 05/07/2024 05:42 AM    ALTSGPT 7 05/07/2024 05:42 AM    TBILIRUBIN 0.4 05/07/2024 05:42 AM        Echocardiogram:  No results found for this or any previous visit.      Assessment and Recommendations:    Problem List Items Addressed This Visit          Cardiology Medicine Problems    Essential hypertension    Relevant Medications    losartan (COZAAR) 25 MG Tab    Mixed hyperlipidemia    Relevant Medications    losartan (COZAAR) 25 MG Tab    Microvascular angina (HCC)    Relevant Medications    losartan (COZAAR) 25 MG Tab    Chronic heart failure with preserved ejection fraction (HFpEF) (HCC) - Primary    Relevant Medications    losartan (COZAAR) 25 MG Tab    Inappropriate sinus tachycardia (HCC)    Relevant Medications    losartan (COZAAR) 25 MG Tab    Stable angina (HCC)    Relevant Medications    losartan (COZAAR) 25 MG Tab        Other    Class 2 obesity    RBBB    Relevant Medications    losartan (COZAAR) 25 MG Tab    Aortic valve sclerosis    Relevant Medications    losartan (COZAAR) 25 MG Tab    Myalgia due to statin     Other Visit Diagnoses       Acute blood loss anemia        Relevant Orders    Referral to Outpatient Infusion          Overall Ms. Garrett is doing well from cardiac standpoint however assessment is limited given her current and activity level recovering from her orthopedic surgery few weeks ago.    We will reenroll her in cardiac rehab once we get clearance from the orthopedic team.    We agreed to hold ivabradine and Inpefa for the next 4 weeks until she recovers more and her anemia hopefully improves.    Regarding her acute blood loss anemia (baseline Hg 12-13, now around 8), it is important to correct her anemia given her underlying chronic HFpEF with microvascular angina.  Placed a referral to the outpatient infusion center/pharmacy team to help arrange IV iron infusion - fe dextran to facilitate bone marrow production of RBCs and eventually correct her anemia sooner rather than later.    Regarding her elevated blood pressure mainly in the mornings based on her home log, advised her to try taking her losartan 25 mg twice a day; and to go back to once a day pill if she notices more fatigue and low blood pressures with the extra pill.      Thank you for the opportunity to be involved in Flavia Garrett 's cardiovascular care; and please reach out with any questions or concerns.     () Today's E/M visit is associated with medical care services that serve as the continuing focal point for all needed health care services and/or with medical care services that  are part of ongoing cardiac care related to a patient's single, serious condition, or a complex condition: This includes  furnishing services to patients on an ongoing basis that result in care that is personalized  to the patient. The services result  in a comprehensive, longitudinal, and continuous  relationship with the patient and involve delivery of team-based care that is accessible, coordinated with other practitioners and providers, and integrated with the broader health  care landscape.     Return in about 3 months (around 8/23/2024).    John Campoverde MD, MPH [facility]  Interventional Cardiologist  [facility]   of Clinical Internal Medicine - [facility]    ~ Portions of this note were completed using voice recognition software (Dragon Naturally speaking software) . Occasional transcription errors may have escaped proof reading. I have made every reasonable attempt to correct obvious errors, but I expect that there are errors of grammar and possibly content that I did not discover before finalizing the note. ~

## 2024-05-24 DIAGNOSIS — D62 ACUTE BLOOD LOSS ANEMIA: ICD-10-CM

## 2024-05-24 RX ORDER — METHYLPREDNISOLONE SODIUM SUCCINATE 125 MG/2ML
125 INJECTION, POWDER, LYOPHILIZED, FOR SOLUTION INTRAMUSCULAR; INTRAVENOUS PRN
Status: CANCELLED | OUTPATIENT
Start: 2024-05-25

## 2024-05-24 RX ORDER — DIPHENHYDRAMINE HYDROCHLORIDE 50 MG/ML
50 INJECTION INTRAMUSCULAR; INTRAVENOUS PRN
Status: CANCELLED | OUTPATIENT
Start: 2024-05-25

## 2024-05-24 RX ORDER — EPINEPHRINE 1 MG/ML(1)
0.5 AMPUL (ML) INJECTION PRN
Status: CANCELLED | OUTPATIENT
Start: 2024-05-25

## 2024-05-24 RX ORDER — SODIUM CHLORIDE 9 MG/ML
INJECTION, SOLUTION INTRAVENOUS CONTINUOUS
Status: CANCELLED | OUTPATIENT
Start: 2024-05-25

## 2024-05-28 ENCOUNTER — HOSPITAL ENCOUNTER (OUTPATIENT)
Facility: MEDICAL CENTER | Age: 84
End: 2024-05-28
Attending: PHYSICIAN ASSISTANT
Payer: MEDICARE

## 2024-05-28 ENCOUNTER — HOSPITAL ENCOUNTER (OUTPATIENT)
Dept: RADIOLOGY | Facility: MEDICAL CENTER | Age: 84
End: 2024-05-28
Attending: PHYSICIAN ASSISTANT
Payer: MEDICARE

## 2024-05-28 ENCOUNTER — OFFICE VISIT (OUTPATIENT)
Dept: MEDICAL GROUP | Facility: IMAGING CENTER | Age: 84
End: 2024-05-28
Payer: MEDICARE

## 2024-05-28 VITALS
RESPIRATION RATE: 18 BRPM | OXYGEN SATURATION: 97 % | SYSTOLIC BLOOD PRESSURE: 120 MMHG | HEART RATE: 91 BPM | WEIGHT: 178 LBS | DIASTOLIC BLOOD PRESSURE: 62 MMHG | TEMPERATURE: 98.2 F | HEIGHT: 59 IN | BODY MASS INDEX: 35.88 KG/M2

## 2024-05-28 DIAGNOSIS — S72.452A: ICD-10-CM

## 2024-05-28 DIAGNOSIS — M80.052A: ICD-10-CM

## 2024-05-28 DIAGNOSIS — M79.5 FOREIGN BODY (FB) IN SOFT TISSUE: ICD-10-CM

## 2024-05-28 DIAGNOSIS — E03.9 HYPOTHYROIDISM, UNSPECIFIED TYPE: ICD-10-CM

## 2024-05-28 DIAGNOSIS — R82.90 ABNORMAL URINALYSIS: ICD-10-CM

## 2024-05-28 DIAGNOSIS — Z09 HOSPITAL DISCHARGE FOLLOW-UP: Primary | ICD-10-CM

## 2024-05-28 DIAGNOSIS — I10 PRIMARY HYPERTENSION: ICD-10-CM

## 2024-05-28 DIAGNOSIS — M85.859 OSTEOPENIA OF HIP, UNSPECIFIED LATERALITY: ICD-10-CM

## 2024-05-28 DIAGNOSIS — E55.9 VITAMIN D DEFICIENCY: ICD-10-CM

## 2024-05-28 DIAGNOSIS — D62 ACUTE BLOOD LOSS ANEMIA: ICD-10-CM

## 2024-05-28 PROBLEM — L08.9 TOE INFECTION: Status: RESOLVED | Noted: 2023-12-26 | Resolved: 2024-05-28

## 2024-05-28 PROBLEM — Z01.818 ENCOUNTER FOR PREOPERATIVE ASSESSMENT: Status: RESOLVED | Noted: 2024-04-29 | Resolved: 2024-05-28

## 2024-05-28 PROBLEM — I50.32 CHRONIC HEART FAILURE WITH PRESERVED EJECTION FRACTION (HCC): Status: ACTIVE | Noted: 2024-03-15

## 2024-05-28 LAB
APPEARANCE UR: CLEAR
BILIRUB UR STRIP-MCNC: NORMAL MG/DL
COLOR UR AUTO: YELLOW
GLUCOSE UR STRIP.AUTO-MCNC: NORMAL MG/DL
KETONES UR STRIP.AUTO-MCNC: NORMAL MG/DL
LEUKOCYTE ESTERASE UR QL STRIP.AUTO: NORMAL
NITRITE UR QL STRIP.AUTO: NORMAL
PH UR STRIP.AUTO: 5.5 [PH] (ref 5–8)
PROT UR QL STRIP: NORMAL MG/DL
RBC UR QL AUTO: NORMAL
SP GR UR STRIP.AUTO: 1
UROBILINOGEN UR STRIP-MCNC: 0.2 MG/DL

## 2024-05-28 RX ORDER — GINGER ROOT/GINGER ROOT EXT 262.5 MG
0.5 CAPSULE ORAL
COMMUNITY

## 2024-05-28 RX ORDER — ERGOCALCIFEROL 1.25 MG/1
50000 CAPSULE ORAL
Qty: 12 CAPSULE | Refills: 0 | Status: CANCELLED | OUTPATIENT
Start: 2024-05-28

## 2024-05-28 ASSESSMENT — PAIN SCALES - GENERAL: PAINLEVEL: NO PAIN

## 2024-05-28 ASSESSMENT — FIBROSIS 4 INDEX: FIB4 SCORE: 1.19

## 2024-05-28 NOTE — PATIENT INSTRUCTIONS
It was a pleasure meeting with you today at [facility]!    Your medical history/records and medications were reviewed today.     UPDATE on MyChart Results: If you have blood work, and/or imaging studies, or any other test or procedure completed, you will have access to results as soon as they become available in MyChart. Recently, these results will be available for review at the same time that your provider is able to see results!    This will likely mean you will see a result before your provider has had a chance to review and discuss with you.  Some results or care notes may be hard to understand and may be serious in nature.    We look at every result and your provider will contact you to explain what they mean and discuss appropriate next steps. Please allow for at least 72 business hours for chart and result review.     We prefer that you wait for your care team to contact you with your results.  Often, your provider will discuss your results with you at your next appointment. We look forward to continuing to partner with you in your care.    Please review my practice information below:    If you have any prescription refill requests, please send them via [employer] or discuss with your provider at the start of your office visits. Please allow 3-5 business days for lab and testing review and you will be contacted via [employer] with those results, or if advised to make a follow up appointment regarding those results, then please do so.     Once resulted, your lab/test/imaging results will show up automatically in your MyChart. Please wait for my interpretation and recommendations prior to viewing your results to avoid any unnecessary confusion or misinterpretation. I will address all of the lab values that I interpret as abnormal and message you accordingly on your MyChart. I will always send you a message about your results even if they are normal. If you do not hear back from me within 5-7 business  days after completing your tests, then please send me a message on [employer] so I can obtain your results (especially if you went to an outside lab or imaging center - [facility], [facility], etc).     If you have any additional questions or concerns beyond my interpretation of your results, please make an appointment with me to discuss in further detail.    Please only use the [employer] messaging system for questions regarding your most recent appointment or if advised to use otherwise (glucose or blood pressure reporting).     If you have any new problems or concerns, you must make an appointment to discuss. This includes any referral requests, lab requests (unless advised to notify me for pre-appt labs), medication side effects, or request for medication adjustments.     Please arrive 15 minutes prior to your appointment time to complete your check-in and intake with the medical assistant.      Thank you,    Almita Valenzuela PA-C ([place])  Physician Assistant Certified  [facility]    -----------------------------------------------------------------    Attn: Patients of [facility]:    In an effort to continue to provide excellent and efficient care to our patients, it is vital that we continue to use our resources appropriately. With that, this is a reminder that [employer] is used for prescription refill requests, test results, virtual visits, and chart review only.     Any new questions, concerns/conditions, lab/imaging requests, medication adjustments, new prescriptions, or referral requests do require an appointment (virtually or in person), unless discussed otherwise at your most recent appointment.     Thank you for your understanding,    [facility]      General tips for bone health:  - Recommend total calcium intake of 1200 mg daily in combination of diet and supplements (see below)  No more than 500-600 mg calcium at a time in your supplements. Thus, you may need to take your supplements  twice a day to meet the 1200 mg total.   The two main kinds of supplements are calcium citrate and calcium carbonate. Calcium carbonate must be taken with food in order to absorb the calcium properly. Calcium citrate can be taken either with or without food.  Dietary calcium sources:   -A typical diet without any calcium-rich foods has about 300 mg of calcium. A total of ~1200 mg of calcium per day is recommended through diet and/or supplements. More than 1500 mg per day increases risk for kidney stones.   -Leafy greens (broccoli, bok suhail, kale) have about 80 mg of calcium per cup of raw vegetable.  -Dairy foods (cheese, yogurt, milk) have about 250-300 mg calcium per serving. Other non-dairy, high-calcium dietary options include calcium-fortified almond, rice, or soy milk.  -Calcium-fortified orange juice also has about 300 mg of calcium per cup.    -Calcium-enriched tofu (check label for calcium amount) has up to 500-600 mg/cup.  - Recommend total vitamin D3 intake of 6077-3976 units (or  mcg) daily in addition to sun exposure of at least 15-20 minutes a day  - Avoid tobacco  - Limit alcohol consumption to no more than 2 drinks/day  - Fall precautions:   Make sure floors are free of clutter, loose cords  Smooth out and anchor rugs  Use nonskid mats  Install handrails and guardrails for stairs, showers, bathtubs  Light hallways, stairways, entrances  Wear sturdy, low-heeled shoes  Work with a professional to fit any cane or walker devices to your height and body frame    Combination of weight-bearing and muscle-strengthening exercises for at least 30 min/day x 5 days/week:    High-impact weight-bearing exercises:  You may want to avoid these if you are very high risk for breaking a bone or if you have broken a bone from osteoporosis before.  - Jogging/running  - Dancing  - High-impact aerobics  - Hiking  - Jumping Rope  - Stair climbing  - Tennis    Low-impact weight-bearing exercises:  - Elliptical  machines  - Doing low-impact aerobics  - Stair-stepping machines  - Fast walking on a treadmill or outside    Muscle strengthening/resistance exercises:  - Lifting weights (free weights or machines)  - Using elastic exercise bands  - Lifting your own body weight (squats, pushups, sit-ups)  - Functional movements, such as standing and rising up on your toes

## 2024-05-28 NOTE — PROGRESS NOTES
Subjective:     Flaiva Garrett is a 84 y.o. female who presents with her son for Hospital Follow-up.    Transitional Care Management  TCM Outreach Date and Time: Filed (5/15/2024  9:20 AM)    Discharge Questions  Actual Discharge Date: 05/14/24  Now that you are home, how are you feeling?: Good  Did you receive any new prescriptions?: Yes  Were you able to get them filled?: Yes  Meds to Bed or Pharmacy filled?: Pharmacy ([facility])  Do you have any questions about your current medications or new medications (Review Med Rec)?: No  Did you have any durable medical equipment ordered?: No  Do you have a follow up appointment scheduled with your PCP?: No  Was an appointment scheduled for the patient?: No  Any issues or paperwork you wish to discuss with your PCP?: No  Are you (patient) able to get to the appointment?: Yes (pt [place]alo will bring to appt, they will call back to schedule)  Reason not scheduled?: Patient to Schedule  If Home Health was ordered, have they contacted you (Patient): Not Applicable  Did you have enough support after your last discharge?: Yes  Does this patient qualify for the CCM program?: No    Transitional Care  Number of attempts made to contact patient: 1  Current or previous attempts completed within two business days of discharge? : Yes  Provided education regarding treatment plan, medications, self-management, ADLs?: Yes  Has patient completed an Advanced Directive?: No  Has the Care Manager's phone number provided?: No  Is there anything else I can help you with?: No    Discharge Summary  Chief Complaint: GLF - Patient BIB [facility] ground unit #[id number]  ([facility] also on scene) from home. 84F involved in mGLF. Pt reportedly lost balance and fell forward, - head strike, - LOC, - thinners. EMS reports possible open fx L distal femur; laceration noted to L lateral thigh with obvious deformity to femur. Patient arrives w/ *no spinal immobilizations* in place. Chief complaint of L lower  extremity (thigh) discomfort. Medications administered en route: 100 mcg Fentanyl via 20g R hand established PTA.  Admitting Diagnosis: trauma yellow  Discharge Diagnosis: Closed comminuted supracondylar fracture of femur    HPI:   Recently hospitalized for femur fracture from ground-level fall.  Underwent ORIF on 4/29/2024 by Dr. Tolbert.  Was admitted to inpatient rehab and discharged from inpatient rehab on 5/14/2024.  Labs show acute blood loss anemia and UTI.  Has followed up with her cardiologist since discharge and her Corlanor and Sotagliflozin are currently held.  She completed aspirin 81 mg twice a day postop.  No calf pain, shortness of breath.  Has not been using her incentive spirometer.  Is now doing home PT/OT, using a walker and wheelchair.    Patient also notes that she has a callus along her left foot from when she stepped on a needle in February, would like it evaluated today.    Current medicines (including reconciliation performed today)  Current Outpatient Medications   Medication Sig Dispense Refill    Cholecalciferol (VITAMIN D3) 125 MCG/0.5ML Liquid Take 0.5 mL by mouth two times a week.      losartan (COZAAR) 25 MG Tab Take 1 Tablet by mouth 2 times a day. 180 Tablet 3    levothyroxine (SYNTHROID) 175 MCG Tab Take 175 mcg by mouth every Monday, Wednesday, and Friday. Brand name only      acetaminophen (TYLENOL) 500 MG Tab Take 500 mg by mouth at bedtime as needed for Mild Pain.      Melatonin 10 MG Tab Take 10 mg by mouth at bedtime as needed.  May repeat 1 time..      levalbuterol (XOPENEX HFA) 45 MCG/ACT inhaler Inhale 2 Puffs every four hours as needed for Shortness of Breath. 3 Each 3    Spacer/Aero-Holding Chambers Device 1 Device as needed (SOB). 1 Each 0    levothyroxine (SYNTHROID) 150 MCG Tab TAKE 1 TABLET 175 MCG ON MONDAY, WEDNESDAY, FRIDAY. TAKE 150MCG ON Sunday, TUESDAY, THURSDAY, and Saturday. 48 Tablet 3    valacyclovir (VALTREX) 1 GM Tab Take 2 Tablets by mouth every 12  "hours. 2 g twice daily for one day. 20 Tablet 0    clobetasol (TEMOVATE) 0.05 % Cream CLOBETASOL PROPIONATE 0.05 % CREA 30 g 1    loratadine (CLARITIN) 10 MG Tab Take 10 mg by mouth every day.      SYNTHROID 150 MCG Tab Take 1 Tablet by mouth in the morning every Tue, Thurs, Sat, and Sun. (Patient not taking: Reported on 5/28/2024) 30 Tablet 0    silver sulfADIAZINE (SILVADENE) 1 % Cream Apply thin layer to affected area twice a day x 5 days (Patient not taking: Reported on 5/28/2024) 50 g 0    estradiol (ESTRACE) 0.1 MG/GM vaginal cream 1 time a day as needed. (Patient not taking: Reported on 5/28/2024)       No current facility-administered medications for this visit.       Allergies:   Lisinopril, Atorvastatin, Atorvastatin, Hydrochlorothiazide, Lisinopril, and Other misc    Social History     Tobacco Use    Smoking status: Never    Smokeless tobacco: Never   Vaping Use    Vaping status: Never Used   Substance Use Topics    Alcohol use: Never     Comment: min    Drug use: No       ROS:  ROS: see hpi  Gen: no fevers/chills  Pulm: no cough  CV: no chest pain, no palpitations, no edema  GI: no nausea/vomiting, no diarrhea  Skin: no rash    Objective:     Vitals:    05/28/24 1052   BP: 120/62   BP Location: Right arm   Patient Position: Sitting   BP Cuff Size: Adult   Pulse: 91   Resp: 18   Temp: 36.8 °C (98.2 °F)   TempSrc: Temporal   SpO2: 97%   Weight: 80.7 kg (178 lb)   Height: 1.499 m (4' 11\")     Body mass index is 35.95 kg/m².    Physical Exam:  Gen:    Alert and oriented, No apparent distress.  HEENT: Head atraumatic, normocephalic. Pupils equal and round.  Lungs: Normal effort, CTA bilaterally, no wheezes, rhonchi, or rales  CV:      Regular rate and rhythm. No murmurs, rubs, or gallops.  Ext:      No clubbing, cyanosis, edema.  Left lateral distal foot with small callus and mild tenderness.  No erythema or induration.    Assessment and Plan:     1. Hospital discharge follow-up  Hospital notes, records, " labs, imaging reviewed.  Will trend anemia.  Has IV iron infusion scheduled for 5/30/2024.  Repeat labs 2 to 4 weeks later.    2. Closed comminuted supracondylar fracture of femur, left, initial encounter ([facility])  Status post ORIF on 4/29/24.  Follow with orthopedics as scheduled.  Discussed the 18-30% all cause mortality rate after femur fractures including risk of infection, hypercoagulability from immobility and surgery, repeat falls.  Encouraged patient to continue PT/OT.  Fall precautions.  Use incentive spirometer 10 times an hour.  Monitor for calf pain, swelling, shortness of breath.    3. 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  She is taking vitamin D 6000 IU twice a week via liquid drop formulation.  She is not currently taking calcium supplementation, but tries to incorporate calcium in her diet.    4. Osteopenia of hip, unspecified laterality  - Comp Metabolic Panel; Future    5. Acute blood loss anemia  - CBC WITH DIFFERENTIAL; Future  - IRON/TOTAL IRON BIND; Future  - FERRITIN; Future  - TSH WITH REFLEX TO FT4; Future  - VITAMIN B12; Future  - FOLATE; Future    6. Hypothyroidism, unspecified type  - TSH WITH REFLEX TO FT4; Future    7. Vitamin D deficiency    8. Primary hypertension  Chronic, controlled and stable. Continue current regimen -losartan 25 mg twice a day. Recommend DASH diet, exercise as tolerated. Monitor Blood Pressure at home and report any consistent readings above >140/90. Seek medical help/ER if chest pain, palpitations, shortness of breath, headache, dizziness.     9. Abnormal urinalysis  - POCT Urinalysis  - URINE CULTURE(NEW); Future    10. Foreign body (FB) in soft tissue  Check x-ray, refer to podiatry for management.  Soak foot in warm water.  Monitor for signs of infection such as worsening redness, swelling, warmth, drainage, fever, bone  or joint pain.  If you develop any of these symptoms, please seek medical treatment immediately.  - DX-FOOT-COMPLETE 3+ LEFT; Future      - Chart and discharge summary were reviewed.   - Hospitalization and results reviewed with patient.   - Medications reviewed including instructions regarding high risk medications, dosing and side effects.  - Recommended Services: No services needed at this time  - Advance directive/POLST on file?  Yes    Follow-up:Return in about 23 days (around 6/20/2024) for Follow-up labs/tests.    Face-to-face transitional care management services with [facility] (today's visit is within 14 days post discharge & LACE+ score of 28-58) medical decision complexity were provided.

## 2024-05-30 ENCOUNTER — OUTPATIENT INFUSION SERVICES (OUTPATIENT)
Dept: ONCOLOGY | Facility: MEDICAL CENTER | Age: 84
End: 2024-05-30
Attending: INTERNAL MEDICINE
Payer: MEDICARE

## 2024-05-30 VITALS
HEART RATE: 107 BPM | OXYGEN SATURATION: 98 % | WEIGHT: 178 LBS | RESPIRATION RATE: 18 BRPM | BODY MASS INDEX: 35.88 KG/M2 | TEMPERATURE: 98 F | DIASTOLIC BLOOD PRESSURE: 71 MMHG | SYSTOLIC BLOOD PRESSURE: 131 MMHG | HEIGHT: 59 IN

## 2024-05-30 DIAGNOSIS — D62 ACUTE BLOOD LOSS ANEMIA: ICD-10-CM

## 2024-05-30 DIAGNOSIS — N30.00 ACUTE CYSTITIS WITHOUT HEMATURIA: ICD-10-CM

## 2024-05-30 RX ORDER — SODIUM CHLORIDE 9 MG/ML
INJECTION, SOLUTION INTRAVENOUS CONTINUOUS
OUTPATIENT
Start: 2024-05-30

## 2024-05-30 RX ORDER — METHYLPREDNISOLONE SODIUM SUCCINATE 125 MG/2ML
125 INJECTION, POWDER, LYOPHILIZED, FOR SOLUTION INTRAMUSCULAR; INTRAVENOUS PRN
Status: CANCELLED | OUTPATIENT
Start: 2024-05-30

## 2024-05-30 RX ORDER — METHYLPREDNISOLONE SODIUM SUCCINATE 125 MG/2ML
125 INJECTION, POWDER, LYOPHILIZED, FOR SOLUTION INTRAMUSCULAR; INTRAVENOUS PRN
OUTPATIENT
Start: 2024-05-30

## 2024-05-30 RX ORDER — NITROFURANTOIN 25; 75 MG/1; MG/1
100 CAPSULE ORAL 2 TIMES DAILY
Qty: 14 CAPSULE | Refills: 0 | Status: SHIPPED | OUTPATIENT
Start: 2024-05-30 | End: 2024-06-06

## 2024-05-30 RX ORDER — EPINEPHRINE 1 MG/ML(1)
0.5 AMPUL (ML) INJECTION PRN
OUTPATIENT
Start: 2024-05-30

## 2024-05-30 RX ORDER — METHYLPREDNISOLONE SODIUM SUCCINATE 125 MG/2ML
125 INJECTION, POWDER, LYOPHILIZED, FOR SOLUTION INTRAMUSCULAR; INTRAVENOUS PRN
Status: DISCONTINUED | OUTPATIENT
Start: 2024-05-30 | End: 2024-05-30

## 2024-05-30 RX ORDER — DIPHENHYDRAMINE HYDROCHLORIDE 50 MG/ML
50 INJECTION INTRAMUSCULAR; INTRAVENOUS PRN
OUTPATIENT
Start: 2024-05-30

## 2024-05-30 RX ADMIN — SODIUM CHLORIDE 1000 MG: 9 INJECTION, SOLUTION INTRAVENOUS at 16:24

## 2024-05-30 RX ADMIN — METHYLPREDNISOLONE SODIUM SUCCINATE 125 MG: 125 INJECTION, POWDER, FOR SOLUTION INTRAMUSCULAR; INTRAVENOUS at 16:18

## 2024-05-30 ASSESSMENT — FIBROSIS 4 INDEX: FIB4 SCORE: 1.19

## 2024-05-30 NOTE — PROGRESS NOTES
Pt arrived in  for Infed infusion accompanied by son.  Pt had recent left femur fracture and was Dc'd from [facility] rehab.  Pt c/o severe fatigue due to low iron.  Plan of care reviewed, verbalized understanding, and wish to proceed.  PIV started in LFA with good blood return.  Infed started at 75cc/hr x 5 minutes, then pump stopped for 10 minutes.  No s/s of any reaction noted, restarted at 333cc/hr for the remainder of bag, tolerated well, no reaction while here.  Pt Dc'd home with son and will f/u with provider for further care.

## 2024-06-12 ENCOUNTER — OFFICE VISIT (OUTPATIENT)
Dept: SLEEP MEDICINE | Facility: MEDICAL CENTER | Age: 84
End: 2024-06-12
Attending: INTERNAL MEDICINE
Payer: MEDICARE

## 2024-06-12 VITALS
HEART RATE: 91 BPM | OXYGEN SATURATION: 97 % | HEIGHT: 60 IN | BODY MASS INDEX: 34.55 KG/M2 | SYSTOLIC BLOOD PRESSURE: 110 MMHG | DIASTOLIC BLOOD PRESSURE: 60 MMHG | WEIGHT: 176 LBS

## 2024-06-12 DIAGNOSIS — R05.3 CHRONIC COUGH: ICD-10-CM

## 2024-06-12 DIAGNOSIS — R06.02 SOB (SHORTNESS OF BREATH): ICD-10-CM

## 2024-06-12 PROCEDURE — 3078F DIAST BP <80 MM HG: CPT | Performed by: INTERNAL MEDICINE

## 2024-06-12 PROCEDURE — 99214 OFFICE O/P EST MOD 30 MIN: CPT | Performed by: INTERNAL MEDICINE

## 2024-06-12 PROCEDURE — 99211 OFF/OP EST MAY X REQ PHY/QHP: CPT | Performed by: INTERNAL MEDICINE

## 2024-06-12 PROCEDURE — 3074F SYST BP LT 130 MM HG: CPT | Performed by: INTERNAL MEDICINE

## 2024-06-12 ASSESSMENT — ENCOUNTER SYMPTOMS
WEAKNESS: 0
PHOTOPHOBIA: 0
VOMITING: 0
SORE THROAT: 0
WHEEZING: 0
DIARRHEA: 0
FEVER: 0
EYE DISCHARGE: 0
HEARTBURN: 0
PND: 0
CLAUDICATION: 0
PALPITATIONS: 0
FOCAL WEAKNESS: 0
FALLS: 0
STRIDOR: 0
SINUS PAIN: 0
ORTHOPNEA: 0
WEIGHT LOSS: 0
NAUSEA: 0
BLURRED VISION: 0
DOUBLE VISION: 0
DIAPHORESIS: 0
MYALGIAS: 0
ABDOMINAL PAIN: 0
HEADACHES: 0
CONSTIPATION: 0
TREMORS: 0
HEMOPTYSIS: 0
DEPRESSION: 0
EYE PAIN: 0
SPUTUM PRODUCTION: 0
DIZZINESS: 0
SHORTNESS OF BREATH: 0
EYE REDNESS: 0
CHILLS: 0
COUGH: 0
BACK PAIN: 0
SPEECH CHANGE: 0
NECK PAIN: 0

## 2024-06-12 ASSESSMENT — FIBROSIS 4 INDEX: FIB4 SCORE: 1.19

## 2024-06-12 NOTE — PROGRESS NOTES
Chief Complaint   Patient presents with    Follow-Up     LAST SEEN 2/7/24    Results     CXR 4/29/24         HPI: This patient is a 84 y.o. female whom is followed in our clinic for chronic cough and SOB tx for RAD last seen by me on 2/7/24.  PMHx includes stage I breast Ca dx roughly 10 years ago, DJD and hypothyroid. She is a life-long non-smoker. No known occupational or environmental exposures. She was referred to us in 2021 for RUIZ. PFT from 3/2021 was normal, CXR clear. She has chronic chest congestion, need to clear throat, worse at night which waxes and wanes. No GERD sxs. She does get PND and if significant will take OTC antihistamine.  She had been experiencing increased RUIZ for about a year for which we referred her to cardiology for resting tachycardia. Updated PFT from 12/2023 showed normal airflows with normal lung volumes and normal DLCO. She did have bronchodilator responsiveness and felt subjectively improved after the bronchodilator during testing. She has been using albuterol prn since that time with significant improvement in both SOB and cough. She has also seen cardiology and underwent left and right heart cath which showed mild elevation of left-sided filling pressures but no evidence of precapillary pulmonary hypertension or obstructive coronary artery disease. Metoprolol was increased and she is taking spironolactone. Since our last visit she has completed cardiac rehab however suffered a fall resulting in hip fracture requiring ORIF in May which she is still recovering from. She has noticed increase in throat clearing and describes a globus sensation today but SOB stable/good although activity has been limited.    Past Medical History:   Diagnosis Date    Acquired absence of both cervix and uterus 11/11/2014    Back pain     Breast cancer (HCC)     Cancer (HCC)     Chickenpox     Chronic left-sided low back pain without sciatica 03/17/2017    Constipation     Cough     Cystitis 06/12/2019     Diarrhea     Dyslipidemia 07/26/2016    Eczema     Fatigue     Frequent urination     Gout of foot 09/30/2016    H/O total hysterectomy 07/26/2016    High cholesterol     History of UTI 01/31/2020    Hx of breast cancer 07/26/2016    Hypertension     Hypopituitarism (HCC) 06/23/2023    Hypothyroidism 07/26/2016    Idiopathic chronic gout of right ankle 09/30/2016    Influenza     Lichen sclerosus of female genitalia     Nasal drainage     Nocturnal oxygen desaturation 08/07/2020    Obesity     Osteopenia 07/26/2016    Osteoporosis     Osteoporosis 07/26/2016    Overactive bladder 02/09/2020    Overweight     Recurrent UTI 01/31/2020    Rhinitis     Ringing in ears     Shortness of breath     Sputum production     Status post right knee replacement 05/05/2016    Status post total right knee replacement 08/17/2016    Swelling of lower extremity     Thyroid activity decreased     Tonsillitis     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     Vision loss     Vitamin D deficiency 07/26/2016    Whooping cough        Social History     Socioeconomic History    Marital status:      Spouse name: Not on file    Number of children: Not on file    Years of education: Not on file    Highest education level: Associate degree: academic program   Occupational History    Not on file   Tobacco Use    Smoking status: Never    Smokeless tobacco: Never   Vaping Use    Vaping status: Never Used   Substance and Sexual Activity    Alcohol use: Never     Comment: min    Drug use: No    Sexual activity: Yes     Partners: Male   Other Topics Concern    Not on file   Social History Narrative    ** Merged History Encounter **         From [place]      Social Determinants of Health     Financial Resource Strain: Low Risk  (7/13/2023)    Overall Financial Resource Strain (CARDIA)     Difficulty of Paying Living Expenses: Not hard at all   Food Insecurity: No Food Insecurity (7/13/2023)    Hunger Vital  Sign     Worried About Running Out of Food in the Last Year: Never true     Ran Out of Food in the Last Year: Never true   Transportation Needs: No Transportation Needs (5/14/2024)    PRAPARE - Transportation     Lack of Transportation (Medical): No     Lack of Transportation (Non-Medical): No   Physical Activity: Sufficiently Active (10/2/2019)    Exercise Vital Sign     Days of Exercise per Week: 3 days     Minutes of Exercise per Session: 90 min   Stress: Stress Concern Present (7/13/2023)    [employer] [place] [employer] - Occupational Stress Questionnaire     Feeling of Stress : To some extent   Social Connections: Unknown (7/13/2023)    Social Connection and Isolation Panel [NHANES]     Frequency of Communication with Friends and Family: More than three times a week     Frequency of Social Gatherings with Friends and Family: More than three times a week     Attends Sabianism Services: Not on file     Active Member of Clubs or Organizations: Yes     Attends Club or Organization Meetings: More than 4 times per year     Marital Status: Not on file   Intimate Partner Violence: Not on file   Housing Stability: Unknown (7/13/2023)    Housing Stability Vital Sign     Unable to Pay for Housing in the Last Year: No     Number of Places Lived in the Last Year: Not on file     Unstable Housing in the Last Year: No       Family History   Problem Relation Age of Onset    Arthritis Mother     Other Son         CELIAC DISEASE-SEVERE    Diabetes Other         GF    Arthritis Other         GM    Heart Disease Neg Hx        Current Outpatient Medications on File Prior to Visit   Medication Sig Dispense Refill    losartan (COZAAR) 25 MG Tab Take 1 Tablet by mouth 2 times a day. 180 Tablet 3    levothyroxine (SYNTHROID) 175 MCG Tab Take 175 mcg by mouth every Monday, Wednesday, and Friday. Brand name only      levalbuterol (XOPENEX HFA) 45 MCG/ACT inhaler Inhale 2 Puffs every four hours as needed for Shortness of  Breath. 3 Each 3    levothyroxine (SYNTHROID) 150 MCG Tab TAKE 1 TABLET 175 MCG ON MONDAY, WEDNESDAY, FRIDAY. TAKE 150MCG ON Sunday, TUESDAY, THURSDAY, and Saturday. 48 Tablet 3    valacyclovir (VALTREX) 1 GM Tab Take 2 Tablets by mouth every 12 hours. 2 g twice daily for one day. 20 Tablet 0    loratadine (CLARITIN) 10 MG Tab Take 10 mg by mouth every day.      Cholecalciferol (VITAMIN D3) 125 MCG/0.5ML Liquid Take 0.5 mL by mouth two times a week.      SYNTHROID 150 MCG Tab Take 1 Tablet by mouth in the morning every Tue, Thurs, Sat, and Sun. (Patient not taking: Reported on 5/28/2024) 30 Tablet 0    acetaminophen (TYLENOL) 500 MG Tab Take 500 mg by mouth at bedtime as needed for Mild Pain.      Melatonin 10 MG Tab Take 10 mg by mouth at bedtime as needed.  May repeat 1 time..      Spacer/Aero-Holding Chambers Device 1 Device as needed (SOB). 1 Each 0    silver sulfADIAZINE (SILVADENE) 1 % Cream Apply thin layer to affected area twice a day x 5 days (Patient not taking: Reported on 5/28/2024) 50 g 0    estradiol (ESTRACE) 0.1 MG/GM vaginal cream 1 time a day as needed. (Patient not taking: Reported on 5/28/2024)      clobetasol (TEMOVATE) 0.05 % Cream CLOBETASOL PROPIONATE 0.05 % CREA 30 g 1     No current facility-administered medications on file prior to visit.       Lisinopril, Atorvastatin, Atorvastatin, Hydrochlorothiazide, Lisinopril, and Other misc      ROS:   Review of Systems   Constitutional:  Negative for chills, diaphoresis, fever, malaise/fatigue and weight loss.   HENT:  Negative for congestion, ear discharge, ear pain, hearing loss, nosebleeds, sinus pain, sore throat and tinnitus.    Eyes:  Negative for blurred vision, double vision, photophobia, pain, discharge and redness.   Respiratory:  Negative for cough, hemoptysis, sputum production, shortness of breath, wheezing and stridor.    Cardiovascular:  Negative for chest pain, palpitations, orthopnea, claudication, leg swelling and PND.  "  Gastrointestinal:  Negative for abdominal pain, constipation, diarrhea, heartburn, nausea and vomiting.   Genitourinary:  Negative for dysuria and urgency.   Musculoskeletal:  Negative for back pain, falls, joint pain, myalgias and neck pain.   Skin:  Negative for itching and rash.   Neurological:  Negative for dizziness, tremors, speech change, focal weakness, weakness and headaches.   Endo/Heme/Allergies:  Negative for environmental allergies.   Psychiatric/Behavioral:  Negative for depression.        /60 (BP Location: Left arm, Patient Position: Sitting, BP Cuff Size: Adult)   Pulse 91   Ht 1.511 m (4' 11.5\")   Wt 79.8 kg (176 lb)   SpO2 97%   Physical Exam  Constitutional:       General: She is not in acute distress.     Appearance: Normal appearance. She is well-developed and normal weight.   HENT:      Head: Normocephalic and atraumatic.      Right Ear: External ear normal.      Left Ear: External ear normal.      Nose: Nose normal. No congestion.      Mouth/Throat:      Mouth: Mucous membranes are moist.      Pharynx: Oropharynx is clear. No oropharyngeal exudate.   Eyes:      General: No scleral icterus.     Extraocular Movements: Extraocular movements intact.      Conjunctiva/sclera: Conjunctivae normal.      Pupils: Pupils are equal, round, and reactive to light.   Neck:      Vascular: No JVD.      Trachea: No tracheal deviation.   Cardiovascular:      Rate and Rhythm: Normal rate and regular rhythm.      Heart sounds: Normal heart sounds. No murmur heard.     No friction rub. No gallop.   Pulmonary:      Effort: Pulmonary effort is normal. No accessory muscle usage or respiratory distress.      Breath sounds: Normal breath sounds. No wheezing or rales.   Abdominal:      General: There is no distension.      Palpations: Abdomen is soft.      Tenderness: There is no abdominal tenderness.   Musculoskeletal:         General: No tenderness or deformity. Normal range of motion.      Cervical back: " Normal range of motion and neck supple.      Right lower leg: No edema.      Left lower leg: No edema.   Lymphadenopathy:      Cervical: No cervical adenopathy.   Skin:     General: Skin is warm and dry.      Findings: No rash.      Nails: There is no clubbing.   Neurological:      Mental Status: She is alert and oriented to person, place, and time.      Cranial Nerves: No cranial nerve deficit.      Gait: Gait normal.   Psychiatric:         Behavior: Behavior normal.         PFTs as reviewed by me personally: as per hPI    Imagaing as reviewed by me personally:  as per hPI    Assessment:  1. Chronic cough        2. SOB (shortness of breath)            Plan:  While she has responded well to prn Olamide, I do think there is a GERD component particularly with more recent sxs. We discussed elevating HOB and lifestyle modifications, opted not to add medication today but continue observation as her activity increases and continue prn OLAMIDE  Improved but she has been less active see above.   Return in about 4 months (around 10/12/2024) for chronic cough .

## 2024-06-13 ENCOUNTER — APPOINTMENT (OUTPATIENT)
Dept: MEDICAL GROUP | Facility: IMAGING CENTER | Age: 84
End: 2024-06-13
Payer: MEDICARE

## 2024-06-17 ENCOUNTER — HOSPITAL ENCOUNTER (OUTPATIENT)
Dept: LAB | Facility: MEDICAL CENTER | Age: 84
End: 2024-06-17
Attending: PHYSICIAN ASSISTANT
Payer: MEDICARE

## 2024-06-17 DIAGNOSIS — M85.859 OSTEOPENIA OF HIP, UNSPECIFIED LATERALITY: ICD-10-CM

## 2024-06-17 DIAGNOSIS — E03.9 HYPOTHYROIDISM, UNSPECIFIED TYPE: ICD-10-CM

## 2024-06-17 DIAGNOSIS — D62 ACUTE BLOOD LOSS ANEMIA: ICD-10-CM

## 2024-06-17 LAB
ALBUMIN SERPL BCP-MCNC: 4.3 G/DL (ref 3.2–4.9)
ALBUMIN/GLOB SERPL: 1.5 G/DL
ALP SERPL-CCNC: 181 U/L (ref 30–99)
ALT SERPL-CCNC: 19 U/L (ref 2–50)
ANION GAP SERPL CALC-SCNC: 15 MMOL/L (ref 7–16)
AST SERPL-CCNC: 21 U/L (ref 12–45)
BASOPHILS # BLD AUTO: 0.8 % (ref 0–1.8)
BASOPHILS # BLD: 0.04 K/UL (ref 0–0.12)
BILIRUB SERPL-MCNC: 0.3 MG/DL (ref 0.1–1.5)
BUN SERPL-MCNC: 13 MG/DL (ref 8–22)
CALCIUM ALBUM COR SERPL-MCNC: 9.6 MG/DL (ref 8.5–10.5)
CALCIUM SERPL-MCNC: 9.8 MG/DL (ref 8.5–10.5)
CHLORIDE SERPL-SCNC: 105 MMOL/L (ref 96–112)
CO2 SERPL-SCNC: 21 MMOL/L (ref 20–33)
CREAT SERPL-MCNC: 0.67 MG/DL (ref 0.5–1.4)
EOSINOPHIL # BLD AUTO: 0.16 K/UL (ref 0–0.51)
EOSINOPHIL NFR BLD: 3 % (ref 0–6.9)
ERYTHROCYTE [DISTWIDTH] IN BLOOD BY AUTOMATED COUNT: 57.1 FL (ref 35.9–50)
FERRITIN SERPL-MCNC: 732 NG/ML (ref 10–291)
FOLATE SERPL-MCNC: 5.9 NG/ML
GFR SERPLBLD CREATININE-BSD FMLA CKD-EPI: 87 ML/MIN/1.73 M 2
GLOBULIN SER CALC-MCNC: 2.8 G/DL (ref 1.9–3.5)
GLUCOSE SERPL-MCNC: 96 MG/DL (ref 65–99)
HCT VFR BLD AUTO: 38.3 % (ref 37–47)
HGB BLD-MCNC: 11.8 G/DL (ref 12–16)
IMM GRANULOCYTES # BLD AUTO: 0.02 K/UL (ref 0–0.11)
IMM GRANULOCYTES NFR BLD AUTO: 0.4 % (ref 0–0.9)
IRON SATN MFR SERPL: 26 % (ref 15–55)
IRON SERPL-MCNC: 69 UG/DL (ref 40–170)
LYMPHOCYTES # BLD AUTO: 1.56 K/UL (ref 1–4.8)
LYMPHOCYTES NFR BLD: 29.7 % (ref 22–41)
MCH RBC QN AUTO: 27.7 PG (ref 27–33)
MCHC RBC AUTO-ENTMCNC: 30.8 G/DL (ref 32.2–35.5)
MCV RBC AUTO: 89.9 FL (ref 81.4–97.8)
MONOCYTES # BLD AUTO: 0.61 K/UL (ref 0–0.85)
MONOCYTES NFR BLD AUTO: 11.6 % (ref 0–13.4)
NEUTROPHILS # BLD AUTO: 2.87 K/UL (ref 1.82–7.42)
NEUTROPHILS NFR BLD: 54.5 % (ref 44–72)
NRBC # BLD AUTO: 0 K/UL
NRBC BLD-RTO: 0 /100 WBC (ref 0–0.2)
PLATELET # BLD AUTO: 309 K/UL (ref 164–446)
PMV BLD AUTO: 10.7 FL (ref 9–12.9)
POTASSIUM SERPL-SCNC: 4.4 MMOL/L (ref 3.6–5.5)
PROT SERPL-MCNC: 7.1 G/DL (ref 6–8.2)
RBC # BLD AUTO: 4.26 M/UL (ref 4.2–5.4)
SODIUM SERPL-SCNC: 141 MMOL/L (ref 135–145)
TIBC SERPL-MCNC: 268 UG/DL (ref 250–450)
TSH SERPL DL<=0.005 MIU/L-ACNC: 1.15 UIU/ML (ref 0.38–5.33)
UIBC SERPL-MCNC: 199 UG/DL (ref 110–370)
VIT B12 SERPL-MCNC: 493 PG/ML (ref 211–911)
WBC # BLD AUTO: 5.3 K/UL (ref 4.8–10.8)

## 2024-06-17 PROCEDURE — 80053 COMPREHEN METABOLIC PANEL: CPT

## 2024-06-17 PROCEDURE — 82607 VITAMIN B-12: CPT

## 2024-06-17 PROCEDURE — 36415 COLL VENOUS BLD VENIPUNCTURE: CPT

## 2024-06-17 PROCEDURE — 82728 ASSAY OF FERRITIN: CPT

## 2024-06-17 PROCEDURE — 83540 ASSAY OF IRON: CPT

## 2024-06-17 PROCEDURE — 82746 ASSAY OF FOLIC ACID SERUM: CPT

## 2024-06-17 PROCEDURE — 83550 IRON BINDING TEST: CPT

## 2024-06-17 PROCEDURE — 85025 COMPLETE CBC W/AUTO DIFF WBC: CPT

## 2024-06-17 PROCEDURE — 84443 ASSAY THYROID STIM HORMONE: CPT

## 2024-06-20 ENCOUNTER — OFFICE VISIT (OUTPATIENT)
Dept: MEDICAL GROUP | Facility: IMAGING CENTER | Age: 84
End: 2024-06-20
Payer: MEDICARE

## 2024-06-20 ENCOUNTER — HOSPITAL ENCOUNTER (OUTPATIENT)
Facility: MEDICAL CENTER | Age: 84
End: 2024-06-20
Attending: PHYSICIAN ASSISTANT
Payer: MEDICARE

## 2024-06-20 ENCOUNTER — APPOINTMENT (OUTPATIENT)
Dept: MEDICAL GROUP | Facility: IMAGING CENTER | Age: 84
End: 2024-06-20
Payer: MEDICARE

## 2024-06-20 VITALS
HEART RATE: 73 BPM | BODY MASS INDEX: 36.12 KG/M2 | HEIGHT: 60 IN | TEMPERATURE: 97.7 F | WEIGHT: 184 LBS | DIASTOLIC BLOOD PRESSURE: 74 MMHG | OXYGEN SATURATION: 95 % | RESPIRATION RATE: 17 BRPM | SYSTOLIC BLOOD PRESSURE: 128 MMHG

## 2024-06-20 DIAGNOSIS — E53.8 LOW SERUM VITAMIN B12: ICD-10-CM

## 2024-06-20 DIAGNOSIS — R35.0 URINARY FREQUENCY: ICD-10-CM

## 2024-06-20 DIAGNOSIS — E53.8 LOW FOLATE: ICD-10-CM

## 2024-06-20 DIAGNOSIS — R74.8 HIGH ALKALINE PHOSPHATASE: ICD-10-CM

## 2024-06-20 DIAGNOSIS — H91.13 PRESBYCUSIS OF BOTH EARS: ICD-10-CM

## 2024-06-20 DIAGNOSIS — Z87.440 HISTORY OF UTI: ICD-10-CM

## 2024-06-20 DIAGNOSIS — D62 ACUTE BLOOD LOSS ANEMIA: ICD-10-CM

## 2024-06-20 DIAGNOSIS — R82.90 ABNORMAL URINALYSIS: ICD-10-CM

## 2024-06-20 PROBLEM — N39.0 URINARY TRACT INFECTION: Status: RESOLVED | Noted: 2024-05-14 | Resolved: 2024-06-20

## 2024-06-20 LAB
APPEARANCE UR: CLEAR
BILIRUB UR STRIP-MCNC: NORMAL MG/DL
COLOR UR AUTO: NORMAL
GLUCOSE UR STRIP.AUTO-MCNC: NORMAL MG/DL
KETONES UR STRIP.AUTO-MCNC: NORMAL MG/DL
LEUKOCYTE ESTERASE UR QL STRIP.AUTO: NORMAL
NITRITE UR QL STRIP.AUTO: NORMAL
PH UR STRIP.AUTO: 5.5 [PH] (ref 5–8)
PROT UR QL STRIP: NORMAL MG/DL
RBC UR QL AUTO: NORMAL
SP GR UR STRIP.AUTO: 1
UROBILINOGEN UR STRIP-MCNC: 0.2 MG/DL

## 2024-06-20 PROCEDURE — 87086 URINE CULTURE/COLONY COUNT: CPT

## 2024-06-20 PROCEDURE — 81002 URINALYSIS NONAUTO W/O SCOPE: CPT | Performed by: PHYSICIAN ASSISTANT

## 2024-06-20 PROCEDURE — 1126F AMNT PAIN NOTED NONE PRSNT: CPT | Performed by: PHYSICIAN ASSISTANT

## 2024-06-20 PROCEDURE — 99214 OFFICE O/P EST MOD 30 MIN: CPT | Mod: 25 | Performed by: PHYSICIAN ASSISTANT

## 2024-06-20 PROCEDURE — 87077 CULTURE AEROBIC IDENTIFY: CPT

## 2024-06-20 PROCEDURE — 87186 SC STD MICRODIL/AGAR DIL: CPT

## 2024-06-20 RX ORDER — VITAMIN B COMPLEX
1 CAPSULE ORAL DAILY
COMMUNITY
Start: 2024-06-20

## 2024-06-20 RX ORDER — SULFAMETHOXAZOLE AND TRIMETHOPRIM 800; 160 MG/1; MG/1
1 TABLET ORAL 2 TIMES DAILY
Qty: 14 TABLET | Refills: 0 | Status: SHIPPED | OUTPATIENT
Start: 2024-06-20 | End: 2024-06-27

## 2024-06-20 RX ORDER — SOTAGLIFLOZIN 200 MG/1
1 TABLET ORAL DAILY
COMMUNITY
Start: 2024-06-04

## 2024-06-20 RX ORDER — CYANOCOBALAMIN 1000 UG/ML
1000 INJECTION, SOLUTION INTRAMUSCULAR; SUBCUTANEOUS ONCE
Status: COMPLETED | OUTPATIENT
Start: 2024-06-20 | End: 2024-06-20

## 2024-06-20 RX ADMIN — CYANOCOBALAMIN 1000 MCG: 1000 INJECTION, SOLUTION INTRAMUSCULAR; SUBCUTANEOUS at 14:09

## 2024-06-20 ASSESSMENT — FIBROSIS 4 INDEX: FIB4 SCORE: 1.28

## 2024-06-20 ASSESSMENT — PAIN SCALES - GENERAL: PAINLEVEL: NO PAIN

## 2024-06-20 NOTE — ASSESSMENT & PLAN NOTE
Status post treatment with Macrobid.  Still having urinary frequency, most notably at night.  She is on an SGLT2 inhibitor from her cardiologist.  No burning with urination or blood in the urine.

## 2024-06-20 NOTE — PROGRESS NOTES
Subjective:     CC:   Chief Complaint   Patient presents with    Follow-Up     On lab results from 06/17/24    Urinary Frequency     Thinks antibiotics did not work for UTI         HPI:   Flavia presents today with her daughter Cher to discuss:    Acute blood loss anemia  Hemoglobin improving.  Ferritin elevated.  B12 and folic acid borderline low.     Hearing loss  Patient admits to chronic hearing loss.  Would like to get updated audiology assessment.    High alkaline phosphatase  Noted in labs, recent femur fracture.  No abdominal pain or nausea.  History of gallstones.    History of UTI  Status post treatment with Macrobid.  Still having urinary frequency, most notably at night.  She is on an SGLT2 inhibitor from her cardiologist.  No burning with urination or blood in the urine.      Past Medical History:   Diagnosis Date    Acquired absence of both cervix and uterus 11/11/2014    Back pain     Breast cancer (HCC)     Cancer ([facility])     Chickenpox     Chronic left-sided low back pain without sciatica 03/17/2017    Constipation     Cough     Cystitis 06/12/2019    Diarrhea     Dyslipidemia 07/26/2016    Eczema     Fatigue     Frequent urination     Gout of foot 09/30/2016    H/O total hysterectomy 07/26/2016    High cholesterol     History of UTI 01/31/2020    Hx of breast cancer 07/26/2016    Hypertension     Hypopituitarism (HCC) 06/23/2023    Hypothyroidism 07/26/2016    Idiopathic chronic gout of right ankle 09/30/2016    Influenza     Lichen sclerosus of female genitalia     Nasal drainage     Nocturnal oxygen desaturation 08/07/2020    Obesity     Osteopenia 07/26/2016    Osteoporosis     Osteoporosis 07/26/2016    Overactive bladder 02/09/2020    Overweight     Recurrent UTI 01/31/2020    Rhinitis     Ringing in ears     Shortness of breath     Sputum production     Status post right knee replacement 05/05/2016    Status post total right knee replacement 08/17/2016    Swelling of lower extremity      Thyroid activity decreased     Tonsillitis     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     Urinary tract infection 05/14/2024    Vision loss     Vitamin D deficiency 07/26/2016    Whooping cough      Family History   Problem Relation Age of Onset    Arthritis Mother     Other Son         CELIAC DISEASE-SEVERE    Diabetes Other         GF    Arthritis Other         GM    Heart Disease Neg Hx      Past Surgical History:   Procedure Laterality Date    ORIF, FRACTURE, FEMUR Left 4/29/2024    Procedure: ORIF, FRACTURE, DISTAL FEMUR;  Surgeon: Jasiel Tolbert M.D.;  Location: SURGERY [facility];  Service: Trauma    IRRIGATION & DEBRIDEMENT GENERAL Left 4/29/2024    Procedure: IRRIGATION AND DEBRIDEMENT, LEFT FEMUR;  Surgeon: Jasiel Tolbert M.D.;  Location: SURGERY [facility];  Service: Trauma    LUMPECTOMY Right 2012    ARTHROSCOPY, KNEE      HYSTERECTOMY LAPAROSCOPY      KNEE ARTHROPLASTY TOTAL Right     OTHER      LIPOSUCTION THIGHS-LEG LIFT    WY REMV 2ND CATARACT,CORN-SCLER SECTN      VAGINAL HYSTERECTOMY TOTAL      Hysterectomy,Total Vaginal     Social History     Tobacco Use    Smoking status: Never    Smokeless tobacco: Never   Vaping Use    Vaping status: Never Used   Substance Use Topics    Alcohol use: Never     Comment: min    Drug use: No     Social History     Social History Narrative    ** Merged History Encounter **         From [place]      Current Outpatient Medications Ordered in Epic   Medication Sig Dispense Refill    INPEFA 200 MG Tab Take 1 Tablet by mouth every day.      b complex vitamins capsule Take 1 Capsule by mouth every day.      sulfamethoxazole-trimethoprim (BACTRIM DS) 800-160 MG tablet Take 1 Tablet by mouth 2 times a day for 7 days. 14 Tablet 0    Cholecalciferol (VITAMIN D3) 125 MCG/0.5ML Liquid Take 0.5 mL by mouth two times a week.      losartan (COZAAR) 25 MG Tab Take 1 Tablet by mouth 2 times a day. 180 Tablet 3    levothyroxine  "(SYNTHROID) 175 MCG Tab Take 175 mcg by mouth every Monday, Wednesday, and Friday. Brand name only      acetaminophen (TYLENOL) 500 MG Tab Take 500 mg by mouth at bedtime as needed for Mild Pain.      Melatonin 10 MG Tab Take 10 mg by mouth at bedtime as needed.  May repeat 1 time..      levalbuterol (XOPENEX HFA) 45 MCG/ACT inhaler Inhale 2 Puffs every four hours as needed for Shortness of Breath. 3 Each 3    Spacer/Aero-Holding Chambers Device 1 Device as needed (SOB). 1 Each 0    levothyroxine (SYNTHROID) 150 MCG Tab TAKE 1 TABLET 175 MCG ON MONDAY, WEDNESDAY, FRIDAY. TAKE 150MCG ON Sunday, TUESDAY, THURSDAY, and Saturday. 48 Tablet 3    valacyclovir (VALTREX) 1 GM Tab Take 2 Tablets by mouth every 12 hours. 2 g twice daily for one day. 20 Tablet 0    clobetasol (TEMOVATE) 0.05 % Cream CLOBETASOL PROPIONATE 0.05 % CREA 30 g 1    loratadine (CLARITIN) 10 MG Tab Take 10 mg by mouth every day.       No current [facility]-ordered facility-administered medications on file.     Lisinopril, Atorvastatin, Atorvastatin, Hydrochlorothiazide, Lisinopril, and Other misc    PMH/PSH/FH/Social history reviewed.  Vaccinations discussed.  Previous records and labs reviewed. Discussed age appropriate anticipatory guidance.    ROS: see hpi  Gen: no fevers/chills  Pulm: no sob, no cough  CV: no chest pain, no palpitations, no edema  GI: no nausea/vomiting, no diarrhea  Skin: no rash    Objective:   Exam:  /74 (BP Location: Left arm, Patient Position: Sitting, BP Cuff Size: Adult)   Pulse 73   Temp 36.5 °C (97.7 °F) (Temporal)   Resp 17   Ht 1.511 m (4' 11.5\")   Wt 83.5 kg (184 lb)   SpO2 95%   BMI 36.54 kg/m²    Body mass index is 36.54 kg/m².    Gen: Alert and oriented, No apparent distress.  HEENT: Head atraumatic, normocephalic. Pupils equal and round.  Bilateral TMs pearly gray.  Neck: Neck is supple without lymphadenopathy.   Lungs: Normal effort, CTA bilaterally, no wheezes, rhonchi, or rales  CV: Regular rate and " rhythm. No murmurs, rubs, or gallops.  Ext: No clubbing, cyanosis, edema.    Assessment & Plan:     84 y.o. female with the following -     1. Abnormal urinalysis  Recheck culture, trial Bactrim.  Patient to follow-up with cardiologist regarding continued SGLT2 inhibitor use with recurrent UTI.  Will check renal ultrasound and PVRs if symptoms persist.  Urology referral placed for consultation.  - sulfamethoxazole-trimethoprim (BACTRIM DS) 800-160 MG tablet; Take 1 Tablet by mouth 2 times a day for 7 days.  Dispense: 14 Tablet; Refill: 0  - URINE CULTURE(NEW); Future    2. History of UTI  - POCT Urinalysis  - sulfamethoxazole-trimethoprim (BACTRIM DS) 800-160 MG tablet; Take 1 Tablet by mouth 2 times a day for 7 days.  Dispense: 14 Tablet; Refill: 0  - US-RENAL; Future  - Referral to Urology  - URINE CULTURE(NEW); Future    3. Urinary frequency  - US-RENAL; Future  - Referral to Urology  - URINE CULTURE(NEW); Future    4. Low serum vitamin B12  - cyanocobalamin (Vitamin B-12) injection 1,000 mcg  - b complex vitamins capsule; Take 1 Capsule by mouth every day.    5. Acute blood loss anemia  Continue to monitor labs, recheck in 4 weeks.  - CBC WITH DIFFERENTIAL; Future    6. Low folate  - b complex vitamins capsule; Take 1 Capsule by mouth every day.    7. High alkaline phosphatase  Recheck in 4 weeks, likely due to recent femur fracture.  - ALKALINE PHOSPHATASE ISOENZYMES; Future  - GAMMA GT (GGT); Future    8. Presbycusis of both ears  - Referral to Audiology    Return in about 1 month (around 7/20/2024) for Follow-up labs/tests.    Almita Valenzuela PA-C ([facility])  Physician Assistant Certified  [facility]      Please note that this dictation was created using voice recognition software. I have made every reasonable attempt to correct obvious errors, but I expect that there are errors of grammar and possibly content that I did not discover before finalizing the note.

## 2024-06-20 NOTE — PATIENT INSTRUCTIONS
It was a pleasure meeting with you today at [facility]!    Your medical history/records and medications were reviewed today.     UPDATE on MyChart Results: If you have blood work, and/or imaging studies, or any other test or procedure completed, you will have access to results as soon as they become available in MyChart. Recently, these results will be available for review at the same time that your provider is able to see results!    This will likely mean you will see a result before your provider has had a chance to review and discuss with you.  Some results or care notes may be hard to understand and may be serious in nature.    We look at every result and your provider will contact you to explain what they mean and discuss appropriate next steps. Please allow for at least 72 business hours for chart and result review.     We prefer that you wait for your care team to contact you with your results.  Often, your provider will discuss your results with you at your next appointment. We look forward to continuing to partner with you in your care.    Please review my practice information below:    If you have any prescription refill requests, please send them via [employer] or discuss with your provider at the start of your office visits. Please allow 3-5 business days for lab and testing review and you will be contacted via [employer] with those results, or if advised to make a follow up appointment regarding those results, then please do so.     Once resulted, your lab/test/imaging results will show up automatically in your MyChart. Please wait for my interpretation and recommendations prior to viewing your results to avoid any unnecessary confusion or misinterpretation. I will address all of the lab values that I interpret as abnormal and message you accordingly on your MyChart. I will always send you a message about your results even if they are normal. If you do not hear back from me within 5-7 business  days after completing your tests, then please send me a message on [facility] so I can obtain your results (especially if you went to an outside lab or imaging center - [facility], [facility], etc).     If you have any additional questions or concerns beyond my interpretation of your results, please make an appointment with me to discuss in further detail.    Please only use the [facility] messaging system for questions regarding your most recent appointment or if advised to use otherwise (glucose or blood pressure reporting).     If you have any new problems or concerns, you must make an appointment to discuss. This includes any referral requests, lab requests (unless advised to notify me for pre-appt labs), medication side effects, or request for medication adjustments.     Please arrive 15 minutes prior to your appointment time to complete your check-in and intake with the medical assistant.      Thank you,    Amlita Valenzuela PA-C ([place])  Physician Assistant Certified  [facility]    -----------------------------------------------------------------    Attn: Patients of [facility]:    In an effort to continue to provide excellent and efficient care to our patients, it is vital that we continue to use our resources appropriately. With that, this is a reminder that [facility] is used for prescription refill requests, test results, virtual visits, and chart review only.     Any new questions, concerns/conditions, lab/imaging requests, medication adjustments, new prescriptions, or referral requests do require an appointment (virtually or in person), unless discussed otherwise at your most recent appointment.     Thank you for your understanding,    [facility]

## 2024-07-12 DIAGNOSIS — E03.9 HYPOTHYROIDISM, UNSPECIFIED TYPE: ICD-10-CM

## 2024-07-15 RX ORDER — LEVOTHYROXINE SODIUM 0.15 MG/1
TABLET ORAL
Qty: 48 TABLET | Refills: 3 | Status: SHIPPED | OUTPATIENT
Start: 2024-07-15

## 2024-07-15 RX ORDER — LEVOTHYROXINE SODIUM 175 MCG
TABLET ORAL
Qty: 36 TABLET | Refills: 3 | Status: SHIPPED | OUTPATIENT
Start: 2024-07-15

## 2024-07-18 ENCOUNTER — HOSPITAL ENCOUNTER (OUTPATIENT)
Dept: LAB | Facility: MEDICAL CENTER | Age: 84
End: 2024-07-18
Attending: PHYSICIAN ASSISTANT
Payer: MEDICARE

## 2024-07-18 DIAGNOSIS — D62 ACUTE BLOOD LOSS ANEMIA: ICD-10-CM

## 2024-07-18 DIAGNOSIS — R74.8 HIGH ALKALINE PHOSPHATASE: ICD-10-CM

## 2024-07-18 LAB
BASOPHILS # BLD AUTO: 1 % (ref 0–1.8)
BASOPHILS # BLD: 0.06 K/UL (ref 0–0.12)
EOSINOPHIL # BLD AUTO: 0.16 K/UL (ref 0–0.51)
EOSINOPHIL NFR BLD: 2.6 % (ref 0–6.9)
ERYTHROCYTE [DISTWIDTH] IN BLOOD BY AUTOMATED COUNT: 55.8 FL (ref 35.9–50)
GGT SERPL-CCNC: 40 U/L (ref 7–34)
HCT VFR BLD AUTO: 39.4 % (ref 37–47)
HGB BLD-MCNC: 12 G/DL (ref 12–16)
IMM GRANULOCYTES # BLD AUTO: 0.02 K/UL (ref 0–0.11)
IMM GRANULOCYTES NFR BLD AUTO: 0.3 % (ref 0–0.9)
LYMPHOCYTES # BLD AUTO: 1.67 K/UL (ref 1–4.8)
LYMPHOCYTES NFR BLD: 27.1 % (ref 22–41)
MCH RBC QN AUTO: 27.7 PG (ref 27–33)
MCHC RBC AUTO-ENTMCNC: 30.5 G/DL (ref 32.2–35.5)
MCV RBC AUTO: 91 FL (ref 81.4–97.8)
MONOCYTES # BLD AUTO: 0.65 K/UL (ref 0–0.85)
MONOCYTES NFR BLD AUTO: 10.5 % (ref 0–13.4)
NEUTROPHILS # BLD AUTO: 3.61 K/UL (ref 1.82–7.42)
NEUTROPHILS NFR BLD: 58.5 % (ref 44–72)
NRBC # BLD AUTO: 0 K/UL
NRBC BLD-RTO: 0 /100 WBC (ref 0–0.2)
PLATELET # BLD AUTO: 329 K/UL (ref 164–446)
PMV BLD AUTO: 11.1 FL (ref 9–12.9)
RBC # BLD AUTO: 4.33 M/UL (ref 4.2–5.4)
WBC # BLD AUTO: 6.2 K/UL (ref 4.8–10.8)

## 2024-07-18 PROCEDURE — 85025 COMPLETE CBC W/AUTO DIFF WBC: CPT

## 2024-07-18 PROCEDURE — 82977 ASSAY OF GGT: CPT

## 2024-07-18 PROCEDURE — 84075 ASSAY ALKALINE PHOSPHATASE: CPT

## 2024-07-18 PROCEDURE — 84080 ASSAY ALKALINE PHOSPHATASES: CPT

## 2024-07-18 PROCEDURE — 36415 COLL VENOUS BLD VENIPUNCTURE: CPT

## 2024-07-23 ENCOUNTER — APPOINTMENT (OUTPATIENT)
Dept: MEDICAL GROUP | Facility: IMAGING CENTER | Age: 84
End: 2024-07-23
Payer: MEDICARE

## 2024-07-23 VITALS
SYSTOLIC BLOOD PRESSURE: 114 MMHG | WEIGHT: 184 LBS | HEIGHT: 59 IN | TEMPERATURE: 97.8 F | OXYGEN SATURATION: 95 % | DIASTOLIC BLOOD PRESSURE: 74 MMHG | BODY MASS INDEX: 37.09 KG/M2 | RESPIRATION RATE: 16 BRPM | HEART RATE: 71 BPM

## 2024-07-23 DIAGNOSIS — E55.9 VITAMIN D DEFICIENCY: ICD-10-CM

## 2024-07-23 DIAGNOSIS — D62 ACUTE BLOOD LOSS ANEMIA: ICD-10-CM

## 2024-07-23 DIAGNOSIS — Z13.220 SCREENING CHOLESTEROL LEVEL: ICD-10-CM

## 2024-07-23 DIAGNOSIS — K82.9 DISORDER OF GALLBLADDER: ICD-10-CM

## 2024-07-23 DIAGNOSIS — K80.20 CALCULUS OF GALLBLADDER WITHOUT CHOLECYSTITIS WITHOUT OBSTRUCTION: ICD-10-CM

## 2024-07-23 DIAGNOSIS — R74.8 HIGH ALKALINE PHOSPHATASE: ICD-10-CM

## 2024-07-23 DIAGNOSIS — Z78.9 PHYSICIAN ORDERS FOR LIFE-SUSTAINING TREATMENT (POLST) FORM INDICATES PATIENT WISH FOR FULL CODE RESUSCITATION STATUS: ICD-10-CM

## 2024-07-23 PROBLEM — R82.90 ABNORMAL URINALYSIS: Status: RESOLVED | Noted: 2024-05-28 | Resolved: 2024-07-23

## 2024-07-23 PROBLEM — Z87.440 HISTORY OF UTI: Status: RESOLVED | Noted: 2020-01-31 | Resolved: 2024-07-23

## 2024-07-23 PROCEDURE — 99214 OFFICE O/P EST MOD 30 MIN: CPT | Mod: 25 | Performed by: PHYSICIAN ASSISTANT

## 2024-07-23 PROCEDURE — 3078F DIAST BP <80 MM HG: CPT | Performed by: PHYSICIAN ASSISTANT

## 2024-07-23 PROCEDURE — 1126F AMNT PAIN NOTED NONE PRSNT: CPT | Performed by: PHYSICIAN ASSISTANT

## 2024-07-23 PROCEDURE — 3074F SYST BP LT 130 MM HG: CPT | Performed by: PHYSICIAN ASSISTANT

## 2024-07-23 PROCEDURE — 99497 ADVNCD CARE PLAN 30 MIN: CPT | Mod: 33 | Performed by: PHYSICIAN ASSISTANT

## 2024-07-23 ASSESSMENT — PAIN SCALES - GENERAL: PAINLEVEL: NO PAIN

## 2024-07-23 ASSESSMENT — FIBROSIS 4 INDEX: FIB4 SCORE: 1.2

## 2024-07-24 LAB
ALP BONE SERPL-CCNC: 117 U/L (ref 0–55)
ALP ISOS SERPL HS-CCNC: 0 U/L
ALP LIVER SERPL-CCNC: 45 U/L (ref 0–94)
ALP SERPL-CCNC: 162 U/L (ref 40–120)

## 2024-07-29 PROBLEM — M79.672 PAIN OF LEFT FOOT: Status: ACTIVE | Noted: 2024-07-29

## 2024-08-01 ENCOUNTER — APPOINTMENT (OUTPATIENT)
Dept: RADIOLOGY | Facility: MEDICAL CENTER | Age: 84
End: 2024-08-01
Attending: PHYSICIAN ASSISTANT
Payer: MEDICARE

## 2024-08-01 DIAGNOSIS — Z87.440 HISTORY OF UTI: ICD-10-CM

## 2024-08-01 DIAGNOSIS — R35.0 URINARY FREQUENCY: ICD-10-CM

## 2024-08-01 PROCEDURE — 76775 US EXAM ABDO BACK WALL LIM: CPT

## 2024-08-21 ENCOUNTER — OFFICE VISIT (OUTPATIENT)
Dept: CARDIOLOGY | Facility: MEDICAL CENTER | Age: 84
End: 2024-08-21
Attending: INTERNAL MEDICINE
Payer: MEDICARE

## 2024-08-21 VITALS
HEIGHT: 59 IN | BODY MASS INDEX: 36.89 KG/M2 | OXYGEN SATURATION: 94 % | RESPIRATION RATE: 16 BRPM | SYSTOLIC BLOOD PRESSURE: 108 MMHG | HEART RATE: 94 BPM | DIASTOLIC BLOOD PRESSURE: 62 MMHG | WEIGHT: 183 LBS

## 2024-08-21 DIAGNOSIS — I47.11 INAPPROPRIATE SINUS TACHYCARDIA (HCC): ICD-10-CM

## 2024-08-21 DIAGNOSIS — I50.32 CHRONIC HEART FAILURE WITH PRESERVED EJECTION FRACTION (HFPEF) (HCC): ICD-10-CM

## 2024-08-21 DIAGNOSIS — I10 ESSENTIAL HYPERTENSION: ICD-10-CM

## 2024-08-21 DIAGNOSIS — I20.89 STABLE ANGINA (HCC): ICD-10-CM

## 2024-08-21 DIAGNOSIS — I20.89 MICROVASCULAR ANGINA (HCC): Primary | ICD-10-CM

## 2024-08-21 PROCEDURE — 3074F SYST BP LT 130 MM HG: CPT | Performed by: INTERNAL MEDICINE

## 2024-08-21 PROCEDURE — 99213 OFFICE O/P EST LOW 20 MIN: CPT | Performed by: INTERNAL MEDICINE

## 2024-08-21 PROCEDURE — G2211 COMPLEX E/M VISIT ADD ON: HCPCS | Performed by: INTERNAL MEDICINE

## 2024-08-21 PROCEDURE — 3078F DIAST BP <80 MM HG: CPT | Performed by: INTERNAL MEDICINE

## 2024-08-21 RX ORDER — LOSARTAN POTASSIUM 25 MG/1
25 TABLET ORAL 2 TIMES DAILY
Qty: 180 TABLET | Refills: 3 | Status: SHIPPED | OUTPATIENT
Start: 2024-08-21

## 2024-08-21 ASSESSMENT — FIBROSIS 4 INDEX: FIB4 SCORE: 1.2

## 2024-08-21 NOTE — PATIENT INSTRUCTIONS
Let me know when you are cleared to resume cardiac rehab from physical therapy  Let the lab know in October to send me the results

## 2024-08-21 NOTE — PROGRESS NOTES
CARDIOLOGY established PATIENT:    PCP: Almita Valenzuela P.A.-C.    1. Microvascular angina (HCC)    2. Stable angina (HCC)    3. Chronic heart failure with preserved ejection fraction (HFpEF) (HCC)    4. Essential hypertension    5. Inappropriate sinus tachycardia (HCC)        Flavia Garrett here for HFpEF, inapp ST, microvascular angina follow up    Chief Complaint   Patient presents with    Hypertension     F/V dx: Essential hypertension    Hyperlipidemia    Angina (Stable)       History:     Flavia Garrett is an 84 y.o. female with history of stage I breast cancer about in 2011 (partial right sided lumpectomy with LN dissection), gallbladder stones / sludge, RBBB, dyslipidemia, hypertension, arthritis, stable angina due to microvascular disease, HFpEF, and hypothyroidism was originally referred from the pulmonary medicine clinic for dyspnea on exertion concerns and tachycardia.  Is here for follow-up.     She met with Dr. Vela with the pulmonary medicine team 7/20/2023 and referred for evaluation for her dyspnea on exertion and tachycardia episodes. She finished 6 months of PT until 4/2023 (back pain).     Clinic 9/2023: Due to ongoing dyspnea on exertion, arrange for a TTE, cardiac PET, started metoprolol, offered semaglutide trial however deferred given gallbladder issues, and started atorvastatin 20 mg. TTE with normal LVEF, aortic valve sclerosis without stenosis, grade 1 LV diastolic dysfunction.     Change losartan-HCTZ to losartan 50 mg 11/2/23 due to feeling more tired and lightheaded.     Fell July 2023 due to tripping.     Clinic 12/2023: Started spironolactone 25 mg, decrease losartan to 25 mg, discussed R/LHC.     2/2024 after the R/LHC: Cardiac monitor, increase metoprolol, started rosuvastatin 5 mg, referred to cardiac rehab     Clinic 3/2024: she stopped metoprolol, rosuvastatin and spironolactone (blurry eyes , fatigue, mind fog). 6 min walk test then: lowest sat 90%, peak  bpm.  Felt SOB. On RA. Starting HR 115bpm.      ICR started 4/2024. She really enjoyed ICR and was feeling good while doing it with manageable RUIZ and no CP concerns.     4/29/24: mechanical fall with left femur fracture, s/p Open reduction internal fixation left intra-articular distal femur fracture on 4/29 with postacute PT placement. Discharged from rehab 5/14/24. Losartan stopped 5/6/24 due to hypotension. Then, treated for UTI with Omnicef. Had acute blood loss anemia post-op. Discharged on Aspirin for DVT ppx.    Clinic 5/2024: We agreed to hold ivabradine and Inpefa until more recovery with improved anemia.  Placed referral to infusion center for iron infusion.  Increased losartan 25 mg to twice daily given hypertension    Got one iron infusion. Hgb improved to 11.8 (6/2024) from 7.8 (5/2024).    Continues to do well from cardiac standpoint. Still working with PT. lost weight, eating healthier but she admits to enjoying butter.  Rare palpitations.  Denies concerning placement exertional shortness of breath at this time.  Denies chest pain or lower extremity edema.    AM and PM BP and HR log ( before taking losartan): average 120/60-70's, HR 60-80's bpm.    Inpefa worsened her UTIs.  lipid panel planned to be rechecked 10/2024 by PCP.     Cardiac event monitor 2/2024: Personally reviewed  Rare PACs and PVCs   Rare brief SVT   Patient triggered events correlated predominantly sinus rhythm and rare PVCs   Reassuring cardiac event monitor findings      RHC and LHC: 2/5/24 personally performed  1.  Mildly elevated right heart filling pressures: CVP 10mmHg  2.  Mildly elevated left heart filling pressures: PCWP 15-18mmHg  3.  Mild postcapillary pulmonary hypertension: mean PA 25mmHg, PASP 35mmHg, PVR < 2WU  4.  Normal resting cardiac output  5.  Mildly elevated SVR ~ 1400  6.  Favorable , CPI and kael  7 . Mild slow coronary flow phenomena in all epicardial coronary arteries consistent with microvascular disease which  "was suspected on the recent cardiac PET scan     Cardiac PET 9/2023: Personally reviewed  No ischemia  TID 1.0  Normal estimated LVEF  Reduced CFR 1.8     TTE 9/22/23: Personally reviewed  Compared to the prior study on 3/18/21, no significant changes.  Normal LV size, thickness and systolic function with normal estimated   LVEF 60%  Grade I LV diastolic dysfunction with normal estimated LA pressure.  Mildly dilated LA.  Normal RV size and systolic function.  AV sclerosis without stenosis.  Normal IVC size  Unable to estimate RVSP  No pericardial effusion     Lipid panel 1/2023:  ,       Normal A1C 3/2022 5.2%.     Treadmill stress test 6/2021:  Baseline rhythm sinus with right bundle branch block.   Poor exercise capacity.   With stress ST changes noted due to bundle branch block, but not suggestive of ischemia   Overall negative stress electrocardiogram for ischemia.     TTE 3/2021: Personally reviewed  Normal LV systolic function, LVEF 60%  Grade 1 LV diastolic dysfunction  No significant valvular abnormalities  Normal IVC size  Unable to estimate RVSP       Functional status:  NYHA Class: I  I: no symptoms with activity. Normal  II. Mild symptoms with normal physical activity and comfortable at rest.  III: Moderate symptoms with less than normal physical activity. Only comfortable at rest  IV: Severe symptoms with symptoms of heart failure, even at rest.      PE:  /62 (BP Location: Left arm, Patient Position: Sitting, BP Cuff Size: Adult)   Pulse 94   Resp 16   Ht 1.499 m (4' 11\")   Wt 83 kg (183 lb)   SpO2 94%   BMI 36.96 kg/m²     Gen: well  HEENT: Symmetric face.  NECK: No JVD.   CARDIAC: Regular, Normal S1, S2, No murmur  VASCULATURE: carotids are normal bilaterally without bruit  RESP: Clear to auscultation bilaterally   EXT: No edema  SKIN: Warm and dry  NEURO: No gross deficits  PSYCH: Appropriate affect, participates in conversation    The ASCVD Risk score (Delbert GOVEA, et al., " 2019) failed to calculate.    Past Medical History:   Diagnosis Date    Acquired absence of both cervix and uterus 11/11/2014    Back pain     Breast cancer (HCC)     Cancer (HCC)     Chickenpox     Chronic left-sided low back pain without sciatica 03/17/2017    Constipation     Cough     Cystitis 06/12/2019    Diarrhea     Dyslipidemia 07/26/2016    Eczema     Fatigue     Frequent urination     Gout of foot 09/30/2016    H/O total hysterectomy 07/26/2016    High cholesterol     History of UTI 01/31/2020    History of UTI 01/31/2020    Hx of breast cancer 07/26/2016    Hypertension     Hypopituitarism (HCC) 06/23/2023    Hypothyroidism 07/26/2016    Idiopathic chronic gout of right ankle 09/30/2016    Influenza     Lichen sclerosus of female genitalia     Nasal drainage     Nocturnal oxygen desaturation 08/07/2020    Obesity     Osteopenia 07/26/2016    Osteopenia 07/26/2016    Osteoporosis     Osteoporosis 07/26/2016    Overactive bladder 02/09/2020    Overweight     Recurrent UTI 01/31/2020    Rhinitis     Ringing in ears     Shortness of breath     Sputum production     Status post right knee replacement 05/05/2016    Status post total right knee replacement 08/17/2016    Swelling of lower extremity     Thyroid activity decreased     Tonsillitis     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     Urinary tract infection 05/14/2024    Vision loss     Vitamin D deficiency 07/26/2016    Whooping cough      Past Surgical History:   Procedure Laterality Date    [place] REMV FOOT FOREIGN BODY,DEEP Left 7/31/2024    Procedure: LEFT FOOT FOREIGN BODY REMOVAL;  Surgeon: Saman Weldon M.D.;  Location: [facility];  Service: Orthopedics    ORIF, FRACTURE, FEMUR Left 4/29/2024    Procedure: ORIF, FRACTURE, DISTAL FEMUR;  Surgeon: Jasiel Tolbert M.D.;  Location: SURGERY [facility];  Service: Trauma    IRRIGATION & DEBRIDEMENT GENERAL Left 4/29/2024    Procedure: IRRIGATION  "AND DEBRIDEMENT, LEFT FEMUR;  Surgeon: Jasiel Tolbert M.D.;  Location: SURGERY [facility];  Service: Trauma    LUMPECTOMY Right 2012    ARTHROSCOPY, KNEE      HYSTERECTOMY LAPAROSCOPY      KNEE ARTHROPLASTY TOTAL Right     OTHER      LIPOSUCTION THIGHS-LEG LIFT    IL REMV 2ND CATARACT,CORN-SCLER SECTN      VAGINAL HYSTERECTOMY TOTAL      Hysterectomy,Total Vaginal     Allergies   Allergen Reactions    Lisinopril      Cramps and upset stomach    Other Reaction(s): Not available    Atorvastatin Myalgia     Leg pain    Atorvastatin     Hydrochlorothiazide     Inpefa [Sotagliflozin] Unspecified     UTIs    Lisinopril     Other Misc      Hydroc hlorathiazide - rxn - \"too dehydrated\" per pt     Outpatient Encounter Medications as of 8/21/2024   Medication Sig Dispense Refill    losartan (COZAAR) 25 MG Tab Take 1 Tablet by mouth 2 times a day. 180 Tablet 3    SYNTHROID 175 MCG Tab TAKE 1 TABLET ON MONDAY,   WEDNESDAY, AND FRIDAY AND  150MCG ON SUNDAY, TUESDAY, THURSDAY, AND SATURDAY. 36 Tablet 3    levothyroxine (SYNTHROID) 150 MCG Tab TAKE 175MCG ON MONDAY, WEDNESDAY, AND FRIDAY, AND 1 TABLET OF 150MCG ON SUNDAY, TUESDAY, THURSDAY, AND SATURDAY. 48 Tablet 3    b complex vitamins capsule Take 1 Capsule by mouth every day.      Cholecalciferol (VITAMIN D3) 125 MCG/0.5ML Liquid Take 0.5 mL by mouth two times a week.      acetaminophen (TYLENOL) 500 MG Tab Take 500 mg by mouth at bedtime as needed for Mild Pain.      Melatonin 10 MG Tab Take 10 mg by mouth at bedtime as needed.  May repeat 1 time..      levalbuterol (XOPENEX HFA) 45 MCG/ACT inhaler Inhale 2 Puffs every four hours as needed for Shortness of Breath. 3 Each 3    Spacer/Aero-Holding Chambers Device 1 Device as needed (SOB). 1 Each 0    valacyclovir (VALTREX) 1 GM Tab Take 2 Tablets by mouth every 12 hours. 2 g twice daily for one day. 20 Tablet 0    clobetasol (TEMOVATE) 0.05 % Cream CLOBETASOL PROPIONATE 0.05 % CREA 30 g 1    [DISCONTINUED] hydrOXYzine " pamoate (VISTARIL) 25 MG Cap Take 1 Capsule by mouth 3 times a day as needed for Itching (nausea, insomnia). (Patient not taking: Reported on 8/21/2024) 20 Capsule 0    [DISCONTINUED] gabapentin (NEURONTIN) 100 MG Cap Take 1 Capsule by mouth at bedtime. (Patient not taking: Reported on 8/21/2024) 7 Capsule 0    [DISCONTINUED] losartan (COZAAR) 25 MG Tab Take 1 Tablet by mouth 2 times a day. 180 Tablet 3     No facility-administered encounter medications on file as of 8/21/2024.     Social History     Socioeconomic History    Marital status:      Spouse name: Not on file    Number of children: Not on file    Years of education: Not on file    Highest education level: Associate degree: academic program   Occupational History    Not on file   Tobacco Use    Smoking status: Never    Smokeless tobacco: Never   Vaping Use    Vaping status: Never Used   Substance and Sexual Activity    Alcohol use: Never     Comment: min    Drug use: No    Sexual activity: Yes     Partners: Male   Other Topics Concern    Not on file   Social History Narrative    ** Merged History Encounter **         From [place]      Social Determinants of Health     Financial Resource Strain: Low Risk  (7/13/2023)    Overall Financial Resource Strain (CARDIA)     Difficulty of Paying Living Expenses: Not hard at all   Food Insecurity: No Food Insecurity (7/13/2023)    Hunger Vital Sign     Worried About Running Out of Food in the Last Year: Never true     Ran Out of Food in the Last Year: Never true   Transportation Needs: No Transportation Needs (5/14/2024)    PRAPARE - Transportation     Lack of Transportation (Medical): No     Lack of Transportation (Non-Medical): No   Physical Activity: Sufficiently Active (10/2/2019)    Exercise Vital Sign     Days of Exercise per Week: 3 days     Minutes of Exercise per Session: 90 min   Stress: Stress Concern Present (7/13/2023)    [employer] - Occupational Stress  Questionnaire     Feeling of Stress : To some extent   Social Connections: Unknown (7/13/2023)    Social Connection and Isolation Panel [NHANES]     Frequency of Communication with Friends and Family: More than three times a week     Frequency of Social Gatherings with Friends and Family: More than three times a week     Attends Hoahaoism Services: Not on file     Active Member of Clubs or Organizations: Yes     Attends Club or Organization Meetings: More than 4 times per year     Marital Status: Not on file   Intimate Partner Violence: Not on file   Housing Stability: Unknown (7/13/2023)    Housing Stability Vital Sign     Unable to Pay for Housing in the Last Year: No     Number of Places Lived in the Last Year: Not on file     Unstable Housing in the Last Year: No     Family History   Problem Relation Age of Onset    Arthritis Mother     Other Son         CELIAC DISEASE-SEVERE    Diabetes Other         GF    Arthritis Other         GM    Heart Disease Neg Hx          Studies    Lab Results   Component Value Date/Time    TSHULTRASEN 1.150 06/17/2024 1258        Lab Results   Component Value Date/Time    FREET4 1.56 06/28/2023 0738      Lab Results   Component Value Date/Time    HBA1C 5.2 03/15/2022 12:00 AM     Lab Results   Component Value Date/Time    CHOLSTRLTOT 217 01/24/2023 12:00 AM     01/24/2023 12:00 AM    HDL 45 01/24/2023 12:00 AM    TRIGLYCERIDE 213 01/24/2023 12:00 AM       Lab Results   Component Value Date/Time    SODIUM 141 06/17/2024 12:58 PM    POTASSIUM 4.4 06/17/2024 12:58 PM    CHLORIDE 105 06/17/2024 12:58 PM    CO2 21 06/17/2024 12:58 PM    GLUCOSE 96 06/17/2024 12:58 PM    BUN 13 06/17/2024 12:58 PM    CREATININE 0.67 06/17/2024 12:58 PM     Lab Results   Component Value Date/Time    ALKPHOSPHAT 181 (H) 06/17/2024 12:58 PM    ASTSGOT 21 06/17/2024 12:58 PM    ALTSGPT 19 06/17/2024 12:58 PM    TBILIRUBIN 0.3 06/17/2024 12:58 PM        Echocardiogram:  No results found for this or any  previous visit.      Assessment and Recommendations:    Problem List Items Addressed This Visit          Cardiology Medicine Problems    Essential hypertension    Relevant Medications    losartan (COZAAR) 25 MG Tab    Microvascular angina (HCC) - Primary    Relevant Medications    losartan (COZAAR) 25 MG Tab    Inappropriate sinus tachycardia (HCC)    Relevant Medications    losartan (COZAAR) 25 MG Tab    Stable angina (HCC)    Relevant Medications    losartan (COZAAR) 25 MG Tab     Other Visit Diagnoses       Chronic heart failure with preserved ejection fraction (HFpEF) (HCC)        Relevant Medications    losartan (COZAAR) 25 MG Tab          Ms. Garrett is doing well from a cardiac standpoint.  No concerns of angina, decompensated HFpEF and no concerning arrhythmias at this time.  We agreed to avoid resuming Inpefa or antiplatelet medication at this time given exacerbation of UTIs.  Rare palpitations, we agreed not to resume ivabradine at this time    Blood pressure normal and well-controlled on losartan 25 mg twice daily    Hg much improved after iron supplementation.  Almost normal    Advised her to let me know when cleared from physical therapy to help her reinitiate her cardiac rehab plan.  It was on hold given her fall and fracture    Advised her to let the lab send me her upcoming lipid profile results in October to readdress management plan, briefly discussed with her Repatha utility.Target TG < 150 and LDL < 100.  She is doing her best with staying active and heart healthy diet    Thank you for the opportunity to be involved in Flavia Garrett 's  complex cardiovascular care; and please reach out with any questions or concerns.     () Today's E/M visit is associated with medical care services that serve as the continuing focal point for all needed health care services and/or with medical care services that  are part of ongoing cardiac care related to a patient's single, serious condition, or a  complex condition: This includes  furnishing services to patients on an ongoing basis that result in care that is personalized  to the patient. The services result in a comprehensive, longitudinal, and continuous  relationship with the patient and involve delivery of team-based care that is accessible, coordinated with other practitioners and providers, and integrated with the broader health  care landscape.     Return in about 6 months (around 2/21/2025).    John Campoverde MD, MPH [facility]  Interventional Cardiologist  [facility]   of Clinical Internal Medicine - [facility]    ~ Portions of this note were completed using voice recognition software (Dragon Naturally speaking software) . Occasional transcription errors may have escaped proof reading. I have made every reasonable attempt to correct obvious errors, but I expect that there are errors of grammar and possibly content that I did not discover before finalizing the note. ~

## 2024-09-12 ENCOUNTER — APPOINTMENT (OUTPATIENT)
Dept: RADIOLOGY | Facility: IMAGING CENTER | Age: 84
End: 2024-09-12
Attending: PHYSICIAN ASSISTANT
Payer: MEDICARE

## 2024-09-12 ENCOUNTER — OFFICE VISIT (OUTPATIENT)
Dept: URGENT CARE | Facility: CLINIC | Age: 84
End: 2024-09-12
Payer: MEDICARE

## 2024-09-12 ENCOUNTER — APPOINTMENT (OUTPATIENT)
Dept: TELEHEALTH | Facility: TELEMEDICINE | Age: 84
End: 2024-09-12
Payer: MEDICARE

## 2024-09-12 VITALS
SYSTOLIC BLOOD PRESSURE: 102 MMHG | BODY MASS INDEX: 34.95 KG/M2 | RESPIRATION RATE: 18 BRPM | HEART RATE: 79 BPM | OXYGEN SATURATION: 93 % | DIASTOLIC BLOOD PRESSURE: 62 MMHG | TEMPERATURE: 98.1 F | HEIGHT: 60 IN | WEIGHT: 178 LBS

## 2024-09-12 DIAGNOSIS — R53.83 MALAISE AND FATIGUE: ICD-10-CM

## 2024-09-12 DIAGNOSIS — R63.0 LOSS OF APPETITE: ICD-10-CM

## 2024-09-12 DIAGNOSIS — N30.00 ACUTE CYSTITIS WITHOUT HEMATURIA: ICD-10-CM

## 2024-09-12 DIAGNOSIS — R53.81 MALAISE AND FATIGUE: ICD-10-CM

## 2024-09-12 PROCEDURE — 99215 OFFICE O/P EST HI 40 MIN: CPT | Performed by: PHYSICIAN ASSISTANT

## 2024-09-12 PROCEDURE — 71046 X-RAY EXAM CHEST 2 VIEWS: CPT | Mod: TC | Performed by: PHYSICIAN ASSISTANT

## 2024-09-12 PROCEDURE — 3078F DIAST BP <80 MM HG: CPT | Performed by: PHYSICIAN ASSISTANT

## 2024-09-12 PROCEDURE — 3074F SYST BP LT 130 MM HG: CPT | Performed by: PHYSICIAN ASSISTANT

## 2024-09-12 ASSESSMENT — ENCOUNTER SYMPTOMS
SHORTNESS OF BREATH: 0
ABDOMINAL PAIN: 0
EYE DISCHARGE: 0
FLANK PAIN: 0
HEADACHES: 0
DIZZINESS: 0
VOMITING: 0
SORE THROAT: 0
DIARRHEA: 0
RESPIRATORY NEGATIVE: 1
CHILLS: 0
FALLS: 1
FEVER: 0
WHEEZING: 0
DIAPHORESIS: 0
EYE REDNESS: 0
LOSS OF CONSCIOUSNESS: 1
CARDIOVASCULAR NEGATIVE: 1
SINUS PAIN: 0
EYE PAIN: 0
NAUSEA: 0
CONSTIPATION: 0
COUGH: 0

## 2024-09-12 ASSESSMENT — FIBROSIS 4 INDEX: FIB4 SCORE: 1.2

## 2024-09-12 NOTE — PROGRESS NOTES
"  Subjective:     Flavia Garrett  is a 84 y.o. female who presents for Influenza (Has had flew symptoms since September 3rd. Hasn't been able to eat. Isn't taking any meds currently for the flu.)       She presents today with concerns of an influenza infection that has been ongoing since September 3.  She is currently experiencing general malaise, fatigue and loss of appetite.  Notes reduced food intake since September 3, she has continued fluid intake.  She has also experienced 2 episodes of syncope that occurred since September 3.  She denies fever/chills/sweats, chest pain, shortness of breath, nausea/vomiting, abdominal pain, diarrhea.  No urinary tract symptoms.       Review of Systems   Constitutional:  Positive for malaise/fatigue. Negative for chills, diaphoresis and fever.   HENT:  Negative for congestion, ear discharge, sinus pain and sore throat.    Eyes:  Negative for pain, discharge and redness.   Respiratory: Negative.  Negative for cough, shortness of breath and wheezing.    Cardiovascular: Negative.  Negative for chest pain.   Gastrointestinal:  Negative for abdominal pain, constipation, diarrhea, nausea and vomiting.   Genitourinary:  Negative for dysuria, flank pain, frequency, hematuria and urgency.   Musculoskeletal:  Positive for falls.   Neurological:  Positive for loss of consciousness. Negative for dizziness and headaches.      Allergies   Allergen Reactions    Lisinopril      Cramps and upset stomach    Other Reaction(s): Not available    Atorvastatin Myalgia     Leg pain    Atorvastatin     Hydrochlorothiazide     Inpefa [Sotagliflozin] Unspecified     UTIs    Lisinopril     Other Misc      Hydroc hlorathiazide - rxn - \"too dehydrated\" per pt     Past Medical History:   Diagnosis Date    Acquired absence of both cervix and uterus 11/11/2014    Back pain     Breast cancer (HCC)     Cancer (HCC)     Chickenpox     Chronic left-sided low back pain without sciatica 03/17/2017    " "Constipation     Cough     Cystitis 06/12/2019    Diarrhea     Dyslipidemia 07/26/2016    Eczema     Fatigue     Frequent urination     Gout of foot 09/30/2016    H/O total hysterectomy 07/26/2016    High cholesterol     History of UTI 01/31/2020    History of UTI 01/31/2020    Hx of breast cancer 07/26/2016    Hypertension     Hypopituitarism (HCC) 06/23/2023    Hypothyroidism 07/26/2016    Idiopathic chronic gout of right ankle 09/30/2016    Influenza     Lichen sclerosus of female genitalia     Nasal drainage     Nocturnal oxygen desaturation 08/07/2020    Obesity     Osteopenia 07/26/2016    Osteopenia 07/26/2016    Osteoporosis     Osteoporosis 07/26/2016    Overactive bladder 02/09/2020    Overweight     Recurrent UTI 01/31/2020    Rhinitis     Ringing in ears     Shortness of breath     Sputum production     Status post right knee replacement 05/05/2016    Status post total right knee replacement 08/17/2016    Swelling of lower extremity     Thyroid activity decreased     Tonsillitis     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     Urinary tract infection 05/14/2024    Vision loss     Vitamin D deficiency 07/26/2016    Whooping cough         Objective:   /62 (BP Location: Right arm, Patient Position: Sitting, BP Cuff Size: Adult)   Pulse 79   Temp 36.7 °C (98.1 °F) (Temporal)   Resp 18   Ht 1.511 m (4' 11.5\")   Wt 80.7 kg (178 lb)   SpO2 93%   BMI 35.35 kg/m²   Physical Exam  Vitals and nursing note reviewed.   Constitutional:       General: She is not in acute distress.     Appearance: Normal appearance. She is not ill-appearing, toxic-appearing or diaphoretic.      Comments: Presents using a front wheel walker   HENT:      Head: Normocephalic.      Right Ear: Tympanic membrane, ear canal and external ear normal. There is no impacted cerumen.      Left Ear: Tympanic membrane, ear canal and external ear normal. There is no impacted cerumen.      Nose: No congestion " or rhinorrhea.      Mouth/Throat:      Mouth: Mucous membranes are moist.      Pharynx: No oropharyngeal exudate or posterior oropharyngeal erythema.   Eyes:      General:         Right eye: No discharge.         Left eye: No discharge.      Conjunctiva/sclera: Conjunctivae normal.   Cardiovascular:      Rate and Rhythm: Normal rate and regular rhythm.   Pulmonary:      Effort: Pulmonary effort is normal. No respiratory distress.      Breath sounds: Normal breath sounds. No stridor. No wheezing or rhonchi.   Musculoskeletal:      Cervical back: Neck supple.   Lymphadenopathy:      Cervical: No cervical adenopathy.   Neurological:      General: No focal deficit present.      Mental Status: She is alert and oriented to person, place, and time.   Psychiatric:         Mood and Affect: Mood normal.         Behavior: Behavior normal.         Thought Content: Thought content normal.         Judgment: Judgment normal.             Diagnostic testing:    POC urinalysis-patient was unable to provide urine sample during today's visit    2 view chest x-ray  Radiologist IMPRESSION:  1. No active cardiopulmonary abnormalities are identified.    CBC, CMP, urinalysis-pending    Assessment/Plan:     Encounter Diagnoses   Name Primary?    Malaise and fatigue     Loss of appetite           Plan for care for today's complaint includes obtaining chest x-ray today to rule out pneumonia, this was negative.  I also wanted to obtain a urinalysis during today's office visit due to history of urinary tract infections and concern for urinary tract infection at this time; however, patient was unable to provide urine sample for us during today's visit.  We did provide her multiple water bottles and allowed her roughly 45 minutes to attempt to provide us a urine sample, she did attempt twice with no success.  Did place a lab order for urinalysis to be performed tomorrow, also ordered CBC and CMP for further evaluation of her current symptoms.   "Encouraged patient to closely monitor the symptoms of follow-up in the emergency department if they worsen or become severe.  Vital signs were stable today and overall patient was well-appearing during the visit..    See AVS Instructions below for written guidance provided to patient on after-visit management and care in addition to our verbal discussion during the visit.    Please note that this dictation was created using voice recognition software. I have attempted to correct all errors, but there may be sound-alike, spelling, grammar and possibly content errors that I did not discover before finalizing the note.    Time spent evaluating the patient was 42 minutes which included preparing for the visit, obtaining history, examination, ordering labs/tests/procedures/medications, independent interpretation, discussion of plan, counseling/education, medical information reconciliation, and documentation into chart.       Александр Trinidad PA-C    **Addendum**  Contacted patient via phone call discussed results of the labs that were obtained.  Lab results showed mild hyponatremia at 131, encouraged starting electrolyte beverages for support of this value. Slightly reduced GFR of to 57, this is likely transient from her reduced food and fluid intake recently.  She also had leukocytes on her urinalysis.  She does have a history of UTIs, do have suspicion of her current malaise, fatigue and loss of appetite being related to a urinary tract infection and we will start her on Keflex. This antibiotic was chosen after patient declined wanting to take cefdinir, macrobid or bactrim stating \"I have taken them previously and they did not treat my UTI\" despite most recent urine culture on 6/20/2024 showed susceptibility to these medications.  Patient did understand and agree to this treatment plan.  Encouraged her to closely monitor symptoms and follow-up immediately in the emergency department if symptoms worsen.  Encouraged to " schedule follow-up appoint with primary care within the next 1-2 weeks for reevaluation.    Александр Trinidad PA-C

## 2024-09-13 ENCOUNTER — HOSPITAL ENCOUNTER (OUTPATIENT)
Dept: LAB | Facility: MEDICAL CENTER | Age: 84
End: 2024-09-13
Attending: PHYSICIAN ASSISTANT
Payer: MEDICARE

## 2024-09-13 DIAGNOSIS — R53.83 MALAISE AND FATIGUE: ICD-10-CM

## 2024-09-13 DIAGNOSIS — R63.0 LOSS OF APPETITE: ICD-10-CM

## 2024-09-13 DIAGNOSIS — R53.81 MALAISE AND FATIGUE: ICD-10-CM

## 2024-09-13 LAB
ALBUMIN SERPL BCP-MCNC: 3.5 G/DL (ref 3.2–4.9)
ALBUMIN/GLOB SERPL: 1.1 G/DL
ALP SERPL-CCNC: 74 U/L (ref 30–99)
ALT SERPL-CCNC: 24 U/L (ref 2–50)
ANION GAP SERPL CALC-SCNC: 12 MMOL/L (ref 7–16)
APPEARANCE UR: CLEAR
AST SERPL-CCNC: 32 U/L (ref 12–45)
BACTERIA #/AREA URNS HPF: NEGATIVE /HPF
BASOPHILS # BLD AUTO: 0.2 % (ref 0–1.8)
BASOPHILS # BLD: 0.01 K/UL (ref 0–0.12)
BILIRUB SERPL-MCNC: 0.5 MG/DL (ref 0.1–1.5)
BILIRUB UR QL STRIP.AUTO: NEGATIVE
BUN SERPL-MCNC: 21 MG/DL (ref 8–22)
CALCIUM ALBUM COR SERPL-MCNC: 9.4 MG/DL (ref 8.5–10.5)
CALCIUM SERPL-MCNC: 9 MG/DL (ref 8.5–10.5)
CHLORIDE SERPL-SCNC: 96 MMOL/L (ref 96–112)
CO2 SERPL-SCNC: 23 MMOL/L (ref 20–33)
COLOR UR: YELLOW
CREAT SERPL-MCNC: 0.98 MG/DL (ref 0.5–1.4)
EOSINOPHIL # BLD AUTO: 0.19 K/UL (ref 0–0.51)
EOSINOPHIL NFR BLD: 3.8 % (ref 0–6.9)
EPI CELLS #/AREA URNS HPF: NEGATIVE /HPF
ERYTHROCYTE [DISTWIDTH] IN BLOOD BY AUTOMATED COUNT: 49.7 FL (ref 35.9–50)
GFR SERPLBLD CREATININE-BSD FMLA CKD-EPI: 57 ML/MIN/1.73 M 2
GLOBULIN SER CALC-MCNC: 3.1 G/DL (ref 1.9–3.5)
GLUCOSE SERPL-MCNC: 108 MG/DL (ref 65–99)
GLUCOSE UR STRIP.AUTO-MCNC: NEGATIVE MG/DL
GRAN CASTS #/AREA URNS LPF: ABNORMAL /LPF
HCT VFR BLD AUTO: 35.4 % (ref 37–47)
HGB BLD-MCNC: 11.5 G/DL (ref 12–16)
HYALINE CASTS #/AREA URNS LPF: ABNORMAL /LPF
IMM GRANULOCYTES # BLD AUTO: 0.04 K/UL (ref 0–0.11)
IMM GRANULOCYTES NFR BLD AUTO: 0.8 % (ref 0–0.9)
KETONES UR STRIP.AUTO-MCNC: NEGATIVE MG/DL
LEUKOCYTE ESTERASE UR QL STRIP.AUTO: ABNORMAL
LYMPHOCYTES # BLD AUTO: 0.57 K/UL (ref 1–4.8)
LYMPHOCYTES NFR BLD: 11.4 % (ref 22–41)
MCH RBC QN AUTO: 27.9 PG (ref 27–33)
MCHC RBC AUTO-ENTMCNC: 32.5 G/DL (ref 32.2–35.5)
MCV RBC AUTO: 85.9 FL (ref 81.4–97.8)
MICRO URNS: ABNORMAL
MONOCYTES # BLD AUTO: 0.83 K/UL (ref 0–0.85)
MONOCYTES NFR BLD AUTO: 16.6 % (ref 0–13.4)
NEUTROPHILS # BLD AUTO: 3.37 K/UL (ref 1.82–7.42)
NEUTROPHILS NFR BLD: 67.2 % (ref 44–72)
NITRITE UR QL STRIP.AUTO: NEGATIVE
NRBC # BLD AUTO: 0 K/UL
NRBC BLD-RTO: 0 /100 WBC (ref 0–0.2)
PH UR STRIP.AUTO: 6 [PH] (ref 5–8)
PLATELET # BLD AUTO: 260 K/UL (ref 164–446)
PMV BLD AUTO: 10.8 FL (ref 9–12.9)
POTASSIUM SERPL-SCNC: 4.1 MMOL/L (ref 3.6–5.5)
PROT SERPL-MCNC: 6.6 G/DL (ref 6–8.2)
PROT UR QL STRIP: NEGATIVE MG/DL
RBC # BLD AUTO: 4.12 M/UL (ref 4.2–5.4)
RBC # URNS HPF: ABNORMAL /HPF
RBC UR QL AUTO: NEGATIVE
SODIUM SERPL-SCNC: 131 MMOL/L (ref 135–145)
SP GR UR STRIP.AUTO: 1.01
UROBILINOGEN UR STRIP.AUTO-MCNC: 0.2 MG/DL
WBC # BLD AUTO: 5 K/UL (ref 4.8–10.8)
WBC #/AREA URNS HPF: ABNORMAL /HPF

## 2024-09-13 PROCEDURE — 36415 COLL VENOUS BLD VENIPUNCTURE: CPT

## 2024-09-13 PROCEDURE — 81001 URINALYSIS AUTO W/SCOPE: CPT

## 2024-09-13 PROCEDURE — 85025 COMPLETE CBC W/AUTO DIFF WBC: CPT

## 2024-09-13 PROCEDURE — 87086 URINE CULTURE/COLONY COUNT: CPT

## 2024-09-13 PROCEDURE — 80053 COMPREHEN METABOLIC PANEL: CPT

## 2024-09-13 RX ORDER — CEPHALEXIN 500 MG/1
500 CAPSULE ORAL 4 TIMES DAILY
Qty: 28 CAPSULE | Refills: 0 | Status: SHIPPED | OUTPATIENT
Start: 2024-09-13 | End: 2024-09-20

## 2024-09-15 ENCOUNTER — TELEPHONE (OUTPATIENT)
Dept: URGENT CARE | Facility: CLINIC | Age: 84
End: 2024-09-15
Payer: MEDICARE

## 2024-09-15 LAB
BACTERIA UR CULT: NORMAL
SIGNIFICANT IND 70042: NORMAL
SITE SITE: NORMAL
SOURCE SOURCE: NORMAL

## 2024-09-15 NOTE — TELEPHONE ENCOUNTER
Called patient today in regards to urine culture. Let patient know that her urine culture did come back negative so per provider [place] Vivi P.A.-C. she needs to stop taking antibiotic. Patient is to also follow up with her primary care provider. Patient did not have any further questions or concerns at the moment.

## 2024-09-17 ENCOUNTER — APPOINTMENT (OUTPATIENT)
Dept: SLEEP MEDICINE | Facility: MEDICAL CENTER | Age: 84
End: 2024-09-17
Payer: MEDICARE

## 2024-09-30 ENCOUNTER — HOSPITAL ENCOUNTER (OUTPATIENT)
Facility: MEDICAL CENTER | Age: 84
End: 2024-09-30
Attending: PHYSICIAN ASSISTANT
Payer: MEDICARE

## 2024-09-30 ENCOUNTER — OFFICE VISIT (OUTPATIENT)
Dept: MEDICAL GROUP | Facility: IMAGING CENTER | Age: 84
End: 2024-09-30
Payer: MEDICARE

## 2024-09-30 VITALS
HEART RATE: 92 BPM | WEIGHT: 180.6 LBS | DIASTOLIC BLOOD PRESSURE: 62 MMHG | HEIGHT: 60 IN | RESPIRATION RATE: 18 BRPM | OXYGEN SATURATION: 97 % | TEMPERATURE: 98.4 F | BODY MASS INDEX: 35.46 KG/M2 | SYSTOLIC BLOOD PRESSURE: 128 MMHG

## 2024-09-30 DIAGNOSIS — N28.9 RENAL INSUFFICIENCY: ICD-10-CM

## 2024-09-30 DIAGNOSIS — E87.1 HYPONATREMIA: ICD-10-CM

## 2024-09-30 DIAGNOSIS — R82.90 ABNORMAL URINALYSIS: ICD-10-CM

## 2024-09-30 DIAGNOSIS — E78.2 MIXED HYPERLIPIDEMIA: ICD-10-CM

## 2024-09-30 DIAGNOSIS — E55.9 VITAMIN D DEFICIENCY: ICD-10-CM

## 2024-09-30 DIAGNOSIS — D64.9 ANEMIA, UNSPECIFIED TYPE: ICD-10-CM

## 2024-09-30 LAB
APPEARANCE UR: NORMAL
BILIRUB UR STRIP-MCNC: NEGATIVE MG/DL
COLOR UR AUTO: YELLOW
GLUCOSE UR STRIP.AUTO-MCNC: NEGATIVE MG/DL
KETONES UR STRIP.AUTO-MCNC: NEGATIVE MG/DL
LEUKOCYTE ESTERASE UR QL STRIP.AUTO: NORMAL
NITRITE UR QL STRIP.AUTO: NEGATIVE
PH UR STRIP.AUTO: 6 [PH] (ref 5–8)
PROT UR QL STRIP: NORMAL MG/DL
RBC UR QL AUTO: NORMAL
SP GR UR STRIP.AUTO: 1.02
UROBILINOGEN UR STRIP-MCNC: NORMAL MG/DL

## 2024-09-30 PROCEDURE — 87077 CULTURE AEROBIC IDENTIFY: CPT

## 2024-09-30 PROCEDURE — 87186 SC STD MICRODIL/AGAR DIL: CPT

## 2024-09-30 PROCEDURE — 87086 URINE CULTURE/COLONY COUNT: CPT

## 2024-09-30 RX ORDER — HYDROCODONE BITARTRATE AND ACETAMINOPHEN 5; 325 MG/1; MG/1
TABLET ORAL
COMMUNITY
Start: 2024-09-18

## 2024-09-30 ASSESSMENT — FIBROSIS 4 INDEX: FIB4 SCORE: 2.06

## 2024-09-30 NOTE — PATIENT INSTRUCTIONS
It was a pleasure meeting with you today at [facility]!    Your medical history/records and medications were reviewed today.     UPDATE on MyChart Results: If you have blood work, and/or imaging studies, or any other test or procedure completed, you will have access to results as soon as they become available in MyChart. Recently, these results will be available for review at the same time that your provider is able to see results!    This will likely mean you will see a result before your provider has had a chance to review and discuss with you.  Some results or care notes may be hard to understand and may be serious in nature.    We look at every result and your provider will contact you to explain what they mean and discuss appropriate next steps. Please allow for at least 72 business hours for chart and result review.     We prefer that you wait for your care team to contact you with your results.  Often, your provider will discuss your results with you at your next appointment. We look forward to continuing to partner with you in your care.    Please review my practice information below:    If you have any prescription refill requests, please send them via [employer] or discuss with your provider at the start of your office visits. Please allow 3-5 business days for lab and testing review and you will be contacted via [employer] with those results, or if advised to make a follow up appointment regarding those results, then please do so.     Once resulted, your lab/test/imaging results will show up automatically in your MyChart. Please wait for my interpretation and recommendations prior to viewing your results to avoid any unnecessary confusion or misinterpretation. I will address all of the lab values that I interpret as abnormal and message you accordingly on your MyChart. I will always send you a message about your results even if they are normal. If you do not hear back from me within 5-7 business  days after completing your tests, then please send me a message on [employer] so I can obtain your results (especially if you went to an outside lab or imaging center - [facility], [facility], etc).     If you have any additional questions or concerns beyond my interpretation of your results, please make an appointment with me to discuss in further detail.    Please only use the [employer] messaging system for questions regarding your most recent appointment or if advised to use otherwise (glucose or blood pressure reporting).     If you have any new problems or concerns, you must make an appointment to discuss. This includes any referral requests, lab requests (unless advised to notify me for pre-appt labs), medication side effects, or request for medication adjustments.     Please arrive 15 minutes prior to your appointment time to complete your check-in and intake with the medical assistant.      Thank you,    Almita Valenzuela PA-C ([place])  Physician Assistant Certified  [facility]    -----------------------------------------------------------------    Attn: Patients of [facility]:    In an effort to continue to provide excellent and efficient care to our patients, it is vital that we continue to use our resources appropriately. With that, this is a reminder that [employer] is used for prescription refill requests, test results, virtual visits, and chart review only.     Any new questions, concerns/conditions, lab/imaging requests, medication adjustments, new prescriptions, or referral requests do require an appointment (virtually or in person), unless discussed otherwise at your most recent appointment.     Thank you for your understanding,    [facility]

## 2024-09-30 NOTE — ASSESSMENT & PLAN NOTE
Patient with recent urgent care visit due to flulike illness.  She was found to have abnormal urinalysis with negative culture.  Labs showed anemia and hyponatremia.  She states that she is feeling much better since then.  She has been drinking 1 electrolyte packet a day and resting and hydrating.  Eating normally.  No visible bleeding.  No dysuria.  
Rivaroxaban/Xarelto

## 2024-09-30 NOTE — PROGRESS NOTES
Subjective:     CC:   Chief Complaint   Patient presents with    Malaise     [facility] s/p UC visit DX: Malaise and poor appetite: pt feeling better       HPI:   Flavia presents today to discuss:    Abnormal urinalysis  Patient with recent urgent care visit due to flulike illness.  She was found to have abnormal urinalysis with negative culture.  Labs showed anemia and hyponatremia.  She states that she is feeling much better since then.  She has been drinking 1 electrolyte packet a day and resting and hydrating.  Eating normally.  No visible bleeding.  No dysuria.      Past Medical History:   Diagnosis Date    Acquired absence of both cervix and uterus 11/11/2014    Back pain     Breast cancer (HCC)     Cancer (HCC)     Chickenpox     Chronic left-sided low back pain without sciatica 03/17/2017    Constipation     Cough     Cystitis 06/12/2019    Diarrhea     Dyslipidemia 07/26/2016    Eczema     Fatigue     Frequent urination     Gout of foot 09/30/2016    H/O total hysterectomy 07/26/2016    High cholesterol     History of UTI 01/31/2020    History of UTI 01/31/2020    Hx of breast cancer 07/26/2016    Hypertension     Hypopituitarism (HCC) 06/23/2023    Hypothyroidism 07/26/2016    Idiopathic chronic gout of right ankle 09/30/2016    Influenza     Lichen sclerosus of female genitalia     Nasal drainage     Nocturnal oxygen desaturation 08/07/2020    Obesity     Osteopenia 07/26/2016    Osteopenia 07/26/2016    Osteoporosis     Osteoporosis 07/26/2016    Overactive bladder 02/09/2020    Overweight     Recurrent UTI 01/31/2020    Rhinitis     Ringing in ears     Shortness of breath     Sputum production     Status post right knee replacement 05/05/2016    Status post total right knee replacement 08/17/2016    Swelling of lower extremity     Thyroid activity decreased     Tonsillitis     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     Urinary tract infection 05/14/2024    Vision loss      Vitamin D deficiency 07/26/2016    Whooping cough      Family History   Problem Relation Age of Onset    Arthritis Mother     Other Son         CELIAC DISEASE-SEVERE    Diabetes Other         GF    Arthritis Other         GM    Heart Disease Neg Hx      Past Surgical History:   Procedure Laterality Date    NJ REMV FOOT FOREIGN BODY,DEEP Left 7/31/2024    Procedure: LEFT FOOT FOREIGN BODY REMOVAL;  Surgeon: Saman Weldon M.D.;  Location: [facility];  Service: Orthopedics    ORIF, FRACTURE, FEMUR Left 4/29/2024    Procedure: ORIF, FRACTURE, DISTAL FEMUR;  Surgeon: Jasiel Tolbert M.D.;  Location: [facility];  Service: Trauma    IRRIGATION & DEBRIDEMENT GENERAL Left 4/29/2024    Procedure: IRRIGATION AND DEBRIDEMENT, LEFT FEMUR;  Surgeon: Jasiel Tolbert M.D.;  Location: SURGERY [facility];  Service: Trauma    LUMPECTOMY Right 2012    ARTHROSCOPY, KNEE      HYSTERECTOMY LAPAROSCOPY      KNEE ARTHROPLASTY TOTAL Right     OTHER      LIPOSUCTION THIGHS-LEG LIFT    NJ REMV 2ND CATARACT,CORN-SCLER SECTN      VAGINAL HYSTERECTOMY TOTAL      Hysterectomy,Total Vaginal     Social History     Tobacco Use    Smoking status: Never    Smokeless tobacco: Never   Vaping Use    Vaping status: Never Used   Substance Use Topics    Alcohol use: Never     Comment: min    Drug use: No     Social History     Social History Narrative    ** Merged History Encounter **         From [place]      Current Outpatient Medications Ordered in Epic   Medication Sig Dispense Refill    HYDROcodone-acetaminophen (NORCO) 5-325 MG Tab per tablet       losartan (COZAAR) 25 MG Tab Take 1 Tablet by mouth 2 times a day. 180 Tablet 3    SYNTHROID 175 MCG Tab TAKE 1 TABLET ON MONDAY,   WEDNESDAY, AND FRIDAY AND  150MCG ON SUNDAY, TUESDAY, THURSDAY, AND SATURDAY. 36 Tablet 3    levothyroxine (SYNTHROID) 150 MCG Tab TAKE 175MCG ON MONDAY, WEDNESDAY, AND FRIDAY, AND 1 TABLET OF 150MCG ON SUNDAY, TUESDAY, THURSDAY, AND  "SATURDAY. 48 Tablet 3    b complex vitamins capsule Take 1 Capsule by mouth every day.      Cholecalciferol (VITAMIN D3) 125 MCG/0.5ML Liquid Take 0.5 mL by mouth two times a week.      acetaminophen (TYLENOL) 500 MG Tab Take 500 mg by mouth at bedtime as needed for Mild Pain.      Melatonin 10 MG Tab Take 10 mg by mouth at bedtime as needed.  May repeat 1 time..      levalbuterol (XOPENEX HFA) 45 MCG/ACT inhaler Inhale 2 Puffs every four hours as needed for Shortness of Breath. 3 Each 3    Spacer/Aero-Holding Chambers Device 1 Device as needed (SOB). 1 Each 0    valacyclovir (VALTREX) 1 GM Tab Take 2 Tablets by mouth every 12 hours. 2 g twice daily for one day. 20 Tablet 0    clobetasol (TEMOVATE) 0.05 % Cream CLOBETASOL PROPIONATE 0.05 % CREA 30 g 1     No current [facility]-ordered facility-administered medications on file.     Lisinopril, Atorvastatin, Atorvastatin, Hydrochlorothiazide, Inpefa [sotagliflozin], Lisinopril, and Other misc    PMH/PSH/FH/Social history reviewed.  Vaccinations discussed.  Declined COVID, flu, Prevnar. Previous records and labs reviewed. Discussed age appropriate anticipatory guidance.    ROS: see hpi  Gen: no fevers/chills  Pulm: no sob, no cough  CV: no chest pain, no palpitations, no edema  GI: no nausea/vomiting, no diarrhea  Skin: no rash    Objective:   Exam:  /62 (BP Location: Left arm, Patient Position: Sitting, BP Cuff Size: Large adult)   Pulse 92   Temp 36.9 °C (98.4 °F) (Temporal)   Resp 18   Ht 1.511 m (4' 11.5\")   Wt 81.9 kg (180 lb 9.6 oz)   SpO2 97%   BMI 35.87 kg/m²    Body mass index is 35.87 kg/m².    Gen: Alert and oriented, No apparent distress.  HEENT: Head atraumatic, normocephalic. Pupils equal and round.  Neck: Neck is supple without lymphadenopathy.   Lungs: Normal effort, CTA bilaterally, no wheezes, rhonchi, or rales  CV: Regular rate and rhythm. No murmurs, rubs, or gallops.  ABD: +BS. Non-tender, non-distended. No rebound, rigidity, or " guarding.  Ext: No clubbing, cyanosis, edema.    Assessment & Plan:     84 y.o. female with the following -     1. Abnormal urinalysis  We will recheck with culture to ensure negative results.  - POCT Urinalysis  - URINE CULTURE(NEW); Future    2. Hyponatremia  Recheck, if persists, will workup for SIADH.  - Comp Metabolic Panel; Future  - TSH WITH REFLEX TO FT4; Future    3. Renal insufficiency  Recheck, avoid NSAIDs.  Increase fluids.  - Comp Metabolic Panel; Future    4. Anemia, unspecified type  See orders below.  Hematology evaluation plus or minus GI evaluation if persist.  - CBC WITH DIFFERENTIAL; Future  - VITAMIN B12; Future  - FOLATE; Future  - IRON/TOTAL IRON BIND; Future  - FERRITIN; Future  - RETICULOCYTES COUNT; Future  - LDH; Future  - HAPTOGLOBIN; Future  - TSH WITH REFLEX TO FT4; Future    5. Vitamin D deficiency  - VITAMIN D,25 HYDROXY (DEFICIENCY); Future    6. Mixed hyperlipidemia  - Lipid Profile; Future    Return for As scheduled.    Almita Valenzuela PA-C ([place])  Physician Assistant Certified  [facility]    Billing : secondary to the complexity of this patient's illnesses and their interactions.  See assessment and plan above for the comprehensive evaluation and management of the patient's acute and chronic concerns.  As the patient's PCP, I am the continued focal point for all health care services for the patient's needs and ongoing subsequent medical care.  All problems listed were discussed during the office visit, medications were evaluated and complexities were discussed, as well as plan for the future.     Please note that this dictation was created using voice recognition software. I have made every reasonable attempt to correct obvious errors, but I expect that there are errors of grammar and possibly content that I did not discover before finalizing the note.

## 2024-10-02 ENCOUNTER — HOSPITAL ENCOUNTER (OUTPATIENT)
Dept: LAB | Facility: MEDICAL CENTER | Age: 84
End: 2024-10-02
Attending: PHYSICIAN ASSISTANT
Payer: MEDICARE

## 2024-10-02 DIAGNOSIS — N30.00 ACUTE CYSTITIS WITHOUT HEMATURIA: ICD-10-CM

## 2024-10-02 DIAGNOSIS — N28.9 RENAL INSUFFICIENCY: ICD-10-CM

## 2024-10-02 DIAGNOSIS — E55.9 VITAMIN D DEFICIENCY: ICD-10-CM

## 2024-10-02 DIAGNOSIS — E78.2 MIXED HYPERLIPIDEMIA: ICD-10-CM

## 2024-10-02 DIAGNOSIS — E87.1 HYPONATREMIA: ICD-10-CM

## 2024-10-02 DIAGNOSIS — D64.9 ANEMIA, UNSPECIFIED TYPE: ICD-10-CM

## 2024-10-02 LAB
25(OH)D3 SERPL-MCNC: 35 NG/ML (ref 30–100)
ALBUMIN SERPL BCP-MCNC: 4.3 G/DL (ref 3.2–4.9)
ALBUMIN/GLOB SERPL: 1.4 G/DL
ALP SERPL-CCNC: 109 U/L (ref 30–99)
ALT SERPL-CCNC: 24 U/L (ref 2–50)
ANION GAP SERPL CALC-SCNC: 14 MMOL/L (ref 7–16)
AST SERPL-CCNC: 27 U/L (ref 12–45)
BACTERIA UR CULT: ABNORMAL
BACTERIA UR CULT: ABNORMAL
BASOPHILS # BLD AUTO: 0.9 % (ref 0–1.8)
BASOPHILS # BLD: 0.05 K/UL (ref 0–0.12)
BILIRUB SERPL-MCNC: 0.4 MG/DL (ref 0.1–1.5)
BUN SERPL-MCNC: 20 MG/DL (ref 8–22)
CALCIUM ALBUM COR SERPL-MCNC: 9.9 MG/DL (ref 8.5–10.5)
CALCIUM SERPL-MCNC: 10.1 MG/DL (ref 8.5–10.5)
CHLORIDE SERPL-SCNC: 102 MMOL/L (ref 96–112)
CHOLEST SERPL-MCNC: 216 MG/DL (ref 100–199)
CO2 SERPL-SCNC: 23 MMOL/L (ref 20–33)
CREAT SERPL-MCNC: 0.72 MG/DL (ref 0.5–1.4)
EOSINOPHIL # BLD AUTO: 0.17 K/UL (ref 0–0.51)
EOSINOPHIL NFR BLD: 3 % (ref 0–6.9)
ERYTHROCYTE [DISTWIDTH] IN BLOOD BY AUTOMATED COUNT: 56 FL (ref 35.9–50)
FASTING STATUS PATIENT QL REPORTED: NORMAL
FERRITIN SERPL-MCNC: 547 NG/ML (ref 10–291)
FOLATE SERPL-MCNC: 6.3 NG/ML
GFR SERPLBLD CREATININE-BSD FMLA CKD-EPI: 83 ML/MIN/1.73 M 2
GLOBULIN SER CALC-MCNC: 3.1 G/DL (ref 1.9–3.5)
GLUCOSE SERPL-MCNC: 103 MG/DL (ref 65–99)
HCT VFR BLD AUTO: 38.1 % (ref 37–47)
HDLC SERPL-MCNC: 45 MG/DL
HGB BLD-MCNC: 12.1 G/DL (ref 12–16)
HGB RETIC QN AUTO: 31.8 PG/CELL (ref 29–35)
IMM GRANULOCYTES # BLD AUTO: 0.01 K/UL (ref 0–0.11)
IMM GRANULOCYTES NFR BLD AUTO: 0.2 % (ref 0–0.9)
IMM RETICS NFR: 15.3 % (ref 2.6–16.1)
IRON SATN MFR SERPL: 24 % (ref 15–55)
IRON SERPL-MCNC: 71 UG/DL (ref 40–170)
LDH SERPL L TO P-CCNC: 182 U/L (ref 107–266)
LDLC SERPL CALC-MCNC: 124 MG/DL
LYMPHOCYTES # BLD AUTO: 1.71 K/UL (ref 1–4.8)
LYMPHOCYTES NFR BLD: 30.3 % (ref 22–41)
MCH RBC QN AUTO: 28.6 PG (ref 27–33)
MCHC RBC AUTO-ENTMCNC: 31.8 G/DL (ref 32.2–35.5)
MCV RBC AUTO: 90.1 FL (ref 81.4–97.8)
MONOCYTES # BLD AUTO: 0.64 K/UL (ref 0–0.85)
MONOCYTES NFR BLD AUTO: 11.3 % (ref 0–13.4)
NEUTROPHILS # BLD AUTO: 3.06 K/UL (ref 1.82–7.42)
NEUTROPHILS NFR BLD: 54.3 % (ref 44–72)
NRBC # BLD AUTO: 0 K/UL
NRBC BLD-RTO: 0 /100 WBC (ref 0–0.2)
PLATELET # BLD AUTO: 274 K/UL (ref 164–446)
PMV BLD AUTO: 10.7 FL (ref 9–12.9)
POTASSIUM SERPL-SCNC: 4.2 MMOL/L (ref 3.6–5.5)
PROT SERPL-MCNC: 7.4 G/DL (ref 6–8.2)
RBC # BLD AUTO: 4.23 M/UL (ref 4.2–5.4)
RETICS # AUTO: 0.11 M/UL (ref 0.04–0.12)
RETICS/RBC NFR: 2.5 % (ref 0.8–2.6)
SIGNIFICANT IND 70042: ABNORMAL
SITE SITE: ABNORMAL
SODIUM SERPL-SCNC: 139 MMOL/L (ref 135–145)
SOURCE SOURCE: ABNORMAL
TIBC SERPL-MCNC: 299 UG/DL (ref 250–450)
TRIGL SERPL-MCNC: 233 MG/DL (ref 0–149)
TSH SERPL DL<=0.005 MIU/L-ACNC: 1.04 UIU/ML (ref 0.38–5.33)
UIBC SERPL-MCNC: 228 UG/DL (ref 110–370)
VIT B12 SERPL-MCNC: 491 PG/ML (ref 211–911)
WBC # BLD AUTO: 5.6 K/UL (ref 4.8–10.8)

## 2024-10-02 PROCEDURE — 82746 ASSAY OF FOLIC ACID SERUM: CPT

## 2024-10-02 PROCEDURE — 83550 IRON BINDING TEST: CPT

## 2024-10-02 PROCEDURE — 85046 RETICYTE/HGB CONCENTRATE: CPT

## 2024-10-02 PROCEDURE — 82607 VITAMIN B-12: CPT

## 2024-10-02 PROCEDURE — 82728 ASSAY OF FERRITIN: CPT

## 2024-10-02 PROCEDURE — 80061 LIPID PANEL: CPT

## 2024-10-02 PROCEDURE — 83540 ASSAY OF IRON: CPT

## 2024-10-02 PROCEDURE — 83010 ASSAY OF HAPTOGLOBIN QUANT: CPT

## 2024-10-02 PROCEDURE — 80053 COMPREHEN METABOLIC PANEL: CPT

## 2024-10-02 PROCEDURE — 83615 LACTATE (LD) (LDH) ENZYME: CPT

## 2024-10-02 PROCEDURE — 36415 COLL VENOUS BLD VENIPUNCTURE: CPT

## 2024-10-02 PROCEDURE — 85025 COMPLETE CBC W/AUTO DIFF WBC: CPT

## 2024-10-02 PROCEDURE — 82306 VITAMIN D 25 HYDROXY: CPT

## 2024-10-02 PROCEDURE — 84443 ASSAY THYROID STIM HORMONE: CPT

## 2024-10-02 RX ORDER — NITROFURANTOIN 25; 75 MG/1; MG/1
100 CAPSULE ORAL 2 TIMES DAILY
Qty: 10 CAPSULE | Refills: 0 | Status: SHIPPED | OUTPATIENT
Start: 2024-10-02 | End: 2024-10-07

## 2024-10-05 LAB — HAPTOGLOB SERPL-MCNC: 280 MG/DL (ref 30–200)

## 2024-10-28 SDOH — ECONOMIC STABILITY: INCOME INSECURITY: HOW HARD IS IT FOR YOU TO PAY FOR THE VERY BASICS LIKE FOOD, HOUSING, MEDICAL CARE, AND HEATING?: NOT HARD AT ALL

## 2024-10-28 SDOH — ECONOMIC STABILITY: FOOD INSECURITY: WITHIN THE PAST 12 MONTHS, YOU WORRIED THAT YOUR FOOD WOULD RUN OUT BEFORE YOU GOT MONEY TO BUY MORE.: NEVER TRUE

## 2024-10-28 SDOH — ECONOMIC STABILITY: INCOME INSECURITY: IN THE LAST 12 MONTHS, WAS THERE A TIME WHEN YOU WERE NOT ABLE TO PAY THE MORTGAGE OR RENT ON TIME?: NO

## 2024-10-28 SDOH — ECONOMIC STABILITY: FOOD INSECURITY: WITHIN THE PAST 12 MONTHS, THE FOOD YOU BOUGHT JUST DIDN'T LAST AND YOU DIDN'T HAVE MONEY TO GET MORE.: NEVER TRUE

## 2024-10-28 SDOH — HEALTH STABILITY: PHYSICAL HEALTH: ON AVERAGE, HOW MANY MINUTES DO YOU ENGAGE IN EXERCISE AT THIS LEVEL?: 100 MIN

## 2024-10-28 SDOH — HEALTH STABILITY: PHYSICAL HEALTH

## 2024-10-28 ASSESSMENT — SOCIAL DETERMINANTS OF HEALTH (SDOH)
HOW OFTEN DO YOU ATTENT MEETINGS OF THE CLUB OR ORGANIZATION YOU BELONG TO?: MORE THAN 4 TIMES PER YEAR
HOW OFTEN DO YOU ATTEND CHURCH OR RELIGIOUS SERVICES?: PATIENT DECLINED
HOW OFTEN DO YOU ATTEND CHURCH OR RELIGIOUS SERVICES?: PATIENT DECLINED
HOW OFTEN DO YOU GET TOGETHER WITH FRIENDS OR RELATIVES?: MORE THAN THREE TIMES A WEEK
IN THE PAST 12 MONTHS, HAS THE ELECTRIC, GAS, OIL, OR WATER COMPANY THREATENED TO SHUT OFF SERVICE IN YOUR HOME?: NO
IN A TYPICAL WEEK, HOW MANY TIMES DO YOU TALK ON THE PHONE WITH FAMILY, FRIENDS, OR NEIGHBORS?: MORE THAN THREE TIMES A WEEK
HOW OFTEN DO YOU ATTENT MEETINGS OF THE CLUB OR ORGANIZATION YOU BELONG TO?: MORE THAN 4 TIMES PER YEAR
DO YOU BELONG TO ANY CLUBS OR ORGANIZATIONS SUCH AS CHURCH GROUPS UNIONS, FRATERNAL OR ATHLETIC GROUPS, OR SCHOOL GROUPS?: YES
DO YOU BELONG TO ANY CLUBS OR ORGANIZATIONS SUCH AS CHURCH GROUPS UNIONS, FRATERNAL OR ATHLETIC GROUPS, OR SCHOOL GROUPS?: YES
HOW HARD IS IT FOR YOU TO PAY FOR THE VERY BASICS LIKE FOOD, HOUSING, MEDICAL CARE, AND HEATING?: NOT HARD AT ALL
HOW OFTEN DO YOU GET TOGETHER WITH FRIENDS OR RELATIVES?: MORE THAN THREE TIMES A WEEK
WITHIN THE PAST 12 MONTHS, YOU WORRIED THAT YOUR FOOD WOULD RUN OUT BEFORE YOU GOT THE MONEY TO BUY MORE: NEVER TRUE
HOW OFTEN DO YOU HAVE A DRINK CONTAINING ALCOHOL: NEVER
IN A TYPICAL WEEK, HOW MANY TIMES DO YOU TALK ON THE PHONE WITH FAMILY, FRIENDS, OR NEIGHBORS?: MORE THAN THREE TIMES A WEEK

## 2024-10-28 ASSESSMENT — LIFESTYLE VARIABLES: HOW OFTEN DO YOU HAVE A DRINK CONTAINING ALCOHOL: NEVER

## 2024-10-29 ENCOUNTER — HOSPITAL ENCOUNTER (OUTPATIENT)
Facility: MEDICAL CENTER | Age: 84
End: 2024-10-29
Attending: PHYSICIAN ASSISTANT
Payer: MEDICARE

## 2024-10-29 ENCOUNTER — OFFICE VISIT (OUTPATIENT)
Dept: MEDICAL GROUP | Facility: IMAGING CENTER | Age: 84
End: 2024-10-29
Payer: MEDICARE

## 2024-10-29 VITALS
HEIGHT: 60 IN | HEART RATE: 89 BPM | DIASTOLIC BLOOD PRESSURE: 76 MMHG | RESPIRATION RATE: 17 BRPM | WEIGHT: 181 LBS | TEMPERATURE: 98.5 F | BODY MASS INDEX: 35.53 KG/M2 | OXYGEN SATURATION: 97 % | SYSTOLIC BLOOD PRESSURE: 126 MMHG

## 2024-10-29 DIAGNOSIS — E55.9 VITAMIN D DEFICIENCY: ICD-10-CM

## 2024-10-29 DIAGNOSIS — E53.8 LOW FOLATE: ICD-10-CM

## 2024-10-29 DIAGNOSIS — I50.32 CHRONIC HEART FAILURE WITH PRESERVED EJECTION FRACTION (HCC): ICD-10-CM

## 2024-10-29 DIAGNOSIS — R82.90 ABNORMAL URINALYSIS: ICD-10-CM

## 2024-10-29 DIAGNOSIS — Z00.00 ENCOUNTER FOR ANNUAL WELLNESS EXAM IN MEDICARE PATIENT: ICD-10-CM

## 2024-10-29 DIAGNOSIS — E66.812 CLASS 2 SEVERE OBESITY DUE TO EXCESS CALORIES WITH SERIOUS COMORBIDITY AND BODY MASS INDEX (BMI) OF 35.0 TO 35.9 IN ADULT (HCC): ICD-10-CM

## 2024-10-29 DIAGNOSIS — E53.8 LOW SERUM VITAMIN B12: ICD-10-CM

## 2024-10-29 DIAGNOSIS — M85.89 OSTEOPENIA OF MULTIPLE SITES: ICD-10-CM

## 2024-10-29 DIAGNOSIS — Z85.3 HISTORY OF MALIGNANT NEOPLASM OF BREAST: ICD-10-CM

## 2024-10-29 DIAGNOSIS — I20.89 MICROVASCULAR ANGINA (HCC): ICD-10-CM

## 2024-10-29 DIAGNOSIS — Z71.9 ENCOUNTER FOR CONSULTATION: ICD-10-CM

## 2024-10-29 DIAGNOSIS — E66.01 CLASS 2 SEVERE OBESITY DUE TO EXCESS CALORIES WITH SERIOUS COMORBIDITY AND BODY MASS INDEX (BMI) OF 35.0 TO 35.9 IN ADULT (HCC): ICD-10-CM

## 2024-10-29 DIAGNOSIS — E78.2 MIXED HYPERLIPIDEMIA: ICD-10-CM

## 2024-10-29 DIAGNOSIS — G31.9 CEREBRAL ATROPHY (HCC): ICD-10-CM

## 2024-10-29 DIAGNOSIS — I10 PRIMARY HYPERTENSION: ICD-10-CM

## 2024-10-29 DIAGNOSIS — E03.9 HYPOTHYROIDISM, UNSPECIFIED TYPE: ICD-10-CM

## 2024-10-29 PROBLEM — M79.5 FOREIGN BODY (FB) IN SOFT TISSUE: Status: RESOLVED | Noted: 2024-05-28 | Resolved: 2024-10-29

## 2024-10-29 PROBLEM — D64.9 ANEMIA: Status: RESOLVED | Noted: 2024-09-30 | Resolved: 2024-10-29

## 2024-10-29 PROBLEM — R74.8 HIGH ALKALINE PHOSPHATASE: Status: RESOLVED | Noted: 2024-06-20 | Resolved: 2024-10-29

## 2024-10-29 PROBLEM — M79.672 PAIN OF LEFT FOOT: Status: RESOLVED | Noted: 2024-07-29 | Resolved: 2024-10-29

## 2024-10-29 PROBLEM — M80.052A: Status: RESOLVED | Noted: 2024-04-29 | Resolved: 2024-10-29

## 2024-10-29 PROBLEM — R19.06 EPIGASTRIC LUMP: Status: RESOLVED | Noted: 2023-12-26 | Resolved: 2024-10-29

## 2024-10-29 PROBLEM — R14.0 ABDOMINAL BLOATING: Status: RESOLVED | Noted: 2023-06-23 | Resolved: 2024-10-29

## 2024-10-29 PROBLEM — N28.9 RENAL INSUFFICIENCY: Status: RESOLVED | Noted: 2024-09-30 | Resolved: 2024-10-29

## 2024-10-29 PROBLEM — E87.1 HYPONATREMIA: Status: RESOLVED | Noted: 2024-09-30 | Resolved: 2024-10-29

## 2024-10-29 PROBLEM — N32.9 LESION OF URINARY BLADDER: Status: RESOLVED | Noted: 2020-08-04 | Resolved: 2024-10-29

## 2024-10-29 PROBLEM — I47.11 INAPPROPRIATE SINUS TACHYCARDIA (HCC): Status: RESOLVED | Noted: 2024-03-15 | Resolved: 2024-10-29

## 2024-10-29 PROBLEM — S72.452A CLOSED COMMINUTED SUPRACONDYLAR FRACTURE OF FEMUR, LEFT, INITIAL ENCOUNTER (HCC): Status: RESOLVED | Noted: 2024-04-29 | Resolved: 2024-10-29

## 2024-10-29 PROBLEM — D62 ACUTE BLOOD LOSS ANEMIA: Status: RESOLVED | Noted: 2024-05-24 | Resolved: 2024-10-29

## 2024-10-29 PROCEDURE — 87086 URINE CULTURE/COLONY COUNT: CPT

## 2024-10-29 RX ORDER — AMOXICILLIN 500 MG/1
CAPSULE ORAL
COMMUNITY
Start: 2024-10-23

## 2024-10-29 ASSESSMENT — ACTIVITIES OF DAILY LIVING (ADL): BATHING_REQUIRES_ASSISTANCE: 0

## 2024-10-29 ASSESSMENT — FIBROSIS 4 INDEX: FIB4 SCORE: 1.65

## 2024-10-29 ASSESSMENT — ENCOUNTER SYMPTOMS: GENERAL WELL-BEING: FAIR

## 2024-10-29 ASSESSMENT — PAIN SCALES - GENERAL: PAINLEVEL: NO PAIN

## 2024-10-29 ASSESSMENT — PATIENT HEALTH QUESTIONNAIRE - PHQ9: CLINICAL INTERPRETATION OF PHQ2 SCORE: 0

## 2024-10-31 LAB
BACTERIA UR CULT: NORMAL
SIGNIFICANT IND 70042: NORMAL
SITE SITE: NORMAL
SOURCE SOURCE: NORMAL

## 2024-11-01 ENCOUNTER — E-CONSULT (OUTPATIENT)
Dept: ENDOCRINOLOGY | Facility: MEDICAL CENTER | Age: 84
End: 2024-11-01
Payer: MEDICARE

## 2024-11-01 DIAGNOSIS — Z71.9 ENCOUNTER FOR CONSULTATION: ICD-10-CM

## 2024-11-01 PROCEDURE — 99449 NTRPROF PH1/NTRNET/EHR 31/>: CPT | Performed by: INTERNAL MEDICINE

## 2024-11-01 NOTE — PROGRESS NOTES
E-Consult Response     After careful review of the patient's information available in the medical record, the following are my findings and recommendations:    Reason for consult: Recommendations for patient with osteopenia but with fragility fracture    Summary of data reviewed: In summary this is a 85-year-old female born on January 27, 1940 with diagnosis of osteopenia    She also has hypothyroidism, obesity, congestive heart failure with preserved EF, hypertension    Her bone density on August 24, 2023 showed osteopenia with the lowest T-score of -2.1 for the left femur.  Her FRAX scores are significantly elevated.  Her 10-year probability of any major osteoporotic fracture was elevated at 25.7% and her 10-year probability for any major hip fracture was elevated at 8.4%.      Per provider records dated October 29, 2024 the patient reported recent femoral fracture.  It is unclear if she had a ground-level fall or what was the source of the fracture it was not documented from the notes      Her labs indicate normal calcium levels of 10.1 vitamin D of 35, TSH of 1.0 on October 29, 2024    According to provider notes the patient has hesitated on starting bisphosphonate therapy in the past but is open to it        Recommendations: Based from the records the patient appears to have osteopenia with elevated fracture risk because of FRAX scores being elevated and also because of recent fragility fracture which is the femoral fracture      Because she has high risk for fracture I believe the an anabolic agent therapy would be more appropriate for her instead of bisphosphonates    I would advocate for her to get Evenity at [facility].  I would also recommend a workup to rule out secondary causes of bone loss to make sure that she does not have hyperparathyroidism, myeloma, idiopathic hypercalciuria and celiac disease      Therefore I recommend getting an SPEP, UPEP, PTH, 24 urine calcium and 24 urine  creatinine if patient is able to do it and a celiac disease antibody panel      In addition I would encourage the patient to start calcium citrate 600 mg twice a day and vitamin D 3 2000 IU daily or higher if medically necessary      If she does not want  Evenity an appropriate alternative would be PTH analog such as Tymlos or Forteo which are also anabolic agents downsides are the required self injection.  Also PTH levels need to be normal and hyperparathyroidism should be ruled out prior to initiation of Tymlos or Forteo    Apologies for any delay in answering this electronic consult        E-Consult Time: 31 minutes were spent with >50% of the total time spent discussing plan of care with requesting physician (Use code [id number])    Serafin Urias M.D.

## 2024-11-14 DIAGNOSIS — M85.89 OSTEOPENIA OF MULTIPLE SITES: ICD-10-CM

## 2024-12-16 NOTE — CARE PLAN
The patient is Stable - Low risk of patient condition declining or worsening    Shift Goals  Clinical Goals: Safety  Patient Goals: participate in therapy    Progress made toward(s) clinical / shift goals:    Problem: Skin Integrity  Goal: Skin integrity is maintained or improved  Outcome: Progressing     Problem: Fall Risk - Rehab  Goal: Patient will remain free from falls  Outcome: Progressing  Note: Pt uses call light consistently and appropriately. Waits for assistance does not attempt self transfer this shift. Able to verbalize needs.     Problem: Pain - Standard  Goal: Alleviation of pain or a reduction in pain to the patient’s comfort goal  Outcome: Progressing  Note: Patient able to verbalize pain level and verbalize an acceptable level of pain.              What Type Of Note Output Would You Prefer (Optional)?: Bullet Format What Is The Reason For Today's Visit?: Full Body Skin Examination What Is The Reason For Today's Visit? (Being Monitored For X): concerning skin lesions on an annual basis

## 2025-01-13 ENCOUNTER — HOSPITAL ENCOUNTER (OUTPATIENT)
Facility: MEDICAL CENTER | Age: 85
End: 2025-01-13
Attending: PHYSICIAN ASSISTANT
Payer: MEDICARE

## 2025-01-13 ENCOUNTER — OFFICE VISIT (OUTPATIENT)
Dept: MEDICAL GROUP | Facility: IMAGING CENTER | Age: 85
End: 2025-01-13
Payer: MEDICARE

## 2025-01-13 VITALS
HEIGHT: 60 IN | HEART RATE: 85 BPM | WEIGHT: 182.5 LBS | TEMPERATURE: 96.8 F | DIASTOLIC BLOOD PRESSURE: 66 MMHG | SYSTOLIC BLOOD PRESSURE: 126 MMHG | RESPIRATION RATE: 17 BRPM | OXYGEN SATURATION: 96 % | BODY MASS INDEX: 35.83 KG/M2

## 2025-01-13 DIAGNOSIS — M85.89 OSTEOPENIA OF MULTIPLE SITES: ICD-10-CM

## 2025-01-13 DIAGNOSIS — N30.01 ACUTE CYSTITIS WITH HEMATURIA: ICD-10-CM

## 2025-01-13 DIAGNOSIS — M54.50 CHRONIC BILATERAL LOW BACK PAIN WITHOUT SCIATICA: ICD-10-CM

## 2025-01-13 DIAGNOSIS — G89.29 CHRONIC BILATERAL LOW BACK PAIN WITHOUT SCIATICA: ICD-10-CM

## 2025-01-13 DIAGNOSIS — M79.605 PAIN OF LEFT LOWER EXTREMITY: ICD-10-CM

## 2025-01-13 LAB
APPEARANCE UR: NORMAL
BILIRUB UR STRIP-MCNC: NEGATIVE MG/DL
COLOR UR AUTO: NORMAL
GLUCOSE UR STRIP.AUTO-MCNC: NEGATIVE MG/DL
KETONES UR STRIP.AUTO-MCNC: 15 MG/DL
LEUKOCYTE ESTERASE UR QL STRIP.AUTO: NORMAL
NITRITE UR QL STRIP.AUTO: NEGATIVE
PH UR STRIP.AUTO: 5.5 [PH] (ref 5–8)
PROT UR QL STRIP: 300 MG/DL
RBC UR QL AUTO: NORMAL
SP GR UR STRIP.AUTO: 1.02
UROBILINOGEN UR STRIP-MCNC: 0.2 MG/DL

## 2025-01-13 PROCEDURE — 87086 URINE CULTURE/COLONY COUNT: CPT

## 2025-01-13 PROCEDURE — 81002 URINALYSIS NONAUTO W/O SCOPE: CPT | Performed by: PHYSICIAN ASSISTANT

## 2025-01-13 PROCEDURE — G2211 COMPLEX E/M VISIT ADD ON: HCPCS | Performed by: PHYSICIAN ASSISTANT

## 2025-01-13 PROCEDURE — 3074F SYST BP LT 130 MM HG: CPT | Performed by: PHYSICIAN ASSISTANT

## 2025-01-13 PROCEDURE — 99214 OFFICE O/P EST MOD 30 MIN: CPT | Performed by: PHYSICIAN ASSISTANT

## 2025-01-13 PROCEDURE — 3078F DIAST BP <80 MM HG: CPT | Performed by: PHYSICIAN ASSISTANT

## 2025-01-13 PROCEDURE — 87077 CULTURE AEROBIC IDENTIFY: CPT

## 2025-01-13 RX ORDER — CALCIUM CARBONATE 500(1250)
500 TABLET ORAL 2 TIMES DAILY WITH MEALS
COMMUNITY

## 2025-01-13 RX ORDER — CEPHALEXIN 500 MG/1
500 CAPSULE ORAL 2 TIMES DAILY
Qty: 10 CAPSULE | Refills: 0 | Status: CANCELLED | OUTPATIENT
Start: 2025-01-13

## 2025-01-13 RX ORDER — NITROFURANTOIN 25; 75 MG/1; MG/1
100 CAPSULE ORAL 2 TIMES DAILY
Qty: 10 CAPSULE | Refills: 0 | Status: SHIPPED | OUTPATIENT
Start: 2025-01-13 | End: 2025-01-18

## 2025-01-13 ASSESSMENT — FIBROSIS 4 INDEX: FIB4 SCORE: 1.65

## 2025-01-13 ASSESSMENT — PATIENT HEALTH QUESTIONNAIRE - PHQ9: CLINICAL INTERPRETATION OF PHQ2 SCORE: 0

## 2025-01-13 NOTE — ASSESSMENT & PLAN NOTE
Patient has not scheduled appointment yet with the endocrinologist.  She has not completed her lab testing to rule out secondary causes of decreased bone density.  States that she refuses any prescription medication treatment for osteoporosis at this time.  She does take vitamin D and calcium.  She is not sure which type of calcium she takes.

## 2025-01-13 NOTE — PROGRESS NOTES
Subjective:     CC:   Chief Complaint   Patient presents with    Follow-Up     3 mos Med Check       UTI     Burning   Pt report not yet schedule with urologist        HPI:   Flavia presents today to discuss:    Acute cystitis with hematuria  Pt admits urinary frequency and burning x 2 weeks. Takes cranberry juice. No pelvic pain or flank pain. No fever.     Chronic bilateral low back pain without sciatica  Patient admits to chronic lower back pain.  Symptoms are stable.  Has been seeing a physical therapist, but needs her prescription renewed.  She uses a cane to assist with walking and balance.  No recent falls.    Osteopenia of multiple sites  Patient has not scheduled appointment yet with the endocrinologist.  She has not completed her lab testing to rule out secondary causes of decreased bone density.  States that she refuses any prescription medication treatment for osteoporosis at this time.  She does take vitamin D and calcium.  She is not sure which type of calcium she takes.    Pain of left lower extremity  Patient still recovering from her femur fracture from April/May 2024.  She has been doing physical therapy weekly, needs her PT prescription renewed.  Has not had a session in 3 to 4 weeks.  She uses a cane for balance.      Past Medical History:   Diagnosis Date    Acquired absence of both cervix and uterus 11/11/2014    Back pain     Breast cancer ([facility])     Cancer ([facility])     Chickenpox     Chronic left-sided low back pain without sciatica 03/17/2017    Closed comminuted supracondylar fracture of femur, left, initial encounter ([facility]) 04/29/2024    Constipation     Cough     Cystitis 06/12/2019    Diarrhea     Dyslipidemia 07/26/2016    Eczema     Fatigue     Frequent urination     Gout of foot 09/30/2016    H/O total hysterectomy 07/26/2016    High cholesterol     History of UTI 01/31/2020    History of UTI 01/31/2020    Hx of breast cancer 07/26/2016    Hypertension     Hypopituitarism ([facility])  06/23/2023    Hypothyroidism 07/26/2016    Idiopathic chronic gout of right ankle 09/30/2016    Inappropriate sinus tachycardia (HCC) 03/15/2024    Influenza     Lichen sclerosus of female genitalia     Nasal drainage     Nocturnal oxygen desaturation 08/07/2020    Obesity     Osteopenia 07/26/2016    Osteopenia 07/26/2016    Osteoporosis     Osteoporosis 07/26/2016    Overactive bladder 02/09/2020    Overweight     Pathological fracture of left femur due to age-related osteoporosis (HCC) 04/29/2024    Recurrent UTI 01/31/2020    Rhinitis     Ringing in ears     Shortness of breath     Sputum production     Status post right knee replacement 05/05/2016    Status post total right knee replacement 08/17/2016    Swelling of lower extremity     Thyroid activity decreased     Tonsillitis     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     Urinary tract infection 05/14/2024    Vision loss     Vitamin D deficiency 07/26/2016    Whooping cough      Family History   Problem Relation Age of Onset    Arthritis Mother     Other Son         CELIAC DISEASE-SEVERE    Diabetes Other         GF    Arthritis Other         GM    Heart Disease Neg Hx      Past Surgical History:   Procedure Laterality Date    WA REMV FOOT FOREIGN BODY,DEEP Left 7/31/2024    Procedure: LEFT FOOT FOREIGN BODY REMOVAL;  Surgeon: Saman Weldon M.D.;  Location: [facility];  Service: Orthopedics    ORIF, FRACTURE, FEMUR Left 4/29/2024    Procedure: ORIF, FRACTURE, DISTAL FEMUR;  Surgeon: Jasiel Tolbert M.D.;  Location: [facility];  Service: Trauma    IRRIGATION & DEBRIDEMENT GENERAL Left 4/29/2024    Procedure: IRRIGATION AND DEBRIDEMENT, LEFT FEMUR;  Surgeon: Jasiel Tolbert M.D.;  Location: [facility];  Service: Trauma    LUMPECTOMY Right 2012    ARTHROSCOPY, KNEE      HYSTERECTOMY LAPAROSCOPY      KNEE ARTHROPLASTY TOTAL Right     OTHER      LIPOSUCTION THIGHS-LEG LIFT    WA REMV  2ND CATARACT,CORN-SCLER SECTN      VAGINAL HYSTERECTOMY TOTAL      Hysterectomy,Total Vaginal     Social History     Tobacco Use    Smoking status: Never    Smokeless tobacco: Never   Vaping Use    Vaping status: Never Used   Substance Use Topics    Alcohol use: Never     Comment: min    Drug use: No     Social History     Social History Narrative    ** Merged History Encounter **         From [place]      Current Outpatient Medications Ordered in Epic   Medication Sig Dispense Refill    nitrofurantoin (MACROBID) 100 MG Cap Take 1 Capsule by mouth 2 times a day for 5 days. 10 Capsule 0    calcium carbonate (OS-HILDA 500) 500 MG Tab Take 500 mg by mouth 2 times a day with meals.      amoxicillin (AMOXIL) 500 MG Cap TAKE 4 CAPSULES 1 HOUR BEFORE DENTAL APPOINTMENT      CRANBERRY-VITAMIN C-D MANNOSE PO Take  by mouth.      HYDROcodone-acetaminophen (NORCO) 5-325 MG Tab per tablet       losartan (COZAAR) 25 MG Tab Take 1 Tablet by mouth 2 times a day. 180 Tablet 3    SYNTHROID 175 MCG Tab TAKE 1 TABLET ON MONDAY,   WEDNESDAY, AND FRIDAY AND  150MCG ON SUNDAY, TUESDAY, THURSDAY, AND SATURDAY. 36 Tablet 3    levothyroxine (SYNTHROID) 150 MCG Tab TAKE 175MCG ON MONDAY, WEDNESDAY, AND FRIDAY, AND 1 TABLET OF 150MCG ON SUNDAY, TUESDAY, THURSDAY, AND SATURDAY. 48 Tablet 3    b complex vitamins capsule Take 1 Capsule by mouth every day.      Cholecalciferol (VITAMIN D3) 125 MCG/0.5ML Liquid Take 0.5 mL by mouth two times a week.      acetaminophen (TYLENOL) 500 MG Tab Take 500 mg by mouth at bedtime as needed for Mild Pain.      Melatonin 10 MG Tab Take 10 mg by mouth at bedtime as needed.  May repeat 1 time..      levalbuterol (XOPENEX HFA) 45 MCG/ACT inhaler Inhale 2 Puffs every four hours as needed for Shortness of Breath. 3 Each 3    Spacer/Aero-Holding Chambers Device 1 Device as needed (SOB). 1 Each 0    valacyclovir (VALTREX) 1 GM Tab Take 2 Tablets by mouth every 12 hours. 2 g twice daily for one day. 20 Tablet 0     "clobetasol (TEMOVATE) 0.05 % Cream CLOBETASOL PROPIONATE 0.05 % CREA 30 g 1     No current Epic-ordered facility-administered medications on file.     Lisinopril, Atorvastatin, Atorvastatin, Hydrochlorothiazide, Inpefa [sotagliflozin], Lisinopril, and Other misc    PMH/PSH/FH/Social history reviewed.  Vaccinations discussed.  Previous records and labs reviewed. Discussed age appropriate anticipatory guidance.    ROS: see hpi  Gen: no fevers/chills  Pulm: no sob, no cough  CV: no chest pain, no palpitations, no edema  GI: no nausea/vomiting, no diarrhea  Skin: no rash    Objective:   Exam:  /66 (BP Location: Right arm, Patient Position: Sitting, BP Cuff Size: Adult)   Pulse 85   Temp 36 °C (96.8 °F) (Temporal)   Resp 17   Ht 1.511 m (4' 11.5\")   Wt 82.8 kg (182 lb 8 oz)   SpO2 96%   BMI 36.24 kg/m²    Body mass index is 36.24 kg/m².    Gen: Alert and oriented, No apparent distress.  HEENT: Head atraumatic, normocephalic. Pupils equal and round.  Neck: Neck is supple without lymphadenopathy.   Lungs: Normal effort, CTA bilaterally, no wheezes, rhonchi, or rales  CV: Regular rate and rhythm. No murmurs, rubs, or gallops.  ABD: +BS. Non-tender, non-distended. No rebound, rigidity, or guarding.  No CVA tenderness.  Ext: No clubbing, cyanosis, edema.  No bony tenderness in lower extremities.  No vertebral tenderness.    Assessment & Plan:     84 y.o. female with the following -     1. Acute cystitis with hematuria  Increase fluids.  Seek medical treatment if you develop flank pain, pelvic pain, fever, gross hematuria despite antibiotic course.  - POCT Urinalysis  - URINE CULTURE(NEW); Future  - nitrofurantoin (MACROBID) 100 MG Cap; Take 1 Capsule by mouth 2 times a day for 5 days.  Dispense: 10 Capsule; Refill: 0  Potential side effects of antibiotics include nausea, indigestion, diarrhea, rash.  If you develop any of these symptoms, please schedule an appointment in the office or go to urgent/emergency room " if severe.    2. Pain of left lower extremity  Will renew patient's physical therapy prescription from previous femur fracture.  Continue to use cane for balance.  - Referral to Physical Therapy    3. Chronic bilateral low back pain without sciatica  Chronic, controlled and stable.  Will continue physical therapy.    4. Osteopenia of multiple sites  Chronic, uncontrolled.  Previous femur fracture. I would recommend taking vitamin D 2000 IU daily, as well as calcium 600 mg twice daily. I also recommend regular weight-bearing and muscle strengthening exercise to improve agility, strength, posture, and balance; maintain and improve bone strength; and reduce the risk of falls and fractures.  Patient to complete preappointment lab orders for endocrinology and then schedule appointment to discuss treatment options.  Repeat DEXA August 2025.    Return for As scheduled.    Almita Valenzuela PA-C ([place])  Physician Assistant Certified  [facility]    Billing : secondary to the complexity of this patient's illnesses and their interactions.  See assessment and plan above for the comprehensive evaluation and management of the patient's acute and chronic concerns.  As the patient's PCP, I am the continued focal point for all health care services for the patient's needs and ongoing subsequent medical care.  All problems listed were discussed during the office visit, medications were evaluated and complexities were discussed, as well as plan for the future.    Please note that this dictation was created using voice recognition software. I have made every reasonable attempt to correct obvious errors, but I expect that there are errors of grammar and possibly content that I did not discover before finalizing the note.

## 2025-01-13 NOTE — ASSESSMENT & PLAN NOTE
Patient still recovering from her femur fracture from April/May 2024.  She has been doing physical therapy weekly, needs her PT prescription renewed.  Has not had a session in 3 to 4 weeks.  She uses a cane for balance.

## 2025-01-13 NOTE — PATIENT INSTRUCTIONS
It was a pleasure meeting with you today at [facility]!    Your medical history/records and medications were reviewed today.     UPDATE on MyChart Results: If you have blood work, and/or imaging studies, or any other test or procedure completed, you will have access to results as soon as they become available in MyChart. Recently, these results will be available for review at the same time that your provider is able to see results!    This will likely mean you will see a result before your provider has had a chance to review and discuss with you.  Some results or care notes may be hard to understand and may be serious in nature.    We look at every result and your provider will contact you to explain what they mean and discuss appropriate next steps. Please allow for at least 72 business hours for chart and result review.     We prefer that you wait for your care team to contact you with your results.  Often, your provider will discuss your results with you at your next appointment. We look forward to continuing to partner with you in your care.    Please review my practice information below:    If you have any prescription refill requests, please send them via AppSame or discuss with your provider at the start of your office visits. Please allow 3-5 business days for lab and testing review and you will be contacted via AppSame with those results, or if advised to make a follow up appointment regarding those results, then please do so.     Once resulted, your lab/test/imaging results will show up automatically in your MyChart. Please wait for my interpretation and recommendations prior to viewing your results to avoid any unnecessary confusion or misinterpretation. I will address all of the lab values that I interpret as abnormal and message you accordingly on your MyChart. I will always send you a message about your results even if they are normal. If you do not hear back from me within 5-7 business  days after completing your tests, then please send me a message on Loopt so I can obtain your results (especially if you went to an outside lab or imaging center - [facility], [facility], etc).     If you have any additional questions or concerns beyond my interpretation of your results, please make an appointment with me to discuss in further detail.    Please only use the Loopt messaging system for questions regarding your most recent appointment or if advised to use otherwise (glucose or blood pressure reporting).     If you have any new problems or concerns, you must make an appointment to discuss. This includes any referral requests, lab requests (unless advised to notify me for pre-appt labs), medication side effects, or request for medication adjustments.     Please arrive 15 minutes prior to your appointment time to complete your check-in and intake with the medical assistant.      Thank you,    Almita Valenzuela PA-C ([place])  Physician Assistant Certified  [facility]    -----------------------------------------------------------------    Attn: Patients of [facility]:    In an effort to continue to provide excellent and efficient care to our patients, it is vital that we continue to use our resources appropriately. With that, this is a reminder that Loopt is used for prescription refill requests, test results, virtual visits, and chart review only.     Any new questions, concerns/conditions, lab/imaging requests, medication adjustments, new prescriptions, or referral requests do require an appointment (virtually or in person), unless discussed otherwise at your most recent appointment.     Thank you for your understanding,    [facility]

## 2025-01-13 NOTE — ASSESSMENT & PLAN NOTE
Pt admits urinary frequency and burning x 2 weeks. Takes cranberry juice. No pelvic pain or flank pain. No fever.

## 2025-01-13 NOTE — ASSESSMENT & PLAN NOTE
Patient admits to chronic lower back pain.  Symptoms are stable.  Has been seeing a physical therapist, but needs her prescription renewed.  She uses a cane to assist with walking and balance.  No recent falls.

## 2025-01-15 LAB
BACTERIA UR CULT: NORMAL
SIGNIFICANT IND 70042: NORMAL
SITE SITE: NORMAL
SOURCE SOURCE: NORMAL

## 2025-01-23 ENCOUNTER — HOSPITAL ENCOUNTER (OUTPATIENT)
Dept: LAB | Facility: MEDICAL CENTER | Age: 85
End: 2025-01-23
Attending: PHYSICIAN ASSISTANT
Payer: MEDICARE

## 2025-01-23 DIAGNOSIS — M85.89 OSTEOPENIA OF MULTIPLE SITES: ICD-10-CM

## 2025-01-23 PROCEDURE — 36415 COLL VENOUS BLD VENIPUNCTURE: CPT

## 2025-01-23 PROCEDURE — 83521 IG LIGHT CHAINS FREE EACH: CPT | Mod: 91

## 2025-01-23 PROCEDURE — 86334 IMMUNOFIX E-PHORESIS SERUM: CPT

## 2025-01-23 PROCEDURE — 82784 ASSAY IGA/IGD/IGG/IGM EACH: CPT

## 2025-01-23 PROCEDURE — 83970 ASSAY OF PARATHORMONE: CPT

## 2025-01-23 PROCEDURE — 84155 ASSAY OF PROTEIN SERUM: CPT

## 2025-01-23 PROCEDURE — 84165 PROTEIN E-PHORESIS SERUM: CPT

## 2025-01-23 PROCEDURE — 86364 TISS TRNSGLTMNASE EA IG CLAS: CPT

## 2025-01-24 LAB — PTH-INTACT SERPL-MCNC: 39.5 PG/ML (ref 14–72)

## 2025-01-25 LAB — TTG IGA SER IA-ACNC: 1.18 FLU (ref 0–4.99)

## 2025-01-27 ENCOUNTER — HOSPITAL ENCOUNTER (OUTPATIENT)
Facility: MEDICAL CENTER | Age: 85
End: 2025-01-27
Attending: PHYSICIAN ASSISTANT
Payer: MEDICARE

## 2025-01-27 DIAGNOSIS — M85.89 OSTEOPENIA OF MULTIPLE SITES: ICD-10-CM

## 2025-01-27 LAB
ALBUMIN SERPL ELPH-MCNC: 4.17 G/DL (ref 3.75–5.01)
ALPHA1 GLOB SERPL ELPH-MCNC: 0.27 G/DL (ref 0.19–0.46)
ALPHA2 GLOB SERPL ELPH-MCNC: 1 G/DL (ref 0.48–1.05)
B-GLOBULIN SERPL ELPH-MCNC: 1.02 G/DL (ref 0.48–1.1)
CREAT 24H UR-MSRATE: 824 MG/24 HR (ref 800–1800)
CREAT UR-MCNC: 54.9 MG/DL
EER MONOCLONAL PROTEIN AND FLC, SERUM Q5224: ABNORMAL
GAMMA GLOB SERPL ELPH-MCNC: 0.64 G/DL (ref 0.62–1.51)
IGA SERPL-MCNC: 387 MG/DL (ref 68–408)
IGG SERPL-MCNC: 643 MG/DL (ref 768–1632)
IGM SERPL-MCNC: 70 MG/DL (ref 35–263)
INTERPRETATION SERPL IFE-IMP: ABNORMAL
INTERPRETATION SERPL IFE-IMP: ABNORMAL
KAPPA LC FREE SER-MCNC: 14.6 MG/L (ref 3.3–19.4)
KAPPA LC FREE/LAMBDA FREE SER NEPH: 0.87 {RATIO} (ref 0.26–1.65)
LAMBDA LC FREE SERPL-MCNC: 16.8 MG/L (ref 5.71–26.3)
MONOCLONAL PROTEIN NL11656: ABNORMAL G/DL
PROT SERPL-MCNC: 7.1 G/DL (ref 6.3–8.2)
SPECIMEN VOL UR: 1500 ML

## 2025-01-27 PROCEDURE — 82570 ASSAY OF URINE CREATININE: CPT

## 2025-01-27 PROCEDURE — 81050 URINALYSIS VOLUME MEASURE: CPT

## 2025-01-27 PROCEDURE — 82340 ASSAY OF CALCIUM IN URINE: CPT

## 2025-01-27 PROCEDURE — 84156 ASSAY OF PROTEIN URINE: CPT

## 2025-01-30 ENCOUNTER — APPOINTMENT (OUTPATIENT)
Dept: MEDICAL GROUP | Facility: IMAGING CENTER | Age: 85
End: 2025-01-30
Payer: MEDICARE

## 2025-01-30 VITALS
WEIGHT: 184 LBS | BODY MASS INDEX: 36.12 KG/M2 | HEART RATE: 74 BPM | TEMPERATURE: 98.1 F | RESPIRATION RATE: 16 BRPM | SYSTOLIC BLOOD PRESSURE: 112 MMHG | HEIGHT: 60 IN | DIASTOLIC BLOOD PRESSURE: 66 MMHG | OXYGEN SATURATION: 97 %

## 2025-01-30 DIAGNOSIS — E66.812 CLASS 2 SEVERE OBESITY DUE TO EXCESS CALORIES WITH SERIOUS COMORBIDITY AND BODY MASS INDEX (BMI) OF 36.0 TO 36.9 IN ADULT (HCC): ICD-10-CM

## 2025-01-30 DIAGNOSIS — R35.0 URINARY FREQUENCY: ICD-10-CM

## 2025-01-30 DIAGNOSIS — N32.81 OVERACTIVE BLADDER: ICD-10-CM

## 2025-01-30 DIAGNOSIS — I10 PRIMARY HYPERTENSION: ICD-10-CM

## 2025-01-30 DIAGNOSIS — M85.89 OSTEOPENIA OF MULTIPLE SITES: ICD-10-CM

## 2025-01-30 DIAGNOSIS — E66.01 CLASS 2 SEVERE OBESITY DUE TO EXCESS CALORIES WITH SERIOUS COMORBIDITY AND BODY MASS INDEX (BMI) OF 36.0 TO 36.9 IN ADULT (HCC): ICD-10-CM

## 2025-01-30 RX ORDER — OXYBUTYNIN CHLORIDE 5 MG/1
5 TABLET, EXTENDED RELEASE ORAL DAILY
Qty: 90 TABLET | Refills: 0 | Status: SHIPPED | OUTPATIENT
Start: 2025-01-30 | End: 2025-01-31

## 2025-01-30 ASSESSMENT — PAIN SCALES - GENERAL: PAINLEVEL_OUTOF10: NO PAIN

## 2025-01-30 ASSESSMENT — FIBROSIS 4 INDEX: FIB4 SCORE: 1.67

## 2025-01-30 NOTE — PROGRESS NOTES
Subjective:     CC:   Chief Complaint   Patient presents with    Follow-Up     On medication and lab results from 01/27/25       HPI:   Flavia presents today to discuss:    Class 2 severe obesity due to excess calories with serious comorbidity and body mass index (BMI) of 36.0 to 36.9 in adult ([facility])  Chronic, uncontrolled.  Planning to restart physical therapy.  Uses a walker.    Osteopenia of multiple sites  Chronic, negative secondary workup.  Refusing endocrinology evaluation at this time.  Wants to wait until her repeat DEXA in August.    Overactive bladder  Patient admits to urinary frequency over the past year.  States that she urinates every 1-2 hours.  States that her symptoms keep her up at night.  Most recent urinalysis without evidence of UTI.  Was previously referred to urology, patient never ended up seeing them in office.  Open to a new referral.  No dysuria, discharge, itching, frequency, flank pain, pelvic pain, back pain.      Past Medical History:   Diagnosis Date    Acquired absence of both cervix and uterus 11/11/2014    Back pain     Breast cancer ([facility])     Cancer ([facility])     Chickenpox     Chronic left-sided low back pain without sciatica 03/17/2017    Closed comminuted supracondylar fracture of femur, left, initial encounter ([facility]) 04/29/2024    Constipation     Cough     Cystitis 06/12/2019    Diarrhea     Dyslipidemia 07/26/2016    Eczema     Fatigue     Frequent urination     Gout of foot 09/30/2016    H/O total hysterectomy 07/26/2016    High cholesterol     History of UTI 01/31/2020    History of UTI 01/31/2020    Hx of breast cancer 07/26/2016    Hypertension     Hypopituitarism ([facility]) 06/23/2023    Hypothyroidism 07/26/2016    Idiopathic chronic gout of right ankle 09/30/2016    Inappropriate sinus tachycardia ([facility]) 03/15/2024    Influenza     Lichen sclerosus of female genitalia     Nasal drainage     Nocturnal oxygen desaturation 08/07/2020    Obesity     Osteopenia 07/26/2016     Osteopenia 07/26/2016    Osteoporosis     Osteoporosis 07/26/2016    Overactive bladder 02/09/2020    Overweight     Pathological fracture of left femur due to age-related osteoporosis (HCC) 04/29/2024    Recurrent UTI 01/31/2020    Rhinitis     Ringing in ears     Shortness of breath     Sputum production     Status post right knee replacement 05/05/2016    Status post total right knee replacement 08/17/2016    Swelling of lower extremity     Thyroid activity decreased     Tonsillitis     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     Urinary tract infection 05/14/2024    Vision loss     Vitamin D deficiency 07/26/2016    Whooping cough      Family History   Problem Relation Age of Onset    Arthritis Mother     Other Son         CELIAC DISEASE-SEVERE    Diabetes Other         GF    Arthritis Other         GM    Heart Disease Neg Hx      Past Surgical History:   Procedure Laterality Date    DE REMV FOOT FOREIGN BODY,DEEP Left 7/31/2024    Procedure: LEFT FOOT FOREIGN BODY REMOVAL;  Surgeon: Saman Weldon M.D.;  Location: [facility];  Service: Orthopedics    ORIF, FRACTURE, FEMUR Left 4/29/2024    Procedure: ORIF, FRACTURE, DISTAL FEMUR;  Surgeon: Jasiel Tolbert M.D.;  Location: [facility];  Service: Trauma    IRRIGATION & DEBRIDEMENT GENERAL Left 4/29/2024    Procedure: IRRIGATION AND DEBRIDEMENT, LEFT FEMUR;  Surgeon: Jasiel Tolbert M.D.;  Location: [facility];  Service: Trauma    LUMPECTOMY Right 2012    ARTHROSCOPY, KNEE      HYSTERECTOMY LAPAROSCOPY      KNEE ARTHROPLASTY TOTAL Right     OTHER      LIPOSUCTION THIGHS-LEG LIFT    DE REMV 2ND CATARACT,CORN-SCLER SECTN      VAGINAL HYSTERECTOMY TOTAL      Hysterectomy,Total Vaginal     Social History     Tobacco Use    Smoking status: Never    Smokeless tobacco: Never   Vaping Use    Vaping status: Never Used   Substance Use Topics    Alcohol use: Never     Comment: min    Drug use: No      Social History     Social History Narrative    ** Merged History Encounter **         From [place]      Current Outpatient Medications Ordered in Epic   Medication Sig Dispense Refill    oxybutynin SR (DITROPAN-XL) 5 MG TABLET SR 24 HR Take 1 Tablet by mouth every day. 90 Tablet 0    calcium carbonate (OS-HILDA 500) 500 MG Tab Take 500 mg by mouth 2 times a day with meals.      amoxicillin (AMOXIL) 500 MG Cap TAKE 4 CAPSULES 1 HOUR BEFORE DENTAL APPOINTMENT      CRANBERRY-VITAMIN C-D MANNOSE PO Take  by mouth.      losartan (COZAAR) 25 MG Tab Take 1 Tablet by mouth 2 times a day. 180 Tablet 3    SYNTHROID 175 MCG Tab TAKE 1 TABLET ON MONDAY,   WEDNESDAY, AND FRIDAY AND  150MCG ON SUNDAY, TUESDAY, THURSDAY, AND SATURDAY. 36 Tablet 3    levothyroxine (SYNTHROID) 150 MCG Tab TAKE 175MCG ON MONDAY, WEDNESDAY, AND FRIDAY, AND 1 TABLET OF 150MCG ON SUNDAY, TUESDAY, THURSDAY, AND SATURDAY. 48 Tablet 3    b complex vitamins capsule Take 1 Capsule by mouth every day.      Cholecalciferol (VITAMIN D3) 125 MCG/0.5ML Liquid Take 0.5 mL by mouth two times a week.      acetaminophen (TYLENOL) 500 MG Tab Take 500 mg by mouth at bedtime as needed for Mild Pain.      Melatonin 10 MG Tab Take 10 mg by mouth at bedtime as needed.  May repeat 1 time..      levalbuterol (XOPENEX HFA) 45 MCG/ACT inhaler Inhale 2 Puffs every four hours as needed for Shortness of Breath. 3 Each 3    Spacer/Aero-Holding Chambers Device 1 Device as needed (SOB). 1 Each 0    valacyclovir (VALTREX) 1 GM Tab Take 2 Tablets by mouth every 12 hours. 2 g twice daily for one day. 20 Tablet 0    clobetasol (TEMOVATE) 0.05 % Cream CLOBETASOL PROPIONATE 0.05 % CREA 30 g 1     No current [facility]-ordered facility-administered medications on file.     Lisinopril, Atorvastatin, Atorvastatin, Hydrochlorothiazide, Inpefa [sotagliflozin], Lisinopril, and Other misc    PMH/PSH/FH/Social history reviewed.  Vaccinations discussed.  Previous records and labs reviewed.  "Discussed age appropriate anticipatory guidance.    ROS: see hpi  Gen: no fevers/chills  Pulm: no sob, no cough  CV: no chest pain, no palpitations, no edema  GI: no nausea/vomiting, no diarrhea  Skin: no rash    Objective:   Exam:  /66 (BP Location: Right arm, Patient Position: Sitting, BP Cuff Size: Adult)   Pulse 74   Temp 36.7 °C (98.1 °F) (Temporal)   Resp 16   Ht 1.511 m (4' 11.5\")   Wt 83.5 kg (184 lb)   SpO2 97%   BMI 36.54 kg/m²    Body mass index is 36.54 kg/m².    Gen: Alert and oriented, No apparent distress.  HEENT: Head atraumatic, normocephalic. Pupils equal and round.  Neck: Neck is supple without lymphadenopathy.   Lungs: Normal effort, CTA bilaterally, no wheezes, rhonchi, or rales  CV: Regular rate and rhythm. No murmurs, rubs, or gallops.  ABD: +BS. Non-tender, non-distended. No rebound, rigidity, or guarding.  Ext: No clubbing, cyanosis, edema.    Assessment & Plan:     85 y.o. female with the following -     1. Osteopenia of multiple sites  Patient declining endocrinology evaluation at this time.  Continue vitamin D and calcium.  Fall precautions, continue to use walker for balance.  Restart physical therapy for gait strengthening.    2. Overactive bladder  Chronic, uncontrolled.  Trial low-dose oxybutynin.  Titrate up as tolerated.  Side effects may include dry mouth constipation.  - Referral to Urology  - oxybutynin SR (DITROPAN-XL) 5 MG TABLET SR 24 HR; Take 1 Tablet by mouth every day.  Dispense: 90 Tablet; Refill: 0    3. Urinary frequency  - Referral to Urology  - oxybutynin SR (DITROPAN-XL) 5 MG TABLET SR 24 HR; Take 1 Tablet by mouth every day.  Dispense: 90 Tablet; Refill: 0    4. Class 2 severe obesity due to excess calories with serious comorbidity and body mass index (BMI) of 36.0 to 36.9 in adult (HCC)  - Patient identified as having weight management issue.  Appropriate orders and counseling given.    5. Primary hypertension  Chronic, controlled and stable. Continue " current regimen -losartan 25 mg twice a day.  Has appointment with cardiology tomorrow.    Return in about 3 months (around 4/23/2025) for Medication recheck.    Almita Valenzuela PA-C ([place])  Physician Assistant Certified  [facility]    Billing : secondary to the complexity of this patient's illnesses and their interactions.  See assessment and plan above for the comprehensive evaluation and management of the patient's acute and chronic concerns.  As the patient's PCP, I am the continued focal point for all health care services for the patient's needs and ongoing subsequent medical care.  All problems listed were discussed during the office visit, medications were evaluated and complexities were discussed, as well as plan for the future.    Please note that this dictation was created using voice recognition software. I have made every reasonable attempt to correct obvious errors, but I expect that there are errors of grammar and possibly content that I did not discover before finalizing the note.

## 2025-01-30 NOTE — ASSESSMENT & PLAN NOTE
Patient admits to urinary frequency over the past year.  States that she urinates every 1-2 hours.  States that her symptoms keep her up at night.  Most recent urinalysis without evidence of UTI.  Was previously referred to urology, patient never ended up seeing them in office.  Open to a new referral.  No dysuria, discharge, itching, frequency, flank pain, pelvic pain, back pain.

## 2025-01-30 NOTE — PATIENT INSTRUCTIONS
It was a pleasure meeting with you today at [facility]!    Your medical history/records and medications were reviewed today.     UPDATE on MyChart Results: If you have blood work, and/or imaging studies, or any other test or procedure completed, you will have access to results as soon as they become available in MyChart. Recently, these results will be available for review at the same time that your provider is able to see results!    This will likely mean you will see a result before your provider has had a chance to review and discuss with you.  Some results or care notes may be hard to understand and may be serious in nature.    We look at every result and your provider will contact you to explain what they mean and discuss appropriate next steps. Please allow for at least 72 business hours for chart and result review.     We prefer that you wait for your care team to contact you with your results.  Often, your provider will discuss your results with you at your next appointment. We look forward to continuing to partner with you in your care.    Please review my practice information below:    If you have any prescription refill requests, please send them via [employer] or discuss with your provider at the start of your office visits. Please allow 3-5 business days for lab and testing review and you will be contacted via [employer] with those results, or if advised to make a follow up appointment regarding those results, then please do so.     Once resulted, your lab/test/imaging results will show up automatically in your MyChart. Please wait for my interpretation and recommendations prior to viewing your results to avoid any unnecessary confusion or misinterpretation. I will address all of the lab values that I interpret as abnormal and message you accordingly on your MyChart. I will always send you a message about your results even if they are normal. If you do not hear back from me within 5-7 business  days after completing your tests, then please send me a message on [employer] so I can obtain your results (especially if you went to an outside lab or imaging center - [facility], [facility], etc).     If you have any additional questions or concerns beyond my interpretation of your results, please make an appointment with me to discuss in further detail.    Please only use the [employer] messaging system for questions regarding your most recent appointment or if advised to use otherwise (glucose or blood pressure reporting).     If you have any new problems or concerns, you must make an appointment to discuss. This includes any referral requests, lab requests (unless advised to notify me for pre-appt labs), medication side effects, or request for medication adjustments.     Please arrive 15 minutes prior to your appointment time to complete your check-in and intake with the medical assistant.      Thank you,    Almita Valenzuela PA-C ([place])  Physician Assistant Certified  [facility]    -----------------------------------------------------------------    Attn: Patients of [facility]:    In an effort to continue to provide excellent and efficient care to our patients, it is vital that we continue to use our resources appropriately. With that, this is a reminder that [employer] is used for prescription refill requests, test results, virtual visits, and chart review only.     Any new questions, concerns/conditions, lab/imaging requests, medication adjustments, new prescriptions, or referral requests do require an appointment (virtually or in person), unless discussed otherwise at your most recent appointment.     Thank you for your understanding,    [facility]

## 2025-01-30 NOTE — ASSESSMENT & PLAN NOTE
Chronic, negative secondary workup.  Refusing endocrinology evaluation at this time.  Wants to wait until her repeat DEXA in August.

## 2025-01-31 ENCOUNTER — OFFICE VISIT (OUTPATIENT)
Dept: CARDIOLOGY | Facility: MEDICAL CENTER | Age: 85
End: 2025-01-31
Attending: INTERNAL MEDICINE
Payer: MEDICARE

## 2025-01-31 VITALS
SYSTOLIC BLOOD PRESSURE: 130 MMHG | WEIGHT: 186 LBS | HEIGHT: 59 IN | RESPIRATION RATE: 18 BRPM | OXYGEN SATURATION: 95 % | BODY MASS INDEX: 37.5 KG/M2 | HEART RATE: 85 BPM | DIASTOLIC BLOOD PRESSURE: 68 MMHG

## 2025-01-31 DIAGNOSIS — I20.89 MICROVASCULAR ANGINA (HCC): ICD-10-CM

## 2025-01-31 DIAGNOSIS — I10 ESSENTIAL HYPERTENSION: ICD-10-CM

## 2025-01-31 DIAGNOSIS — I35.8 AORTIC VALVE SCLEROSIS: ICD-10-CM

## 2025-01-31 DIAGNOSIS — E78.2 MIXED HYPERLIPIDEMIA: ICD-10-CM

## 2025-01-31 DIAGNOSIS — I50.32 CHRONIC HEART FAILURE WITH PRESERVED EJECTION FRACTION (HFPEF) (HCC): Primary | ICD-10-CM

## 2025-01-31 DIAGNOSIS — I47.11 INAPPROPRIATE SINUS TACHYCARDIA (HCC): ICD-10-CM

## 2025-01-31 DIAGNOSIS — I45.10 RBBB: ICD-10-CM

## 2025-01-31 LAB
ALBUMIN 24H MFR UR ELPH: 100 %
ALPHA1 GLOB 24H MFR UR ELPH: 0 %
ALPHA2 GLOB 24H MFR UR ELPH: 0 %
B-GLOBULIN 24H MFR UR ELPH: 0 %
CALCIUM 24H UR-MCNC: 6.9 MG/DL
CALCIUM 24H UR-MRATE: 104 MG/D (ref 100–250)
CALCIUM/CREAT 24H UR: 128 MG/G (ref 20–300)
COLLECT DURATION TIME SPEC: 24 HR
CREAT 24H UR-MCNC: 54 MG/DL
EER MONOCLONAL PROTEIN STUDY, 24 HOUR U Q5964: ABNORMAL
GAMMA GLOB 24H MFR UR ELPH: 0 %
INTERPRETATION UR IFE-IMP: ABNORMAL
M PROTEIN 24H MFR UR ELPH: 0 %
M PROTEIN 24H UR ELPH-MRATE: 0 MG/24 HRS
PROT 24H UR-MRATE: 20 MG/DL
PROT 24H UR-MRATE: 300 MG/D (ref 40–150)
SPECIMEN VOL ?TM UR: 1500 ML

## 2025-01-31 PROCEDURE — 99213 OFFICE O/P EST LOW 20 MIN: CPT | Performed by: INTERNAL MEDICINE

## 2025-01-31 RX ORDER — IVABRADINE 5 MG/1
5 TABLET, FILM COATED ORAL DAILY
Qty: 90 TABLET | Refills: 3 | Status: SHIPPED | OUTPATIENT
Start: 2025-01-31

## 2025-01-31 RX ORDER — LOSARTAN POTASSIUM 25 MG/1
25 TABLET ORAL 2 TIMES DAILY
Qty: 180 TABLET | Refills: 3 | Status: SHIPPED | OUTPATIENT
Start: 2025-01-31

## 2025-01-31 ASSESSMENT — FIBROSIS 4 INDEX: FIB4 SCORE: 1.67

## 2025-01-31 NOTE — PROGRESS NOTES
CARDIOLOGY established PATIENT:    PCP: Almita Valenzuela P.A.-C.    1. Chronic heart failure with preserved ejection fraction (HFpEF) (HCC)    2. Microvascular angina (HCC)    3. Inappropriate sinus tachycardia (HCC)    4. RBBB    5. Aortic valve sclerosis    6. Essential hypertension    7. Mixed hyperlipidemia        Flavia Garrett here for HFpEF, inapp ST, microvascular angina follow up     Chief Complaint   Patient presents with    Hyperlipidemia    Hypertension    Congestive Heart Failure     Chronic heart failure with preserved ejection fraction (HFpEF) (HCC)       History:     Flavia Garrett is an 85 y.o. female with history of stage I breast cancer in 2011 (partial right sided lumpectomy with LN dissection), gallbladder stones / sludge, RBBB, dyslipidemia, hypertension, arthritis, stable angina due to microvascular disease, HFpEF, and hypothyroidism was originally referred from the pulmonary medicine clinic for dyspnea on exertion concerns and tachycardia.  Is here for follow-up.     She met with Dr. Vela with the pulmonary medicine team 7/20/2023 and referred for evaluation for her dyspnea on exertion and tachycardia episodes. She finished 6 months of PT until 4/2023 (back pain).     Clinic 9/2023: Due to ongoing dyspnea on exertion, arrange for a TTE, cardiac PET, started metoprolol, offered semaglutide trial however deferred given gallbladder issues, and started atorvastatin 20 mg. TTE with normal LVEF, aortic valve sclerosis without stenosis, grade 1 LV diastolic dysfunction.     Change losartan-HCTZ to losartan 50 mg 11/2/23 due to feeling more tired and lightheaded.     Fell July 2023 due to tripping.     Clinic 12/2023: Started spironolactone 25 mg, decrease losartan to 25 mg, discussed R/LHC.     2/2024 after the R/LHC: Cardiac monitor, increase metoprolol, started rosuvastatin 5 mg, referred to cardiac rehab     Clinic 3/2024: she stopped metoprolol, rosuvastatin and spironolactone (blurry  eyes , fatigue, mind fog). 6 min walk test then: lowest sat 90%, peak  bpm. [place] SOB. On RA. Starting HR 115bpm.      ICR started 4/2024. She really enjoyed ICR and was feeling good while doing it with manageable RUIZ and no CP concerns.     4/29/24: mechanical fall with left femur fracture, s/p Open reduction internal fixation left intra-articular distal femur fracture on 4/29 with postacute PT placement. Discharged from rehab 5/14/24. Losartan stopped 5/6/24 due to hypotension. Then, treated for UTI with Omnicef. Had acute blood loss anemia post-op. Discharged on Aspirin for DVT ppx.     Clinic 5/2024: We agreed to hold ivabradine and Inpefa until more recovery with improved anemia.  Placed referral to infusion center for iron infusion.  Increased losartan 25 mg to twice daily given hypertension     Got one iron infusion. Hgb improved to 11.8 (6/2024) from 7.8 (5/2024). And 12.1 10/2024    Clinic 8/2024: agreed to hold off resuming ivabradine, Inpefa. Normal BP on losartan 25mg BID.    BP log: overall < 130/80mmHg after losartan pills.    Overall feels good . Rare palpitations. Denies CP. Has stable mild RUIZ. Denies RACHEL, syncope. Gets tired with walking at a mall, uses walker. Still feels HR increase with minimal activity but not as bothersome as last year, with her RUIZ.    Lipid profile 10/2024:  , HDL 45,     Normal Cr, ALT, AST, Na and K 10/2024    Cardiac event monitor 2/2024: Personally reviewed  Rare PACs and PVCs   Rare brief SVT   Patient triggered events correlated predominantly sinus rhythm and rare PVCs   Reassuring cardiac event monitor findings      RHC and LHC: 2/5/24 personally performed  1.  Mildly elevated right heart filling pressures: CVP 10mmHg  2.  Mildly elevated left heart filling pressures: PCWP 15-18mmHg  3.  Mild postcapillary pulmonary hypertension: mean PA 25mmHg, PASP 35mmHg, PVR < 2WU  4.  Normal resting cardiac output  5.  Mildly elevated SVR ~ 1400  6.  Favorable  ", CPI and kael  7 . Mild slow coronary flow phenomena in all epicardial coronary arteries consistent with microvascular disease which was suspected on the recent cardiac PET scan     Cardiac PET 9/2023: Personally reviewed  No ischemia  TID 1.0  Normal estimated LVEF  Reduced CFR 1.8     TTE 9/22/23: Personally reviewed  Compared to the prior study on 3/18/21, no significant changes.  Normal LV size, thickness and systolic function with normal estimated   LVEF 60%  Grade I LV diastolic dysfunction with normal estimated LA pressure.  Mildly dilated LA.  Normal RV size and systolic function.  AV sclerosis without stenosis.  Normal IVC size  Unable to estimate RVSP  No pericardial effusion     Lipid panel 1/2023:  ,      Normal A1C 3/2022 5.2%.     Treadmill stress test 6/2021:  Baseline rhythm sinus with right bundle branch block.   Poor exercise capacity.   With stress ST changes noted due to bundle branch block, but not suggestive of ischemia   Overall negative stress electrocardiogram for ischemia.     TTE 3/2021: Personally reviewed  Normal LV systolic function, LVEF 60%  Grade 1 LV diastolic dysfunction  No significant valvular abnormalities  Normal IVC size  Unable to estimate RVSP       Functional status:     NYHA Class: II  I: no symptoms with activity. Normal  II. Mild symptoms with normal physical activity and comfortable at rest.  III: Moderate symptoms with less than normal physical activity. Only comfortable at rest  IV: Severe symptoms with symptoms of heart failure, even at rest.      PE:  /68 (BP Location: Left arm, Patient Position: Sitting, BP Cuff Size: Adult)   Pulse 85   Resp 18   Ht 1.499 m (4' 11\")   Wt 84.4 kg (186 lb)   SpO2 95%   BMI 37.57 kg/m²     Gen: well  HEENT: Symmetric face.  NECK: No JVD.   CARDIAC: Regular, Normal S1, S2, No murmur  VASCULATURE: carotids are normal bilaterally without bruit  RESP: Clear to auscultation bilaterally   EXT: No edema, " bilateral varicose veins noted  SKIN: Warm and dry  NEURO: No gross deficits  PSYCH: Appropriate affect, participates in conversation    The ASCVD Risk score (Delbert DK, et al., 2019) failed to calculate.    Past Medical History:   Diagnosis Date    Acquired absence of both cervix and uterus 11/11/2014    Back pain     Breast cancer ([facility])     Cancer ([facility])     Chickenpox     Chronic left-sided low back pain without sciatica 03/17/2017    Closed comminuted supracondylar fracture of femur, left, initial encounter ([facility]) 04/29/2024    Constipation     Cough     Cystitis 06/12/2019    Diarrhea     Dyslipidemia 07/26/2016    Eczema     Fatigue     Frequent urination     Gout of foot 09/30/2016    H/O total hysterectomy 07/26/2016    High cholesterol     History of UTI 01/31/2020    History of UTI 01/31/2020    Hx of breast cancer 07/26/2016    Hypertension     Hypopituitarism ([facility]) 06/23/2023    Hypothyroidism 07/26/2016    Idiopathic chronic gout of right ankle 09/30/2016    Inappropriate sinus tachycardia ([facility]) 03/15/2024    Influenza     Lichen sclerosus of female genitalia     Nasal drainage     Nocturnal oxygen desaturation 08/07/2020    Obesity     Osteopenia 07/26/2016    Osteopenia 07/26/2016    Osteoporosis     Osteoporosis 07/26/2016    Overactive bladder 02/09/2020    Overweight     Pathological fracture of left femur due to age-related osteoporosis ([facility]) 04/29/2024    Recurrent UTI 01/31/2020    Rhinitis     Ringing in ears     Shortness of breath     Sputum production     Status post right knee replacement 05/05/2016    Status post total right knee replacement 08/17/2016    Swelling of lower extremity     Thyroid activity decreased     Tonsillitis     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     Urinary tract infection 05/14/2024    Vision loss     Vitamin D deficiency 07/26/2016    Whooping cough      Past Surgical History:   Procedure Laterality Date    MT REMV FOOT FOREIGN  "BODY,DEEP Left 7/31/2024    Procedure: LEFT FOOT FOREIGN BODY REMOVAL;  Surgeon: Saman Weldon M.D.;  Location: [facility];  Service: Orthopedics    ORIF, FRACTURE, FEMUR Left 4/29/2024    Procedure: ORIF, FRACTURE, DISTAL FEMUR;  Surgeon: Jasiel Tolbert M.D.;  Location: SUR[facility]ERY [facility];  Service: Trauma    IRRIGATION & DEBRIDEMENT GENERAL Left 4/29/2024    Procedure: IRRIGATION AND DEBRIDEMENT, LEFT FEMUR;  Surgeon: Jasiel Tolbert M.D.;  Location: SURGERY [facility];  Service: Trauma    LUMPECTOMY Right 2012    ARTHROSCOPY, KNEE      HYSTERECTOMY LAPAROSCOPY      KNEE ARTHROPLASTY TOTAL Right     OTHER      LIPOSUCTION THIGHS-LEG LIFT    NC REMV 2ND CATARACT,CORN-SCLER SECTN      VAGINAL HYSTERECTOMY TOTAL      Hysterectomy,Total Vaginal     Allergies   Allergen Reactions    Lisinopril      Cramps and upset stomach    Other Reaction(s): Not available    Atorvastatin Myalgia     Leg pain    Atorvastatin     Hydrochlorothiazide     Inpefa [Sotagliflozin] Unspecified     UTIs    Lisinopril     Other Misc      Hydroc hlorathiazide - rxn - \"too dehydrated\" per pt     Outpatient Encounter Medications as of 1/31/2025   Medication Sig Dispense Refill    losartan (COZAAR) 25 MG Tab Take 1 Tablet by mouth 2 times a day. 180 Tablet 3    ivabradine (CORLANOR) 5 MG Tab tablet Take 1 Tablet by mouth every day. 90 Tablet 3    calcium carbonate (OS-HILDA 500) 500 MG Tab Take 500 mg by mouth 2 times a day with meals.      CRANBERRY-VITAMIN C-D MANNOSE PO Take  by mouth.      SYNTHROID 175 MCG Tab TAKE 1 TABLET ON MONDAY,   WEDNESDAY, AND FRIDAY AND  150MCG ON SUNDAY, TUESDAY, THURSDAY, AND SATURDAY. 36 Tablet 3    levothyroxine (SYNTHROID) 150 MCG Tab TAKE 175MCG ON MONDAY, WEDNESDAY, AND FRIDAY, AND 1 TABLET OF 150MCG ON SUNDAY, TUESDAY, THURSDAY, AND SATURDAY. 48 Tablet 3    b complex vitamins capsule Take 1 Capsule by mouth every day.      Cholecalciferol (VITAMIN D3) 125 MCG/0.5ML Liquid Take " 0.5 mL by mouth two times a week.      acetaminophen (TYLENOL) 500 MG Tab Take 500 mg by mouth at bedtime as needed for Mild Pain.      levalbuterol (XOPENEX HFA) 45 MCG/ACT inhaler Inhale 2 Puffs every four hours as needed for Shortness of Breath. 3 Each 3    Spacer/Aero-Holding Chambers Device 1 Device as needed (SOB). 1 Each 0    valacyclovir (VALTREX) 1 GM Tab Take 2 Tablets by mouth every 12 hours. 2 g twice daily for one day. 20 Tablet 0    clobetasol (TEMOVATE) 0.05 % Cream CLOBETASOL PROPIONATE 0.05 % CREA 30 g 1    [DISCONTINUED] oxybutynin SR (DITROPAN-XL) 5 MG TABLET SR 24 HR Take 1 Tablet by mouth every day. (Patient not taking: Reported on 1/31/2025) 90 Tablet 0    [DISCONTINUED] amoxicillin (AMOXIL) 500 MG Cap TAKE 4 CAPSULES 1 HOUR BEFORE DENTAL APPOINTMENT (Patient not taking: Reported on 1/31/2025)      [DISCONTINUED] losartan (COZAAR) 25 MG Tab Take 1 Tablet by mouth 2 times a day. 180 Tablet 3    [DISCONTINUED] Melatonin 10 MG Tab Take 10 mg by mouth at bedtime as needed.  May repeat 1 time.. (Patient not taking: Reported on 1/31/2025)       No facility-administered encounter medications on file as of 1/31/2025.     Social History     Socioeconomic History    Marital status:      Spouse name: Not on file    Number of children: Not on file    Years of education: Not on file    Highest education level: Associate degree: occupational, technical, or vocational program   Occupational History    Not on file   Tobacco Use    Smoking status: Never    Smokeless tobacco: Never   Vaping Use    Vaping status: Never Used   Substance and Sexual Activity    Alcohol use: Never     Comment: min    Drug use: No    Sexual activity: Yes     Partners: Male   Other Topics Concern    Not on file   Social History Narrative    ** Merged History Encounter **         From [place]      Social Drivers of Health     Financial Resource Strain: Low Risk  (10/28/2024)    Overall Financial Resource Strain (CARDIA)      Difficulty of Paying Living Expenses: Not hard at all   Food Insecurity: No Food Insecurity (10/28/2024)    Hunger Vital Sign     Worried About Running Out of Food in the Last Year: Never true     Ran Out of Food in the Last Year: Never true   Transportation Needs: No Transportation Needs (10/28/2024)    PRAPARE - Transportation     Lack of Transportation (Medical): No     Lack of Transportation (Non-Medical): No   Physical Activity: Unknown (10/28/2024)    Exercise Vital Sign     Days of Exercise per Week: Not on file     Minutes of Exercise per Session: 100 min   Stress: No Stress Concern Present (10/28/2024)    Citizen of [place] [employer] - Occupational Stress Questionnaire     Feeling of Stress : Not at all   Social Connections: Unknown (10/28/2024)    Social Connection and Isolation Panel [NHANES]     Frequency of Communication with Friends and Family: More than three times a week     Frequency of Social Gatherings with Friends and Family: More than three times a week     Attends [employer] Services: Patient declined     Active Member of Clubs or Organizations: Yes     Attends Club or Organization Meetings: More than 4 times per year     Marital Status:    Intimate Partner Violence: Not on file   Housing Stability: Unknown (10/28/2024)    Housing Stability Vital Sign     Unable to Pay for Housing in the Last Year: No     Number of Times Moved in the Last Year: Not on file     Homeless in the Last Year: No     Family History   Problem Relation Age of Onset    Arthritis Mother     Other Son         CELIAC DISEASE-SEVERE    Diabetes Other         GF    Arthritis Other         GM    Heart Disease Neg Hx          Studies    Lab Results   Component Value Date/Time    TSHULTRASEN 1.040 10/02/2024 1022        Lab Results   Component Value Date/Time    FREET4 1.56 06/28/2023 0738      Lab Results   Component Value Date/Time    HBA1C 5.2 03/15/2022 12:00 AM     Lab Results   Component Value Date/Time     CHOLSTRLTOT 216 (H) 10/02/2024 10:22 AM     (H) 10/02/2024 10:22 AM    HDL 45 10/02/2024 10:22 AM    TRIGLYCERIDE 233 (H) 10/02/2024 10:22 AM       Lab Results   Component Value Date/Time    SODIUM 139 10/02/2024 10:22 AM    POTASSIUM 4.2 10/02/2024 10:22 AM    CHLORIDE 102 10/02/2024 10:22 AM    CO2 23 10/02/2024 10:22 AM    GLUCOSE 103 (H) 10/02/2024 10:22 AM    BUN 20 10/02/2024 10:22 AM    CREATININE 0.72 10/02/2024 10:22 AM     Lab Results   Component Value Date/Time    ALKPHOSPHAT 109 (H) 10/02/2024 10:22 AM    ASTSGOT 27 10/02/2024 10:22 AM    ALTSGPT 24 10/02/2024 10:22 AM    TBILIRUBIN 0.4 10/02/2024 10:22 AM        Echocardiogram:  No results found. However, due to the size of the patient record, not all encounters were searched. Please check Results Review for a complete set of results.        Assessment and Recommendations:    Problem List Items Addressed This Visit          Cardiology Medicine Problems    Mixed hyperlipidemia    Relevant Medications    losartan (COZAAR) 25 MG Tab    ivabradine (CORLANOR) 5 MG Tab tablet    Microvascular angina (HCC)    Relevant Medications    losartan (COZAAR) 25 MG Tab    ivabradine (CORLANOR) 5 MG Tab tablet    Chronic heart failure with preserved ejection fraction (HFpEF) (HCC) - Primary    Relevant Medications    losartan (COZAAR) 25 MG Tab    ivabradine (CORLANOR) 5 MG Tab tablet    Essential hypertension    Relevant Medications    losartan (COZAAR) 25 MG Tab    ivabradine (CORLANOR) 5 MG Tab tablet    Inappropriate sinus tachycardia (HCC)    Relevant Medications    losartan (COZAAR) 25 MG Tab    ivabradine (CORLANOR) 5 MG Tab tablet       Other    RBBB    Relevant Medications    losartan (COZAAR) 25 MG Tab    ivabradine (CORLANOR) 5 MG Tab tablet    Aortic valve sclerosis    Relevant Medications    losartan (COZAAR) 25 MG Tab    ivabradine (CORLANOR) 5 MG Tab tablet     Ms. Garrett is overall doing well from a chronic complex cardiovascular standpoint with  minimal microvascular angina concerns at this time.  She continues to have dyspnea on exertion with rapid heart rate with exertion much better compared to last year.  We agreed to trial ivabradine again but at 5 mg once a day first before we further increase the dose.    Still having issues with recurrent UTIs, planning to see urology soon.  PCP recently prescribed oxybutynin . will avoid resuming any SGLT inhibitors at this time.    Blood pressure normal and at goal on losartan 25 mg twice daily.    She will continue to do her best with a heart healthy diet and stay active, accounting for her underlying limitations from arthritis.    Thank you for the opportunity to be involved in Flavia Garrett 's  complex cardiovascular care; and please reach out with any questions or concerns.     () Today's E/M visit is associated with medical care services that serve as the continuing focal point for all needed health care services and/or with medical care services that  are part of ongoing cardiac care related to a patient's single, serious condition, or a complex condition: This includes  furnishing services to patients on an ongoing basis that result in care that is personalized  to the patient. The services result in a comprehensive, longitudinal, and continuous  relationship with the patient and involve delivery of team-based care that is accessible, coordinated with other practitioners and providers, and integrated with the broader health  care landscape.     Return in about 6 months (around 7/31/2025).    John Campoverde MD, MPH [facility]  Interventional Cardiologist  [facility]   of Clinical Internal Medicine - [facility] JUSTYN    ~ Portions of this note were completed using voice recognition software (Dragon Naturally speaking software) . Occasional transcription errors may have escaped proof reading. I have made every reasonable attempt to  correct obvious errors, but I expect that there are errors of grammar and possibly content that I did not discover before finalizing the note. ~

## 2025-02-12 ENCOUNTER — TELEPHONE (OUTPATIENT)
Dept: CARDIOLOGY | Facility: MEDICAL CENTER | Age: 85
End: 2025-02-12
Payer: MEDICARE

## 2025-02-12 NOTE — TELEPHONE ENCOUNTER
AL    Caller: Flavia Tinoco Gerry    Topic/issue: Patient wants to cut her medication in half as she feels groggy (jet lag-like) after taking it. It has prevented her from driving. Please advise.     Medication: ivabradine (CORLANOR) 5 MG Tab tablet     Callback Number: [phone]    Thank you,  Luz ESCALONA

## 2025-02-12 NOTE — TELEPHONE ENCOUNTER
Noted:  John Campoverde M.D.  You7 minutes ago (12:14 PM)     JA  Sure, and if still not feeling well on it, se can stop it       Phone Number Called: [phone]     Call outcome: Spoke to patient regarding message below.    Message: Called to inform patient of above recommendation. Pt agreeable to this plan.

## 2025-02-12 NOTE — TELEPHONE ENCOUNTER
AL, Patient experiencing fatigue/grogginess after taking corlanor. Pt cannot drive as result. Pt would like to know if it would be okay to try half tablet. Please advise. Thank you!

## 2025-02-18 ENCOUNTER — TELEPHONE (OUTPATIENT)
Dept: MEDICAL GROUP | Facility: MEDICAL CENTER | Age: 85
End: 2025-02-18

## 2025-02-18 ENCOUNTER — RESULTS FOLLOW-UP (OUTPATIENT)
Dept: MEDICAL GROUP | Facility: MEDICAL CENTER | Age: 85
End: 2025-02-18

## 2025-02-18 ENCOUNTER — OFFICE VISIT (OUTPATIENT)
Dept: MEDICAL GROUP | Facility: MEDICAL CENTER | Age: 85
End: 2025-02-18
Payer: MEDICARE

## 2025-02-18 VITALS
TEMPERATURE: 97.6 F | DIASTOLIC BLOOD PRESSURE: 66 MMHG | SYSTOLIC BLOOD PRESSURE: 126 MMHG | HEIGHT: 59 IN | OXYGEN SATURATION: 95 % | HEART RATE: 105 BPM | WEIGHT: 186.51 LBS | BODY MASS INDEX: 37.6 KG/M2

## 2025-02-18 DIAGNOSIS — J02.9 SORE THROAT: ICD-10-CM

## 2025-02-18 DIAGNOSIS — I50.32 CHRONIC HEART FAILURE WITH PRESERVED EJECTION FRACTION (HFPEF) (HCC): ICD-10-CM

## 2025-02-18 DIAGNOSIS — I10 ESSENTIAL HYPERTENSION: ICD-10-CM

## 2025-02-18 DIAGNOSIS — R09.81 SINUS CONGESTION: ICD-10-CM

## 2025-02-18 DIAGNOSIS — R05.1 ACUTE COUGH: ICD-10-CM

## 2025-02-18 LAB
FLUAV RNA SPEC QL NAA+PROBE: NEGATIVE
FLUBV RNA SPEC QL NAA+PROBE: NEGATIVE
RSV RNA SPEC QL NAA+PROBE: NEGATIVE
S PYO DNA SPEC NAA+PROBE: NOT DETECTED
SARS-COV-2 RNA RESP QL NAA+PROBE: NEGATIVE

## 2025-02-18 PROCEDURE — 3078F DIAST BP <80 MM HG: CPT | Performed by: FAMILY MEDICINE

## 2025-02-18 PROCEDURE — 99214 OFFICE O/P EST MOD 30 MIN: CPT | Performed by: FAMILY MEDICINE

## 2025-02-18 PROCEDURE — 3074F SYST BP LT 130 MM HG: CPT | Performed by: FAMILY MEDICINE

## 2025-02-18 PROCEDURE — 0241U POCT CEPHEID COV-2, FLU A/B, RSV - PCR: CPT | Performed by: FAMILY MEDICINE

## 2025-02-18 PROCEDURE — 87651 STREP A DNA AMP PROBE: CPT | Performed by: FAMILY MEDICINE

## 2025-02-18 RX ORDER — METHYLPREDNISOLONE 4 MG/1
TABLET ORAL
Qty: 21 TABLET | Refills: 0 | Status: SHIPPED | OUTPATIENT
Start: 2025-02-18

## 2025-02-18 ASSESSMENT — FIBROSIS 4 INDEX: FIB4 SCORE: 1.67

## 2025-02-18 ASSESSMENT — ENCOUNTER SYMPTOMS
SORE THROAT: 1
FEVER: 0
WHEEZING: 0
SINUS PAIN: 1
PALPITATIONS: 0
COUGH: 1
SHORTNESS OF BREATH: 0

## 2025-02-18 NOTE — TELEPHONE ENCOUNTER
Caller Name: Flavia Garrett    Call Back Number: [phone] (home)       Message: Informed pt her test results for covid and strep were negative.

## 2025-02-18 NOTE — PROGRESS NOTES
Verbal consent was acquired by the patient to use [employer] ambient listening note generation during this visit.      Flavia was seen today for follow-up.    Diagnoses and all orders for this visit:    Acute cough  -     methylPREDNISolone (MEDROL DOSEPAK) 4 MG Tablet Therapy Pack; As directed on the packaging label.  -     POCT CoV-2, Flu A/B, RSV by PCR    Sinus congestion  -     methylPREDNISolone (MEDROL DOSEPAK) 4 MG Tablet Therapy Pack; As directed on the packaging label.  -     POCT CoV-2, Flu A/B, RSV by PCR    Sore throat  -     POCT GROUP A STREP, PCR    Chronic heart failure with preserved ejection fraction (HFpEF) ([facility])    Essential hypertension                  Assessment & Plan  1. Acute sinusitis:  New condition, unstable  - Symptoms: sinus pain, runny eyes, weakness, and fatigue suggest viral or bacterial infection  -COVID, flu, RSV and strep test came back negative.  - Continue using albuterol inhaler for respiratory support  - Short course of steroids (tapering) for symptom relief and congestion.  Can try Flonase nasal spray first.  - Additional recommendations:    - Steam inhalation three times daily    - Salt gargles    - Honey with teas    - Tylenol as needed for pain and headache    - Adequate rest    - Hydration with electrolyte water    - Light diet    - Flonase nasal spray for sinus relief  - If no improvement by Friday, contact via [employer] or phone call for antibiotic prescription (doxycycline)    2. Congestive heart failure  Chronic condition, stable  - History of congestive heart failure  - Uses levalbuterol inhaler as prescribed  - Continue using inhaler as needed.  Continue to follow with cardiology    3.  Hypertension  Chronic condition, stable, blood pressure today 126/66.  Continue losartan 25 mg twice daily    Follow-up as needed.          Chief complaint::Diagnoses of Acute cough, Sinus congestion, Sore throat, Chronic heart failure with preserved ejection fraction (HFpEF)  (HCC), and Essential hypertension were pertinent to this visit.      History of Present Illness  The patient is an 85-year-old female who presents for evaluation of acute sickness for the last 4 days.    Sinus Pain  - Severe sinus pain initially perceived as originating from her lateral teeth but has since spread throughout her body.  - Accompanying symptoms include watery eyes, generalized weakness, and fatigue.  - Symptoms began abruptly on Saturday and Sunday, with no significant manifestation until yesterday.  - No respiratory distress reported but acknowledges a transient cough on Saturday and Sunday, which has since resolved.  - Mucus is clear, and she did not want to wait until it turned green.  - Managing symptoms with honey and levalbuterol, the latter prescribed by her cardiologist and pulmonologist for congestive heart failure.  - Suspects albuterol may have contributed to a metallic taste in her mouth prior to the onset of her cold.  - Notes an elevated heart rate, which she attributes to allowing her throat to recover from the cold.      ALLERGIES  - No known allergies to antibiotics    I      Review of Systems   Constitutional:  Positive for malaise/fatigue. Negative for fever.   HENT:  Positive for congestion, sinus pain and sore throat. Negative for ear discharge and ear pain.    Respiratory:  Positive for cough. Negative for shortness of breath and wheezing.    Cardiovascular:  Negative for chest pain, palpitations and leg swelling.          Medications and Allergies:     Current Outpatient Medications   Medication Sig Dispense Refill    methylPREDNISolone (MEDROL DOSEPAK) 4 MG Tablet Therapy Pack As directed on the packaging label. 21 Tablet 0    losartan (COZAAR) 25 MG Tab Take 1 Tablet by mouth 2 times a day. 180 Tablet 3    ivabradine (CORLANOR) 5 MG Tab tablet Take 1 Tablet by mouth every day. 90 Tablet 3    calcium carbonate (OS-HILDA 500) 500 MG Tab Take 500 mg by mouth 2 times a day with  "meals.      CRANBERRY-VITAMIN C-D MANNOSE PO Take  by mouth.      SYNTHROID 175 MCG Tab TAKE 1 TABLET ON MONDAY,   WEDNESDAY, AND FRIDAY AND  150MCG ON SUNDAY, TUESDAY, THURSDAY, AND SATURDAY. 36 Tablet 3    levothyroxine (SYNTHROID) 150 MCG Tab TAKE 175MCG ON MONDAY, WEDNESDAY, AND FRIDAY, AND 1 TABLET OF 150MCG ON SUNDAY, TUESDAY, THURSDAY, AND SATURDAY. 48 Tablet 3    b complex vitamins capsule Take 1 Capsule by mouth every day.      Cholecalciferol (VITAMIN D3) 125 MCG/0.5ML Liquid Take 0.5 mL by mouth two times a week.      acetaminophen (TYLENOL) 500 MG Tab Take 500 mg by mouth at bedtime as needed for Mild Pain.      levalbuterol (XOPENEX HFA) 45 MCG/ACT inhaler Inhale 2 Puffs every four hours as needed for Shortness of Breath. 3 Each 3    Spacer/Aero-Holding Chambers Device 1 Device as needed (SOB). 1 Each 0    valacyclovir (VALTREX) 1 GM Tab Take 2 Tablets by mouth every 12 hours. 2 g twice daily for one day. 20 Tablet 0    clobetasol (TEMOVATE) 0.05 % Cream CLOBETASOL PROPIONATE 0.05 % CREA 30 g 1     No current facility-administered medications for this visit.       /66 (BP Location: Left arm, Patient Position: Sitting, BP Cuff Size: Adult)   Pulse (!) 105   Temp 36.4 °C (97.6 °F) (Temporal)   Ht 1.499 m (4' 11\")   Wt 84.6 kg (186 lb 8.2 oz)   SpO2 95% , Body mass index is 37.67 kg/m².      Physical Exam  Constitutional:       Appearance: Normal appearance. She is well-developed and well-groomed.   HENT:      Head: Normocephalic and atraumatic.      Right Ear: Tympanic membrane, ear canal and external ear normal.      Left Ear: Tympanic membrane, ear canal and external ear normal.      Nose:      Right Sinus: Maxillary sinus tenderness and frontal sinus tenderness present.      Left Sinus: Maxillary sinus tenderness and frontal sinus tenderness present.      Mouth/Throat:      Mouth: Mucous membranes are moist.      Pharynx: Posterior oropharyngeal erythema present.   Eyes:      General:        "  Right eye: No discharge.         Left eye: No discharge.      Conjunctiva/sclera: Conjunctivae normal.   Cardiovascular:      Rate and Rhythm: Normal rate and regular rhythm.      Heart sounds: Normal heart sounds. No murmur heard.     No friction rub. No gallop.   Pulmonary:      Effort: Pulmonary effort is normal. No respiratory distress.      Breath sounds: Normal breath sounds. No wheezing or rales.   Neurological:      General: No focal deficit present.      Mental Status: She is alert. Mental status is at baseline.      Gait: Gait is intact.   Psychiatric:         Mood and Affect: Mood and affect normal.         Behavior: Behavior normal.                  Please note that this dictation was created using voice recognition software. I have made every reasonable attempt to correct obvious errors, but I expect that there are errors of grammar and possibly content that I did not discover before finalizing the note.

## 2025-02-21 ENCOUNTER — TELEPHONE (OUTPATIENT)
Dept: MEDICAL GROUP | Facility: MEDICAL CENTER | Age: 85
End: 2025-02-21
Payer: MEDICARE

## 2025-02-21 DIAGNOSIS — J06.9 ACUTE URI: ICD-10-CM

## 2025-02-21 RX ORDER — DOXYCYCLINE HYCLATE 100 MG
100 TABLET ORAL 2 TIMES DAILY
Qty: 14 TABLET | Refills: 0 | Status: SHIPPED | OUTPATIENT
Start: 2025-02-21 | End: 2025-02-28

## 2025-02-21 RX ORDER — DOXYCYCLINE HYCLATE 100 MG
100 TABLET ORAL 2 TIMES DAILY
Qty: 14 TABLET | Refills: 0 | Status: SHIPPED | OUTPATIENT
Start: 2025-02-21 | End: 2025-02-21

## 2025-02-21 NOTE — TELEPHONE ENCOUNTER
I sent antibiotic doxycycline 1 tablet 2 times daily for 7 days.  Please let patient know about this.

## 2025-02-21 NOTE — TELEPHONE ENCOUNTER
Caller Name: Flavia Garrett  Call Back Number: [phone]    How would the patient prefer to be contacted with a response: Phone call OK to leave a detailed message    Patient saw Dr Gregorio in the clinic 2/18/25 for a URI. She has been taking the Methylprednisolone since then and is still not feeling any better. In fact, she feels that her congestion and fatigue has gotten worse. She is not having any shortness of breath or trouble breathing. Her energy levels are severely effected. She was told by Dr Gregorio to get a message to her if she did not improve by the weekend.

## 2025-02-25 ENCOUNTER — APPOINTMENT (OUTPATIENT)
Dept: RADIOLOGY | Facility: MEDICAL CENTER | Age: 85
End: 2025-02-25
Attending: EMERGENCY MEDICINE
Payer: MEDICARE

## 2025-02-25 ENCOUNTER — HOSPITAL ENCOUNTER (EMERGENCY)
Facility: MEDICAL CENTER | Age: 85
End: 2025-02-25
Attending: EMERGENCY MEDICINE
Payer: MEDICARE

## 2025-02-25 VITALS
WEIGHT: 182.1 LBS | HEART RATE: 79 BPM | BODY MASS INDEX: 35.75 KG/M2 | OXYGEN SATURATION: 92 % | DIASTOLIC BLOOD PRESSURE: 57 MMHG | RESPIRATION RATE: 18 BRPM | TEMPERATURE: 96.8 F | HEIGHT: 60 IN | SYSTOLIC BLOOD PRESSURE: 110 MMHG

## 2025-02-25 DIAGNOSIS — N30.90 CYSTITIS: ICD-10-CM

## 2025-02-25 LAB
ALBUMIN SERPL BCP-MCNC: 3.9 G/DL (ref 3.2–4.9)
ALBUMIN/GLOB SERPL: 1.2 G/DL
ALP SERPL-CCNC: 68 U/L (ref 30–99)
ALT SERPL-CCNC: 14 U/L (ref 2–50)
ANION GAP SERPL CALC-SCNC: 15 MMOL/L (ref 7–16)
APPEARANCE UR: ABNORMAL
AST SERPL-CCNC: 21 U/L (ref 12–45)
BACTERIA #/AREA URNS HPF: ABNORMAL /HPF
BASOPHILS # BLD AUTO: 0.3 % (ref 0–1.8)
BASOPHILS # BLD: 0.02 K/UL (ref 0–0.12)
BILIRUB SERPL-MCNC: 0.5 MG/DL (ref 0.1–1.5)
BILIRUB UR QL STRIP.AUTO: ABNORMAL
BUN SERPL-MCNC: 36 MG/DL (ref 8–22)
CALCIUM ALBUM COR SERPL-MCNC: 9.4 MG/DL (ref 8.5–10.5)
CALCIUM SERPL-MCNC: 9.3 MG/DL (ref 8.4–10.2)
CASTS URNS QL MICRO: >20 /LPF (ref 0–2)
CHLORIDE SERPL-SCNC: 98 MMOL/L (ref 96–112)
CO2 SERPL-SCNC: 19 MMOL/L (ref 20–33)
COLOR UR: ABNORMAL
CREAT SERPL-MCNC: 1.39 MG/DL (ref 0.5–1.4)
EKG IMPRESSION: NORMAL
EOSINOPHIL # BLD AUTO: 0.22 K/UL (ref 0–0.51)
EOSINOPHIL NFR BLD: 3.1 % (ref 0–6.9)
EPITHELIAL CELLS 1715: ABNORMAL /HPF (ref 0–5)
ERYTHROCYTE [DISTWIDTH] IN BLOOD BY AUTOMATED COUNT: 47 FL (ref 35.9–50)
GFR SERPLBLD CREATININE-BSD FMLA CKD-EPI: 38 ML/MIN/1.73 M 2
GLOBULIN SER CALC-MCNC: 3.2 G/DL (ref 1.9–3.5)
GLUCOSE SERPL-MCNC: 132 MG/DL (ref 65–99)
GLUCOSE UR STRIP.AUTO-MCNC: NEGATIVE MG/DL
HCT VFR BLD AUTO: 40.7 % (ref 37–47)
HGB BLD-MCNC: 13.2 G/DL (ref 12–16)
HYALINE CAST   1831: PRESENT /LPF
IMM GRANULOCYTES # BLD AUTO: 0.06 K/UL (ref 0–0.11)
IMM GRANULOCYTES NFR BLD AUTO: 0.9 % (ref 0–0.9)
KETONES UR STRIP.AUTO-MCNC: ABNORMAL MG/DL
LEUKOCYTE ESTERASE UR QL STRIP.AUTO: ABNORMAL
LYMPHOCYTES # BLD AUTO: 0.7 K/UL (ref 1–4.8)
LYMPHOCYTES NFR BLD: 9.9 % (ref 22–41)
MCH RBC QN AUTO: 28.1 PG (ref 27–33)
MCHC RBC AUTO-ENTMCNC: 32.4 G/DL (ref 32.2–35.5)
MCV RBC AUTO: 86.6 FL (ref 81.4–97.8)
MICRO URNS: ABNORMAL
MONOCYTES # BLD AUTO: 0.82 K/UL (ref 0–0.85)
MONOCYTES NFR BLD AUTO: 11.6 % (ref 0–13.4)
MUCOUS THREADS URNS QL MICRO: PRESENT /HPF
NEUTROPHILS # BLD AUTO: 5.22 K/UL (ref 1.82–7.42)
NEUTROPHILS NFR BLD: 74.2 % (ref 44–72)
NITRITE UR QL STRIP.AUTO: NEGATIVE
NRBC # BLD AUTO: 0 K/UL
NRBC BLD-RTO: 0 /100 WBC (ref 0–0.2)
NT-PROBNP SERPL IA-MCNC: 253 PG/ML (ref 0–125)
PH UR STRIP.AUTO: 5 [PH] (ref 5–8)
PLATELET # BLD AUTO: 254 K/UL (ref 164–446)
PMV BLD AUTO: 10.1 FL (ref 9–12.9)
POTASSIUM SERPL-SCNC: 3.7 MMOL/L (ref 3.6–5.5)
PROT SERPL-MCNC: 7.1 G/DL (ref 6–8.2)
PROT UR QL STRIP: 30 MG/DL
RBC # BLD AUTO: 4.7 M/UL (ref 4.2–5.4)
RBC # URNS HPF: ABNORMAL /HPF
RBC UR QL AUTO: ABNORMAL
SODIUM SERPL-SCNC: 132 MMOL/L (ref 135–145)
SP GR UR STRIP.AUTO: 1.02
TROPONIN T SERPL-MCNC: 21 NG/L (ref 6–19)
TROPONIN T SERPL-MCNC: 23 NG/L (ref 6–19)
TSH SERPL DL<=0.005 MIU/L-ACNC: 1.28 UIU/ML (ref 0.38–5.33)
UROBILINOGEN UR STRIP.AUTO-MCNC: 1 EU/DL
WBC # BLD AUTO: 7 K/UL (ref 4.8–10.8)
WBC #/AREA URNS HPF: >100 /HPF

## 2025-02-25 PROCEDURE — 93005 ELECTROCARDIOGRAM TRACING: CPT | Mod: TC | Performed by: EMERGENCY MEDICINE

## 2025-02-25 PROCEDURE — 71045 X-RAY EXAM CHEST 1 VIEW: CPT

## 2025-02-25 PROCEDURE — 83880 ASSAY OF NATRIURETIC PEPTIDE: CPT

## 2025-02-25 PROCEDURE — 96365 THER/PROPH/DIAG IV INF INIT: CPT

## 2025-02-25 PROCEDURE — 700105 HCHG RX REV CODE 258: Performed by: EMERGENCY MEDICINE

## 2025-02-25 PROCEDURE — 85025 COMPLETE CBC W/AUTO DIFF WBC: CPT

## 2025-02-25 PROCEDURE — 84443 ASSAY THYROID STIM HORMONE: CPT

## 2025-02-25 PROCEDURE — 93005 ELECTROCARDIOGRAM TRACING: CPT | Mod: TC

## 2025-02-25 PROCEDURE — 80053 COMPREHEN METABOLIC PANEL: CPT

## 2025-02-25 PROCEDURE — 99285 EMERGENCY DEPT VISIT HI MDM: CPT

## 2025-02-25 PROCEDURE — 84484 ASSAY OF TROPONIN QUANT: CPT

## 2025-02-25 PROCEDURE — 81001 URINALYSIS AUTO W/SCOPE: CPT

## 2025-02-25 PROCEDURE — 36415 COLL VENOUS BLD VENIPUNCTURE: CPT

## 2025-02-25 PROCEDURE — 700111 HCHG RX REV CODE 636 W/ 250 OVERRIDE (IP): Performed by: EMERGENCY MEDICINE

## 2025-02-25 RX ORDER — CEPHALEXIN 500 MG/1
500 CAPSULE ORAL 3 TIMES DAILY
Qty: 21 CAPSULE | Refills: 0 | Status: ACTIVE | OUTPATIENT
Start: 2025-02-25 | End: 2025-02-25

## 2025-02-25 RX ORDER — CEPHALEXIN 500 MG/1
500 CAPSULE ORAL 3 TIMES DAILY
Qty: 21 CAPSULE | Refills: 0 | Status: ACTIVE | OUTPATIENT
Start: 2025-02-25 | End: 2025-03-04

## 2025-02-25 RX ADMIN — CEFAZOLIN 2 G: 2 INJECTION, POWDER, FOR SOLUTION INTRAMUSCULAR; INTRAVENOUS at 13:56

## 2025-02-25 ASSESSMENT — FIBROSIS 4 INDEX: FIB4 SCORE: 1.67

## 2025-02-25 NOTE — ED PROVIDER NOTES
ED PHYSICIAN NOTE    CHIEF COMPLAINT  Chief Complaint   Patient presents with    Weakness     Recent history of flu. Patient reports increased weakness. Recently prescribed a steroid pack and an antibiotic without relieve from PCP. Patient also report history of Heart failure.        EXTERNAL RECORDS REVIEWED  Outpatient Notes patient is seen by primary doctor.  Prescribed prednisone for cough and sinus congestion.  Strep test was negative. .  Viral panel negative.    [facility]      Flavia Garrett is a 85 y.o. female who presents with generalized weakness.  Patient started getting sick with URI symptoms about 10 days ago.  This consisted of congestion, runny nose and cough.  Was seen by primary doctor.  Started on a Medrol Dosepak.  She took this without improvement.  She was then given a prescription for doxycycline.  This caused her to feel nauseous and so it was discontinued after 2 days.  She thought she might be getting better and had some sweats yesterday but her weakness just seems to be getting worse.  She has spurts of about 30 minutes where she can get up and do things but then she is exhausted and has to lay down.  She feels like she can barely walk.  Over the last week of her illness she has had some nausea but denies nausea now.  She has not had diarrhea.  She has been trying to drink liquids and her electrolyte drink.  Has not been eating very much.  Denies dysuria, but does have urinary frequency.  She has not had any chest pain.  No orthopnea PND, dyspnea.  No new leg swelling or leg pain.    PAST MEDICAL HISTORY  Past Medical History:   Diagnosis Date    Acquired absence of both cervix and uterus 11/11/2014    Back pain     Breast cancer ([facility])     Cancer ([facility])     Chickenpox     Chronic left-sided low back pain without sciatica 03/17/2017    Closed comminuted supracondylar fracture of femur, left, initial encounter ([facility]) 04/29/2024    Constipation     Cough     Cystitis 06/12/2019    Diarrhea      Dyslipidemia 07/26/2016    Eczema     Fatigue     Frequent urination     Gout of foot 09/30/2016    H/O total hysterectomy 07/26/2016    High cholesterol     History of UTI 01/31/2020    History of UTI 01/31/2020    Hx of breast cancer 07/26/2016    Hypertension     Hypopituitarism (HCC) 06/23/2023    Hypothyroidism 07/26/2016    Idiopathic chronic gout of right ankle 09/30/2016    Inappropriate sinus tachycardia (HCC) 03/15/2024    Influenza     Lichen sclerosus of female genitalia     Nasal drainage     Nocturnal oxygen desaturation 08/07/2020    Obesity     Osteopenia 07/26/2016    Osteopenia 07/26/2016    Osteoporosis     Osteoporosis 07/26/2016    Overactive bladder 02/09/2020    Overweight     Pathological fracture of left femur due to age-related osteoporosis (HCC) 04/29/2024    Recurrent UTI 01/31/2020    Rhinitis     Ringing in ears     Shortness of breath     Sputum production     Status post right knee replacement 05/05/2016    Status post total right knee replacement 08/17/2016    Swelling of lower extremity     Thyroid activity decreased     Tonsillitis     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     Urinary tract infection 05/14/2024    Vision loss     Vitamin D deficiency 07/26/2016    Whooping cough        SOCIAL HISTORY  Social History     Tobacco Use    Smoking status: Never    Smokeless tobacco: Never   Vaping Use    Vaping status: Never Used   Substance Use Topics    Alcohol use: Never     Comment: min    Drug use: No       CURRENT MEDICATIONS  Home Medications       Reviewed by Harish Grady R.N. (Registered Nurse) on 02/25/25 at 1353  Med List Status: Not Addressed     Medication Last Dose Status   acetaminophen (TYLENOL) 500 MG Tab  Active   b complex vitamins capsule  Active   calcium carbonate (OS-HLIDA 500) 500 MG Tab  Active   Cholecalciferol (VITAMIN D3) 125 MCG/0.5ML Liquid  Active   clobetasol (TEMOVATE) 0.05 % Cream  Active   CRANBERRY-VITAMIN C-D  "MANNOSE PO  Active   doxycycline (VIBRAMYCIN) 100 MG Tab  Active   ivabradine (CORLANOR) 5 MG Tab tablet  Active   levalbuterol (XOPENEX HFA) 45 MCG/ACT inhaler  Active   levothyroxine (SYNTHROID) 150 MCG Tab  Active   losartan (COZAAR) 25 MG Tab  Active   methylPREDNISolone (MEDROL DOSEPAK) 4 MG Tablet Therapy Pack  Active   Spacer/Aero-Holding Chambers Device  Active   SYNTHROID 175 MCG Tab  Active   valacyclovir (VALTREX) 1 GM Tab  Active                  Audit from Redirected Encounters    **Home medications have not yet been reviewed for this encounter**         ALLERGIES  Allergies   Allergen Reactions    Lisinopril      Cramps and upset stomach    Other Reaction(s): Not available    Atorvastatin Myalgia     Leg pain    Atorvastatin     Hydrochlorothiazide     Inpefa [Sotagliflozin] Unspecified     UTIs    Lisinopril     Other Misc      Hydroc hlorathiazide - rxn - \"too dehydrated\" per pt       PHYSICAL EXAM  VITAL SIGNS: /57   Pulse 79   Temp 36 °C (96.8 °F) (Temporal)   Resp 18   Ht 1.524 m (5')   Wt 82.6 kg (182 lb 1.6 oz)   SpO2 92%   BMI 35.56 kg/m²    Constitutional: Awake and alert  HENT: Normal inspection  Eyes: Normal inspection  Neck: Grossly normal range of motion.  Cardiovascular: Normal heart rate, Normal rhythm.  Symmetric peripheral pulses.   Thorax & Lungs: No respiratory distress, No wheezing, No rales, No rhonchi, No chest tenderness.   Abdomen: Bowel sounds normal, soft, non-distended, nontender, no mass  Skin: No rash.  Back: No tenderness, No CVA tenderness.   Extremities: No clubbing, cyanosis, edema, no Homans or cords.  Neurologic: Grossly normal   Psychiatric: Normal for situation     DIAGNOSTIC STUDIES / PROCEDURES  LABS/EKG  Results for orders placed or performed during the hospital encounter of 02/25/25   CBC with Differential    Collection Time: 02/25/25 10:00 AM   Result Value Ref Range    WBC 7.0 4.8 - 10.8 K/uL    RBC 4.70 4.20 - 5.40 M/uL    Hemoglobin 13.2 12.0 - " 16.0 g/dL    Hematocrit 40.7 37.0 - 47.0 %    MCV 86.6 81.4 - 97.8 fL    MCH 28.1 27.0 - 33.0 pg    MCHC 32.4 32.2 - 35.5 g/dL    RDW 47.0 35.9 - 50.0 fL    Platelet Count 254 164 - 446 K/uL    MPV 10.1 9.0 - 12.9 fL    Neutrophils-Polys 74.20 (H) 44.00 - 72.00 %    Lymphocytes 9.90 (L) 22.00 - 41.00 %    Monocytes 11.60 0.00 - 13.40 %    Eosinophils 3.10 0.00 - 6.90 %    Basophils 0.30 0.00 - 1.80 %    Immature Granulocytes 0.90 0.00 - 0.90 %    Nucleated RBC 0.00 0.00 - 0.20 /100 WBC    Neutrophils (Absolute) 5.22 1.82 - 7.42 K/uL    Lymphs (Absolute) 0.70 (L) 1.00 - 4.80 K/uL    Monos (Absolute) 0.82 0.00 - 0.85 K/uL    Eos (Absolute) 0.22 0.00 - 0.51 K/uL    Baso (Absolute) 0.02 0.00 - 0.12 K/uL    Immature Granulocytes (abs) 0.06 0.00 - 0.11 K/uL    NRBC (Absolute) 0.00 K/uL   Comp Metabolic Panel    Collection Time: 02/25/25 10:00 AM   Result Value Ref Range    Sodium 132 (L) 135 - 145 mmol/L    Potassium 3.7 3.6 - 5.5 mmol/L    Chloride 98 96 - 112 mmol/L    Co2 19 (L) 20 - 33 mmol/L    Anion Gap 15.0 7.0 - 16.0    Glucose 132 (H) 65 - 99 mg/dL    Bun 36 (H) 8 - 22 mg/dL    Creatinine 1.39 0.50 - 1.40 mg/dL    Calcium 9.3 8.4 - 10.2 mg/dL    Correct Calcium 9.4 8.5 - 10.5 mg/dL    AST(SGOT) 21 12 - 45 U/L    ALT(SGPT) 14 2 - 50 U/L    Alkaline Phosphatase 68 30 - 99 U/L    Total Bilirubin 0.5 0.1 - 1.5 mg/dL    Albumin 3.9 3.2 - 4.9 g/dL    Total Protein 7.1 6.0 - 8.2 g/dL    Globulin 3.2 1.9 - 3.5 g/dL    A-G Ratio 1.2 g/dL   proBrain Natriuretic Peptide, NT    Collection Time: 02/25/25 10:00 AM   Result Value Ref Range    NT-proBNP 253 (H) 0 - 125 pg/mL   Troponin    Collection Time: 02/25/25 10:00 AM   Result Value Ref Range    Troponin T 23 (H) 6 - 19 ng/L   ESTIMATED GFR    Collection Time: 02/25/25 10:00 AM   Result Value Ref Range    GFR (CKD-EPI) 38 (A) >60 mL/min/1.73 m 2   TSH    Collection Time: 02/25/25 10:00 AM   Result Value Ref Range    TSH 1.280 0.380 - 5.330 uIU/mL   EKG    Collection Time:  25 10:30 AM   Result Value Ref Range    Report       [facility] Emergency Dept.    Test Date:  2025  Pt Name:    ARIELLE MUSE               Department: [facility]  MRN:        [id number]                      Room:  Gender:     Female                       Technician: [id number]  :        [id number]                   Requested By:ER TRIAGE PROTOCOL  Order #:    [id number]                    Reading MD: JAYCOB STODDARD MD    Measurements  Intervals                                Axis  Rate:       93                           P:          21  WA:         175                          QRS:        19  QRSD:       139                          T:          20  QT:         369  QTc:        459    Interpretive Statements  Sinus rhythm  Consider left atrial enlargement  Right bundle branch block  Compared to ECG 2024 13:51:00  ST (T wave) deviation now present  Myocardial infarct finding now present  Electronically Signed On 2025 10:30:19 PST by JAYCOB STODDARD MD     TROPONIN    Collection Time: 25 12:25 PM   Result Value Ref Range    Troponin T 21 (H) 6 - 19 ng/L   URINALYSIS    Collection Time: 25 12:25 PM    Specimen: Blood   Result Value Ref Range    Color Dark Yellow     Character Cloudy (A)     Specific Gravity 1.021 <1.035    Ph 5.0 5.0 - 8.0    Glucose Negative Negative mg/dL    Ketones Trace (A) Negative mg/dL    Protein 30 (A) Negative mg/dL    Bilirubin Small (A) Negative    Urobilinogen, Urine 1.0 <=1.0 EU/dL    Nitrite Negative Negative    Leukocyte Esterase Moderate (A) Negative    Occult Blood Small (A) Negative    Micro Urine Req Microscopic    URINE MICROSCOPIC (W/UA)    Collection Time: 25 12:25 PM   Result Value Ref Range    WBC >100 (A) /hpf    RBC 6-10 (A) /hpf    Bacteria Rare (A) None /hpf    Epithelial Cells 6-10 (A) 0 - 5 /hpf    Mucous Threads Present /hpf    Urine Casts >20 (A) 0 - 2 /lpf    Hyaline Cast Present /lpf     *Note: Due to a large  number of results and/or encounters for the requested time period, some results have not been displayed. A complete set of results can be found in Results Review.          RADIOLOGY  I have independently interpreted the diagnostic imaging associated with this visit and am waiting the final reading from the radiologist.   My preliminary interpretation is as follows: Chest x-ray without focal infiltrate    COURSE & MEDICAL DECISION MAKING    INITIAL ASSESSMENT, COURSE AND PLAN  Case narrative: Patient presents with generalized malaise and fatigue.  Feeling weak.  This is all after having a URI.  She has no alarming findings on examination.  She appears hydrated.  Her age and comorbidities broad evaluation is undertaken.    Laboratory data returns without leukocytosis.  There is mild left shift.  Obtain serial troponins which are stable.  Minimally elevated BNP without evidence of heart failure.  Chest x-ray was negative.  Thyroid negative.  Patient is identified to have urinary tract infection with greater than 100 whites moderate leukocyte esterase.  Urine culture was ordered because of recurrent infection.    Likely symptoms secondary to a combination of recent upper respiratory infection and urinary tract infection.  The patient is clinically nontoxic with stable vital signs taking orals without difficulty in the department and at home.  She is appropriate for outpatient management.  She had a recent UTI treated with Macrobid.  This urinary tract infection will be treated with Keflex and she was given a dose of IV Ancef while in the emergency department.  I advised push fluids.  She has an appointment next week with urology because of recurrent UTI.  Have encouraged her to keep this appointment.  I precaution to return to the ER for worsening symptoms, not improving, difficulty breathing, high fevers, vomiting cannot keep medications down or concern.          Interventions  Medications   ceFAZolin (Ancef) 2 g in NS  100 mL IVPB (0 g Intravenous Stopped 2/25/25 1426)         DISPOSITION AND DISCUSSIONS      Escalation of care considered, and ultimately not performed:acute inpatient care management, however at this time, the patient is most appropriate for outpatient management      Prescription drugs considered and/or prescribed:   Considered opiate prescription, but nonnarcotic analgesic is most appropriate  Prescribed   New Prescriptions    CEPHALEXIN (KEFLEX) 500 MG CAP    Take 1 Capsule by mouth 3 times a day for 7 days.     Follow up  NAKUL TinocoAJustin-CJustin  [address] [place] [address] Dr Jaziel IBARRA [id number] [phone]        FINAL IMPRESSION  1.  Urinary tract infection with hematuria    This dictation was created using voice recognition software. The accuracy of the dictation is limited to the abilities of the software. I expect there may be some errors of grammar and possibly content. The nursing notes were reviewed and certain aspects of this information were incorporated into this note.    Electronically signed by: Wild Sheppard M.D., 2/25/2025

## 2025-02-25 NOTE — ED NOTES
Patient is stable for discharge at this time, anticipatory guidance provided, close follow-up is encouraged, and ED return instructions have been detailed. Patient and family are both agreeable to the disposition and plan and discharged home in ambulatory state using personal walker and in good condition.     Rx education provided, Pt verbalized understanding;

## 2025-02-25 NOTE — ED TRIAGE NOTES
Patient presents to the ER with the following complaints:    Chief Complaint   Patient presents with    Weakness     Recent history of flu. Patient reports increased weakness. Recently prescribed a steroid pack and an antibiotic without relieve from PCP. Patient also report history of Heart failure.        /57   Pulse (!) 101   Temp 36 °C (96.8 °F) (Temporal)   Resp 18   Ht 1.524 m (5')   Wt 82.6 kg (182 lb 1.6 oz)   SpO2 91%   BMI 35.56 kg/m²

## 2025-02-25 NOTE — ED NOTES
UA and Troponin collected & sent to lab;    Pt denied having any needs at this time, no distress noted;

## 2025-03-04 ENCOUNTER — OFFICE VISIT (OUTPATIENT)
Dept: UROLOGY | Facility: MEDICAL CENTER | Age: 85
End: 2025-03-04
Payer: MEDICARE

## 2025-03-04 DIAGNOSIS — R35.1 NOCTURIA: ICD-10-CM

## 2025-03-04 DIAGNOSIS — Z87.440 HISTORY OF RECURRENT UTIS: ICD-10-CM

## 2025-03-04 DIAGNOSIS — N32.81 OVERACTIVE BLADDER: ICD-10-CM

## 2025-03-04 LAB
POC POST-VOID: 86 ML
POC PRE-VOID: NORMAL

## 2025-03-04 PROCEDURE — 99203 OFFICE O/P NEW LOW 30 MIN: CPT | Mod: 25

## 2025-03-04 PROCEDURE — 51798 US URINE CAPACITY MEASURE: CPT

## 2025-03-04 RX ORDER — TROSPIUM CHLORIDE 20 MG/1
20 TABLET, FILM COATED ORAL 2 TIMES DAILY
Qty: 30 TABLET | Refills: 3 | Status: SHIPPED | OUTPATIENT
Start: 2025-03-04

## 2025-03-04 RX ORDER — ESTRADIOL 0.1 MG/G
CREAM VAGINAL
Qty: 42.5 G | Refills: 3 | Status: SHIPPED | OUTPATIENT
Start: 2025-03-04

## 2025-03-04 NOTE — PROGRESS NOTES
Subjective  Flavia Garrett is a 85 y.o. female who presents today for evaluation of OAB/recurrent UTIs. She was seen in ED 02/2024 and treated for UTI. She was put on Keflex and has 3 pills left. She reports that she felt very weak and is feeling better today.    Patient denies hematuria, dysuria, suprapubic pain, flank pain, fevers, or chills, sensation of incomplete emptying,frequency.    Patient reports nocturia- up 3-4 times/night which started 1.5 years ago, urgency with leakage at times. She double voids. She was prescribed oxybutynin and reported that the medication caused her legs to swell.     She has regular BM daily. soft    She does not wear a pad.    Urine culture in January 2025 negative  Urine culture December 2024 negative  Urine culture in September 2024 positive E. Coli  Urine culture June 2024 positive E. Coli  Urine culture May 2024 positive E. coli    Her most bothersome complaint is nocturia and recurrent UTI.    She uses estrace and uses it inconsistently for vaginal dryness, and irriation  She uses creams for lichen sclerosus      Family History   Problem Relation Age of Onset    Arthritis Mother     Other Son         CELIAC DISEASE-SEVERE    Diabetes Other         GF    Arthritis Other         GM    Heart Disease Neg Hx        Social History     Socioeconomic History    Marital status:      Spouse name: Not on file    Number of children: Not on file    Years of education: Not on file    Highest education level: Associate degree: occupational, technical, or vocational program   Occupational History    Not on file   Tobacco Use    Smoking status: Never    Smokeless tobacco: Never   Vaping Use    Vaping status: Never Used   Substance and Sexual Activity    Alcohol use: Never     Comment: min    Drug use: No    Sexual activity: Yes     Partners: Male   Other Topics Concern    Not on file   Social History Narrative    ** Merged History Encounter **         From [place]       Social Drivers of Health     Financial Resource Strain: Low Risk  (10/28/2024)    Overall Financial Resource Strain (CARDIA)     Difficulty of Paying Living Expenses: Not hard at all   Food Insecurity: No Food Insecurity (10/28/2024)    Hunger Vital Sign     Worried About Running Out of Food in the Last Year: Never true     Ran Out of Food in the Last Year: Never true   Transportation Needs: No Transportation Needs (10/28/2024)    PRAPARE - Transportation     Lack of Transportation (Medical): No     Lack of Transportation (Non-Medical): No   Physical Activity: Unknown (10/28/2024)    Exercise Vital Sign     Days of Exercise per Week: Not on file     Minutes of Exercise per Session: 100 min   Stress: No Stress Concern Present (10/28/2024)    [place] [employer] - Occupational Stress Questionnaire     Feeling of Stress : Not at all   Social Connections: Unknown (10/28/2024)    Social Connection and Isolation Panel [NHANES]     Frequency of Communication with Friends and Family: More than three times a week     Frequency of Social Gatherings with Friends and Family: More than three times a week     Attends Tenriism Services: Patient declined     Active Member of Clubs or Organizations: Yes     Attends Club or Organization Meetings: More than 4 times per year     Marital Status:    Intimate Partner Violence: Not on file   Housing Stability: Unknown (10/28/2024)    Housing Stability Vital Sign     Unable to Pay for Housing in the Last Year: No     Number of Times Moved in the Last Year: Not on file     Homeless in the Last Year: No       Past Surgical History:   Procedure Laterality Date    CO REMV FOOT FOREIGN BODY,DEEP Left 7/31/2024    Procedure: LEFT FOOT FOREIGN BODY REMOVAL;  Surgeon: Saman Weldon M.D.;  Location: [facility];  Service: Orthopedics    ORIF, FRACTURE, FEMUR Left 4/29/2024    Procedure: ORIF, FRACTURE, DISTAL FEMUR;  Surgeon: Jasiel Tolbert,  M.D.;  Location: [facility];  Service: Trauma    IRRIGATION & DEBRIDEMENT GENERAL Left 4/29/2024    Procedure: IRRIGATION AND DEBRIDEMENT, LEFT FEMUR;  Surgeon: Jasiel Tolbert M.D.;  Location: [facility];  Service: Trauma    LUMPECTOMY Right 2012    ARTHROSCOPY, KNEE      HYSTERECTOMY LAPAROSCOPY      KNEE ARTHROPLASTY TOTAL Right     OTHER      LIPOSUCTION THIGHS-LEG LIFT    IN REMV 2ND CATARACT,CORN-SCLER SECTN      VAGINAL HYSTERECTOMY TOTAL      Hysterectomy,Total Vaginal       Past Medical History:   Diagnosis Date    Acquired absence of both cervix and uterus 11/11/2014    Back pain     Breast cancer ([facility])     Cancer ([facility])     Chickenpox     Chronic left-sided low back pain without sciatica 03/17/2017    Closed comminuted supracondylar fracture of femur, left, initial encounter ([facility]) 04/29/2024    Constipation     Cough     Cystitis 06/12/2019    Diarrhea     Dyslipidemia 07/26/2016    Eczema     Fatigue     Frequent urination     Gout of foot 09/30/2016    H/O total hysterectomy 07/26/2016    High cholesterol     History of UTI 01/31/2020    History of UTI 01/31/2020    Hx of breast cancer 07/26/2016    Hypertension     Hypopituitarism ([facility]) 06/23/2023    Hypothyroidism 07/26/2016    Idiopathic chronic gout of right ankle 09/30/2016    Inappropriate sinus tachycardia ([facility]) 03/15/2024    Influenza     Lichen sclerosus of female genitalia     Nasal drainage     Nocturnal oxygen desaturation 08/07/2020    Obesity     Osteopenia 07/26/2016    Osteopenia 07/26/2016    Osteoporosis     Osteoporosis 07/26/2016    Overactive bladder 02/09/2020    Overweight     Pathological fracture of left femur due to age-related osteoporosis ([facility]) 04/29/2024    Recurrent UTI 01/31/2020    Rhinitis     Ringing in ears     Shortness of breath     Sputum production     Status post right knee replacement 05/05/2016    Status post total right knee replacement 08/17/2016    Swelling of lower extremity     Thyroid  activity decreased     Tonsillitis     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     Urinary tract infection 05/14/2024    Vision loss     Vitamin D deficiency 07/26/2016    Whooping cough        Current Outpatient Medications   Medication Sig Dispense Refill    cephALEXin (KEFLEX) 500 MG Cap Take 1 Capsule by mouth 3 times a day for 7 days. 21 Capsule 0    methylPREDNISolone (MEDROL DOSEPAK) 4 MG Tablet Therapy Pack As directed on the packaging label. 21 Tablet 0    losartan (COZAAR) 25 MG Tab Take 1 Tablet by mouth 2 times a day. 180 Tablet 3    ivabradine (CORLANOR) 5 MG Tab tablet Take 1 Tablet by mouth every day. 90 Tablet 3    calcium carbonate (OS-HILDA 500) 500 MG Tab Take 500 mg by mouth 2 times a day with meals.      CRANBERRY-VITAMIN C-D MANNOSE PO Take  by mouth.      SYNTHROID 175 MCG Tab TAKE 1 TABLET ON MONDAY,   WEDNESDAY, AND FRIDAY AND  150MCG ON SUNDAY, TUESDAY, THURSDAY, AND SATURDAY. 36 Tablet 3    levothyroxine (SYNTHROID) 150 MCG Tab TAKE 175MCG ON MONDAY, WEDNESDAY, AND FRIDAY, AND 1 TABLET OF 150MCG ON SUNDAY, TUESDAY, THURSDAY, AND SATURDAY. 48 Tablet 3    b complex vitamins capsule Take 1 Capsule by mouth every day.      Cholecalciferol (VITAMIN D3) 125 MCG/0.5ML Liquid Take 0.5 mL by mouth two times a week.      acetaminophen (TYLENOL) 500 MG Tab Take 500 mg by mouth at bedtime as needed for Mild Pain.      levalbuterol (XOPENEX HFA) 45 MCG/ACT inhaler Inhale 2 Puffs every four hours as needed for Shortness of Breath. 3 Each 3    Spacer/Aero-Holding Chambers Device 1 Device as needed (SOB). 1 Each 0    valacyclovir (VALTREX) 1 GM Tab Take 2 Tablets by mouth every 12 hours. 2 g twice daily for one day. 20 Tablet 0    clobetasol (TEMOVATE) 0.05 % Cream CLOBETASOL PROPIONATE 0.05 % CREA 30 g 1     No current facility-administered medications for this visit.       Allergies   Allergen Reactions    Lisinopril      Cramps and upset stomach    Other Reaction(s): Not  "available    Atorvastatin Myalgia     Leg pain    Atorvastatin     Hydrochlorothiazide     Inpefa [Sotagliflozin] Unspecified     UTIs    Lisinopril     Other Misc      Hydroc hlorathiazide - rxn - \"too dehydrated\" per pt       Objective  There were no vitals taken for this visit.  Physical Exam  Constitutional:       Appearance: Normal appearance.   HENT:      Head: Normocephalic and atraumatic.   Eyes:      Extraocular Movements: Extraocular movements intact.   Pulmonary:      Effort: Pulmonary effort is normal.   Genitourinary:     Comments: PVR 86  Skin:     General: Skin is dry.   Neurological:      Mental Status: She is alert.   Psychiatric:         Mood and Affect: Mood normal.       Labs:   none    Imaging:   none    Assessment    For recurrent urinary tract infections: I suggested that she uses vaginal estrogen the amount the size of pea every day x 1 month, and then every Monday Wednesday Friday.    For her nocturia: PVR in clinic today 86 mL.  This discontinue oxybutynin, start trospium 20 mg p.o. daily.  Start this medication at night.  If ineffective start medication twice daily.  I have instructed patient that this medication can cause dry mouth dry eye constipation and urinary retention.  She can discontinue this medication any point time without need for taper    Return to clinic in 6 weeks    Plan    PVR check    Return to clinic in 6 weeks    Assess use of vaginal estrogen and effectiveness to prevent recurrent urinary tract infections    Problem List Items Addressed This Visit       Overactive bladder    Relevant Orders    POCT Bladder Scan (Completed)       "

## 2025-03-04 NOTE — PATIENT INSTRUCTIONS
For UTIs: Vaginal Estrogen: Please apply the amount the size of a pea to Vagina everyday for one month, then every Monday, Wednesday and Friday daily.  For UTIs:D Mannose 500mg twice a day    Overactivity: Trospium for overactivity: start once a day at night, then if ineffective after 4 weeks, increase to twice a day. DO NOT  TAKE OXYBUTYNIN      Anticholinergic Medications (e.g., Oxybutynin, Tolterodine):     Side Effects:     Common Side Effects:     Dry mouth   Dry eyes   Constipation   Dizziness   Drowsiness   Blurred vision   Upset stomach   Confusion (especially in older adults)     Less Common Side Effects:     Urinary retention   Difficulty urinating   Hallucinations (rare)   Allergic reactions (rare)   Skin rash (rare)     Patient Counseling:     Take the medication exactly as prescribed by your healthcare provider. Follow the dosing instructions carefully. It can take 4-8 weeks to notice the full effect of the medication.   Be aware that anticholinergic medications may cause dry mouth and dry eyes. Sucking on sugar-free candies or sipping water may help alleviate dry mouth symptoms.   Use caution when driving or operating machinery, as these medications can cause dizziness and drowsiness.   If you experience severe confusion, hallucinations, or any unusual symptoms, contact your doctor promptly.   Report any difficulty urinating or signs of urinary retention to your healthcare provider.   Inform your doctor of any other medications or supplements you are taking, as they may interact with anticholinergic drugs.   Avoid alcohol while taking these medications, as it may worsen drowsiness and dizziness.   Discuss any concerns or questions you have about the medication with your healthcare provider.   Follow up with your doctor as scheduled to monitor the effectiveness and side effects of the medication.    Vaginal Estrogen Cream Use     Proper Application Technique:     1. Wash Hands: Start by thoroughly  washing your hands with soap and water to ensure cleanliness during the application process.     2. Read Instructions: Carefully read the instructions provided with the specific vaginal estrogen product you are using. Different formulations may have slightly different application methods and dosing schedules.     3. Choose a Comfortable Position: Find a comfortable position for application. Many individuals prefer lying on their back with their knees bent, but you can choose a position that works best for you.     4. Application of Cream or Tablet:     Cream: If using a vaginal estrogen cream, typically, a small amount is applied inside the vagina using an applicator provided with the product. You can also apply a small pea-sized amount to the tip of your finger. Rub the cream in after placement. It is important to get the cream inside the vaginal canal (where a tampon would be placed).    Tablet: For vaginal estrogen tablets, use the applicator to insert the tablet deeply into the vagina as directed by your healthcare provider.     5. Insertion Technique:     If you are using an applicator, gently insert it into the vagina as far as is comfortable, while following the directions in the product's package insert.   If using a tablet without an applicator, insert it as deep into the vagina as possible using your finger.     6. Disposal: Dispose of the applicator or packaging as per the 's instructions.     7. Wash Hands Again: After inserting the vaginal estrogen, wash your hands thoroughly once more.     8. Follow Dosage Instructions: Use the medication as prescribed by your healthcare provider. Do not use more or less than the recommended dose, and adhere to the recommended schedule.     9. Stay Upright: Remain in an upright position for a short time after application to allow the medication to disperse and absorb properly.     10. Be Consistent: For best results, use vaginal estrogen consistently as  directed by your healthcare provider. It may take some time before you notice significant improvement in your symptoms.     Risks and Benefits:     Benefits:     Symptom Relief: Vaginal estrogen can effectively relieve a range of urogenital symptoms associated with menopause, such as vaginal dryness, itching, burning, and pain during intercourse.   Improved Vaginal Health: It can help improve vaginal tissue health, including increased vaginal moisture and elasticity.   Reduced Urinary Symptoms: Vaginal estrogen can also alleviate certain urinary symptoms like frequent urination and urinary urgency in some individuals.   Reduced Urinary Tract Infections: Vaginal estrogen restores healthy vaginal bacteria that are protective against urinary tract infections.     Risks:     Systemic Absorption: While the risk is low, some estrogen from vaginal applications can be absorbed into the bloodstream. This may carry a small risk of systemic side effects like breast tenderness, headache, or changes in mood. However, the systemic absorption is much lower with vaginal estrogen than with oral hormone therapy.   Breast Cancer Risk: There is long term safety data that supports the use of vaginal estrogen cream in patients with a family history of breast cancer or a personal history of treated breast cancer that is in remission. It does not increase the risk of recurrence in patients that have previously been treated for breast cancer.   Vaginal Irritation: Some individuals may experience vaginal irritation, itching, or discharge as a side effect of vaginal estrogen.

## 2025-04-15 ENCOUNTER — TELEPHONE (OUTPATIENT)
Dept: MEDICAL GROUP | Facility: IMAGING CENTER | Age: 85
End: 2025-04-15
Payer: MEDICARE

## 2025-04-15 NOTE — TELEPHONE ENCOUNTER
Caller Name: Flavia Garrett  Call Back Number:     How would the patient prefer to be contacted with a response: Phone call do NOT leave a detailed message    Pt called stating that she has current sinus infection and was wondering if she can get antibiotics. Informed pt that she would need an appt. [facility] does not have any availability for this week and she refused an appt with any other provider in our office. Informed her to visit urgent care or call back to schedule if symptoms worsen .

## 2025-04-16 ENCOUNTER — RESULTS FOLLOW-UP (OUTPATIENT)
Dept: MEDICAL GROUP | Facility: IMAGING CENTER | Age: 85
End: 2025-04-16

## 2025-04-16 ENCOUNTER — HOSPITAL ENCOUNTER (OUTPATIENT)
Dept: RADIOLOGY | Facility: MEDICAL CENTER | Age: 85
End: 2025-04-16
Attending: PHYSICIAN ASSISTANT
Payer: MEDICARE

## 2025-04-16 ENCOUNTER — OFFICE VISIT (OUTPATIENT)
Dept: MEDICAL GROUP | Facility: IMAGING CENTER | Age: 85
End: 2025-04-16
Payer: MEDICARE

## 2025-04-16 VITALS
WEIGHT: 182 LBS | DIASTOLIC BLOOD PRESSURE: 78 MMHG | SYSTOLIC BLOOD PRESSURE: 122 MMHG | HEIGHT: 60 IN | TEMPERATURE: 97.3 F | RESPIRATION RATE: 17 BRPM | BODY MASS INDEX: 35.73 KG/M2 | OXYGEN SATURATION: 95 % | HEART RATE: 83 BPM

## 2025-04-16 DIAGNOSIS — J40 BRONCHITIS: ICD-10-CM

## 2025-04-16 DIAGNOSIS — R05.1 ACUTE COUGH: ICD-10-CM

## 2025-04-16 PROBLEM — N30.01 ACUTE CYSTITIS WITH HEMATURIA: Status: RESOLVED | Noted: 2019-06-12 | Resolved: 2025-04-16

## 2025-04-16 PROCEDURE — 3078F DIAST BP <80 MM HG: CPT | Performed by: PHYSICIAN ASSISTANT

## 2025-04-16 PROCEDURE — 3074F SYST BP LT 130 MM HG: CPT | Performed by: PHYSICIAN ASSISTANT

## 2025-04-16 PROCEDURE — 99213 OFFICE O/P EST LOW 20 MIN: CPT | Performed by: PHYSICIAN ASSISTANT

## 2025-04-16 PROCEDURE — 71046 X-RAY EXAM CHEST 2 VIEWS: CPT

## 2025-04-16 PROCEDURE — 94640 AIRWAY INHALATION TREATMENT: CPT | Performed by: PHYSICIAN ASSISTANT

## 2025-04-16 RX ORDER — DEXAMETHASONE SODIUM PHOSPHATE 10 MG/ML
10 INJECTION, SOLUTION INTRA-ARTICULAR; INTRALESIONAL; INTRAMUSCULAR; INTRAVENOUS; SOFT TISSUE ONCE
Status: COMPLETED | OUTPATIENT
Start: 2025-04-16 | End: 2025-04-16

## 2025-04-16 RX ORDER — METHYLPREDNISOLONE 4 MG/1
TABLET ORAL
Qty: 21 TABLET | Refills: 0 | Status: SHIPPED | OUTPATIENT
Start: 2025-04-16 | End: 2025-04-16 | Stop reason: SDUPTHER

## 2025-04-16 RX ORDER — NITROFURANTOIN MACROCRYSTALS 50 MG/1
CAPSULE ORAL
COMMUNITY
Start: 2025-04-14

## 2025-04-16 RX ORDER — BENZONATATE 100 MG/1
100 CAPSULE ORAL 3 TIMES DAILY PRN
Qty: 60 CAPSULE | Refills: 0 | Status: SHIPPED | OUTPATIENT
Start: 2025-04-16

## 2025-04-16 RX ORDER — AZITHROMYCIN 250 MG/1
TABLET, FILM COATED ORAL
Qty: 6 TABLET | Refills: 0 | Status: SHIPPED | OUTPATIENT
Start: 2025-04-16 | End: 2025-04-16 | Stop reason: SDUPTHER

## 2025-04-16 RX ORDER — IPRATROPIUM BROMIDE AND ALBUTEROL SULFATE 2.5; .5 MG/3ML; MG/3ML
3 SOLUTION RESPIRATORY (INHALATION) ONCE
Status: COMPLETED | OUTPATIENT
Start: 2025-04-16 | End: 2025-04-16

## 2025-04-16 RX ORDER — METHYLPREDNISOLONE 4 MG/1
TABLET ORAL
Qty: 21 TABLET | Refills: 0 | Status: SHIPPED | OUTPATIENT
Start: 2025-04-16 | End: 2025-04-23

## 2025-04-16 RX ORDER — AZITHROMYCIN 250 MG/1
TABLET, FILM COATED ORAL
Qty: 6 TABLET | Refills: 0 | Status: SHIPPED | OUTPATIENT
Start: 2025-04-16 | End: 2025-04-23

## 2025-04-16 RX ADMIN — DEXAMETHASONE SODIUM PHOSPHATE 10 MG: 10 INJECTION, SOLUTION INTRA-ARTICULAR; INTRALESIONAL; INTRAMUSCULAR; INTRAVENOUS; SOFT TISSUE at 13:03

## 2025-04-16 RX ADMIN — IPRATROPIUM BROMIDE AND ALBUTEROL SULFATE 3 ML: 2.5; .5 SOLUTION RESPIRATORY (INHALATION) at 12:44

## 2025-04-16 ASSESSMENT — PAIN SCALES - GENERAL: PAINLEVEL_OUTOF10: NO PAIN

## 2025-04-16 ASSESSMENT — FIBROSIS 4 INDEX: FIB4 SCORE: 1.83

## 2025-04-16 NOTE — ASSESSMENT & PLAN NOTE
Patient admits to cough, wheezing, chest congestion for 6 days.  Symptoms worsening.  Mild postnasal drip.  Uses Xopenex once a day.  No fever, chills, body aches, sore throat.  Not taking any medications over-the-counter.  Has done negative at home COVID testing.

## 2025-04-16 NOTE — PROGRESS NOTES
Subjective:     CC:   Chief Complaint   Patient presents with    Sinus Problem     Fatigue, lung congestion. X6days        HPI:   Flavia presents today with her son to discuss:    Acute cough  Patient admits to cough, wheezing, chest congestion for 6 days.  Symptoms worsening.  Mild postnasal drip.  Uses Xopenex once a day.  No fever, chills, body aches, sore throat.  Not taking any medications over-the-counter.  Has done negative at home COVID testing.      Past Medical History:   Diagnosis Date    Acquired absence of both cervix and uterus 11/11/2014    Back pain     Breast cancer (HCC)     Cancer (Formerly [facility])     Chickenpox     Chronic left-sided low back pain without sciatica 03/17/2017    Closed comminuted supracondylar fracture of femur, left, initial encounter (Formerly [facility]) 04/29/2024    Constipation     Cough     Cystitis 06/12/2019    Diarrhea     Dyslipidemia 07/26/2016    Eczema     Fatigue     Frequent urination     Gout of foot 09/30/2016    H/O total hysterectomy 07/26/2016    High cholesterol     History of UTI 01/31/2020    History of UTI 01/31/2020    Hx of breast cancer 07/26/2016    Hypertension     Hypopituitarism (Formerly [facility]) 06/23/2023    Hypothyroidism 07/26/2016    Idiopathic chronic gout of right ankle 09/30/2016    Inappropriate sinus tachycardia (Formerly [facility]) 03/15/2024    Influenza     Lichen sclerosus of female genitalia     Nasal drainage     Nocturnal oxygen desaturation 08/07/2020    Obesity     Osteopenia 07/26/2016    Osteopenia 07/26/2016    Osteoporosis     Osteoporosis 07/26/2016    Overactive bladder 02/09/2020    Overweight     Pathological fracture of left femur due to age-related osteoporosis (Formerly [facility]) 04/29/2024    Recurrent UTI 01/31/2020    Rhinitis     Ringing in ears     Shortness of breath     Sputum production     Status post right knee replacement 05/05/2016    Status post total right knee replacement 08/17/2016    Swelling of lower extremity     Thyroid activity decreased     Tonsillitis      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     Urinary tract infection 05/14/2024    Vision loss     Vitamin D deficiency 07/26/2016    Whooping cough      Family History   Problem Relation Age of Onset    Arthritis Mother     Other Son         CELIAC DISEASE-SEVERE    Diabetes Other         GF    Arthritis Other         GM    Heart Disease Neg Hx      Past Surgical History:   Procedure Laterality Date    [place] REMV FOOT FOREIGN BODY,DEEP Left 7/31/2024    Procedure: LEFT FOOT FOREIGN BODY REMOVAL;  Surgeon: Saman Weldon M.D.;  Location: [facility];  Service: Orthopedics    ORIF, FRACTURE, FEMUR Left 4/29/2024    Procedure: ORIF, FRACTURE, DISTAL FEMUR;  Surgeon: Jasiel Tolbert M.D.;  Location: SUR[facility];  Service: Trauma    WOUND IRRIGATION & DEBRIDEMENT Left 4/29/2024    Procedure: IRRIGATION AND DEBRIDEMENT, LEFT FEMUR;  Surgeon: Jasiel Tolbert M.D.;  Location: SURGERY [facility];  Service: Trauma    LUMPECTOMY Right 2012    ARTHROSCOPY, KNEE      HYSTERECTOMY LAPAROSCOPY      KNEE ARTHROPLASTY TOTAL Right     OTHER      LIPOSUCTION THIGHS-LEG LIFT    NJ REMV 2ND CATARACT,CORN-SCLER SECTN      VAGINAL HYSTERECTOMY TOTAL      Hysterectomy,Total Vaginal     Social History     Tobacco Use    Smoking status: Never    Smokeless tobacco: Never   Vaping Use    Vaping status: Never Used   Substance Use Topics    Alcohol use: Never     Comment: min    Drug use: No     Social History     Social History Narrative    ** Merged History Encounter **         From [place]      Current Outpatient Medications Ordered in Epic   Medication Sig Dispense Refill    nitrofurantoin (MACRODANTIN) 50 MG Cap       benzonatate (TESSALON) 100 MG Cap Take 1 Capsule by mouth 3 times a day as needed for Cough. 60 Capsule 0    azithromycin (ZITHROMAX) 250 MG Tab Z-Pack: Take 2 tablets on day 1, then 1 tablet on days 2 through 5. 6 Tablet 0    methylPREDNISolone (MEDROL  DOSEPAK) 4 MG Tablet Therapy Pack As directed on the packaging label. 21 Tablet 0    trospium (SANCTURA) 20 MG Tab Take 1 Tablet by mouth 2 times a day. 30 Tablet 3    estradiol (ESTRACE) 0.1 MG/GM vaginal cream Please apply the amount the size of a pea (0.25g) to Vagina everyday for one month, then every Monday, Wednesday and Friday daily. 42.5 g 3    D-Mannose 500 MG Cap Take 500 mg by mouth 2 times a day. 30 Capsule 3    losartan (COZAAR) 25 MG Tab Take 1 Tablet by mouth 2 times a day. 180 Tablet 3    ivabradine (CORLANOR) 5 MG Tab tablet Take 1 Tablet by mouth every day. 90 Tablet 3    calcium carbonate (OS-HILDA 500) 500 MG Tab Take 500 mg by mouth 2 times a day with meals.      CRANBERRY-VITAMIN C-D MANNOSE PO Take  by mouth.      SYNTHROID 175 MCG Tab TAKE 1 TABLET ON MONDAY,   WEDNESDAY, AND FRIDAY AND  150MCG ON SUNDAY, TUESDAY, THURSDAY, AND SATURDAY. 36 Tablet 3    levothyroxine (SYNTHROID) 150 MCG Tab TAKE 175MCG ON MONDAY, WEDNESDAY, AND FRIDAY, AND 1 TABLET OF 150MCG ON SUNDAY, TUESDAY, THURSDAY, AND SATURDAY. 48 Tablet 3    b complex vitamins capsule Take 1 Capsule by mouth every day.      Cholecalciferol (VITAMIN D3) 125 MCG/0.5ML Liquid Take 0.5 mL by mouth two times a week.      acetaminophen (TYLENOL) 500 MG Tab Take 500 mg by mouth at bedtime as needed for Mild Pain.      levalbuterol (XOPENEX HFA) 45 MCG/ACT inhaler Inhale 2 Puffs every four hours as needed for Shortness of Breath. 3 Each 3    Spacer/Aero-Holding Chambers Device 1 Device as needed (SOB). 1 Each 0    valacyclovir (VALTREX) 1 GM Tab Take 2 Tablets by mouth every 12 hours. 2 g twice daily for one day. 20 Tablet 0    clobetasol (TEMOVATE) 0.05 % Cream CLOBETASOL PROPIONATE 0.05 % CREA 30 g 1     Current Facility-Administered Medications Ordered in Epic   Medication Dose Route Frequency Provider Last Rate Last Admin    dexamethasone (Decadron) injection (check route below) 10 mg  10 mg Intramuscular Once          Lisinopril,  Atorvastatin, Atorvastatin, Hydrochlorothiazide, Inpefa [sotagliflozin], Lisinopril, and Other misc    PMH/PSH/FH/Social history reviewed.  Vaccinations discussed.  Previous records and labs reviewed. Discussed age appropriate anticipatory guidance.    ROS: see hpi  CV: no chest pain, no palpitations, no edema  GI: no nausea/vomiting, no diarrhea  Skin: no rash    Objective:   Exam:  /78 (BP Location: Right arm, Patient Position: Sitting, BP Cuff Size: Adult)   Pulse 83   Temp 36.3 °C (97.3 °F) (Temporal)   Resp 17   Ht 1.524 m (5')   Wt 82.6 kg (182 lb)   SpO2 95%   BMI 35.54 kg/m²    Body mass index is 35.54 kg/m².    Gen: Alert and oriented, Non-toxic appearing  HEENT: Head atraumatic, normocephalic. Pupils equal and round. Conjunctiva without injection or discharge. Bilateral TMs pearly gray.  Bilateral nasal turbinates non-erythematous and non-edematous without nasal drainage.  Posterior pharynx non-erythematous without exudate.  Neck: Neck is supple without lymphadenopathy.   Lungs: Normal effort, diffuse wheeze and rhonchi bibasilarly.  CV: Regular rate and rhythm. No murmurs, rubs, or gallops.  Ext: No clubbing, cyanosis, edema.    Assessment & Plan:     85 y.o. female with the following -     1. Bronchitis  - Increase Xopenex use to twice a day until improvement of symptoms with additional as needed doses every 4 hours.  - ipratropium-albuterol (DUONEB) nebulizer solution  - dexamethasone (Decadron) injection (check route below) 10 mg  - DX-CHEST-2 VIEWS; Future  - benzonatate (TESSALON) 100 MG Cap; Take 1 Capsule by mouth 3 times a day as needed for Cough.  Dispense: 60 Capsule; Refill: 0  - azithromycin (ZITHROMAX) 250 MG Tab; Z-Pack: Take 2 tablets on day 1, then 1 tablet on days 2 through 5.  Dispense: 6 Tablet; Refill: 0  - methylPREDNISolone (MEDROL DOSEPAK) 4 MG Tablet Therapy Pack; As directed on the packaging label.  Dispense: 21 Tablet; Refill: 0  - Potential side effects of steroids  include jitteriness, increased hunger, anxiety, restlessness, difficulty sleeping, indigestion/acid reflux.  If you develop new symptoms, please follow-up in office to discuss.  If you develop severe symptoms and are unable to schedule an appointment, please go to urgent care or emergency department.  - Potential side effects of antibiotics include nausea, indigestion, diarrhea, rash.  If you develop any of these symptoms, please schedule an appointment in the office or go to urgent/emergency room if severe.    2. Acute cough  - Use a humidifier, especially at night. Cold or warm water humidifiers have the same effect.  - King Med squeeze bottle sinus rinses or plain nasal saline twice a day. Warm compresses on sinuses.  - Hot tea + honey + fresh lemon juice for cough/throat soothing  - Salt water gargles for sore throat  - Honey by itself has been shown to help provide cough relief  - Frequent hand washing, rest, hydration  - OTC meds as recommended today during your visit:  - Side effects of medications reviewed  - Seek medical treatment if symptoms persist or worsen    Return if symptoms worsen or fail to improve.    Almita Valenzuela PA-C ([place])  Physician Assistant Certified  [facility]    Please note that this dictation was created using voice recognition software. I have made every reasonable attempt to correct obvious errors, but I expect that there are errors of grammar and possibly content that I did not discover before finalizing the note.

## 2025-04-16 NOTE — PATIENT INSTRUCTIONS
It was a pleasure meeting with you today at [facility]!    Your medical history/records and medications were reviewed today.     UPDATE on MyChart Results: If you have blood work, and/or imaging studies, or any other test or procedure completed, you will have access to results as soon as they become available in MyChart. Recently, these results will be available for review at the same time that your provider is able to see results!    This will likely mean you will see a result before your provider has had a chance to review and discuss with you.  Some results or care notes may be hard to understand and may be serious in nature.    We look at every result and your provider will contact you to explain what they mean and discuss appropriate next steps. Please allow for at least 72 business hours for chart and result review.     We prefer that you wait for your care team to contact you with your results.  Often, your provider will discuss your results with you at your next appointment. We look forward to continuing to partner with you in your care.    Please review my practice information below:    If you have any prescription refill requests, please send them via Triond or discuss with your provider at the start of your office visits. Please allow 3-5 business days for lab and testing review and you will be contacted via Triond with those results, or if advised to make a follow up appointment regarding those results, then please do so.     Once resulted, your lab/test/imaging results will show up automatically in your MyChart. Please wait for my interpretation and recommendations prior to viewing your results to avoid any unnecessary confusion or misinterpretation. I will address all of the lab values that I interpret as abnormal and message you accordingly on your MyChart. I will always send you a message about your results even if they are normal. If you do not hear back from me within 5-7 business  days after completing your tests, then please send me a message on My eShoe so I can obtain your results (especially if you went to an outside lab or imaging center - [facility], [facility], etc).     If you have any additional questions or concerns beyond my interpretation of your results, please make an appointment with me to discuss in further detail.    Please only use the My eShoe messaging system for questions regarding your most recent appointment or if advised to use otherwise (glucose or blood pressure reporting).     If you have any new problems or concerns, you must make an appointment to discuss. This includes any referral requests, lab requests (unless advised to notify me for pre-appt labs), medication side effects, or request for medication adjustments.     Please arrive 15 minutes prior to your appointment time to complete your check-in and intake with the medical assistant.      Thank you,    Almita Valenzuela PA-C (Baker)  Physician Assistant Certified  [facility]    -----------------------------------------------------------------    Attn: Patients of [facility]:    In an effort to continue to provide excellent and efficient care to our patients, it is vital that we continue to use our resources appropriately. With that, this is a reminder that My eShoe is used for prescription refill requests, test results, virtual visits, and chart review only.     Any new questions, concerns/conditions, lab/imaging requests, medication adjustments, new prescriptions, or referral requests do require an appointment (virtually or in person), unless discussed otherwise at your most recent appointment.     Thank you for your understanding,    [facility]

## 2025-04-16 NOTE — TELEPHONE ENCOUNTER
Spoke with pt on the phone. Pt agreed she will go to urgent care tomorrow if symptoms do not improve

## 2025-04-23 ENCOUNTER — APPOINTMENT (OUTPATIENT)
Dept: MEDICAL GROUP | Facility: IMAGING CENTER | Age: 85
End: 2025-04-23
Payer: MEDICARE

## 2025-04-23 VITALS
RESPIRATION RATE: 16 BRPM | HEIGHT: 60 IN | WEIGHT: 182 LBS | OXYGEN SATURATION: 96 % | SYSTOLIC BLOOD PRESSURE: 114 MMHG | TEMPERATURE: 97.6 F | BODY MASS INDEX: 35.73 KG/M2 | HEART RATE: 86 BPM | DIASTOLIC BLOOD PRESSURE: 68 MMHG

## 2025-04-23 DIAGNOSIS — N28.9 RENAL INSUFFICIENCY: ICD-10-CM

## 2025-04-23 DIAGNOSIS — E03.9 HYPOTHYROIDISM, UNSPECIFIED TYPE: ICD-10-CM

## 2025-04-23 DIAGNOSIS — R06.02 SOB (SHORTNESS OF BREATH): ICD-10-CM

## 2025-04-23 DIAGNOSIS — E53.8 LOW SERUM VITAMIN B12: ICD-10-CM

## 2025-04-23 PROBLEM — R05.1 ACUTE COUGH: Status: RESOLVED | Noted: 2019-04-03 | Resolved: 2025-04-23

## 2025-04-23 PROBLEM — R82.90 ABNORMAL URINALYSIS: Status: RESOLVED | Noted: 2024-09-30 | Resolved: 2025-04-23

## 2025-04-23 PROCEDURE — G2211 COMPLEX E/M VISIT ADD ON: HCPCS | Performed by: PHYSICIAN ASSISTANT

## 2025-04-23 PROCEDURE — 99214 OFFICE O/P EST MOD 30 MIN: CPT | Performed by: PHYSICIAN ASSISTANT

## 2025-04-23 PROCEDURE — 3078F DIAST BP <80 MM HG: CPT | Performed by: PHYSICIAN ASSISTANT

## 2025-04-23 PROCEDURE — 3074F SYST BP LT 130 MM HG: CPT | Performed by: PHYSICIAN ASSISTANT

## 2025-04-23 PROCEDURE — 1126F AMNT PAIN NOTED NONE PRSNT: CPT | Performed by: PHYSICIAN ASSISTANT

## 2025-04-23 RX ORDER — LEVOTHYROXINE SODIUM 150 UG/1
TABLET ORAL
Qty: 48 TABLET | Refills: 3 | Status: SHIPPED | OUTPATIENT
Start: 2025-04-23

## 2025-04-23 RX ORDER — LEVALBUTEROL TARTRATE 45 UG/1
2 AEROSOL, METERED ORAL EVERY 4 HOURS PRN
Qty: 3 EACH | Refills: 3 | Status: SHIPPED | OUTPATIENT
Start: 2025-04-23

## 2025-04-23 RX ORDER — LEVOTHYROXINE SODIUM 175 UG/1
TABLET ORAL
Qty: 36 TABLET | Refills: 3 | Status: SHIPPED | OUTPATIENT
Start: 2025-04-23

## 2025-04-23 RX ORDER — CYANOCOBALAMIN 1000 UG/ML
1000 INJECTION, SOLUTION INTRAMUSCULAR; SUBCUTANEOUS ONCE
Status: COMPLETED | OUTPATIENT
Start: 2025-04-23 | End: 2025-04-23

## 2025-04-23 RX ADMIN — CYANOCOBALAMIN 1000 MCG: 1000 INJECTION, SOLUTION INTRAMUSCULAR; SUBCUTANEOUS at 11:39

## 2025-04-23 ASSESSMENT — PAIN SCALES - GENERAL: PAINLEVEL_OUTOF10: NO PAIN

## 2025-04-23 ASSESSMENT — FIBROSIS 4 INDEX: FIB4 SCORE: 1.83

## 2025-04-23 NOTE — ASSESSMENT & PLAN NOTE
Chronic, controlled and stable levothyroxine.  Needs refill today.  No weight loss, bowel habit changes.  She does have chronic fatigue.

## 2025-04-23 NOTE — ASSESSMENT & PLAN NOTE
Patient with recent URI, symptoms improved with Medrol pack and azithromycin.  Has been using Xopenex twice a day with improvement.  Requesting refill today.

## 2025-04-23 NOTE — ASSESSMENT & PLAN NOTE
Patient with borderline low B12.  Has an increase in fatigue since her respiratory infection.  Requesting B12 shot today.

## 2025-04-23 NOTE — PROGRESS NOTES
Subjective:     CC:   Chief Complaint   Patient presents with    Medication Management     Refills     Follow-Up       HPI:   Flavia presents today with her son to discuss:    Hypothyroid  Chronic, controlled and stable levothyroxine.  Needs refill today.  No weight loss, bowel habit changes.  She does have chronic fatigue.    Low serum vitamin B12  Patient with borderline low B12.  Has an increase in fatigue since her respiratory infection.  Requesting B12 shot today.    SOB (shortness of breath)  Patient with recent URI, symptoms improved with Medrol pack and azithromycin.  Has been using Xopenex twice a day with improvement.  Requesting refill today.      Past Medical History:   Diagnosis Date    Acquired absence of both cervix and uterus 11/11/2014    Back pain     Breast cancer (Formerly [facility])     Cancer (Formerly [facility])     Cataract     CHF (congestive heart failure) (Formerly [facility])     Chickenpox     Chronic left-sided low back pain without sciatica 03/17/2017    Closed comminuted supracondylar fracture of femur, left, initial encounter (Formerly [facility]) 04/29/2024    Constipation     Cough     Cystitis 06/12/2019    Diarrhea     Dyslipidemia 07/26/2016    Eczema     Fatigue     Frequent urination     Gout of foot 09/30/2016    H/O total hysterectomy 07/26/2016    High cholesterol     History of UTI 01/31/2020    History of UTI 01/31/2020    Hx of breast cancer 07/26/2016    Hypertension     Hypopituitarism (Formerly [facility]) 06/23/2023    Hypothyroidism 07/26/2016    Idiopathic chronic gout of right ankle 09/30/2016    Inappropriate sinus tachycardia (Formerly [facility]) 03/15/2024    Influenza     Lichen sclerosus of female genitalia     Nasal drainage     Nocturnal oxygen desaturation 08/07/2020    Obesity     Osteopenia 07/26/2016    Osteopenia 07/26/2016    Osteoporosis     Osteoporosis 07/26/2016    Overactive bladder 02/09/2020    Overweight     Pathological fracture of left femur due to age-related osteoporosis (Formerly [facility]) 04/29/2024    Recurrent UTI 01/31/2020    Rhinitis      Ringing in ears     Shortness of breath     Sputum production     Status post right knee replacement 05/05/2016    Status post total right knee replacement 08/17/2016    Swelling of lower extremity     Thyroid activity decreased     Tonsillitis     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     Urinary tract infection 05/14/2024    Vision loss     Vitamin D deficiency 07/26/2016    Whooping cough      Family History   Problem Relation Age of Onset    Arthritis Mother     Hypertension Mother     Other Son         CELIAC DISEASE-SEVERE    Diabetes Other         GF    Arthritis Other         GM    Heart Disease Neg Hx      Past Surgical History:   Procedure Laterality Date    [place] REMV FOOT FOREIGN BODY,DEEP Left 07/31/2024    Procedure: LEFT FOOT FOREIGN BODY REMOVAL;  Surgeon: Saman Weldon M.D.;  Location: [facility];  Service: Orthopedics    ORIF, FRACTURE, FEMUR Left 04/29/2024    Procedure: ORIF, FRACTURE, DISTAL FEMUR;  Surgeon: Jasiel Tolbert M.D.;  Location: SURGERY [facility];  Service: Trauma    WOUND IRRIGATION & DEBRIDEMENT Left 04/29/2024    Procedure: IRRIGATION AND DEBRIDEMENT, LEFT FEMUR;  Surgeon: Jasiel Tolbert M.D.;  Location: SURGERY [facility];  Service: Trauma    LUMPECTOMY Right 2012    ARTHROSCOPY, KNEE      HYSTERECTOMY LAPAROSCOPY      KNEE ARTHROPLASTY TOTAL Right     ORIF, KNEE  2016    OTHER      LIPOSUCTION THIGHS-LEG LIFT    [place] REMV 2ND CATARACT,CORN-SCLER SECTN      VAGINAL HYSTERECTOMY TOTAL      Hysterectomy,Total Vaginal     Social History     Tobacco Use    Smoking status: Never    Smokeless tobacco: Never   Vaping Use    Vaping status: Never Used   Substance Use Topics    Alcohol use: Never     Comment: min    Drug use: No     Social History     Social History Narrative    ** Merged History Encounter **         From [place]      Current Outpatient Medications Ordered in Epic   Medication Sig Dispense Refill     levothyroxine (SYNTHROID) 150 MCG Tab TAKE 175MCG ON MONDAY, WEDNESDAY, AND FRIDAY, AND 1 TABLET OF 150MCG ON SUNDAY, TUESDAY, THURSDAY, AND SATURDAY. 48 Tablet 3    levothyroxine (SYNTHROID) 175 MCG Tab TAKE 1 TABLET ON MONDAY,   WEDNESDAY, AND FRIDAY AND  150MCG ON SUNDAY, TUESDAY, THURSDAY, AND SATURDAY. 36 Tablet 3    levalbuterol (XOPENEX HFA) 45 MCG/ACT inhaler Inhale 2 Puffs every four hours as needed for Shortness of Breath. 3 Each 3    nitrofurantoin (MACRODANTIN) 50 MG Cap       benzonatate (TESSALON) 100 MG Cap Take 1 Capsule by mouth 3 times a day as needed for Cough. 60 Capsule 0    trospium (SANCTURA) 20 MG Tab Take 1 Tablet by mouth 2 times a day. 30 Tablet 3    estradiol (ESTRACE) 0.1 MG/GM vaginal cream Please apply the amount the size of a pea (0.25g) to Vagina everyday for one month, then every Monday, Wednesday and Friday daily. 42.5 g 3    D-Mannose 500 MG Cap Take 500 mg by mouth 2 times a day. 30 Capsule 3    losartan (COZAAR) 25 MG Tab Take 1 Tablet by mouth 2 times a day. 180 Tablet 3    ivabradine (CORLANOR) 5 MG Tab tablet Take 1 Tablet by mouth every day. 90 Tablet 3    calcium carbonate (OS-HILDA 500) 500 MG Tab Take 500 mg by mouth 2 times a day with meals.      CRANBERRY-VITAMIN C-D MANNOSE PO Take  by mouth.      b complex vitamins capsule Take 1 Capsule by mouth every day.      Cholecalciferol (VITAMIN D3) 125 MCG/0.5ML Liquid Take 0.5 mL by mouth two times a week.      acetaminophen (TYLENOL) 500 MG Tab Take 500 mg by mouth at bedtime as needed for Mild Pain.      Spacer/Aero-Holding Chambers Device 1 Device as needed (SOB). 1 Each 0    valacyclovir (VALTREX) 1 GM Tab Take 2 Tablets by mouth every 12 hours. 2 g twice daily for one day. 20 Tablet 0    clobetasol (TEMOVATE) 0.05 % Cream CLOBETASOL PROPIONATE 0.05 % CREA 30 g 1     No current [facility]-ordered facility-administered medications on file.     Lisinopril, Atorvastatin, Atorvastatin, Hydrochlorothiazide, Inpefa [sotagliflozin],  Lisinopril, and Other misc    PMH/PSH/FH/Social history reviewed.  Vaccinations discussed.  Previous records and labs reviewed. Discussed age appropriate anticipatory guidance.    ROS: see hpi  Gen: no fevers/chills  Pulm: no sob, no cough  CV: no chest pain, no palpitations, no edema  GI: no nausea/vomiting, no diarrhea  Skin: no rash    Objective:   Exam:  /68 (BP Location: Left arm, Patient Position: Sitting, BP Cuff Size: Adult)   Pulse 86   Temp 36.4 °C (97.6 °F) (Temporal)   Resp 16   Ht 1.524 m (5')   Wt 82.6 kg (182 lb)   SpO2 96%   BMI 35.54 kg/m²    Body mass index is 35.54 kg/m².    Gen: Alert and oriented, No apparent distress.  HEENT: Head atraumatic, normocephalic. Pupils equal and round.  Neck: Neck is supple without lymphadenopathy.   Lungs: Normal effort, CTA bilaterally, no wheezes, rhonchi, or rales  CV: Regular rate and rhythm. No murmurs, rubs, or gallops.  Ext: No clubbing, cyanosis, edema.    Assessment & Plan:     85 y.o. female with the following -     1. Hypothyroidism, unspecified type  Chronic, controlled and stable. Continue current regimen -   - levothyroxine (SYNTHROID) 150 MCG Tab; TAKE 175MCG ON MONDAY, WEDNESDAY, AND FRIDAY, AND 1 TABLET OF 150MCG ON SUNDAY, TUESDAY, THURSDAY, AND SATURDAY.  Dispense: 48 Tablet; Refill: 3  - levothyroxine (SYNTHROID) 175 MCG Tab; TAKE 1 TABLET ON MONDAY,   WEDNESDAY, AND FRIDAY AND  150MCG ON SUNDAY, TUESDAY, THURSDAY, AND SATURDAY.  Dispense: 36 Tablet; Refill: 3    2. Low serum vitamin B12  - cyanocobalamin (Vitamin B-12) injection 1,000 mcg    3. SOB (shortness of breath)  Improved status post antibiotics and steroids.  Will refill Xopenex.  May continue twice a day for the rest the week, then switch to as needed.  - levalbuterol (XOPENEX HFA) 45 MCG/ACT inhaler; Inhale 2 Puffs every four hours as needed for Shortness of Breath.  Dispense: 3 Each; Refill: 3    4. Renal insufficiency  Noted in previous labs, discussed importance of  hydration and avoiding NSAIDs.  Will recheck.  Nephrology evaluation if GFR remains below 45.  - Basic Metabolic Panel; Future    Return in about 3 months (around 7/23/2025) for Follow-up.    Almita Valenzuela PA-C ([place]aker)  Physician Assistant Certified  [facility]    Billing : secondary to the complexity of this patient's illnesses and their interactions.  See assessment and plan above for the comprehensive evaluation and management of the patient's acute and chronic concerns.  As the patient's PCP, I am the continued focal point for all health care services for the patient's needs and ongoing subsequent medical care.  All problems listed were discussed during the office visit, medications were evaluated and complexities were discussed, as well as plan for the future.    Please note that this dictation was created using voice recognition software. I have made every reasonable attempt to correct obvious errors, but I expect that there are errors of grammar and possibly content that I did not discover before finalizing the note.

## 2025-04-23 NOTE — PATIENT INSTRUCTIONS
It was a pleasure meeting with you today at [facility]!    Your medical history/records and medications were reviewed today.     UPDATE on MyChart Results: If you have blood work, and/or imaging studies, or any other test or procedure completed, you will have access to results as soon as they become available in MyChart. Recently, these results will be available for review at the same time that your provider is able to see results!    This will likely mean you will see a result before your provider has had a chance to review and discuss with you.  Some results or care notes may be hard to understand and may be serious in nature.    We look at every result and your provider will contact you to explain what they mean and discuss appropriate next steps. Please allow for at least 72 business hours for chart and result review.     We prefer that you wait for your care team to contact you with your results.  Often, your provider will discuss your results with you at your next appointment. We look forward to continuing to partner with you in your care.    Please review my practice information below:    If you have any prescription refill requests, please send them via [employer] or discuss with your provider at the start of your office visits. Please allow 3-5 business days for lab and testing review and you will be contacted via [employer] with those results, or if advised to make a follow up appointment regarding those results, then please do so.     Once resulted, your lab/test/imaging results will show up automatically in your MyChart. Please wait for my interpretation and recommendations prior to viewing your results to avoid any unnecessary confusion or misinterpretation. I will address all of the lab values that I interpret as abnormal and message you accordingly on your MyChart. I will always send you a message about your results even if they are normal. If you do not hear back from me within 5-7 business  days after completing your tests, then please send me a message on [employer] so I can obtain your results (especially if you went to an outside lab or imaging center - [facility], [facility], etc).     If you have any additional questions or concerns beyond my interpretation of your results, please make an appointment with me to discuss in further detail.    Please only use the [employer] messaging system for questions regarding your most recent appointment or if advised to use otherwise (glucose or blood pressure reporting).     If you have any new problems or concerns, you must make an appointment to discuss. This includes any referral requests, lab requests (unless advised to notify me for pre-appt labs), medication side effects, or request for medication adjustments.     Please arrive 15 minutes prior to your appointment time to complete your check-in and intake with the medical assistant.      Thank you,    Almita Valenzuela PA-C ([place])  Physician Assistant Certified  [facility]    -----------------------------------------------------------------    Attn: Patients of [facility]:    In an effort to continue to provide excellent and efficient care to our patients, it is vital that we continue to use our resources appropriately. With that, this is a reminder that [employer] is used for prescription refill requests, test results, virtual visits, and chart review only.     Any new questions, concerns/conditions, lab/imaging requests, medication adjustments, new prescriptions, or referral requests do require an appointment (virtually or in person), unless discussed otherwise at your most recent appointment.     Thank you for your understanding,    [facility]

## 2025-05-12 ENCOUNTER — TELEPHONE (OUTPATIENT)
Dept: CARDIOLOGY | Facility: MEDICAL CENTER | Age: 85
End: 2025-05-12
Payer: MEDICARE

## 2025-05-12 NOTE — TELEPHONE ENCOUNTER
AL  Caller: Flavia Garrett     Topic/issue: Patient has been taking antibiotics for the past two months and was recommended not to take ivabradine (CORLANOR) 5 MG Tab tablet .    Please advise.     Callback Number: [phone]      Thank you  Taylor FAN

## 2025-05-12 NOTE — TELEPHONE ENCOUNTER
Called and s/w patient. Pt stated that she has had several infections over the last couple of months (UTI, sinus infection, pneumonia) and has been unable to take ivebradine for a month. Her pharmacist informed her that it is not safe to take together. She will be unable to take it for another 2 months due to interactions with the medications she is taking for her infections. Pt states she does not think her symptoms have worsened since not being on it, however, she has been so sick it is very hard for her to tell. She states she will resume taking once she completes the abx.    To AL, please review. Thank you!

## 2025-05-20 ENCOUNTER — OFFICE VISIT (OUTPATIENT)
Dept: SLEEP MEDICINE | Facility: MEDICAL CENTER | Age: 85
End: 2025-05-20
Attending: INTERNAL MEDICINE
Payer: MEDICARE

## 2025-05-20 VITALS
HEIGHT: 59 IN | SYSTOLIC BLOOD PRESSURE: 124 MMHG | BODY MASS INDEX: 36.89 KG/M2 | HEART RATE: 79 BPM | OXYGEN SATURATION: 97 % | WEIGHT: 183 LBS | DIASTOLIC BLOOD PRESSURE: 68 MMHG

## 2025-05-20 DIAGNOSIS — N17.9 AKI (ACUTE KIDNEY INJURY) (HCC): ICD-10-CM

## 2025-05-20 DIAGNOSIS — J98.8 RECURRENT RESPIRATORY INFECTION: Primary | ICD-10-CM

## 2025-05-20 DIAGNOSIS — R05.3 CHRONIC COUGH: ICD-10-CM

## 2025-05-20 PROCEDURE — 99213 OFFICE O/P EST LOW 20 MIN: CPT | Performed by: INTERNAL MEDICINE

## 2025-05-20 PROCEDURE — 3074F SYST BP LT 130 MM HG: CPT | Performed by: INTERNAL MEDICINE

## 2025-05-20 PROCEDURE — 3078F DIAST BP <80 MM HG: CPT | Performed by: INTERNAL MEDICINE

## 2025-05-20 PROCEDURE — 99214 OFFICE O/P EST MOD 30 MIN: CPT | Performed by: INTERNAL MEDICINE

## 2025-05-20 ASSESSMENT — ENCOUNTER SYMPTOMS
SPEECH CHANGE: 0
EYE DISCHARGE: 0
MYALGIAS: 0
WEIGHT LOSS: 0
DEPRESSION: 0
SPUTUM PRODUCTION: 0
STRIDOR: 0
DIARRHEA: 0
COUGH: 0
BACK PAIN: 0
HEADACHES: 0
SINUS PAIN: 0
CHILLS: 0
PND: 0
TREMORS: 0
FALLS: 0
WHEEZING: 0
BLURRED VISION: 0
WEAKNESS: 0
ABDOMINAL PAIN: 0
HEMOPTYSIS: 0
EYE REDNESS: 0
SHORTNESS OF BREATH: 0
EYE PAIN: 0
CLAUDICATION: 0
CONSTIPATION: 0
DIAPHORESIS: 0
VOMITING: 0
FOCAL WEAKNESS: 0
DOUBLE VISION: 0
PALPITATIONS: 0
PHOTOPHOBIA: 0
NAUSEA: 0
NECK PAIN: 0
ORTHOPNEA: 0
FEVER: 0
HEARTBURN: 0
DIZZINESS: 0
SORE THROAT: 0

## 2025-05-20 ASSESSMENT — FIBROSIS 4 INDEX: FIB4 SCORE: 1.83

## 2025-05-20 NOTE — PROGRESS NOTES
Chief Complaint   Patient presents with    Follow-Up     LAST SEEN 6/12/24    Results     CXR 4/16/25, 2/25/25         HPI: This patient is a 85 y.o. female whom is followed in our clinic for RAD last seen by me on 6/12/24.  PMHx includes stage I breast Ca dx roughly 10 years ago, DJD and hypothyroid. She is a life-long non-smoker. No known occupational or environmental exposures. She was referred to us in 2021 for RUIZ. PFT from 3/2021 was normal, CXR clear. She has chronic chest congestion, need to clear throat, worse at night which waxes and wanes. No GERD sxs. She does get PND and if significant will take OTC antihistamine.  She had been experiencing increased RUIZ for about a year for which we referred her to cardiology for resting tachycardia. Updated PFT from 12/2023 showed normal airflows with normal lung volumes and normal DLCO. She did have bronchodilator responsiveness and felt subjectively improved after the bronchodilator during testing. She has been using albuterol prn since that time with significant improvement in both SOB and cough. She has also seen cardiology and underwent left and right heart cath which showed mild elevation of left-sided filling pressures but no evidence of precapillary pulmonary hypertension or obstructive coronary artery disease. She was doing well until a fall last year resulting in hip fracture requiring ORIF in May 2024. Since that time she has been on abx for recurrent UTI and was tx with prednisone in February and again in April for upper airway sxs. CXR clear both times. She does report some sinus congestion each time and was give abx only in April. She feels her breathing is back to baseline today.     Past Medical History[1]    Social History     Socioeconomic History    Marital status:      Spouse name: Not on file    Number of children: Not on file    Years of education: Not on file    Highest education level: Associate degree: occupational, technical, or  vocational program   Occupational History    Not on file   Tobacco Use    Smoking status: Never    Smokeless tobacco: Never   Vaping Use    Vaping status: Never Used   Substance and Sexual Activity    Alcohol use: Never     Comment: min    Drug use: No    Sexual activity: Yes     Partners: Male   Other Topics Concern    Not on file   Social History Narrative    ** Merged History Encounter **         From [place]      Social Drivers of Health     Financial Resource Strain: Low Risk  (10/28/2024)    Overall Financial Resource Strain (CARDIA)     Difficulty of Paying Living Expenses: Not hard at all   Food Insecurity: No Food Insecurity (10/28/2024)    Hunger Vital Sign     Worried About Running Out of Food in the Last Year: Never true     Ran Out of Food in the Last Year: Never true   Transportation Needs: No Transportation Needs (10/28/2024)    PRAPARE - Transportation     Lack of Transportation (Medical): No     Lack of Transportation (Non-Medical): No   Physical Activity: Unknown (10/28/2024)    Exercise Vital Sign     Days of Exercise per Week: Not on file     Minutes of Exercise per Session: 100 min   Stress: No Stress Concern Present (10/28/2024)    [employer] - Occupational Stress Questionnaire     Feeling of Stress : Not at all   Social Connections: Unknown (10/28/2024)    Social Connection and Isolation Panel [NHANES]     Frequency of Communication with Friends and Family: More than three times a week     Frequency of Social Gatherings with Friends and Family: More than three times a week     Attends Yazidi Services: Patient declined     Active Member of Clubs or Organizations: Yes     Attends Club or Organization Meetings: More than 4 times per year     Marital Status:    Intimate Partner Violence: Not on file   Housing Stability: Unknown (10/28/2024)    Housing Stability Vital Sign     Unable to Pay for Housing in the Last Year: No     Number of Times Moved in the  "Last Year: Not on file     Homeless in the Last Year: No       Family History   Problem Relation Age of Onset    Arthritis Mother     Hypertension Mother     Other Son         CELIAC DISEASE-SEVERE    Diabetes Other         GF    Arthritis Other         GM    Heart Disease Neg Hx        Medications Ordered Prior to Encounter[2]    Lisinopril, Atorvastatin, Atorvastatin, Hydrochlorothiazide, Inpefa [sotagliflozin], Lisinopril, and Other misc      ROS:   Review of Systems   Constitutional:  Positive for malaise/fatigue. Negative for chills, diaphoresis, fever and weight loss.   HENT:  Negative for congestion, ear discharge, ear pain, hearing loss, nosebleeds, sinus pain, sore throat and tinnitus.    Eyes:  Negative for blurred vision, double vision, photophobia, pain, discharge and redness.   Respiratory:  Negative for cough, hemoptysis, sputum production, shortness of breath, wheezing and stridor.    Cardiovascular:  Negative for chest pain, palpitations, orthopnea, claudication, leg swelling and PND.   Gastrointestinal:  Negative for abdominal pain, constipation, diarrhea, heartburn, nausea and vomiting.   Genitourinary:  Negative for dysuria and urgency.   Musculoskeletal:  Negative for back pain, falls, joint pain, myalgias and neck pain.   Skin:  Negative for itching and rash.   Neurological:  Negative for dizziness, tremors, speech change, focal weakness, weakness and headaches.   Endo/Heme/Allergies:  Negative for environmental allergies.   Psychiatric/Behavioral:  Negative for depression.        /68 (BP Location: Right arm, Patient Position: Sitting, BP Cuff Size: Adult)   Pulse 79   Ht 1.499 m (4' 11\")   Wt 83 kg (183 lb)   SpO2 97%   Physical Exam  Constitutional:       General: She is not in acute distress.     Appearance: Normal appearance. She is well-developed and normal weight.   HENT:      Head: Normocephalic and atraumatic.      Right Ear: External ear normal.      Left Ear: External ear " normal.      Nose: Nose normal. No congestion.      Mouth/Throat:      Mouth: Mucous membranes are moist.      Pharynx: Oropharynx is clear. No oropharyngeal exudate.   Eyes:      General: No scleral icterus.     Extraocular Movements: Extraocular movements intact.      Conjunctiva/sclera: Conjunctivae normal.      Pupils: Pupils are equal, round, and reactive to light.   Neck:      Vascular: No JVD.      Trachea: No tracheal deviation.   Cardiovascular:      Rate and Rhythm: Normal rate and regular rhythm.      Heart sounds: Normal heart sounds. No murmur heard.     No friction rub. No gallop.   Pulmonary:      Effort: Pulmonary effort is normal. No accessory muscle usage or respiratory distress.      Breath sounds: Normal breath sounds. No wheezing or rales.   Abdominal:      General: There is no distension.      Palpations: Abdomen is soft.      Tenderness: There is no abdominal tenderness.   Musculoskeletal:         General: No tenderness or deformity. Normal range of motion.      Cervical back: Normal range of motion and neck supple.      Right lower leg: No edema.      Left lower leg: No edema.   Lymphadenopathy:      Cervical: No cervical adenopathy.   Skin:     General: Skin is warm and dry.      Findings: No rash.      Nails: There is no clubbing.   Neurological:      Mental Status: She is alert and oriented to person, place, and time.      Cranial Nerves: No cranial nerve deficit.      Gait: Gait normal.   Psychiatric:         Behavior: Behavior normal.       PFTs as reviewed by me personally:as per hPI    Imaging as reviewed by me personally:  as per hPI    Assessment:  1. Recurrent respiratory infection  CBC WITH DIFFERENTIAL    IMMUNOGLOBULINS A/E/G/M, SERUM      2. Chronic cough        3. JORGE LUIS (acute kidney injury) (HCC)  CANCELED: Basic Metabolic Panel          Plan:  Not clear to me that these are true infx vs allergy/asthma exacerbations. We discussed low dose ICS which she declined today. Will  obtain immunoglobulins and cbc with diff. Recommended sinus rinses and intranasal steroids. Continue prn Olamide  Improved. As per my last note, likely GERD or PND.   Has f/u BMP ordered by primary care.   Return in about 3 months (around 8/20/2025) for blood test .             [1]   Past Medical History:  Diagnosis Date    Acquired absence of both cervix and uterus 11/11/2014    Back pain     Breast cancer ([facility])     Cancer (Formerly [facility])     Cataract     CHF (congestive heart failure) (Formerly [facility])     Chickenpox     Chronic left-sided low back pain without sciatica 03/17/2017    Closed comminuted supracondylar fracture of femur, left, initial encounter ([facility]) 04/29/2024    Constipation     Cough     Cystitis 06/12/2019    Diarrhea     Dyslipidemia 07/26/2016    Eczema     Fatigue     Frequent urination     Gout of foot 09/30/2016    H/O total hysterectomy 07/26/2016    High cholesterol     History of UTI 01/31/2020    History of UTI 01/31/2020    Hx of breast cancer 07/26/2016    Hypertension     Hypopituitarism ([facility]) 06/23/2023    Hypothyroidism 07/26/2016    Idiopathic chronic gout of right ankle 09/30/2016    Inappropriate sinus tachycardia ([facility]) 03/15/2024    Influenza     Lichen sclerosus of female genitalia     Nasal drainage     Nocturnal oxygen desaturation 08/07/2020    Obesity     Osteopenia 07/26/2016    Osteopenia 07/26/2016    Osteoporosis     Osteoporosis 07/26/2016    Overactive bladder 02/09/2020    Overweight     Pathological fracture of left femur due to age-related osteoporosis ([facility]ly [facility]) 04/29/2024    Recurrent UTI 01/31/2020    Rhinitis     Ringing in ears     Shortness of breath     Sputum production     Status post right knee replacement 05/05/2016    Status post total right knee replacement 08/17/2016    Swelling of lower extremity     Thyroid activity decreased     Tonsillitis     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     Urinary tract infection 05/14/2024    Vision loss      Vitamin D deficiency 07/26/2016    Whooping cough    [2]   Current Outpatient Medications on File Prior to Visit   Medication Sig Dispense Refill    levothyroxine (SYNTHROID) 150 MCG Tab TAKE 175MCG ON MONDAY, WEDNESDAY, AND FRIDAY, AND 1 TABLET OF 150MCG ON SUNDAY, TUESDAY, THURSDAY, AND SATURDAY. 48 Tablet 3    levothyroxine (SYNTHROID) 175 MCG Tab TAKE 1 TABLET ON MONDAY,   WEDNESDAY, AND FRIDAY AND  150MCG ON SUNDAY, TUESDAY, THURSDAY, AND SATURDAY. 36 Tablet 3    levalbuterol (XOPENEX HFA) 45 MCG/ACT inhaler Inhale 2 Puffs every four hours as needed for Shortness of Breath. 3 Each 3    nitrofurantoin (MACRODANTIN) 50 MG Cap       estradiol (ESTRACE) 0.1 MG/GM vaginal cream Please apply the amount the size of a pea (0.25g) to Vagina everyday for one month, then every Monday, Wednesday and Friday daily. 42.5 g 3    D-Mannose 500 MG Cap Take 500 mg by mouth 2 times a day. 30 Capsule 3    losartan (COZAAR) 25 MG Tab Take 1 Tablet by mouth 2 times a day. 180 Tablet 3    ivabradine (CORLANOR) 5 MG Tab tablet Take 1 Tablet by mouth every day. 90 Tablet 3    calcium carbonate (OS-HILDA 500) 500 MG Tab Take 500 mg by mouth 2 times a day with meals.      CRANBERRY-VITAMIN C-D MANNOSE PO Take  by mouth.      b complex vitamins capsule Take 1 Capsule by mouth every day.      Cholecalciferol (VITAMIN D3) 125 MCG/0.5ML Liquid Take 0.5 mL by mouth two times a week.      acetaminophen (TYLENOL) 500 MG Tab Take 500 mg by mouth at bedtime as needed for Mild Pain.      Spacer/Aero-Holding Chambers Device 1 Device as needed (SOB). 1 Each 0    valacyclovir (VALTREX) 1 GM Tab Take 2 Tablets by mouth every 12 hours. 2 g twice daily for one day. 20 Tablet 0    clobetasol (TEMOVATE) 0.05 % Cream CLOBETASOL PROPIONATE 0.05 % CREA 30 g 1    benzonatate (TESSALON) 100 MG Cap Take 1 Capsule by mouth 3 times a day as needed for Cough. (Patient not taking: Reported on 5/20/2025) 60 Capsule 0    trospium (SANCTURA) 20 MG Tab Take 1 Tablet by  mouth 2 times a day. 30 Tablet 3     No current facility-administered medications on file prior to visit.

## 2025-06-05 DIAGNOSIS — M79.605 PAIN OF LEFT LOWER EXTREMITY: ICD-10-CM

## 2025-06-05 DIAGNOSIS — G89.29 CHRONIC BILATERAL LOW BACK PAIN WITHOUT SCIATICA: Primary | ICD-10-CM

## 2025-06-05 DIAGNOSIS — M54.50 CHRONIC BILATERAL LOW BACK PAIN WITHOUT SCIATICA: Primary | ICD-10-CM

## 2025-06-05 NOTE — PROGRESS NOTES
1. Caller Name: Flavia Garrett                          Call Back Number: [phone] (home)         How would the patient prefer to be contacted with a response: Phone call do NOT leave a detailed message    2. SPECIFIC Action To Be Taken: Referral pending, please sign.    3. Diagnosis/Clinical Reason for Request:    Pain of left lower extremity   Chronic bilateral low back pain without sciatica       4. Specialty & Provider Name/Lab/Imaging Location: [facility]     5. Is appointment scheduled for requested order/referral: NO    Patient was informed they will receive a return phone call from the office ONLY if there are any questions before processing their request. Advised to call back if they haven't received a call from the referral department in 5 days.

## 2025-06-16 NOTE — Clinical Note
REFERRAL APPROVAL NOTICE         Sent on June 16, 2025                   Flavia Garrett  [address] [place] [address]                   Dear Ms. Garrett,    After a careful review of the medical information and benefit coverage, [facility] has processed your referral. See below for additional details.    If applicable, you must be actively enrolled with your insurance for coverage of the authorized service. If you have any questions regarding your coverage, please contact your insurance directly.    REFERRAL INFORMATION   Referral #:  [id number]  Referred-To Provider    Referred-By Provider:  Physical Therapy    KEISHA Mendez BRADLEY J      [address] [place] [address]  [phone] [address]  [place] [address] [phone]    Referral Start Date:  06/05/2025  Referral End Date:   06/05/2026             SCHEDULING  If you do not already have an appointment, please call [phone] to make an appointment.     MORE INFORMATION  If you do not already have a [email or web address] account, sign up at: [email or web address]  You can access your medical information, make appointments, see lab results, billing information, and more.  If you have questions regarding this referral, please contact  the [facility] Referrals department at:             [phone]. Monday - Friday 8:00AM - 5:00PM.     Sincerely,    [facility]

## 2025-07-17 ENCOUNTER — RESULTS FOLLOW-UP (OUTPATIENT)
Dept: MEDICAL GROUP | Facility: IMAGING CENTER | Age: 85
End: 2025-07-17
Payer: MEDICARE

## 2025-07-17 ENCOUNTER — HOSPITAL ENCOUNTER (OUTPATIENT)
Dept: LAB | Facility: MEDICAL CENTER | Age: 85
End: 2025-07-17
Attending: INTERNAL MEDICINE
Payer: MEDICARE

## 2025-07-17 ENCOUNTER — HOSPITAL ENCOUNTER (OUTPATIENT)
Dept: LAB | Facility: MEDICAL CENTER | Age: 85
End: 2025-07-17
Attending: PHYSICIAN ASSISTANT
Payer: MEDICARE

## 2025-07-17 DIAGNOSIS — J98.8 RECURRENT RESPIRATORY INFECTION: ICD-10-CM

## 2025-07-17 DIAGNOSIS — N28.9 RENAL INSUFFICIENCY: ICD-10-CM

## 2025-07-17 LAB
ANION GAP SERPL CALC-SCNC: 13 MMOL/L (ref 7–16)
BASOPHILS # BLD AUTO: 1.2 % (ref 0–1.8)
BASOPHILS # BLD: 0.06 K/UL (ref 0–0.12)
BUN SERPL-MCNC: 17 MG/DL (ref 8–22)
CALCIUM SERPL-MCNC: 9.4 MG/DL (ref 8.5–10.5)
CHLORIDE SERPL-SCNC: 104 MMOL/L (ref 96–112)
CO2 SERPL-SCNC: 22 MMOL/L (ref 20–33)
CREAT SERPL-MCNC: 0.74 MG/DL (ref 0.5–1.4)
EOSINOPHIL # BLD AUTO: 0.25 K/UL (ref 0–0.51)
EOSINOPHIL NFR BLD: 4.8 % (ref 0–6.9)
ERYTHROCYTE [DISTWIDTH] IN BLOOD BY AUTOMATED COUNT: 51.4 FL (ref 35.9–50)
GFR SERPLBLD CREATININE-BSD FMLA CKD-EPI: 80 ML/MIN/1.73 M 2
GLUCOSE SERPL-MCNC: 92 MG/DL (ref 65–99)
HCT VFR BLD AUTO: 39.6 % (ref 37–47)
HGB BLD-MCNC: 12.6 G/DL (ref 12–16)
IMM GRANULOCYTES # BLD AUTO: 0.02 K/UL (ref 0–0.11)
IMM GRANULOCYTES NFR BLD AUTO: 0.4 % (ref 0–0.9)
LYMPHOCYTES # BLD AUTO: 1.57 K/UL (ref 1–4.8)
LYMPHOCYTES NFR BLD: 30.3 % (ref 22–41)
MCH RBC QN AUTO: 28.8 PG (ref 27–33)
MCHC RBC AUTO-ENTMCNC: 31.8 G/DL (ref 32.2–35.5)
MCV RBC AUTO: 90.4 FL (ref 81.4–97.8)
MONOCYTES # BLD AUTO: 0.61 K/UL (ref 0–0.85)
MONOCYTES NFR BLD AUTO: 11.8 % (ref 0–13.4)
NEUTROPHILS # BLD AUTO: 2.68 K/UL (ref 1.82–7.42)
NEUTROPHILS NFR BLD: 51.5 % (ref 44–72)
NRBC # BLD AUTO: 0 K/UL
NRBC BLD-RTO: 0 /100 WBC (ref 0–0.2)
PLATELET # BLD AUTO: 240 K/UL (ref 164–446)
PMV BLD AUTO: 11 FL (ref 9–12.9)
POTASSIUM SERPL-SCNC: 4.4 MMOL/L (ref 3.6–5.5)
RBC # BLD AUTO: 4.38 M/UL (ref 4.2–5.4)
SODIUM SERPL-SCNC: 139 MMOL/L (ref 135–145)
WBC # BLD AUTO: 5.2 K/UL (ref 4.8–10.8)

## 2025-07-17 PROCEDURE — 82784 ASSAY IGA/IGD/IGG/IGM EACH: CPT

## 2025-07-17 PROCEDURE — 82785 ASSAY OF IGE: CPT

## 2025-07-17 PROCEDURE — 85025 COMPLETE CBC W/AUTO DIFF WBC: CPT

## 2025-07-17 PROCEDURE — 80048 BASIC METABOLIC PNL TOTAL CA: CPT

## 2025-07-17 PROCEDURE — 36415 COLL VENOUS BLD VENIPUNCTURE: CPT

## 2025-07-19 LAB
IGA SERPL-MCNC: 326 MG/DL (ref 68–408)
IGG SERPL-MCNC: 545 MG/DL (ref 768–1632)
IGM SERPL-MCNC: 70 MG/DL (ref 35–263)

## 2025-07-20 LAB — IGE SERPL-ACNC: <2 KU/L

## 2025-07-24 ENCOUNTER — HOSPITAL ENCOUNTER (OUTPATIENT)
Facility: MEDICAL CENTER | Age: 85
End: 2025-07-24
Attending: PHYSICIAN ASSISTANT
Payer: MEDICARE

## 2025-07-24 ENCOUNTER — APPOINTMENT (OUTPATIENT)
Dept: MEDICAL GROUP | Facility: IMAGING CENTER | Age: 85
End: 2025-07-24
Payer: MEDICARE

## 2025-07-24 VITALS
SYSTOLIC BLOOD PRESSURE: 126 MMHG | TEMPERATURE: 97.8 F | WEIGHT: 184 LBS | HEART RATE: 85 BPM | HEIGHT: 59 IN | BODY MASS INDEX: 37.09 KG/M2 | OXYGEN SATURATION: 96 % | DIASTOLIC BLOOD PRESSURE: 80 MMHG | RESPIRATION RATE: 16 BRPM

## 2025-07-24 DIAGNOSIS — N39.0 RECURRENT UTI: ICD-10-CM

## 2025-07-24 DIAGNOSIS — E53.8 LOW SERUM VITAMIN B12: ICD-10-CM

## 2025-07-24 DIAGNOSIS — Z78.0 POSTMENOPAUSAL STATUS (AGE-RELATED) (NATURAL): ICD-10-CM

## 2025-07-24 DIAGNOSIS — N95.1 MENOPAUSAL STATE: ICD-10-CM

## 2025-07-24 DIAGNOSIS — M85.89 OSTEOPENIA OF MULTIPLE SITES: Primary | ICD-10-CM

## 2025-07-24 DIAGNOSIS — E78.2 MIXED HYPERLIPIDEMIA: ICD-10-CM

## 2025-07-24 DIAGNOSIS — E53.8 LOW FOLATE: ICD-10-CM

## 2025-07-24 LAB
APPEARANCE UR: CLEAR
BILIRUB UR STRIP-MCNC: NORMAL MG/DL
COLOR UR AUTO: YELLOW
GLUCOSE UR STRIP.AUTO-MCNC: NORMAL MG/DL
KETONES UR STRIP.AUTO-MCNC: NORMAL MG/DL
LEUKOCYTE ESTERASE UR QL STRIP.AUTO: NORMAL
NITRITE UR QL STRIP.AUTO: NORMAL
PH UR STRIP.AUTO: 6 [PH] (ref 5–8)
PROT UR QL STRIP: NORMAL MG/DL
RBC UR QL AUTO: NORMAL
SP GR UR STRIP.AUTO: 1.01
UROBILINOGEN UR STRIP-MCNC: 0.2 MG/DL

## 2025-07-24 PROCEDURE — 3079F DIAST BP 80-89 MM HG: CPT | Performed by: PHYSICIAN ASSISTANT

## 2025-07-24 PROCEDURE — 1126F AMNT PAIN NOTED NONE PRSNT: CPT | Performed by: PHYSICIAN ASSISTANT

## 2025-07-24 PROCEDURE — 99214 OFFICE O/P EST MOD 30 MIN: CPT | Mod: 25 | Performed by: PHYSICIAN ASSISTANT

## 2025-07-24 PROCEDURE — 3074F SYST BP LT 130 MM HG: CPT | Performed by: PHYSICIAN ASSISTANT

## 2025-07-24 PROCEDURE — 81002 URINALYSIS NONAUTO W/O SCOPE: CPT | Performed by: PHYSICIAN ASSISTANT

## 2025-07-24 PROCEDURE — 87086 URINE CULTURE/COLONY COUNT: CPT

## 2025-07-24 RX ORDER — CYANOCOBALAMIN 1000 UG/ML
1000 INJECTION, SOLUTION INTRAMUSCULAR; SUBCUTANEOUS ONCE
Status: COMPLETED | OUTPATIENT
Start: 2025-07-24 | End: 2025-07-24

## 2025-07-24 RX ADMIN — CYANOCOBALAMIN 1000 MCG: 1000 INJECTION, SOLUTION INTRAMUSCULAR; SUBCUTANEOUS at 11:27

## 2025-07-24 ASSESSMENT — PAIN SCALES - GENERAL: PAINLEVEL_OUTOF10: NO PAIN

## 2025-07-24 ASSESSMENT — FIBROSIS 4 INDEX: FIB4 SCORE: 1.94

## 2025-07-24 NOTE — PATIENT INSTRUCTIONS
It was a pleasure meeting with you today at [facility]!    Your medical history/records and medications were reviewed today.     UPDATE on MyChart Results: If you have blood work, and/or imaging studies, or any other test or procedure completed, you will have access to results as soon as they become available in MyChart. Recently, these results will be available for review at the same time that your provider is able to see results!    This will likely mean you will see a result before your provider has had a chance to review and discuss with you.  Some results or care notes may be hard to understand and may be serious in nature.    We look at every result and your provider will contact you to explain what they mean and discuss appropriate next steps. Please allow for at least 72 business hours for chart and result review.     We prefer that you wait for your care team to contact you with your results.  Often, your provider will discuss your results with you at your next appointment. We look forward to continuing to partner with you in your care.    Please review my practice information below:    If you have any prescription refill requests, please send them via [employer] or discuss with your provider at the start of your office visits. Please allow 3-5 business days for lab and testing review and you will be contacted via [employer] with those results, or if advised to make a follow up appointment regarding those results, then please do so.     Once resulted, your lab/test/imaging results will show up automatically in your MyChart. Please wait for my interpretation and recommendations prior to viewing your results to avoid any unnecessary confusion or misinterpretation. I will address all of the lab values that I interpret as abnormal and message you accordingly on your MyChart. I will always send you a message about your results even if they are normal. If you do not hear back from me within 5-7 business  days after completing your tests, then please send me a message on [facility] so I can obtain your results (especially if you went to an outside lab or imaging center - [facility], [facility], etc).     If you have any additional questions or concerns beyond my interpretation of your results, please make an appointment with me to discuss in further detail.    Please only use the [facility] messaging system for questions regarding your most recent appointment or if advised to use otherwise (glucose or blood pressure reporting).     If you have any new problems or concerns, you must make an appointment to discuss. This includes any referral requests, lab requests (unless advised to notify me for pre-appt labs), medication side effects, or request for medication adjustments.     Please arrive 15 minutes prior to your appointment time to complete your check-in and intake with the medical assistant.      Thank you,    Almita Valenzuela PA-C ([place])  Physician Assistant Certified  [facility]    -----------------------------------------------------------------    Attn: Patients of [facility]:    In an effort to continue to provide excellent and efficient care to our patients, it is vital that we continue to use our resources appropriately. With that, this is a reminder that [facility] is used for prescription refill requests, test results, virtual visits, and chart review only.     Any new questions, concerns/conditions, lab/imaging requests, medication adjustments, new prescriptions, or referral requests do require an appointment (virtually or in person), unless discussed otherwise at your most recent appointment.     Thank you for your understanding,    [facility]

## 2025-07-24 NOTE — PROGRESS NOTES
Subjective:     CC:   Chief Complaint   Patient presents with    Lab Results     From 07/17/25       HPI:   Flavia presents today to discuss:    Osteopenia of multiple sites  Patient history of osteopenia, endocrinology referral has been discussed in the past, patient declined.  History of fracture.  Has declined bisphosphonate therapy.  Takes vitamin D and calcium.  No recent falls.  Uses a cane for stability.  Due for DEXA in August.    Mixed hyperlipidemia  Chronic, cannot tolerate statins.  Tries to follow a balanced diet.    Low serum vitamin B12  Patient with history of low B12 and low folate, on supplementation for both.  Started noticing some tingling in her feet over the last week.    Recurrent UTI  Patient with history of recurrent UTIs.  Recent completed 3-month course of Macrodantin 50 mg daily from her gynecologist.  Has not completed a repeat urinalysis yet.  Asymptomatic.      Past Medical History[1]  Family History   Problem Relation Age of Onset    Arthritis Mother     Hypertension Mother     Other Son         CELIAC DISEASE-SEVERE    Diabetes Other         GF    Arthritis Other         GM    Heart Disease Neg Hx      Past Surgical History[2]  Social History[3]  Social History     Social History Narrative    ** Merged History Encounter **         From [place]      Current Medications and Prescriptions Ordered in Epic[4]  Lisinopril, Atorvastatin, Atorvastatin, Hydrochlorothiazide, Inpefa [sotagliflozin], Lisinopril, and Other misc    PMH/PSH/FH/Social history reviewed.  Vaccinations discussed.  Previous records and labs reviewed. Discussed age appropriate anticipatory guidance.    ROS: see hpi  Gen: no fevers/chills  Pulm: no sob, no cough  CV: no chest pain, no palpitations, no edema  GI: no nausea/vomiting, no diarrhea  Skin: no rash    Objective:   Exam:  /80 (BP Location: Left arm, Patient Position: Sitting, BP Cuff Size: Adult)   Pulse 85   Temp 36.6 °C (97.8 °F) (Temporal)    "Resp 16   Ht 1.499 m (4' 11\")   Wt 83.5 kg (184 lb)   SpO2 96%   BMI 37.16 kg/m²    Body mass index is 37.16 kg/m².    Gen: Alert and oriented, No apparent distress.  HEENT: Head atraumatic, normocephalic. Pupils equal and round.  Neck: Neck is supple without lymphadenopathy.   Lungs: Normal effort, CTA bilaterally, no wheezes, rhonchi, or rales  CV: Regular rate and rhythm. No murmurs, rubs, or gallops.  Ext: No clubbing, cyanosis, edema.    Assessment & Plan:     85 y.o. female with the following -     1. Osteopenia of multiple sites (Primary)  Continue vitamin D and calcium, fall precautions.  Await updated DEXA scan.  If abnormal, will send to get Dr. Salinas at the [facility].    2. Mixed hyperlipidemia  Chronic, cannot tolerate statin therapy.  Check updated lipid profile.  Further treatment per cardiology discretion.  - Lipid Profile; Future    3. Recurrent UTI  Status post 3-month course of Macrodantin, repeat urinalysis and culture.  - URINE CULTURE(NEW); Future  - POCT Urinalysis    4. Low serum vitamin B12  Neurology evaluation if numbness and tingling persists.  - VITAMIN B12; Future  - cyanocobalamin (Vitamin B-12) injection 1,000 mcg    5. Low folate  - FOLATE; Future    6. Postmenopausal status (age-related) (natural)  - DS-BONE DENSITY STUDY (DEXA); Future    7. Menopausal state  - DS-BONE DENSITY STUDY (DEXA); Future    Return in about 3 months (around 10/15/2025) for Follow-up.    Almita Valenzuela PA-C ([place])  Physician Assistant Certified  [facility]      Please note that this dictation was created using voice recognition software. I have made every reasonable attempt to correct obvious errors, but I expect that there are errors of grammar and possibly content that I did not discover before finalizing the note.         [1]   Past Medical History:  Diagnosis Date    Acquired absence of both cervix and uterus 11/11/2014    Back pain     Breast cancer (HCC)     Cancer (HCC)     Cataract  "    CHF (congestive heart failure) ([facility])     Chickenpox     Chronic left-sided low back pain without sciatica 03/17/2017    Closed comminuted supracondylar fracture of femur, left, initial encounter ([facility]) 04/29/2024    Constipation     Cough     Cystitis 06/12/2019    Diarrhea     Dyslipidemia 07/26/2016    Eczema     Fatigue     Frequent urination     Gout of foot 09/30/2016    H/O total hysterectomy 07/26/2016    High cholesterol     History of UTI 01/31/2020    History of UTI 01/31/2020    Hx of breast cancer 07/26/2016    Hypertension     Hypopituitarism ([facility]) 06/23/2023    Hypothyroidism 07/26/2016    Idiopathic chronic gout of right ankle 09/30/2016    Inappropriate sinus tachycardia ([facility]) 03/15/2024    Influenza     Lichen sclerosus of female genitalia     Nasal drainage     Nocturnal oxygen desaturation 08/07/2020    Obesity     Osteopenia 07/26/2016    Osteopenia 07/26/2016    Osteoporosis     Osteoporosis 07/26/2016    Overactive bladder 02/09/2020    Overweight     Pathological fracture of left femur due to age-related osteoporosis ([facility]) 04/29/2024    Recurrent UTI 01/31/2020    Rhinitis     Ringing in ears     Shortness of breath     Sputum production     Status post right knee replacement 05/05/2016    Status post total right knee replacement 08/17/2016    Swelling of lower extremity     Thyroid activity decreased     Tonsillitis     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     Urinary tract infection 05/14/2024    Vision loss     Vitamin D deficiency 07/26/2016    Whooping cough    [2]   Past Surgical History:  Procedure Laterality Date    OH REMV FOOT FOREIGN BODY,DEEP Left 07/31/2024    Procedure: LEFT FOOT FOREIGN BODY REMOVAL;  Surgeon: Saman Weldon M.D.;  Location: [facility];  Service: Orthopedics    ORIF, FRACTURE, FEMUR Left 04/29/2024    Procedure: ORIF, FRACTURE, DISTAL FEMUR;  Surgeon: Jasiel Tolbert M.D.;  Location: SURGERY [facility]  [place];  Service: Trauma    WOUND IRRIGATION & DEBRIDEMENT Left 04/29/2024    Procedure: IRRIGATION AND DEBRIDEMENT, LEFT FEMUR;  Surgeon: Jasiel Tolbert M.D.;  Location: SURGERY [facility];  Service: Trauma    LUMPECTOMY Right 2012    ARTHROSCOPY, KNEE      HYSTERECTOMY LAPAROSCOPY      KNEE ARTHROPLASTY TOTAL Right     ORIF, KNEE  2016    OTHER      LIPOSUCTION THIGHS-LEG LIFT    MO REMV 2ND CATARACT,CORN-SCLER SECTN      VAGINAL HYSTERECTOMY TOTAL      Hysterectomy,Total Vaginal   [3]   Social History  Tobacco Use    Smoking status: Never    Smokeless tobacco: Never   Vaping Use    Vaping status: Never Used   Substance Use Topics    Alcohol use: Never     Comment: min    Drug use: No   [4]   Current Outpatient Medications Ordered in Epic   Medication Sig Dispense Refill    levothyroxine (SYNTHROID) 150 MCG Tab TAKE 175MCG ON MONDAY, WEDNESDAY, AND FRIDAY, AND 1 TABLET OF 150MCG ON SUNDAY, TUESDAY, THURSDAY, AND SATURDAY. 48 Tablet 3    levothyroxine (SYNTHROID) 175 MCG Tab TAKE 1 TABLET ON MONDAY,   WEDNESDAY, AND FRIDAY AND  150MCG ON SUNDAY, TUESDAY, THURSDAY, AND SATURDAY. 36 Tablet 3    levalbuterol (XOPENEX HFA) 45 MCG/ACT inhaler Inhale 2 Puffs every four hours as needed for Shortness of Breath. 3 Each 3    nitrofurantoin (MACRODANTIN) 50 MG Cap       benzonatate (TESSALON) 100 MG Cap Take 1 Capsule by mouth 3 times a day as needed for Cough. 60 Capsule 0    estradiol (ESTRACE) 0.1 MG/GM vaginal cream Please apply the amount the size of a pea (0.25g) to Vagina everyday for one month, then every Monday, Wednesday and Friday daily. 42.5 g 3    D-Mannose 500 MG Cap Take 500 mg by mouth 2 times a day. 30 Capsule 3    losartan (COZAAR) 25 MG Tab Take 1 Tablet by mouth 2 times a day. 180 Tablet 3    ivabradine (CORLANOR) 5 MG Tab tablet Take 1 Tablet by mouth every day. 90 Tablet 3    calcium carbonate (OS-HILDA 500) 500 MG Tab Take 500 mg by mouth 2 times a day with meals.      CRANBERRY-VITAMIN C-D MANNOSE PO  Take  by mouth.      b complex vitamins capsule Take 1 Capsule by mouth every day.      Cholecalciferol (VITAMIN D3) 125 MCG/0.5ML Liquid Take 0.5 mL by mouth two times a week.      acetaminophen (TYLENOL) 500 MG Tab Take 500 mg by mouth at bedtime as needed for Mild Pain.      Spacer/Aero-Holding Chambers Device 1 Device as needed (SOB). 1 Each 0    valacyclovir (VALTREX) 1 GM Tab Take 2 Tablets by mouth every 12 hours. 2 g twice daily for one day. 20 Tablet 0    clobetasol (TEMOVATE) 0.05 % Cream CLOBETASOL PROPIONATE 0.05 % CREA 30 g 1     No current [facility]-ordered facility-administered medications on file.

## 2025-07-24 NOTE — ASSESSMENT & PLAN NOTE
Patient with history of low B12 and low folate, on supplementation for both.  Started noticing some tingling in her feet over the last week.

## 2025-07-24 NOTE — ASSESSMENT & PLAN NOTE
Patient with history of recurrent UTIs.  Recent completed 3-month course of Macrodantin 50 mg daily from her gynecologist.  Has not completed a repeat urinalysis yet.  Asymptomatic.

## 2025-07-24 NOTE — ASSESSMENT & PLAN NOTE
Patient history of osteopenia, endocrinology referral has been discussed in the past, patient declined.  History of fracture.  Has declined bisphosphonate therapy.  Takes vitamin D and calcium.  No recent falls.  Uses a cane for stability.  Due for DEXA in August.

## 2025-07-27 LAB
BACTERIA UR CULT: NORMAL
SIGNIFICANT IND 70042: NORMAL
SITE SITE: NORMAL
SOURCE SOURCE: NORMAL

## 2025-07-28 ENCOUNTER — RESULTS FOLLOW-UP (OUTPATIENT)
Dept: MEDICAL GROUP | Facility: IMAGING CENTER | Age: 85
End: 2025-07-28
Payer: MEDICARE

## 2025-07-30 ENCOUNTER — TELEPHONE (OUTPATIENT)
Dept: MEDICAL GROUP | Facility: IMAGING CENTER | Age: 85
End: 2025-07-30
Payer: MEDICARE

## 2025-07-30 NOTE — TELEPHONE ENCOUNTER
VOICEMAIL  1. Caller Name: Flavia Garrett                        Call Back Number: [phone] (home)       2. Message: pt called stating that she was feeling dizzy and weak at PT today, they took her BP it was 187/81, waited 25 mins, rechecked 170/84 and took once more before leaving PT at 162/79.     3. Patient approves office to leave a detailed voicemail/MyChart message: N\A

## 2025-08-19 ENCOUNTER — HOSPITAL ENCOUNTER (OUTPATIENT)
Dept: LAB | Facility: MEDICAL CENTER | Age: 85
End: 2025-08-19
Attending: NURSE PRACTITIONER
Payer: MEDICARE

## 2025-08-19 DIAGNOSIS — D80.3 IGG DEFICIENCY (HCC): ICD-10-CM

## 2025-08-19 DIAGNOSIS — J98.8 RECURRENT RESPIRATORY INFECTION: ICD-10-CM

## 2025-08-19 PROCEDURE — 36415 COLL VENOUS BLD VENIPUNCTURE: CPT

## 2025-08-19 PROCEDURE — 82787 IGG 1 2 3 OR 4 EACH: CPT

## 2025-08-21 LAB
IGG1 SER-MCNC: 305 MG/DL (ref 240–1118)
IGG2 SER-MCNC: 130 MG/DL (ref 124–549)
IGG3 SER-MCNC: 71 MG/DL (ref 21–134)
IGG4 SER-MCNC: 16 MG/DL (ref 1–123)

## 2025-08-25 ENCOUNTER — OFFICE VISIT (OUTPATIENT)
Dept: SLEEP MEDICINE | Facility: MEDICAL CENTER | Age: 85
End: 2025-08-25
Attending: NURSE PRACTITIONER
Payer: MEDICARE

## 2025-08-25 VITALS
HEIGHT: 59 IN | HEART RATE: 85 BPM | RESPIRATION RATE: 16 BRPM | SYSTOLIC BLOOD PRESSURE: 102 MMHG | BODY MASS INDEX: 37.09 KG/M2 | OXYGEN SATURATION: 96 % | WEIGHT: 184 LBS | DIASTOLIC BLOOD PRESSURE: 70 MMHG

## 2025-08-25 DIAGNOSIS — G47.34 NOCTURNAL HYPOXIA: ICD-10-CM

## 2025-08-25 DIAGNOSIS — J45.909 REACTIVE AIRWAY DISEASE WITHOUT COMPLICATION, UNSPECIFIED ASTHMA SEVERITY, UNSPECIFIED WHETHER PERSISTENT: ICD-10-CM

## 2025-08-25 DIAGNOSIS — R05.3 CHRONIC COUGH: ICD-10-CM

## 2025-08-25 DIAGNOSIS — Z78.9 NONSMOKER: ICD-10-CM

## 2025-08-25 DIAGNOSIS — R06.02 SOB (SHORTNESS OF BREATH): Primary | ICD-10-CM

## 2025-08-25 PROBLEM — R06.09 DOE (DYSPNEA ON EXERTION): Status: RESOLVED | Noted: 2023-09-11 | Resolved: 2025-08-25

## 2025-08-25 PROCEDURE — 94761 N-INVAS EAR/PLS OXIMETRY MLT: CPT | Performed by: NURSE PRACTITIONER

## 2025-08-25 PROCEDURE — 99215 OFFICE O/P EST HI 40 MIN: CPT | Mod: 25 | Performed by: NURSE PRACTITIONER

## 2025-08-25 PROCEDURE — 3078F DIAST BP <80 MM HG: CPT | Performed by: NURSE PRACTITIONER

## 2025-08-25 PROCEDURE — 99213 OFFICE O/P EST LOW 20 MIN: CPT | Performed by: NURSE PRACTITIONER

## 2025-08-25 PROCEDURE — 3074F SYST BP LT 130 MM HG: CPT | Performed by: NURSE PRACTITIONER

## 2025-08-25 ASSESSMENT — FIBROSIS 4 INDEX: FIB4 SCORE: 1.94

## 2025-08-26 ENCOUNTER — OFFICE VISIT (OUTPATIENT)
Facility: MEDICAL CENTER | Age: 85
End: 2025-08-26
Attending: INTERNAL MEDICINE
Payer: MEDICARE

## 2025-08-26 VITALS
SYSTOLIC BLOOD PRESSURE: 114 MMHG | WEIGHT: 187 LBS | BODY MASS INDEX: 37.7 KG/M2 | OXYGEN SATURATION: 93 % | HEIGHT: 59 IN | RESPIRATION RATE: 16 BRPM | HEART RATE: 95 BPM | DIASTOLIC BLOOD PRESSURE: 70 MMHG

## 2025-08-26 DIAGNOSIS — I10 ESSENTIAL HYPERTENSION: ICD-10-CM

## 2025-08-26 DIAGNOSIS — R53.81 MALAISE AND FATIGUE: ICD-10-CM

## 2025-08-26 DIAGNOSIS — R53.83 MALAISE AND FATIGUE: ICD-10-CM

## 2025-08-26 DIAGNOSIS — I50.32 CHRONIC HEART FAILURE WITH PRESERVED EJECTION FRACTION (HFPEF) (HCC): Primary | ICD-10-CM

## 2025-08-26 DIAGNOSIS — J98.6 CHRONICALLY ELEVATED HEMIDIAPHRAGM: ICD-10-CM

## 2025-08-26 DIAGNOSIS — R25.2 BILATERAL LEG CRAMPS: ICD-10-CM

## 2025-08-26 PROCEDURE — 3074F SYST BP LT 130 MM HG: CPT | Performed by: INTERNAL MEDICINE

## 2025-08-26 PROCEDURE — 99214 OFFICE O/P EST MOD 30 MIN: CPT | Performed by: INTERNAL MEDICINE

## 2025-08-26 PROCEDURE — 99213 OFFICE O/P EST LOW 20 MIN: CPT | Performed by: INTERNAL MEDICINE

## 2025-08-26 PROCEDURE — 3078F DIAST BP <80 MM HG: CPT | Performed by: INTERNAL MEDICINE

## 2025-08-26 RX ORDER — LOSARTAN POTASSIUM 25 MG/1
25 TABLET ORAL 2 TIMES DAILY
Qty: 180 TABLET | Refills: 3 | Status: SHIPPED | OUTPATIENT
Start: 2025-08-26

## 2025-08-26 ASSESSMENT — FIBROSIS 4 INDEX: FIB4 SCORE: 1.94
